# Patient Record
Sex: FEMALE | Race: WHITE | NOT HISPANIC OR LATINO | Employment: UNEMPLOYED | URBAN - METROPOLITAN AREA
[De-identification: names, ages, dates, MRNs, and addresses within clinical notes are randomized per-mention and may not be internally consistent; named-entity substitution may affect disease eponyms.]

---

## 2019-10-07 ENCOUNTER — HOSPITAL ENCOUNTER (EMERGENCY)
Facility: MEDICAL CENTER | Age: 34
End: 2019-10-07
Attending: EMERGENCY MEDICINE
Payer: COMMERCIAL

## 2019-10-07 VITALS
RESPIRATION RATE: 16 BRPM | HEART RATE: 75 BPM | SYSTOLIC BLOOD PRESSURE: 118 MMHG | TEMPERATURE: 96.8 F | WEIGHT: 219.14 LBS | BODY MASS INDEX: 35.22 KG/M2 | HEIGHT: 66 IN | OXYGEN SATURATION: 98 % | DIASTOLIC BLOOD PRESSURE: 72 MMHG
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
## 2019-10-07 LAB
HBV CORE AB SERPL QL IA: NEGATIVE
HBV SURFACE AB SERPL IA-ACNC: 118.52 MIU/ML (ref 0–10)
HBV SURFACE AG SER QL: NEGATIVE
HCV AB SER QL: NEGATIVE
HIV 1+2 AB+HIV1 P24 AG SERPL QL IA: NON REACTIVE

## 2019-10-07 PROCEDURE — 87340 HEPATITIS B SURFACE AG IA: CPT

## 2019-10-07 PROCEDURE — 86803 HEPATITIS C AB TEST: CPT

## 2019-10-07 PROCEDURE — 87389 HIV-1 AG W/HIV-1&-2 AB AG IA: CPT

## 2019-10-07 PROCEDURE — 36415 COLL VENOUS BLD VENIPUNCTURE: CPT

## 2019-10-07 PROCEDURE — 86704 HEP B CORE ANTIBODY TOTAL: CPT

## 2019-10-07 PROCEDURE — 99283 EMERGENCY DEPT VISIT LOW MDM: CPT

## 2019-10-07 PROCEDURE — 86706 HEP B SURFACE ANTIBODY: CPT

## 2019-10-07 NOTE — DISCHARGE INSTRUCTIONS
Please go to [facility] health tomorrow to review all of your lab results.  I recommend that the source patient at [facility] dialysis is tested today for transmissible infectious disease including hepatitis B and C, as well as HIV.  Return to the emergency department if you develop any new or worsening symptoms including worsening pain or swelling in the thumb, redness, fevers, generalized body aches, or if you have any further concerns.

## 2019-10-07 NOTE — LETTER
"  FORM C-4:  EMPLOYEE’S CLAIM FOR COMPENSATION/ REPORT OF INITIAL TREATMENT  EMPLOYEE’S CLAIM - PROVIDE ALL INFORMATION REQUESTED   First Name  Fela Last Name  Te Birthdate             Age  1985 34 y.o. Sex  female Claim Number   Home Employee Address  [address]   [facility]                                     Zip  [address] Height  1.676 m (5' 6\") Weight  99.4 kg (219 lb 2.2 oz) N  [id number]   Mailing Employee Address                           PO    [facility]               Zip  [address] Telephone  [phone] (home)  Primary Language Spoken  ENGLISH   Insurer   Third Party   ROYAL CLAIMS [id number] Employee's Occupation (Job Title) When Injury or Occupational Disease Occurred     Employer's Name  [employer] Tele[employer]      Employer Address  [address]] Zip  [address]   Date of Injury  10/7/2019       Hour of Injury  8:15 AM Date Employer Notified  10/7/2019 Last Day of Work after Injury or Occupational Disease  10/7/2019 Supervisor to Whom Injury Reported  AZAR    Address or Location of Accident (if applicable)  [[address] ]   What were you doing at the time of accident? (if applicable)  NEEDLE INSERTION     How did this injury or occupational disease occur? Be specific and answer in detail. Use additional sheet if necessary)  INSERTED ARTERIAL NEEDLE, INSERTED VENOUS NEEDLE AND ARTERIAL NEEDLE PROTRUDED THROUGH SKIN PUNCTURING MY LEFT THUMB    If you believe that you have an occupational disease, when did you first have knowledge of the disability and it relationship to your employment?  N/A Witnesses to the Accident  N/A     Nature of Injury or Occupational Disease  Workers' Compensation  Part(s) of Body Injured or Affected  Thumb (L), N/A, N/A    I certify that the above is true and correct to the best of my knowledge and that I have provided this information in order to obtain the " benefits of [place]).  I hereby authorize any physician, chiropractor, surgeon, practitioner, or other person, any hospital, including [facility] or [facility] hospital, any medical service organization, any insurance company, or other institution or organization to release to each other, any medical or other information, including benefits paid or payable, pertinent to this injury or disease, except information relative to diagnosis, treatment and/or counseling for AIDS, psychological conditions, alcohol or controlled substances, for which I must give specific authorization.  A Photostat of this authorization shall be as valid as the original.   Date  10/07/2019 Place  [facility] Employee’s Signature   THIS REPORT MUST BE COMPLETED AND MAILED WITHIN 3 WORKING DAYS OF TREATMENT   Place  [facility], EMERGENCY DEPT  Name of Facility   [facility]   Date  10/7/2019 Diagnosis  (W46.1XXA) Needlestick injury accident with exposure to body fluid Is there evidence the injured employee was under the influence of alcohol and/or another controlled substance at the time of accident?   Hour  10:55 AM Description of Injury or Disease  Needlestick injury accident with exposure to body fluid No   Treatment  pending  Have you advised the patient to remain off work five days or more?         No   X-Ray Findings      If Yes   From Date    To Date      From information given by the employee, together with medical evidence, can you directly connect this injury or occupational disease as job incurred?  Yes If No, is the employee capable of: Full Duty  Yes Modified Duty      Is additional medical care by a physician indicated?  Yes If Modified Duty, Specify any Limitations / Restrictions        Do you know of any previous injury or disease contributing to this condition or occupational  "disease?  No   Date  10/7/2019 Print Doctor’s Name  Gunner Rivas certify the employer’s copy of this form was mailed on:   Address  [address] [address] [phone] Insurer’s Use Only   [facility]  [address]    Provider’s Tax ID Number  [id number] Telephone  Dept: [phone]    Doctor’s Signature  e-GUNNER Boone M.D. Degree   MD    Original - TREATING PHYSICIAN OR CHIROPRACTOR   Pg 2-Insurer/TPA   Pg 3-Employer   Pg 4-Employee                                                                                                  Form C-4 (rev01/03)     BRIEF DESCRIPTION OF RIGHTS AND BENEFITS  (Pursuant to [place] 616C.050)    Notice of Injury or Occupational Disease (Incident Report Form C-1): If an injury or occupational disease (OD) arises out of and in the course of employment, you must provide written notice to your employer as soon as practicable, but no later than 7 days after the accident or OD. Your employer shall maintain a sufficient supply of the required forms.  Claim for Compensation (Form C-4): If medical treatment is sought, the form C-4 is available at the place of initial treatment. A completed \"Claim for Compensation\" (Form C-4) must be filed within 90 days after an accident or OD. The treating physician or chiropractor must, within 3 working days after treatment, complete and mail to the employer, the employer's insurer and third-party , the Claim for Compensation.  Medical Treatment: If you require medical treatment for your on-the-job injury or OD, you may be required to select a physician or chiropractor from a list provided by your workers’ compensation insurer, if it has contracted with an Organization for Managed Care (MCO) or Preferred Provider Organization (PPO) or providers of health care. If your employer has not entered into a contract with an MCO or PPO, you may select a physician or chiropractor from the Panel of Physicians and " Chiropractors. Any medical costs related to your industrial injury or OD will be paid by your insurer.  Temporary Total Disability (TTD): If your doctor has certified that you are unable to work for a period of at least 5 consecutive days, or 5 cumulative days in a 20-day period, or places restrictions on you that your employer does not accommodate, you may be entitled to TTD compensation.  Temporary Partial Disability (TPD): If the wage you receive upon reemployment is less than the compensation for TTD to which you are entitled, the insurer may be required to pay you TPD compensation to make up the difference. TPD can only be paid for a maximum of 24 months.  Permanent Partial Disability (PPD): When your medical condition is stable and there is an indication of a PPD as a result of your injury or OD, within 30 days, your insurer must arrange for an evaluation by a rating physician or chiropractor to determine the degree of your PPD. The amount of your PPD award depends on the date of injury, the results of the PPD evaluation and your age and wage.  Permanent Total Disability (PTD): If you are medically certified by a treating physician or chiropractor as permanently and totally disabled and have been granted a PTD status by your insurer, you are entitled to receive monthly benefits not to exceed 66 2/3% of your average monthly wage. The amount of your PTD payments is subject to reduction if you previously received a PPD award.  Vocational Rehabilitation Services: You may be eligible for vocational rehabilitation services if you are unable to return to the job due to a permanent physical impairment or permanent restrictions as a result of your injury or occupational disease.  Transportation and Per Leonor Reimbursement: You may be eligible for travel expenses and per leonor associated with medical treatment.  Reopening: You may be able to reopen your claim if your condition worsens after claim closure.  Appeal Process:  If you disagree with a written determination issued by the insurer or the insurer does not respond to your request, you may appeal to the Department of Administration, , by following the instructions contained in your determination letter. You must appeal the determination within 70 days from the date of the determination letter at [address], [address], [place], [place] [address], or [address], [address], [place], [place] [address]. If you disagree with the  decision, you may appeal to the Department of Administration, . You must file your appeal within 30 days from the date of the  decision letter at [address], [address], [place], [place] [address], or [address], [address], [place], [place] [address]. If you disagree with a decision of an , you may file a petition for judicial review with the District Court. You must do so within 30 days of the Appeal Officer’s decision. You may be represented by an  at your own expense or you may contact the [facility] for possible representation.  [place]  for Injured Workers (NAIW): If you disagree with a  decision, you may request that NAIW represent you without charge at an  Hearing. For information regarding denial of benefits, you may contact the [facility] at: [address], [address], [place] [address], [phone], or [address], [address], [place] [address], [phone]  To File a Complaint with the Division: If you wish to file a complaint with the  of the Division of Industrial Relations (DIR), please contact the Workers’ Compensation Section, [address], [address], [place], [place] [address], telephone (874) [phone], or [address], [place] [address], telephone [phone].  For assistance with Workers’ Compensation Issues: you  may contact the Office of the Governor Consumer Health Assistance, [address] [place] [address], [address], [place] [address], Toll Free 1-308.113.6364, Web site: http://govcha.[facility].nv.us, E-mail [email or web address].                                                                                                                                                                               __________________________________________________________________                                    _________________            Employee Name / Signature                                                                                                                            Date                                       D-2 (rev. 10/07)

## 2019-10-07 NOTE — ED PROVIDER NOTES
ED Provider Note    Chief Complaint:   Needlestick injury    HPI:  Fela Tiwari is a 34 y.o. female who presents with a needlestick injury to the left thumb.  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  She reports that the source patient is known to be hepatitis B positive, she reports that she has been fully vaccinated against hepatitis B.  Additionally her titers have been checked and were found to be adequate.  She thoroughly cleaned the wound, and presented to the emergency department for further evaluation.    She has no history of infectious disease, no history of poor wound healing, no history of impaired immunity.    Source patient is a [facility] dialysis patient.    Review of Systems:  See HPI for pertinent positives and negatives.    Past Medical History:   has a past medical history of Breath shortness, Bronchitis (2014), Cholesterol blood decreased, Heart burn, Personal history of venous thrombosis and embolism (&), and Unspecified hemorrhagic conditions.    Social History:  Social History     Tobacco Use   • Smoking status: Former Smoker     Packs/day: 0.50     Years: 6.00     Pack years: 3.00     Types: Cigarettes     Last attempt to quit: 3/1/2005     Years since quittin.6   Substance and Sexual Activity   • Alcohol use: No   • Drug use: No   • Sexual activity: Not on file       Surgical History:   has a past surgical history that includes tonsillectomy (); liposuction (2009); liposuction (3/4/2010); other; hysterectomy robotic (2013); gastric sleeve laparoscopy (2014); liver biopsy laparoscopic (2014); and jordon by laparoscopy (2015).    Current Medications:  Home Medications    **Home medications have not yet been reviewed for this encounter**         Allergies:  Allergies   Allergen Reactions   • Morphine Sulfate Itching       Physical Exam:  Vital Signs: /72   Pulse 75   Temp 36 °C (96.8 °F)  "(Temporal)   Resp 16   Ht 1.676 m (5' 6\")   Wt 99.4 kg (219 lb 2.2 oz)   LMP 02/10/2010   SpO2 98%   BMI 35.37 kg/m²   Constitutional: Alert, no acute distress, well appearing  HENT: Normocephalic  Eyes: Normal conjunctiva, no ocular irritation  Cardiovascular: Extremities are warm and well perfused  Pulmonary: No respiratory distress, normal work of breathing  Skin: Needle secondary to the left thumb, left thumb is clean, skin is dry and intact, no visible wound  Neurologic: Normal speech, normal motor function  Psychiatric: Normal and appropriate mood and affect    Labs:  Labs Reviewed   EXPOSED PERSON-SOURCE PT NEGATIVE (BLOOD & BODY FLUID EXPOSURE) - Abnormal; Notable for the following components:       Result Value    Hep B Surface Antibody Quant 118.52 (*)     All other components within normal limits       MDM:  Patient presents for evaluation after an occupational exposure. Affected area was copiously irrigated and cleansed prior to arrival. Post exposure labs were drawn from exposed patient. At this time, this appears to be a low risk exposure, emergent HIV post exposure prophylaxis was discussed and is not indicated on my initial assessment.  Patient does not have a known history of HIV.  CDC guidelines reviewed (MMWR December 2013), recommendations for vaccinated healthcare personnel with anti-HBs > 10mIU/mL no postexposure management for HPV is necessary, regardless of source patient's HBsAg status.  Hepatitis B surface antibody result is 118, well over 10mIU/mL. The patient will follow up with [facility] within 12-24 hours for complete recheck, and to receive results of post-exposure labs.     Return precautions discussed including redness or irritation at the site of exposure, fevers, nausea or vomiting, or any new or worsening symptoms.    Disposition:  Discharge home in stable condition.    Final Impression:  1. Needlestick injury accident with exposure to body fluid  "       Electronically signed by: Esme Rivas, 10/7/2019 11:41 AM

## 2019-10-07 NOTE — ED NOTES
Pt had a needle stick at [facility].  Per pt the needle stick was from a Hep B+ pt.  erp at bedside

## 2019-10-10 ENCOUNTER — OCCUPATIONAL MEDICINE (OUTPATIENT)
Dept: OCCUPATIONAL MEDICINE | Facility: CLINIC | Age: 34
End: 2019-10-10
Payer: COMMERCIAL

## 2019-10-10 DIAGNOSIS — Z77.21 EXPOSURE TO BLOOD OR BODY FLUID: ICD-10-CM

## 2019-10-10 PROCEDURE — 99203 OFFICE O/P NEW LOW 30 MIN: CPT | Mod: 29 | Performed by: NURSE PRACTITIONER

## 2019-10-10 ASSESSMENT — ENCOUNTER SYMPTOMS
PSYCHIATRIC NEGATIVE: 1
RESPIRATORY NEGATIVE: 1
NEUROLOGICAL NEGATIVE: 1
CONSTITUTIONAL NEGATIVE: 1
MUSCULOSKELETAL NEGATIVE: 1
CARDIOVASCULAR NEGATIVE: 1

## 2019-10-10 NOTE — PROGRESS NOTES
Subjective:      Fela Tiwari is a 34 y.o. female who presents with Other (NEW )      DOI 10/7/19: Patient was doing needle insertion.  Inserted arterial needle, inserted venous needle and arterial needle protruded through skin puncturing my left thumb.needlestick injury to the left thumb.  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  She reports that the source patient is known to be hepatitis B positive, she reports that she has been fully vaccinated against hepatitis B.  Additionally her titers have been checked and were found to be adequate.  Today patient states overall improvement.  Denies any redness, edema, warmth, or discharge.  Baseline labs reviewed with patient.  Patient has immunity against hepatitis B, hep C neg and HIV non-reactive.  Patient is asymptomatic.  Patient states that source labs for hep C and HIV were drawn yesterday, results pending and unsure where labs were sent.  Plan of care discussed with patient     HPI    Review of Systems   Constitutional: Negative.    Respiratory: Negative.    Cardiovascular: Negative.    Musculoskeletal: Negative.    Skin: Negative.    Neurological: Negative.    Psychiatric/Behavioral: Negative.         ROS: All systems were reviewed on intake form, form was reviewed and signed. See scanned documents in media. Pertinent positives and negatives included in HPI.    PMH: No pertinent past medical history to this problem  MEDS: Medications were reviewed in Epic  ALLERGIES:   Allergies   Allergen Reactions   • Morphine Sulfate Itching     SOCHX: Works as CCHT at [employer]  FH: No pertinent family history to this problem       Objective:     LMP 02/10/2010      Physical Exam   Constitutional: She is oriented to person, place, and time. She appears well-developed and well-nourished.   Cardiovascular: Normal rate and regular rhythm.   Pulmonary/Chest: Effort normal.   Musculoskeletal: Normal  range of motion. She exhibits no edema, tenderness or deformity.   Neurological: She is alert and oriented to person, place, and time.   Skin: Skin is warm and dry. Capillary refill takes less than 2 seconds. No erythema.   Psychiatric: She has a normal mood and affect. Her behavior is normal.       Left thumb:  Skin is clean, dry, and intact.  Negative erythema, edema, warmth, or discharge  Sensation intact  Full range of motion  Brisk cap refill       Assessment/Plan:     1. Exposure to blood or body fluid    Follow-up next Tuesday, to review source labs  Released to full duty  Source known positive for hep B   Discussed risks and benefits of HIV prophylaxis, due to the low risk nature of this incident no HIV prophylaxis was recommended.  Patient amenable to this.

## 2019-10-10 NOTE — LETTER
[address],   [address] [place] [address]  Phone:  [phone] - Fax:  [phone]   [facility] Progress Report and Disability Certification  Date of Service: 10/10/2019   No Show:  No  Date / Time of Next Visit: 10/15/2019 @ 3:45 PM   Claim Information   Patient Name: Fela Tiwari  Claim Number:     Employer: [employer]  Date of Injury: 10/7/2019     Insurer / TPA: [employer]  ID / SSN:     Occupation: [employer]  Diagnosis: The encounter diagnosis was Exposure to blood or body fluid.    Medical Information   Related to Industrial Injury? Yes    Subjective Complaints:  DOI 10/7/19: Patient was doing needle insertion.  Inserted arterial needle, inserted venous needle and arterial needle protruded through skin puncturing my left thumb.needlestick injury to the left thumb.  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  She reports that the source patient is known to be hepatitis B positive, she reports that she has been fully vaccinated against hepatitis B.  Additionally her titers have been checked and were found to be adequate.  Today patient states overall improvement.  Denies any redness, edema, warmth, or discharge.  Baseline labs reviewed with patient.  Patient has immunity against hepatitis B, hep C neg and HIV non-reactive.  Patient is asymptomatic.  Patient states that source labs for hep C and HIV were drawn yesterday, results pending and unsure where labs were sent.  Plan of care discussed with patient   Objective Findings: Left thumb:  Skin is clean, dry, and intact.  Negative erythema, edema, warmth, or discharge  Sensation intact  Full range of motion  Brisk cap refill   Pre-Existing Condition(s):     Assessment:   Condition Improved    Status: Additional Care Required  Permanent Disability:No    Plan:      Diagnostics:      Comments:  Follow-up next  Tuesday, to review source labs  Released to full duty  Source known positive for hep B   Discussed risks and benefits of HIV prophylaxis, due to the low risk nature of this incident no HIV prophylaxis was recommended.  Patient amenable   to this.      Disability Information   Status: Released to Full Duty    From:  10/10/2019  Through: 10/15/2019 Restrictions are:     Physical Restrictions   Sitting:    Standing:    Stooping:    Bending:      Squatting:    Walking:    Climbing:    Pushing:      Pulling:    Other:    Reaching Above Shoulder (L):   Reaching Above Shoulder (R):       Reaching Below Shoulder (L):    Reaching Below Shoulder (R):      Not to exceed Weight Limits   Carrying(hrs):   Weight Limit(lb):   Lifting(hrs):   Weight  Limit(lb):     Comments:      Repetitive Actions   Hands: i.e. Fine Manipulations from Grasping:     Feet: i.e. Operating Foot Controls:     Driving / Operate Machinery:     Physician Name: JAUN Hayden Physician Signature:   e-Signature: Dr. Wilder Ocasio, Medical Director   Clinic Name / Location: [address] [address] Clinic Phone Number: Dept: [phone]   Appointment Time: 11:30 Am Visit Start Time: 11:38 AM   Check-In Time:  11:30 Am Visit Discharge Time:  12:13 PM   Original-Treating Physician or Chiropractor    Page 2-Insurer/TPA    Page 3-Employer    Page 4-Employee

## 2020-05-31 ENCOUNTER — HOSPITAL ENCOUNTER (EMERGENCY)
Dept: HOSPITAL 8 - ED | Age: 35
Discharge: HOME | End: 2020-05-31
Payer: COMMERCIAL

## 2020-05-31 VITALS — WEIGHT: 224.43 LBS | HEIGHT: 66 IN | BODY MASS INDEX: 36.07 KG/M2

## 2020-05-31 VITALS — SYSTOLIC BLOOD PRESSURE: 110 MMHG | DIASTOLIC BLOOD PRESSURE: 78 MMHG

## 2020-05-31 DIAGNOSIS — M62.830: ICD-10-CM

## 2020-05-31 DIAGNOSIS — R00.0: ICD-10-CM

## 2020-05-31 DIAGNOSIS — Z86.711: ICD-10-CM

## 2020-05-31 DIAGNOSIS — Z90.710: ICD-10-CM

## 2020-05-31 DIAGNOSIS — R06.02: ICD-10-CM

## 2020-05-31 DIAGNOSIS — R06.00: Primary | ICD-10-CM

## 2020-05-31 LAB
ALBUMIN SERPL-MCNC: 3.8 G/DL (ref 3.4–5)
ALP SERPL-CCNC: 55 U/L (ref 45–117)
ALT SERPL-CCNC: 29 U/L (ref 12–78)
ANION GAP SERPL CALC-SCNC: 6 MMOL/L (ref 5–15)
BASOPHILS # BLD AUTO: 0.06 X10^3/UL (ref 0–0.1)
BASOPHILS NFR BLD AUTO: 1 % (ref 0–1)
BILIRUB SERPL-MCNC: 1.1 MG/DL (ref 0.2–1)
CALCIUM SERPL-MCNC: 8.9 MG/DL (ref 8.5–10.1)
CHLORIDE SERPL-SCNC: 110 MMOL/L (ref 98–107)
CREAT SERPL-MCNC: 1.22 MG/DL (ref 0.55–1.02)
EOSINOPHIL # BLD AUTO: 0.06 X10^3/UL (ref 0–0.4)
EOSINOPHIL NFR BLD AUTO: 1 % (ref 1–7)
ERYTHROCYTE [DISTWIDTH] IN BLOOD BY AUTOMATED COUNT: 12.8 % (ref 9.6–15.2)
LYMPHOCYTES # BLD AUTO: 2.24 X10^3/UL (ref 1–3.4)
LYMPHOCYTES NFR BLD AUTO: 26 % (ref 22–44)
MCH RBC QN AUTO: 31.7 PG (ref 27–34.8)
MCHC RBC AUTO-ENTMCNC: 33.5 G/DL (ref 32.4–35.8)
MCV RBC AUTO: 94.8 FL (ref 80–100)
MD: NO
MONOCYTES # BLD AUTO: 0.49 X10^3/UL (ref 0.2–0.8)
MONOCYTES NFR BLD AUTO: 6 % (ref 2–9)
NEUTROPHILS # BLD AUTO: 5.7 X10^3/UL (ref 1.8–6.8)
NEUTROPHILS NFR BLD AUTO: 67 % (ref 42–75)
PLATELET # BLD AUTO: 189 X10^3/UL (ref 130–400)
PMV BLD AUTO: 9.9 FL (ref 7.4–10.4)
PROT SERPL-MCNC: 7 G/DL (ref 6.4–8.2)
RBC # BLD AUTO: 4.78 X10^6/UL (ref 3.82–5.3)

## 2020-05-31 PROCEDURE — 36415 COLL VENOUS BLD VENIPUNCTURE: CPT

## 2020-05-31 PROCEDURE — 85025 COMPLETE CBC W/AUTO DIFF WBC: CPT

## 2020-05-31 PROCEDURE — 93005 ELECTROCARDIOGRAM TRACING: CPT

## 2020-05-31 PROCEDURE — 80053 COMPREHEN METABOLIC PANEL: CPT

## 2020-05-31 PROCEDURE — 99285 EMERGENCY DEPT VISIT HI MDM: CPT

## 2020-05-31 PROCEDURE — 96374 THER/PROPH/DIAG INJ IV PUSH: CPT

## 2020-05-31 PROCEDURE — 71275 CT ANGIOGRAPHY CHEST: CPT

## 2020-05-31 NOTE — NUR
PT MEDICATED FOR PAIN PER ORDERS. PT AWARE OF POC, PT TO BE D/C AFTER MEDS WHEN 
SAFE TO GO HOME. ER OPA-C AT BEDSIDE TO DISCUSS POC WITH PT. PT REMAINS ON 
MONITORS, VSS. CONT TO MONITOR,.

## 2020-05-31 NOTE — NUR
PT STATES SOB AND RT UPPER BACK PAIN, WORSE WITH DEEP BREATHING. PT STATES HX 
OF PE. PT IS PROTECTING OWN AIRWAY WELL, NO RESPIR DISTRESS NOTED. PT PLACED ON 
ALL MONITORS, VSS. IV STARTED, LABS DRAWN AND SENT TO LAB. AWAITING CT. CONT TO 
MONITOR.

## 2020-09-23 ENCOUNTER — OFFICE VISIT (OUTPATIENT)
Dept: URGENT CARE | Facility: CLINIC | Age: 35
End: 2020-09-23
Payer: MEDICAID

## 2020-09-23 VITALS
DIASTOLIC BLOOD PRESSURE: 64 MMHG | HEART RATE: 85 BPM | WEIGHT: 205 LBS | HEIGHT: 66 IN | BODY MASS INDEX: 32.95 KG/M2 | SYSTOLIC BLOOD PRESSURE: 112 MMHG | TEMPERATURE: 97.5 F | OXYGEN SATURATION: 96 % | RESPIRATION RATE: 16 BRPM

## 2020-09-23 DIAGNOSIS — L29.9 PRURITUS: ICD-10-CM

## 2020-09-23 DIAGNOSIS — R21 RASH AND NONSPECIFIC SKIN ERUPTION: ICD-10-CM

## 2020-09-23 PROCEDURE — 99214 OFFICE O/P EST MOD 30 MIN: CPT | Performed by: NURSE PRACTITIONER

## 2020-09-23 RX ORDER — HYDROXYZINE HYDROCHLORIDE 25 MG/1
25 TABLET, FILM COATED ORAL EVERY 6 HOURS PRN
Qty: 30 TAB | Refills: 0 | Status: SHIPPED | OUTPATIENT
Start: 2020-09-23 | End: 2022-05-28

## 2020-09-23 RX ORDER — PREDNISONE 20 MG/1
TABLET ORAL
Qty: 15 TAB | Refills: 0 | Status: SHIPPED | OUTPATIENT
Start: 2020-09-23 | End: 2022-05-28

## 2020-09-23 RX ORDER — DIPHENHYDRAMINE HCL 25 MG
25 TABLET ORAL EVERY 6 HOURS PRN
COMMUNITY
End: 2022-05-28

## 2020-09-23 ASSESSMENT — ENCOUNTER SYMPTOMS
CHILLS: 0
CONSTITUTIONAL NEGATIVE: 1
RESPIRATORY NEGATIVE: 1
FEVER: 0

## 2020-09-23 ASSESSMENT — VISUAL ACUITY: OU: 1

## 2020-09-23 NOTE — PROGRESS NOTES
Subjective:     Fela Tiwari is a 35 y.o. female who presents for Rash (x 5 weeks all over the body)       Rash  This is a new problem. Episode onset: 5 weeks ago. The problem has been gradually worsening since onset. Pertinent negatives include no fever.     Patient reports 5 weeks ago, she started to experience generalized itchiness and bumps on her skin all over her body.  Symptoms come and go.  Has tried using Benadryl and topical creams with minimal improvement in the symptoms.  Denies known allergens except for morphine.  Denies changes in soaps or laundry detergents.  Patient reports that 5 weeks ago she went camping with her friend.  Reports that her friend was exposed to poison oak and had symptoms consisting of blistering and itching of the skin.    Patient was screened prior to rooming and denied COVID-19 diagnosis or contact with a person who has been diagnosed or is suspected to have COVID-19. During this visit, appropriate PPE was worn, hand hygiene was performed, and the patient and any visitors were masked.     PMH:  has a past medical history of Breath shortness, Bronchitis (6/2014), Cholesterol blood decreased, Heart burn, Personal history of venous thrombosis and embolism (2005&2012), and Unspecified hemorrhagic conditions.    MEDS:   Current Outpatient Medications:   •  diphenhydrAMINE (BENADRYL) 25 MG Tab, Take 25 mg by mouth every 6 hours as needed for Sleep., Disp: , Rfl:   •  predniSONE (DELTASONE) 20 MG Tab, Take 3 tabs daily x 3 days, then 2 tabs daily x 2 days, then 1 tab x 2 days., Disp: 15 Tab, Rfl: 0  •  hydrOXYzine HCl (ATARAX) 25 MG Tab, Take 1 Tab by mouth every 6 hours as needed for Itching., Disp: 30 Tab, Rfl: 0  •  hydrocodone-acetaminophen 2.5-108 mg/5mL (HYCET) 7.5-325 MG/15ML solution, Take 18 mL by mouth 4 times a day as needed for Moderate Pain., Disp: 240 mL, Rfl: 0  •  oxycodone-acetaminophen (ROXICET) 5-325 MG/5ML SOLN, Take 5 mL by mouth every four hours as  "needed., Disp: 240 mL, Rfl: 0  •  enoxaparin (LOVENOX) 80 MG/0.8ML SOLN inj, Inject 1 Syringe as instructed every 12 hours., Disp: , Rfl:   •  lansoprazole (PREVACID) 15 MG CPDR, Take 15 mg by mouth every day., Disp: , Rfl:   •  rivaroxaban (XARELTO) 20 MG TABS tablet, Take 1 Tab by mouth every morning., Disp: 30 Tab, Rfl:     ALLERGIES:   Allergies   Allergen Reactions   • Morphine Sulfate Itching     SURGHX:   Past Surgical History:   Procedure Laterality Date   • DIEGO BY LAPAROSCOPY  2015    Performed by Dre Malone M.D. [place] [facility] [place]   • GASTRIC SLEEVE LAPAROSCOPY  2014    Performed by Dre Malone M.D. at [place] [facility] [place]   • LIVER BIOPSY LAPAROSCOPIC  2014    Performed by Dre Malone M.D. at [facility] [facility] [place]   • HYSTERECTOMY ROBOTIC  2013   • LIPOSUCTION  3/4/2010    Performed by RAMON NUNN at SURGERY [facility]   • LIPOSUCTION  2009    Performed by RAMON NUNN at [facility]   • TONSILLECTOMY     • OTHER       x2     SOCHX:  reports that she quit smoking about 15 years ago. Her smoking use included cigarettes. She has a 3.00 pack-year smoking history. She does not have any smokeless tobacco history on file. She reports that she does not drink alcohol or use drugs.     FH: Reviewed with patient, not pertinent to this visit.    Review of Systems   Constitutional: Negative.  Negative for chills, fever and malaise/fatigue.   Respiratory: Negative.    Skin: Positive for itching and rash.   All other systems reviewed and are negative.    Additional details per HPI.      Objective:     /64 (BP Location: Left arm, Patient Position: Sitting, BP Cuff Size: Adult long)   Pulse 85   Temp 36.4 °C (97.5 °F) (Temporal)   Resp 16   Ht 1.676 m (5' 6\")   Wt 93 kg (205 lb)   LMP 02/10/2010   SpO2 96%   BMI 33.09 kg/m²     Physical Exam  Vitals signs reviewed.   Constitutional:       General: She is not in acute " distress.     Appearance: She is well-developed. She is not ill-appearing or toxic-appearing.   HENT:      Head: Normocephalic.      Right Ear: External ear normal.      Left Ear: External ear normal.      Nose: Nose normal.   Eyes:      General: Vision grossly intact.      Extraocular Movements: Extraocular movements intact.      Conjunctiva/sclera: Conjunctivae normal.   Neck:      Musculoskeletal: Normal range of motion.   Cardiovascular:      Rate and Rhythm: Normal rate.   Pulmonary:      Effort: Pulmonary effort is normal. No respiratory distress.   Musculoskeletal: Normal range of motion.         General: No deformity.   Skin:     General: Skin is warm and dry.      Coloration: Skin is not pale.      Findings: Rash present. Rash is papular (Diffuse, small skin-colored papules on arms, back, and chest).   Neurological:      Mental Status: She is alert and oriented to person, place, and time.      Sensory: No sensory deficit.      Motor: No weakness.      Coordination: Coordination normal.   Psychiatric:         Behavior: Behavior normal. Behavior is cooperative.       Assessment/Plan:     1. Rash and nonspecific skin eruption  - predniSONE (DELTASONE) 20 MG Tab; Take 3 tabs daily x 3 days, then 2 tabs daily x 2 days, then 1 tab x 2 days.  Dispense: 15 Tab; Refill: 0    2. Pruritus  - hydrOXYzine HCl (ATARAX) 25 MG Tab; Take 1 Tab by mouth every 6 hours as needed for Itching.  Dispense: 30 Tab; Refill: 0    Rx as above sent electronically. Advised of potential sedating effects of Atarax. Not for use while at work or while driving.    Likely allergic rash.  No signs/symptoms of infection.  Avoid allergens.  Wash/clean personal belongings.    Differential diagnosis, natural history, supportive care, over-the-counter symptom management per 's instructions, close monitoring, and indications for immediate follow-up discussed.     Patient advised to: Return for 1) Symptoms that worsen/don't improve, or go  to ER, 2) Follow up with primary care in 7-10 days.    All questions answered. Patient agrees with the plan of care.

## 2021-03-17 ENCOUNTER — HOSPITAL ENCOUNTER (OUTPATIENT)
Facility: MEDICAL CENTER | Age: 36
End: 2021-03-17
Attending: PHYSICIAN ASSISTANT
Payer: MEDICAID

## 2021-03-17 ENCOUNTER — OFFICE VISIT (OUTPATIENT)
Dept: URGENT CARE | Facility: CLINIC | Age: 36
End: 2021-03-17
Payer: MEDICAID

## 2021-03-17 VITALS
OXYGEN SATURATION: 97 % | SYSTOLIC BLOOD PRESSURE: 110 MMHG | HEIGHT: 66 IN | RESPIRATION RATE: 14 BRPM | TEMPERATURE: 98.5 F | BODY MASS INDEX: 33.11 KG/M2 | HEART RATE: 75 BPM | DIASTOLIC BLOOD PRESSURE: 64 MMHG | WEIGHT: 206 LBS

## 2021-03-17 DIAGNOSIS — M54.50 CHRONIC BILATERAL LOW BACK PAIN WITHOUT SCIATICA: ICD-10-CM

## 2021-03-17 DIAGNOSIS — G89.29 CHRONIC BILATERAL LOW BACK PAIN WITHOUT SCIATICA: ICD-10-CM

## 2021-03-17 DIAGNOSIS — R52 BODY ACHES: ICD-10-CM

## 2021-03-17 LAB
APPEARANCE UR: CLEAR
BILIRUB UR STRIP-MCNC: NEGATIVE MG/DL
COLOR UR AUTO: YELLOW
COVID ORDER STATUS COVID19: NORMAL
FLUAV+FLUBV AG SPEC QL IA: NEGATIVE
GLUCOSE UR STRIP.AUTO-MCNC: NEGATIVE MG/DL
INT CON NEG: NEGATIVE
INT CON POS: POSITIVE
KETONES UR STRIP.AUTO-MCNC: NEGATIVE MG/DL
LEUKOCYTE ESTERASE UR QL STRIP.AUTO: NEGATIVE
NITRITE UR QL STRIP.AUTO: NEGATIVE
PH UR STRIP.AUTO: 5.5 [PH] (ref 5–8)
PROT UR QL STRIP: NEGATIVE MG/DL
RBC UR QL AUTO: NEGATIVE
SP GR UR STRIP.AUTO: 1.03
UROBILINOGEN UR STRIP-MCNC: 0.2 MG/DL

## 2021-03-17 PROCEDURE — U0003 INFECTIOUS AGENT DETECTION BY NUCLEIC ACID (DNA OR RNA); SEVERE ACUTE RESPIRATORY SYNDROME CORONAVIRUS 2 (SARS-COV-2) (CORONAVIRUS DISEASE [COVID-19]), AMPLIFIED PROBE TECHNIQUE, MAKING USE OF HIGH THROUGHPUT TECHNOLOGIES AS DESCRIBED BY CMS-2020-01-R: HCPCS

## 2021-03-17 PROCEDURE — U0005 INFEC AGEN DETEC AMPLI PROBE: HCPCS

## 2021-03-17 PROCEDURE — 99214 OFFICE O/P EST MOD 30 MIN: CPT | Performed by: PHYSICIAN ASSISTANT

## 2021-03-17 PROCEDURE — 81002 URINALYSIS NONAUTO W/O SCOPE: CPT | Performed by: PHYSICIAN ASSISTANT

## 2021-03-17 PROCEDURE — 87804 INFLUENZA ASSAY W/OPTIC: CPT | Performed by: PHYSICIAN ASSISTANT

## 2021-03-17 ASSESSMENT — ENCOUNTER SYMPTOMS
DIARRHEA: 1
MYALGIAS: 1
COUGH: 0
HEADACHES: 0
SHORTNESS OF BREATH: 0
ABDOMINAL PAIN: 0
CONSTIPATION: 0
CHILLS: 1
EYE PAIN: 0
SORE THROAT: 0
VOMITING: 1
NAUSEA: 1
FEVER: 1

## 2021-03-17 NOTE — PROGRESS NOTES
Subjective:   Fela Tiwari is a 36 y.o. female who presents for Nausea/Vomiting/Diarrhea ( x 2 days with bodyaches. Denies fever or chills. ) and Back Pain ( x 3 years in mid back. Denies any urinary problems.  Hx of compressed disc in back.  Referral to Spine specialist. )      HPI:  35 yo female presents c/o n/v/d with myalgias xx 2 days. F/c has resolved. No known sick contacts, no recent travel. No natural immunity to covid.  Also requesting referral to spine surgery, no new back pain/paresthesias/weakness.     Review of Systems   Constitutional: Positive for chills, fever and malaise/fatigue.   HENT: Negative for congestion, ear pain and sore throat.    Eyes: Negative for pain.   Respiratory: Negative for cough and shortness of breath.    Cardiovascular: Negative for chest pain.   Gastrointestinal: Positive for diarrhea, nausea and vomiting. Negative for abdominal pain and constipation.   Genitourinary: Negative for dysuria.   Musculoskeletal: Positive for myalgias.   Skin: Negative for rash.   Neurological: Negative for headaches.       Medications:    • diphenhydrAMINE Tabs  • enoxaparin Soln  • HYDROcodone-acetaminophen 2.5-108 mg/5mL  • hydrOXYzine HCl Tabs  • lansoprazole Cpdr  • oxyCODONE-acetaminophen Soln  • predniSONE Tabs  • rivaroxaban Tabs    Allergies: Morphine sulfate    Problem List: Fela Tiwari has Morbid obesity (HCC) and Other specified disorder of gallbladder on their problem list.    Surgical History:  Past Surgical History:   Procedure Laterality Date   • DIEGO BY LAPAROSCOPY  4/29/2015    Performed by Dre Malone M.D. at SURGERY [facility]   • GASTRIC SLEEVE LAPAROSCOPY  12/31/2014    Performed by Dre Malone M.D. at [facility]   • LIVER BIOPSY LAPAROSCOPIC  12/31/2014    Performed by Dre Malone M.D. at SURGERY [facility]   • HYSTERECTOMY ROBOTIC  9/2013   • LIPOSUCTION  3/4/2010    Performed by RAMON NUNN at [facility]  "  • LIPOSUCTION  2009    Performed by RAMON NUNN at SURGERY [facility]   • TONSILLECTOMY     • OTHER       x2       Past Social Hx: Fela Tiwari  reports that she quit smoking about 3 years ago. Her smoking use included cigarettes. She has a 3.00 pack-year smoking history. She has never used smokeless tobacco. She reports that she does not drink alcohol and does not use drugs.     Past Family Hx:  Fela Tiwari family history includes Cancer in an other family member; Diabetes in an other family member; Hypertension in an other family member.     Problem list, medications, and allergies reviewed by myself today in Epic.     Objective:     /64   Pulse 75   Temp 36.9 °C (98.5 °F) (Temporal)   Resp 14   Ht 1.676 m (5' 6\")   Wt 93.4 kg (206 lb)   LMP 02/10/2010   SpO2 97%   BMI 33.25 kg/m²     Physical Exam  Vitals reviewed.   Constitutional:       Appearance: Normal appearance.   HENT:      Head: Normocephalic and atraumatic.      Right Ear: External ear normal.      Left Ear: External ear normal.      Nose: Nose normal.      Mouth/Throat:      Mouth: Mucous membranes are moist.   Eyes:      Conjunctiva/sclera: Conjunctivae normal.   Cardiovascular:      Rate and Rhythm: Normal rate.   Pulmonary:      Effort: Pulmonary effort is normal.   Abdominal:      Tenderness: There is no abdominal tenderness.   Musculoskeletal:         General: No tenderness.   Skin:     General: Skin is warm and dry.      Capillary Refill: Capillary refill takes less than 2 seconds.   Neurological:      Mental Status: She is alert and oriented to person, place, and time.      Coordination: Coordination normal.      Gait: Gait normal.         Lab Results/POC Test Results   Results for orders placed or performed in visit on 21   POCT Influenza A/B   Result Value Ref Range    Rapid Influenza A-B Negative     Internal Control Positive Positive     Internal Control Negative " Negative            Assessment/Plan:     Diagnosis and associated orders:     1. Body aches  COVID/SARS CoV-2 PCR    POCT Influenza A/B    POCT Urinalysis    CANCELED: POCT PREGNANCY   2. Chronic bilateral low back pain without sciatica  REFERRAL TO SPINE SURGERY      Comments/MDM:     • UA unremarkable - unlikely to represent pylo/upper URINARY TRACT INFECTION  • Influenza negative  • covid test pending - no exposures. D/w patient supportive care, return precautions, isolation, etc.  Work note provided. Results available via [employer]  • Referral to spine as requested for patient  • Likely nonspecific AGE         Differential diagnosis, natural history, supportive care, and indications for immediate follow-up discussed.    Advised the patient to follow-up with the primary care physician for recheck, reevaluation, and consideration of further management.    Please note that this dictation was created using voice recognition software. I have made a reasonable attempt to correct obvious errors, but I expect that there are errors of grammar and possibly content that I did not discover before finalizing the note.    This note was electronically signed by Quentin Jaimes PA-C

## 2021-03-18 LAB
SARS-COV-2 RNA RESP QL NAA+PROBE: NOTDETECTED
SPECIMEN SOURCE: NORMAL

## 2021-04-09 ENCOUNTER — HOSPITAL ENCOUNTER (EMERGENCY)
Facility: MEDICAL CENTER | Age: 36
End: 2021-04-09
Attending: EMERGENCY MEDICINE
Payer: COMMERCIAL

## 2021-04-09 VITALS
DIASTOLIC BLOOD PRESSURE: 52 MMHG | BODY MASS INDEX: 32.56 KG/M2 | SYSTOLIC BLOOD PRESSURE: 90 MMHG | HEART RATE: 57 BPM | HEIGHT: 66 IN | RESPIRATION RATE: 14 BRPM | WEIGHT: 202.6 LBS | OXYGEN SATURATION: 97 % | TEMPERATURE: 98 F

## 2021-04-09 DIAGNOSIS — R51.9 ACUTE NONINTRACTABLE HEADACHE, UNSPECIFIED HEADACHE TYPE: ICD-10-CM

## 2021-04-09 LAB — D DIMER PPP IA.FEU-MCNC: 0.38 UG/ML (FEU) (ref 0–0.5)

## 2021-04-09 PROCEDURE — 36415 COLL VENOUS BLD VENIPUNCTURE: CPT

## 2021-04-09 PROCEDURE — 96374 THER/PROPH/DIAG INJ IV PUSH: CPT

## 2021-04-09 PROCEDURE — 96375 TX/PRO/DX INJ NEW DRUG ADDON: CPT

## 2021-04-09 PROCEDURE — 700111 HCHG RX REV CODE 636 W/ 250 OVERRIDE (IP): Performed by: STUDENT IN AN ORGANIZED HEALTH CARE EDUCATION/TRAINING PROGRAM

## 2021-04-09 PROCEDURE — 99284 EMERGENCY DEPT VISIT MOD MDM: CPT

## 2021-04-09 PROCEDURE — 85379 FIBRIN DEGRADATION QUANT: CPT

## 2021-04-09 RX ORDER — ACETAMINOPHEN 500 MG
1000 TABLET ORAL ONCE
Status: DISCONTINUED | OUTPATIENT
Start: 2021-04-09 | End: 2021-04-09 | Stop reason: HOSPADM

## 2021-04-09 RX ORDER — KETOROLAC TROMETHAMINE 30 MG/ML
30 INJECTION, SOLUTION INTRAMUSCULAR; INTRAVENOUS ONCE
Status: COMPLETED | OUTPATIENT
Start: 2021-04-09 | End: 2021-04-09

## 2021-04-09 RX ORDER — IBUPROFEN 200 MG
400 TABLET ORAL ONCE
Status: DISCONTINUED | OUTPATIENT
Start: 2021-04-09 | End: 2021-04-09 | Stop reason: HOSPADM

## 2021-04-09 RX ORDER — DIPHENHYDRAMINE HYDROCHLORIDE 50 MG/ML
25 INJECTION INTRAMUSCULAR; INTRAVENOUS ONCE
Status: COMPLETED | OUTPATIENT
Start: 2021-04-09 | End: 2021-04-09

## 2021-04-09 RX ADMIN — KETOROLAC TROMETHAMINE 30 MG: 30 INJECTION, SOLUTION INTRAMUSCULAR; INTRAVENOUS at 09:19

## 2021-04-09 RX ADMIN — DIPHENHYDRAMINE HYDROCHLORIDE 25 MG: 50 INJECTION INTRAMUSCULAR; INTRAVENOUS at 09:19

## 2021-04-09 NOTE — ED PROVIDER NOTES
ED Provider Note    CHIEF COMPLAINT  Chief Complaint   Patient presents with   • Migraine     x3 days, reports that last time she had a migraine like this she had PE's, pt reports pain with deep breath and back pain   • Back Pain   • Ear Pain     right ear       HPI  Fela Tiwari is a 36 y.o. female who presents to the ED for migraine headaches for about 3 days, she also endorses mild SOB when she takes deep breaths. Patient denied fever, chills, palpitations, lower extremity edema, erythema or leg pain.  Patient is very concerned about having a PE since she has had 2 PE in the past, in 2005 while on Oral contraceptives and smoking heavily, then again in 2010 unprovoked. She was on anticoagulation both times for about a year.  Patient currently not on OCP's, she also quit smoking.  She is very worried about having another PE since last time she was diagnosed with PE had a similar presentation, but apparently she also had low O2 sats then, not this time.  Upon arrival patient's vital stable, Sat 99% on RA, HR was 101 at triage but in the 80's in her room, no acute distress.  PERC criteria given Hx and HR PE can not be rule out.  Patient also complaint of vague back pain, which is not acute, located at lumbar region.    REVIEW OF SYSTEMS  See HPI for further details. All other systems are negative.     PAST MEDICAL HISTORY  Past Medical History:   Diagnosis Date   • Bronchitis 6/2014   • Breath shortness     Only with pulmonary embolism   • Cholesterol blood decreased    • Heart burn    • Personal history of venous thrombosis and embolism 2005&2012    pulmonary   • Unspecified hemorrhagic conditions     xarelto       F[facility] HISTORY  [facility]    SOCIAL HISTORY  Social History     Socioeconomic History   • Marital status:      Spouse name: Not on file   • Number of children: Not on file   • Years of education: Not on file   • Highest education level: Not on file   Occupational History   • Not on file    Tobacco Use   • Smoking status: Former Smoker     Packs/day: 0.50     Years: 6.00     Pack years: 3.00     Types: Cigarettes     Quit date: 6/1/2017     Years since quitting: 3.8   • Smokeless tobacco: Never Used   Substance and Sexual Activity   • Alcohol use: No   • Drug use: No   • Sexual activity: Yes     Partners: Male     Birth control/protection: Female Sterilization   Other Topics Concern   • Not on file   Social History Narrative   • Not on file     Social Determinants of Health     Financial Resource Strain:    • Difficulty of Paying Living Expenses:    Food Insecurity:    • Worried About Running Out of Food in the Last Year:    • Ran Out of Food in the Last Year:    Transportation Needs:    • Lack of Transportation (Medical):    • Lack of Transportation (Non-Medical):    Physical Activity:    • Days of Exercise per Week:    • Minutes of Exercise per Session:    Stress:    • Feeling of Stress :    Social Connections:    • Frequency of Communication with Friends and Family:    • Frequency of Social Gatherings with Friends and Family:    • Attends Rastafari Services:    • Active Member of Clubs or Organizations:    • Attends Club or Organization Meetings:    • Marital Status:    Intimate Partner Violence:    • Fear of Current or Ex-Partner:    • Emotionally Abused:    • Physically Abused:    • Sexually Abused:        SURGICAL HISTORY  Past Surgical History:   Procedure Laterality Date   • DIEGO BY LAPAROSCOPY  4/29/2015    Performed by Dre Malone M.D. at SURGERY [facility]   • GASTRIC SLEEVE LAPAROSCOPY  12/31/2014    Performed by Dre Malone M.D. at [facility]   • LIVER BIOPSY LAPAROSCOPIC  12/31/2014    Performed by Dre Malone M.D. at SURGERY [facility]   • HYSTERECTOMY ROBOTIC  9/2013   • LIPOSUCTION  3/4/2010    Performed by RAMON NUNN at [facility]   • LIPOSUCTION  12/21/2009    Performed by RAMON NUNN at [facility]   •  "TONSILLECTOMY     • OTHER       x2       CURRENT MEDICATIONS  Home Medications     Reviewed by Opal Montoya R.N. (Registered Nurse) on 21 at 0757  Med List Status: Complete   Medication Last Dose Status   diphenhydrAMINE (BENADRYL) 25 MG Tab prn Active   hydrocodone-acetaminophen 2.5-108 mg/5mL (HYCET) 7.5-325 MG/15ML solution  Active   hydrOXYzine HCl (ATARAX) 25 MG Tab  Active   lansoprazole (PREVACID) 15 MG CPDR  Active   oxycodone-acetaminophen (ROXICET) 5-325 MG/5ML SOLN  Active   predniSONE (DELTASONE) 20 MG Tab  Active   rivaroxaban (XARELTO) 20 MG TABS tablet  Active                ALLERGIES  Allergies   Allergen Reactions   • Morphine Sulfate Itching       PHYSICAL EXAM  VITAL SIGNS: /61   Pulse (!) 101   Temp 36.1 °C (97 °F) (Temporal)   Resp 16   Ht 1.676 m (5' 6\")   Wt 91.9 kg (202 lb 9.6 oz)   LMP 02/10/2010   SpO2 99%   BMI 32.70 kg/m²       Constitutional: Well developed, Well nourished, No acute distress, Non-toxic appearance.   HENT: Normocephalic, Atraumatic, Bilateral external ears normal, Oropharynx moist, No oral exudates, Nose normal.   Eyes: PERRLA, EOMI, Conjunctiva normal, No discharge.   Neck: Normal range of motion, No tenderness, Supple, No stridor.   Lymphatic: No lymphadenopathy noted.   Cardiovascular: Normal heart rate, Normal rhythm, No murmurs, No rubs, No gallops.   Thorax & Lungs: Normal breath sounds, No respiratory distress, No wheezing, No chest tenderness.   Abdomen: Bowel sounds normal, Soft, No tenderness, No masses, No pulsatile masses.   Skin: Warm, Dry, No erythema, No rash.   Back: No tenderness, No CVA tenderness.   Genitalia: Not performed  Rectal: Not performed  Extremities: Symmetrical, Intact distal pulses, No edema, No tenderness,No  erythema, No cyanosis, No clubbing.   Musculoskeletal: Patient endorsed mild back pain lumbar region, chronic type, not acute, negative for numbness or tingling lower extremities, pain reproducible " lumbar muscles, but not tenderness in the spine.  Neurologic: Alert & oriented x 3, Normal motor function, Normal sensory function, No focal deficits noted.   Psychiatric: Affect normal, Judgment normal, Mood normal.     EKG  Not performed    RADIOLOGY/PROCEDURES  None    COURSE & MEDICAL DECISION MAKING  Pertinent Labs & Imaging studies reviewed. (See chart for details)  Upon arrival PERC criteria PE can not be rule out.  D Dimmer resulted 0.38, PE very unlikely, no need for Imaging.  Patient also received pain medications with totally resolution of headache.  Patient continued normotensive, HR 60-70, O2 sat 98%on RA, SOB with deep breathing also resolved, after medicated patient fell asleep.   Patient will be discharged home in stable condition with close PCP follow up.    FINAL IMPRESSION  1. Headache  2. Back pain   3. Ear pain unclear source          Electronically signed by: Aayush Jaimes M.D., 4/9/2021 8:34 AM      I agree with the above note created by the resident.  I performed a history and physical.  The patient presents with multiple complaints.  As for her headache it does have a bitemporal distribution and due to the duration of 3 days I suspect this is more from a tension headache.  The patient does not have any meningeal findings.  She does not appear toxic.  She was treated effectively with intravenous Benadryl and Toradol.  She is also been under a lot of stress recently and this could cause a tension headache.  The patient's also had some back pain.  I suspect this could be muscular due to retention.  Clinically do not appreciate any evidence of an infectious source.  She does have a history of a pulmonary emboli and does have some pleuritic component therefore D-dimer was ordered and is negative therefore a pulmonary embolus to be very unlikely.  The patient also has ear pain no have a clear source.  There is no evidence of infection.  The patient has remained hemodynamically stable  throughout her stay.  She will be discharged with instructions to continue her current medication and follow-up with her primary care provider for further outpatient management.  She will return if she is acutely worse.

## 2021-04-09 NOTE — ED TRIAGE NOTES
Pt ambulated to triage with c/o   Chief Complaint   Patient presents with   • Migraine     x3 days, reports that last time she had a migraine like this she had PE's, pt reports pain with deep breath and back pain   • Back Pain   • Ear Pain     right ear     Pt report sensitivity to light and sound, pt reports pain on left side when taking a deep breath.  Pt reports pain 8/10.      Pt Informed regarding triage process and verbalized understanding to inform triage tech or RN for any changes in condition. Placed in lobby.

## 2021-04-09 NOTE — ED NOTES
Pt states pain is better now. 2/10. Pt states understanding of dc instructions and fu care. Pt amb out w/ steady gait.

## 2021-04-09 NOTE — ED NOTES
IV start successful. Blood to lab. Pt declined Tylenol and motrin. States she took  mg po this am, states tylenol does not help.

## 2021-08-11 ENCOUNTER — HOSPITAL ENCOUNTER (EMERGENCY)
Dept: HOSPITAL 8 - ED | Age: 36
Discharge: HOME | End: 2021-08-11
Payer: COMMERCIAL

## 2021-08-11 VITALS — WEIGHT: 211.64 LBS | BODY MASS INDEX: 34.01 KG/M2 | HEIGHT: 66 IN

## 2021-08-11 VITALS — DIASTOLIC BLOOD PRESSURE: 75 MMHG | SYSTOLIC BLOOD PRESSURE: 122 MMHG

## 2021-08-11 DIAGNOSIS — Z87.891: ICD-10-CM

## 2021-08-11 DIAGNOSIS — A59.01: Primary | ICD-10-CM

## 2021-08-11 DIAGNOSIS — Z86.711: ICD-10-CM

## 2021-08-11 DIAGNOSIS — N30.00: ICD-10-CM

## 2021-08-11 LAB
CLUE CELLS: (no result)
HCG UR SG: 1.02 (ref 1–1.03)
MICROSCOPIC: (no result)
T VAGINALIS RRNA GENITAL QL PROBE: PRESENT
WET PREP WBCS: (no result)

## 2021-08-11 PROCEDURE — 87210 SMEAR WET MOUNT SALINE/INK: CPT

## 2021-08-11 PROCEDURE — 87491 CHLMYD TRACH DNA AMP PROBE: CPT

## 2021-08-11 PROCEDURE — 81001 URINALYSIS AUTO W/SCOPE: CPT

## 2021-08-11 PROCEDURE — 96372 THER/PROPH/DIAG INJ SC/IM: CPT

## 2021-08-11 PROCEDURE — 87147 CULTURE TYPE IMMUNOLOGIC: CPT

## 2021-08-11 PROCEDURE — 87591 N.GONORRHOEAE DNA AMP PROB: CPT

## 2021-08-11 PROCEDURE — 81025 URINE PREGNANCY TEST: CPT

## 2021-08-11 PROCEDURE — 99284 EMERGENCY DEPT VISIT MOD MDM: CPT

## 2021-08-11 PROCEDURE — 87086 URINE CULTURE/COLONY COUNT: CPT

## 2021-08-11 PROCEDURE — 87808 TRICHOMONAS ASSAY W/OPTIC: CPT

## 2021-08-11 NOTE — NUR
Pelivc bed brought into room and pt moved onto it with instructions to take 
pants and underwear off for exam. [place] provided to cover pt up while 
waiting. MD notified.

## 2021-08-11 NOTE — NUR
Pelvic exam chaperoned by this RN, discharge present and swabs walked to lab 
once pt repositioned on gurney after exam.

## 2021-08-11 NOTE — NUR
assumed care of pt.  pt here for radhatodd c/o.  pt states that she is having 
back pain and that she thinks that she has a UTI as well.  pt states that she 
"Wants to be checked for all STD's"  Dr. Rey at bedside for marry

## 2022-04-08 ENCOUNTER — APPOINTMENT (OUTPATIENT)
Dept: RADIOLOGY | Facility: MEDICAL CENTER | Age: 37
End: 2022-04-08
Attending: EMERGENCY MEDICINE
Payer: COMMERCIAL

## 2022-04-08 ENCOUNTER — HOSPITAL ENCOUNTER (EMERGENCY)
Facility: MEDICAL CENTER | Age: 37
End: 2022-04-08
Attending: EMERGENCY MEDICINE
Payer: COMMERCIAL

## 2022-04-08 VITALS
WEIGHT: 218 LBS | RESPIRATION RATE: 16 BRPM | DIASTOLIC BLOOD PRESSURE: 64 MMHG | OXYGEN SATURATION: 97 % | BODY MASS INDEX: 35.03 KG/M2 | HEART RATE: 59 BPM | SYSTOLIC BLOOD PRESSURE: 102 MMHG | HEIGHT: 66 IN | TEMPERATURE: 97.2 F

## 2022-04-08 DIAGNOSIS — S16.1XXA STRAIN OF NECK MUSCLE, INITIAL ENCOUNTER: ICD-10-CM

## 2022-04-08 DIAGNOSIS — S39.012A BACK STRAIN, INITIAL ENCOUNTER: ICD-10-CM

## 2022-04-08 DIAGNOSIS — S20.212A CONTUSION OF LEFT CHEST WALL, INITIAL ENCOUNTER: ICD-10-CM
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
## 2022-04-08 PROCEDURE — 72100 X-RAY EXAM L-S SPINE 2/3 VWS: CPT

## 2022-04-08 PROCEDURE — 700102 HCHG RX REV CODE 250 W/ 637 OVERRIDE(OP): Performed by: EMERGENCY MEDICINE

## 2022-04-08 PROCEDURE — 700111 HCHG RX REV CODE 636 W/ 250 OVERRIDE (IP): Performed by: EMERGENCY MEDICINE

## 2022-04-08 PROCEDURE — A9270 NON-COVERED ITEM OR SERVICE: HCPCS | Performed by: EMERGENCY MEDICINE

## 2022-04-08 PROCEDURE — 96372 THER/PROPH/DIAG INJ SC/IM: CPT

## 2022-04-08 PROCEDURE — 70450 CT HEAD/BRAIN W/O DYE: CPT

## 2022-04-08 PROCEDURE — 99284 EMERGENCY DEPT VISIT MOD MDM: CPT

## 2022-04-08 PROCEDURE — 72072 X-RAY EXAM THORAC SPINE 3VWS: CPT

## 2022-04-08 PROCEDURE — 71045 X-RAY EXAM CHEST 1 VIEW: CPT

## 2022-04-08 PROCEDURE — 72125 CT NECK SPINE W/O DYE: CPT

## 2022-04-08 RX ORDER — KETOROLAC TROMETHAMINE 30 MG/ML
15 INJECTION, SOLUTION INTRAMUSCULAR; INTRAVENOUS ONCE
Status: COMPLETED | OUTPATIENT
Start: 2022-04-08 | End: 2022-04-08

## 2022-04-08 RX ORDER — CYCLOBENZAPRINE HCL 10 MG
10 TABLET ORAL ONCE
Status: COMPLETED | OUTPATIENT
Start: 2022-04-08 | End: 2022-04-08

## 2022-04-08 RX ORDER — OXYCODONE HYDROCHLORIDE AND ACETAMINOPHEN 5; 325 MG/1; MG/1
1 TABLET ORAL EVERY 6 HOURS PRN
Qty: 6 TABLET | Refills: 0 | Status: SHIPPED | OUTPATIENT
Start: 2022-04-08 | End: 2022-04-10

## 2022-04-08 RX ORDER — OXYCODONE HYDROCHLORIDE AND ACETAMINOPHEN 5; 325 MG/1; MG/1
1 TABLET ORAL ONCE
Status: COMPLETED | OUTPATIENT
Start: 2022-04-08 | End: 2022-04-08

## 2022-04-08 RX ADMIN — KETOROLAC TROMETHAMINE 15 MG: 30 INJECTION, SOLUTION INTRAMUSCULAR at 09:35

## 2022-04-08 RX ADMIN — CYCLOBENZAPRINE 10 MG: 10 TABLET, FILM COATED ORAL at 09:35

## 2022-04-08 RX ADMIN — OXYCODONE HYDROCHLORIDE AND ACETAMINOPHEN 1 TABLET: 5; 325 TABLET ORAL at 11:47

## 2022-04-08 NOTE — ED NOTES
Pt and RN discussed DC instructions and prescriptions all questions answered. Pt and RN discussed follow-up and how to manage pain at home. All questions answered. Pt ambulated out of ED with all belongings to ride with friend

## 2022-04-08 NOTE — Clinical Note
Fela Tiwari was seen and treated in our emergency department on 4/8/2022.  She may return to work on 04/11/2022.       If you have any questions or concerns, please don't hesitate to call.      To Wang M.D.
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
## 2022-05-28 ENCOUNTER — OFFICE VISIT (OUTPATIENT)
Dept: URGENT CARE | Facility: CLINIC | Age: 37
End: 2022-05-28
Payer: COMMERCIAL

## 2022-05-28 VITALS
SYSTOLIC BLOOD PRESSURE: 100 MMHG | HEART RATE: 77 BPM | OXYGEN SATURATION: 97 % | DIASTOLIC BLOOD PRESSURE: 74 MMHG | HEIGHT: 66 IN | TEMPERATURE: 97.5 F | BODY MASS INDEX: 34.07 KG/M2 | RESPIRATION RATE: 16 BRPM | WEIGHT: 212 LBS

## 2022-05-28 DIAGNOSIS — R05.9 COUGH: ICD-10-CM

## 2022-05-28 PROCEDURE — 99213 OFFICE O/P EST LOW 20 MIN: CPT | Performed by: FAMILY MEDICINE

## 2022-05-28 RX ORDER — ALBUTEROL SULFATE 0.63 MG/3ML
0.63 SOLUTION RESPIRATORY (INHALATION) EVERY 4 HOURS PRN
Qty: 150 ML | Refills: 0 | Status: SHIPPED | OUTPATIENT
Start: 2022-05-28 | End: 2024-01-26

## 2022-05-28 RX ORDER — ALBUTEROL SULFATE 90 UG/1
2 AEROSOL, METERED RESPIRATORY (INHALATION) EVERY 6 HOURS PRN
Qty: 8.5 G | Refills: 0 | Status: SHIPPED | OUTPATIENT
Start: 2022-05-28 | End: 2023-05-31 | Stop reason: SDUPTHER

## 2022-05-29 NOTE — PROGRESS NOTES
Chief Complaint   Patient presents with   • Cough     Headache, ear pain  x 2 days     CC:  cough        Cough  This is a new problem. The current episode started 2 days ago. The problem has been unchanged. The problem occurs constantly. The cough is dry. Associated symptoms include : wheezing, ear pain,  fatigue, but she denies:  muscle aches, fever. Pertinent negatives include no  Sore throat  headaches, nausea, vomiting, diarrhea, sweats, weight loss or wheezing. Nothing aggravates the symptoms.  Patient has tried nothing for the symptoms. There is a history of exercise-induced asthma.        Past Medical History:   Diagnosis Date   • Bronchitis 2014   • Breath shortness     Only with pulmonary embolism   • Cholesterol blood decreased    • Heart burn    • Personal history of venous thrombosis and embolism &    pulmonary   • Unspecified hemorrhagic conditions     xarelto         Social History     Tobacco Use   • Smoking status: Former Smoker     Packs/day: 0.50     Years: 6.00     Pack years: 3.00     Types: Cigarettes     Quit date: 2017     Years since quittin.9   • Smokeless tobacco: Never Used   Vaping Use   • Vaping Use: Never used   Substance Use Topics   • Alcohol use: No   • Drug use: No         Current Outpatient Medications on File Prior to Visit   Medication Sig Dispense Refill   • meloxicam (MOBIC) 15 MG tablet Take 1 Tablet by mouth every day. (Patient not taking: Reported on 2022) 30 Tablet 0   • diphenhydrAMINE (BENADRYL) 25 MG Tab Take 25 mg by mouth every 6 hours as needed for Sleep.     • predniSONE (DELTASONE) 20 MG Tab Take 3 tabs daily x 3 days, then 2 tabs daily x 2 days, then 1 tab x 2 days. (Patient not taking: Reported on 3/17/2021) 15 Tab 0   • hydrOXYzine HCl (ATARAX) 25 MG Tab Take 1 Tab by mouth every 6 hours as needed for Itching. (Patient not taking: Reported on 3/17/2021) 30 Tab 0   • hydrocodone-acetaminophen 2.5-108 mg/5mL (HYCET) 7.5-325 MG/15ML solution  "Take 18 mL by mouth 4 times a day as needed for Moderate Pain. (Patient not taking: Reported on 3/17/2021) 240 mL 0   • oxycodone-acetaminophen (ROXICET) 5-325 MG/5ML SOLN Take 5 mL by mouth every four hours as needed. (Patient not taking: Reported on 3/17/2021) 240 mL 0   • lansoprazole (PREVACID) 15 MG CPDR Take 15 mg by mouth every day.     • rivaroxaban (XARELTO) 20 MG TABS tablet Take 1 Tab by mouth every morning. (Patient not taking: Reported on 3/17/2021) 30 Tab      No current facility-administered medications on file prior to visit.                    Review of Systems   Constitutional: Negative for fever and weight loss.   HENT: + for otalgia  Cardiovascular - denies chest pain or dyspnea  Respiratory: Positive for cough.  .  + for wheezing.    Neurological: Negative for headaches.   GI - denies nausea, vomiting or diarrhea  Neuro - denies numbness or tingling.            Objective:     /74   Pulse 77   Temp 36.4 °C (97.5 °F) (Temporal)   Resp 16   Ht 1.676 m (5' 6\")   Wt 96.2 kg (212 lb)   SpO2 97%     Physical Exam   Constitutional: patient is oriented to person, place, and time. Patient appears well-developed and well-nourished. No distress.   HENT:   Head: Normocephalic and atraumatic.   Right Ear: External ear normal.   Left Ear: External ear normal.   Nose: Mucosal edema  present. Right sinus exhibits no maxillary sinus tenderness. Left sinus exhibits no maxillary sinus tenderness.   Mouth/Throat: Mucous membranes are normal. No oral lesions.  No posterior pharyngeal erythema.  No oropharyngeal exudate or posterior oropharyngeal edema.   Eyes: Conjunctivae and EOM are normal. Pupils are equal, round, and reactive to light. Right eye exhibits no discharge. Left eye exhibits no discharge. No scleral icterus.   Neck: Normal range of motion. Neck supple. No tracheal deviation present.   Cardiovascular: Normal rate, regular rhythm and normal heart sounds.  Exam reveals no friction rub.  "   Pulmonary/Chest: Effort normal. No respiratory distress. Patient has no wheezes or rhonchi. Patient has no rales.    + scattered, minor wheezing noted.  Musculoskeletal:  exhibits no edema.   Lymphadenopathy:     Patient has no cervical adenopathy.      Neurological: patient is alert and oriented to person, place, and time.   Skin: Skin is warm and dry. No rash noted. No erythema.   Psychiatric: patient  has a normal mood and affect.  behavior is normal.   Nursing note and vitals reviewed.              Assessment/Plan:        1. Cough, with wheezing  2. Exertional asthma     likely viral     Pt refused covid screening        - albuterol 108 (90 Base) MCG/ACT Aero Soln inhalation aerosol; Inhale 2 Puffs every 6 hours as needed for Shortness of Breath.  Dispense: 8.5 g; Refill: 0  - albuterol (ACCUNEB) 0.63 MG/3ML nebulizer solution; Take 3 mL by nebulization every four hours as needed for Shortness of Breath.  Dispense: 150 mL; Refill: 0       Follow up in one week if no improvement

## 2022-05-30 DIAGNOSIS — R05.9 COUGH: ICD-10-CM

## 2022-05-30 RX ORDER — PREDNISONE 10 MG/1
50 TABLET ORAL DAILY
Qty: 25 TABLET | Refills: 0 | Status: SHIPPED | OUTPATIENT
Start: 2022-05-30 | End: 2022-06-04

## 2022-11-04 ENCOUNTER — APPOINTMENT (OUTPATIENT)
Dept: RADIOLOGY | Facility: MEDICAL CENTER | Age: 37
End: 2022-11-04
Attending: EMERGENCY MEDICINE
Payer: COMMERCIAL

## 2022-11-04 ENCOUNTER — HOSPITAL ENCOUNTER (EMERGENCY)
Facility: MEDICAL CENTER | Age: 37
End: 2022-11-04
Attending: EMERGENCY MEDICINE
Payer: COMMERCIAL

## 2022-11-04 ENCOUNTER — TELEPHONE (OUTPATIENT)
Dept: OBGYN | Facility: CLINIC | Age: 37
End: 2022-11-04
Payer: COMMERCIAL

## 2022-11-04 VITALS
SYSTOLIC BLOOD PRESSURE: 115 MMHG | TEMPERATURE: 97.8 F | OXYGEN SATURATION: 94 % | WEIGHT: 212.96 LBS | BODY MASS INDEX: 34.23 KG/M2 | DIASTOLIC BLOOD PRESSURE: 60 MMHG | HEART RATE: 74 BPM | HEIGHT: 66 IN | RESPIRATION RATE: 17 BRPM

## 2022-11-04 DIAGNOSIS — N83.201 RIGHT OVARIAN CYST: ICD-10-CM

## 2022-11-04 DIAGNOSIS — R10.31 RLQ ABDOMINAL PAIN: ICD-10-CM

## 2022-11-04 LAB
ALBUMIN SERPL BCP-MCNC: 4 G/DL (ref 3.2–4.9)
ALBUMIN/GLOB SERPL: 1.4 G/DL
ALP SERPL-CCNC: 61 U/L (ref 30–99)
ALT SERPL-CCNC: 18 U/L (ref 2–50)
ANION GAP SERPL CALC-SCNC: 9 MMOL/L (ref 7–16)
APPEARANCE UR: CLEAR
AST SERPL-CCNC: 11 U/L (ref 12–45)
BASOPHILS # BLD AUTO: 0.4 % (ref 0–1.8)
BASOPHILS # BLD: 0.05 K/UL (ref 0–0.12)
BILIRUB SERPL-MCNC: 1.1 MG/DL (ref 0.1–1.5)
BILIRUB UR QL STRIP.AUTO: NEGATIVE
BUN SERPL-MCNC: 12 MG/DL (ref 8–22)
CALCIUM SERPL-MCNC: 9.1 MG/DL (ref 8.5–10.5)
CHLORIDE SERPL-SCNC: 106 MMOL/L (ref 96–112)
CO2 SERPL-SCNC: 26 MMOL/L (ref 20–33)
COLOR UR: YELLOW
CREAT SERPL-MCNC: 0.88 MG/DL (ref 0.5–1.4)
EOSINOPHIL # BLD AUTO: 0.14 K/UL (ref 0–0.51)
EOSINOPHIL NFR BLD: 1.1 % (ref 0–6.9)
ERYTHROCYTE [DISTWIDTH] IN BLOOD BY AUTOMATED COUNT: 43.8 FL (ref 35.9–50)
GFR SERPLBLD CREATININE-BSD FMLA CKD-EPI: 85 ML/MIN/1.73 M 2
GLOBULIN SER CALC-MCNC: 2.9 G/DL (ref 1.9–3.5)
GLUCOSE SERPL-MCNC: 115 MG/DL (ref 65–99)
GLUCOSE UR STRIP.AUTO-MCNC: NEGATIVE MG/DL
HCT VFR BLD AUTO: 47.9 % (ref 37–47)
HGB BLD-MCNC: 16.2 G/DL (ref 12–16)
IMM GRANULOCYTES # BLD AUTO: 0.08 K/UL (ref 0–0.11)
IMM GRANULOCYTES NFR BLD AUTO: 0.6 % (ref 0–0.9)
KETONES UR STRIP.AUTO-MCNC: NEGATIVE MG/DL
LEUKOCYTE ESTERASE UR QL STRIP.AUTO: NEGATIVE
LIPASE SERPL-CCNC: 21 U/L (ref 11–82)
LYMPHOCYTES # BLD AUTO: 2.22 K/UL (ref 1–4.8)
LYMPHOCYTES NFR BLD: 17 % (ref 22–41)
MCH RBC QN AUTO: 32.5 PG (ref 27–33)
MCHC RBC AUTO-ENTMCNC: 33.8 G/DL (ref 33.6–35)
MCV RBC AUTO: 96 FL (ref 81.4–97.8)
MICRO URNS: ABNORMAL
MONOCYTES # BLD AUTO: 0.97 K/UL (ref 0–0.85)
MONOCYTES NFR BLD AUTO: 7.4 % (ref 0–13.4)
NEUTROPHILS # BLD AUTO: 9.6 K/UL (ref 2–7.15)
NEUTROPHILS NFR BLD: 73.5 % (ref 44–72)
NITRITE UR QL STRIP.AUTO: NEGATIVE
NRBC # BLD AUTO: 0 K/UL
NRBC BLD-RTO: 0 /100 WBC
PH UR STRIP.AUTO: 5.5 [PH] (ref 5–8)
PLATELET # BLD AUTO: 175 K/UL (ref 164–446)
PMV BLD AUTO: 11.5 FL (ref 9–12.9)
POTASSIUM SERPL-SCNC: 4.4 MMOL/L (ref 3.6–5.5)
PROT SERPL-MCNC: 6.9 G/DL (ref 6–8.2)
PROT UR QL STRIP: NEGATIVE MG/DL
RBC # BLD AUTO: 4.99 M/UL (ref 4.2–5.4)
RBC UR QL AUTO: NEGATIVE
SODIUM SERPL-SCNC: 141 MMOL/L (ref 135–145)
SP GR UR STRIP.AUTO: >=1.045
UROBILINOGEN UR STRIP.AUTO-MCNC: 0.2 MG/DL
WBC # BLD AUTO: 13.1 K/UL (ref 4.8–10.8)

## 2022-11-04 PROCEDURE — 74177 CT ABD & PELVIS W/CONTRAST: CPT

## 2022-11-04 PROCEDURE — 36415 COLL VENOUS BLD VENIPUNCTURE: CPT

## 2022-11-04 PROCEDURE — 81003 URINALYSIS AUTO W/O SCOPE: CPT

## 2022-11-04 PROCEDURE — 96376 TX/PRO/DX INJ SAME DRUG ADON: CPT

## 2022-11-04 PROCEDURE — 80053 COMPREHEN METABOLIC PANEL: CPT

## 2022-11-04 PROCEDURE — 96374 THER/PROPH/DIAG INJ IV PUSH: CPT | Mod: XU

## 2022-11-04 PROCEDURE — 99284 EMERGENCY DEPT VISIT MOD MDM: CPT

## 2022-11-04 PROCEDURE — 76856 US EXAM PELVIC COMPLETE: CPT

## 2022-11-04 PROCEDURE — 700111 HCHG RX REV CODE 636 W/ 250 OVERRIDE (IP): Performed by: EMERGENCY MEDICINE

## 2022-11-04 PROCEDURE — 83690 ASSAY OF LIPASE: CPT

## 2022-11-04 PROCEDURE — 700117 HCHG RX CONTRAST REV CODE 255: Performed by: EMERGENCY MEDICINE

## 2022-11-04 PROCEDURE — 96375 TX/PRO/DX INJ NEW DRUG ADDON: CPT

## 2022-11-04 PROCEDURE — 85025 COMPLETE CBC W/AUTO DIFF WBC: CPT

## 2022-11-04 RX ORDER — HYDROMORPHONE HYDROCHLORIDE 1 MG/ML
0.5 INJECTION, SOLUTION INTRAMUSCULAR; INTRAVENOUS; SUBCUTANEOUS ONCE
Status: COMPLETED | OUTPATIENT
Start: 2022-11-04 | End: 2022-11-04

## 2022-11-04 RX ORDER — OXYCODONE HYDROCHLORIDE AND ACETAMINOPHEN 5; 325 MG/1; MG/1
1 TABLET ORAL EVERY 4 HOURS PRN
Qty: 15 TABLET | Refills: 0 | Status: SHIPPED | OUTPATIENT
Start: 2022-11-04 | End: 2022-11-09

## 2022-11-04 RX ORDER — HYDROMORPHONE HYDROCHLORIDE 1 MG/ML
1 INJECTION, SOLUTION INTRAMUSCULAR; INTRAVENOUS; SUBCUTANEOUS ONCE
Status: COMPLETED | OUTPATIENT
Start: 2022-11-04 | End: 2022-11-04

## 2022-11-04 RX ORDER — ONDANSETRON 2 MG/ML
4 INJECTION INTRAMUSCULAR; INTRAVENOUS ONCE
Status: COMPLETED | OUTPATIENT
Start: 2022-11-04 | End: 2022-11-04

## 2022-11-04 RX ADMIN — IOHEXOL 100 ML: 350 INJECTION, SOLUTION INTRAVENOUS at 08:45

## 2022-11-04 RX ADMIN — HYDROMORPHONE HYDROCHLORIDE 1 MG: 1 INJECTION, SOLUTION INTRAMUSCULAR; INTRAVENOUS; SUBCUTANEOUS at 07:31

## 2022-11-04 RX ADMIN — ONDANSETRON HYDROCHLORIDE 4 MG: 2 SOLUTION INTRAMUSCULAR; INTRAVENOUS at 08:51

## 2022-11-04 RX ADMIN — HYDROMORPHONE HYDROCHLORIDE 0.5 MG: 1 INJECTION, SOLUTION INTRAMUSCULAR; INTRAVENOUS; SUBCUTANEOUS at 08:51

## 2022-11-04 RX ADMIN — ONDANSETRON HYDROCHLORIDE 4 MG: 2 SOLUTION INTRAMUSCULAR; INTRAVENOUS at 07:31

## 2022-11-04 NOTE — ED PROVIDER NOTES
ED Provider Note    Scribed for Mert Rosas M.D. by Pattie Santoro. 11/4/2022  7:24 AM    Primary care provider: Jaron Morocho M.D.  Means of arrival: Walk-in  History obtained from: Patient  History limited by: None    CHIEF COMPLAINT  Chief Complaint   Patient presents with    RLQ Pain       HPI  Fela Tiwari is a 37 y.o. female who presents to the Emergency Department for evaluation of acute abdominal pain onset 3 days ago.  The patient states that the pain is localized to the right-side of her abdomen and travels down to her right lower quadrant.  The patient states that the pain has been getting progressively worse over the last few days, prompting her to come to the ED.  She describes the pain as sharp.  She reports associated vomiting.  The patient denies any fever, diarrhea, dysuria, or hematuria.  She reports a prior medical history of a hysterectomy, 2 C-sections, and a cholecystectomy.  No alleviating or exacerbating factors noted.  Patient is allergic to Morphine Sulfate.    REVIEW OF SYSTEMS  Pertinent positives include right lower quadrant abdominal pain and vomiting.   Pertinent negatives include no fever, diarrhea, dysuria, or hematuria.    All other systems reviewed and negative. See HPI for further details.     PAST MEDICAL HISTORY   has a past medical history of Breath shortness, Bronchitis (6/2014), Cholesterol blood decreased, Heart burn, Personal history of venous thrombosis and embolism (2005&2012), and Unspecified hemorrhagic conditions.    SURGICAL HISTORY   has a past surgical history that includes tonsillectomy (1997); liposuction (12/21/2009); liposuction (3/4/2010); other; hysterectomy robotic (9/2013); gastric sleeve laparoscopy (12/31/2014); liver biopsy laparoscopic (12/31/2014); and jordon by laparoscopy (4/29/2015).    SOCIAL HISTORY  Social History     Tobacco Use    Smoking status: Former     Packs/day: 0.50     Years: 6.00     Pack years: 3.00     Types:  "Cigarettes     Quit date: 2017     Years since quittin.4    Smokeless tobacco: Never   Vaping Use    Vaping Use: Never used   Substance Use Topics    Alcohol use: No    Drug use: Yes     Types: Inhaled     Comment: thc      Social History     Substance and Sexual Activity   Drug Use Yes    Types: Inhaled    Comment: thc       FAMILY HISTORY  Family History   Problem Relation Age of Onset    Diabetes Other     Hypertension Other     Cancer Other        CURRENT MEDICATIONS  Home Medications       Reviewed by Yamilet Godinez R.N. (Registered Nurse) on 22 at 0708  Med List Status: Not Addressed     Medication Last Dose Status   albuterol (ACCUNEB) 0.63 MG/3ML nebulizer solution  Active   albuterol 108 (90 Base) MCG/ACT Aero Soln inhalation aerosol  Active   meloxicam (MOBIC) 15 MG tablet  Active                    ALLERGIES  Allergies   Allergen Reactions    Morphine Sulfate Itching       PHYSICAL EXAM  VITAL SIGNS: /82   Pulse 89   Temp 36.2 °C (97.1 °F) (Temporal)   Resp 18   Ht 1.676 m (5' 6\")   Wt 96.6 kg (212 lb 15.4 oz)   LMP 02/10/2010   SpO2 98% Comment: Room air  BMI 34.37 kg/m²     Nursing note and vitals reviewed.  Constitutional: Well-developed and well-nourished.  Crying, moderate distress secondary to pain.  HENT: Head is normocephalic and atraumatic. Oropharynx is clear and moist without exudate or erythema.   Eyes: Pupils are equal, round, and reactive to light. Conjunctiva are normal.   Cardiovascular: Normal rate and regular rhythm. No murmur heard. Normal radial pulses.  Pulmonary/Chest: Breath sounds normal. No wheezes or rales.   Abdominal: Soft and non-tender. No distention    Musculoskeletal: Extremities exhibit normal range of motion without edema or tenderness.   Neurological: Awake, alert and oriented to person, place, and time. No focal deficits noted.  Skin: Skin is warm and dry. No rash.   Psychiatric: Normal mood and affect. Appropriate for clinical " situation.    DIAGNOSTIC STUDIES / PROCEDURES    LABS  Results for orders placed or performed during the hospital encounter of 11/04/22   CBC with Differential   Result Value Ref Range    WBC 13.1 (H) 4.8 - 10.8 K/uL    RBC 4.99 4.20 - 5.40 M/uL    Hemoglobin 16.2 (H) 12.0 - 16.0 g/dL    Hematocrit 47.9 (H) 37.0 - 47.0 %    MCV 96.0 81.4 - 97.8 fL    MCH 32.5 27.0 - 33.0 pg    MCHC 33.8 33.6 - 35.0 g/dL    RDW 43.8 35.9 - 50.0 fL    Platelet Count 175 164 - 446 K/uL    MPV 11.5 9.0 - 12.9 fL    Neutrophils-Polys 73.50 (H) 44.00 - 72.00 %    Lymphocytes 17.00 (L) 22.00 - 41.00 %    Monocytes 7.40 0.00 - 13.40 %    Eosinophils 1.10 0.00 - 6.90 %    Basophils 0.40 0.00 - 1.80 %    Immature Granulocytes 0.60 0.00 - 0.90 %    Nucleated RBC 0.00 /100 WBC    Neutrophils (Absolute) 9.60 (H) 2.00 - 7.15 K/uL    Lymphs (Absolute) 2.22 1.00 - 4.80 K/uL    Monos (Absolute) 0.97 (H) 0.00 - 0.85 K/uL    Eos (Absolute) 0.14 0.00 - 0.51 K/uL    Baso (Absolute) 0.05 0.00 - 0.12 K/uL    Immature Granulocytes (abs) 0.08 0.00 - 0.11 K/uL    NRBC (Absolute) 0.00 K/uL   Complete Metabolic Panel   Result Value Ref Range    Sodium 141 135 - 145 mmol/L    Potassium 4.4 3.6 - 5.5 mmol/L    Chloride 106 96 - 112 mmol/L    Co2 26 20 - 33 mmol/L    Anion Gap 9.0 7.0 - 16.0    Glucose 115 (H) 65 - 99 mg/dL    Bun 12 8 - 22 mg/dL    Creatinine 0.88 0.50 - 1.40 mg/dL    Calcium 9.1 8.5 - 10.5 mg/dL    AST(SGOT) 11 (L) 12 - 45 U/L    ALT(SGPT) 18 2 - 50 U/L    Alkaline Phosphatase 61 30 - 99 U/L    Total Bilirubin 1.1 0.1 - 1.5 mg/dL    Albumin 4.0 3.2 - 4.9 g/dL    Total Protein 6.9 6.0 - 8.2 g/dL    Globulin 2.9 1.9 - 3.5 g/dL    A-G Ratio 1.4 g/dL   Lipase   Result Value Ref Range    Lipase 21 11 - 82 U/L   Urinalysis    Specimen: Urine   Result Value Ref Range    Color Yellow     Character Clear     Specific Gravity >=1.045 (A) <1.035    Ph 5.5 5.0 - 8.0    Glucose Negative Negative mg/dL    Ketones Negative Negative mg/dL    Protein Negative  Negative mg/dL    Bilirubin Negative Negative    Urobilinogen, Urine 0.2 Negative    Nitrite Negative Negative    Leukocyte Esterase Negative Negative    Occult Blood Negative Negative    Micro Urine Req see below    ESTIMATED GFR   Result Value Ref Range    GFR (CKD-EPI) 85 >60 mL/min/1.73 m 2       All labs reviewed by me.    RADIOLOGY  No orders to display     The radiologist's interpretation of all radiological studies have been reviewed by me.    COURSE & MEDICAL DECISION MAKING  Nursing notes, VS, PMSFHx reviewed in chart.         7:24 AM - Patient seen and examined at bedside. Patient will be treated with Dialudid 1 mg injection and Zofran 4 mg injection.  Ordered CT-Abdomen-Pelvis, CBC w/ diff, CMP, Lipase, and UA to evaluate her symptoms. The differential diagnoses include but are not limited to: Urinary tract infection, ovarian cyst, ureterolithiasis, ovarian torsion, appendicitis.    Laboratory studies remarkable for mild leukocytosis.  No evidence of urinary tract infection.  Metabolic panel is unremarkable.    CT scan shows a right ovarian cyst which is likely the etiology of patient's pain.  Ultrasound will be obtained for further evaluation.    9:56 AM ultrasound is pending at this time.  My partner Dr. Jaimes will assume care at this time follow-up on the results of the ultrasound.  If this simply reveals a ovarian cyst I anticipate the patient will be discharged home with pain medication [facility]  Otherwise an addendum will be dictated.        FINAL IMPRESSION  1. Right ovarian cyst    2. RLQ abdominal pain          Pattie GU (Bishop), am scribing for, and in the presence of, Mert Rosas M.D..    Electronically signed by: Pattie Small), 11/4/2022    Mert GU M.D. personally performed the services described in this documentation, as scribed by Pattie Santoro in my presence, and it is both accurate and complete.    The note  accurately reflects work and decisions made by me.  Mert Rosas M.D.  11/4/2022  9:56 AM

## 2022-11-04 NOTE — ED NOTES
Pt medicated per MAR by RN for pain. Tolerated well. Plan of care discussed and pt verbalized understanding.

## 2022-11-04 NOTE — ED NOTES
Pt discharged to home. IV removed. Pt was given follow up instructions and prescriptions for percocet. Narcotic agreement signed. Pt verbalized understanding of all instructions for discharge and is ambulatory out of ED with steady gait. AOx4. GCS 15. Vitals stable

## 2022-11-04 NOTE — ED TRIAGE NOTES
Fela Tiwari  37 y.o.  Chief Complaint   Patient presents with    RLQ Pain     [facility] pt reporting RLQ pain classified as 9.5/10 sharp stabbing pain that comes and goes. Pt still has appendix, + rebound tenderness. Denies fevers. A&Ox4, speaking in full sentences, tearful in triage. Reports pain has been present for 3 days.

## 2022-11-04 NOTE — ED PROVIDER NOTES
ED Provider Note    Medical decision making.  The patient was signed out to my care pending an ultrasound as CT scan showed evidence of a likely right ovarian cyst.  There is no evidence of torsion.  There is evidence of a suspected hemorrhagic cyst and I suspect this is causing the discomfort that the patient presented to the emergency department with.  Please see Dr. Rosas's note.  The patient will be discharged on pain medication and she will follow-up with [facility].  The patient is aware needs have a repeat ultrasound in 6 to 8 weeks to make sure this hemorrhagic cyst resolves.

## 2022-11-04 NOTE — TELEPHONE ENCOUNTER
----- Message from Lisa Elizabeth sent at 11/4/2022 10:59 AM PDT -----  Regarding: ER Follow up for 901  Hello,    Pt admitted today for abdominal pain, she has ovarian cyst, to be scheduled at 901.    Thank you,  Vale      Consulted with Dr. Vera and agreed for pt to be seen in the next 1 or 2.     Called pt and schedule her for ER f/u on 11/8/22 at 0800. Pt agreed and verbalized understanding.

## 2022-11-08 ENCOUNTER — GYNECOLOGY VISIT (OUTPATIENT)
Dept: OBGYN | Facility: CLINIC | Age: 37
End: 2022-11-08
Payer: COMMERCIAL

## 2022-11-08 ENCOUNTER — TELEPHONE (OUTPATIENT)
Dept: OBGYN | Facility: CLINIC | Age: 37
End: 2022-11-08

## 2022-11-08 VITALS — WEIGHT: 211 LBS | DIASTOLIC BLOOD PRESSURE: 70 MMHG | BODY MASS INDEX: 34.06 KG/M2 | SYSTOLIC BLOOD PRESSURE: 110 MMHG

## 2022-11-08 DIAGNOSIS — N83.201 CYST OF RIGHT OVARY: ICD-10-CM

## 2022-11-08 PROCEDURE — 99203 OFFICE O/P NEW LOW 30 MIN: CPT | Performed by: OBSTETRICS & GYNECOLOGY

## 2022-11-08 ASSESSMENT — FIBROSIS 4 INDEX: FIB4 SCORE: 0.58

## 2022-11-08 NOTE — PROGRESS NOTES
Chief complaint; new patient    Fela Tiwari is a 37 y.o. No obstetric history on file. who presents complaining of several day history of right lower quadrant pain she was seen in the emergency department on 2022 complaining of right lower quadrant pain and slight nausea transvaginal ultrasound there showed a 2.6 cm x 2.2 cm x 2.2 cm right ovarian cyst consistent with a hemorrhagic corpus luteum cyst.  Patient states that her right lower quadrant pain is moderately better but she is now having some left lower quadrant discomfort.    Past GYN history significant for robotic hysterectomy  patient states that it was performed because she was told she should not have children due to history of bilateral pulmonary embolus during her pregnancies.  Patient has a history of previous  section x2 1 performed by     Past surgical history- section x2 laparoscopic cholecystectomy gastric sleeve surgery and liposuction.    Patient was previously on Lovenox but has not been on it for several years.  .    Review of systems; denies fever chills abdominal pain, denies chest pain shortness of breath or urinary symptoms  Past medical history-  Past Medical History:   Diagnosis Date    Breath shortness     Only with pulmonary embolism    Bronchitis 2014    Cholesterol blood decreased     Heart burn     Personal history of venous thrombosis and embolism &    pulmonary    Unspecified hemorrhagic conditions     xarelto     Past surgical history-  Past Surgical History:   Procedure Laterality Date    DIEGO BY LAPAROSCOPY  2015    Performed by Dre Malone M.D. at SUR[facility]ERY [facility]    GASTRIC SLEEVE LAPAROSCOPY  2014    Performed by Dre Malone M.D. at SURGERY [facility]    LIVER BIOPSY LAPAROSCOPIC  2014    Performed by Dre Malone M.D. at SURGERY [facility]    HYSTERECTOMY ROBOTIC  2013    LIPOSUCTION  3/4/2010    Performed by RAMON NUNN at  SUR[facility]ERY [facility]    LIPOSUCTION  2009    Performed by RAMON NUNN at SURGERY [facility]    TONSILLECTOMY  1997    OTHER       x2     Allergies-Morphine sulfate  Medications-  Current Outpatient Medications on File Prior to Visit   Medication Sig Dispense Refill    oxyCODONE-acetaminophen (PERCOCET) 5-325 MG Tab Take 1 Tablet by mouth every four hours as needed for Moderate Pain for up to 5 days. 15 Tablet 0    albuterol 108 (90 Base) MCG/ACT Aero Soln inhalation aerosol Inhale 2 Puffs every 6 hours as needed for Shortness of Breath. 8.5 g 0    albuterol (ACCUNEB) 0.63 MG/3ML nebulizer solution Take 3 mL by nebulization every four hours as needed for Shortness of Breath. 150 mL 0    meloxicam (MOBIC) 15 MG tablet Take 1 Tablet by mouth every day. (Patient not taking: Reported on 2022) 30 Tablet 0     No current facility-administered medications on file prior to visit.     Social history-  Social History     Socioeconomic History    Marital status:      Spouse name: Not on file    Number of children: Not on file    Years of education: Not on file    Highest education level: Not on file   Occupational History    Not on file   Tobacco Use    Smoking status: Former     Packs/day: 0.50     Years: 6.00     Pack years: 3.00     Types: Cigarettes     Quit date: 2017     Years since quittin.4    Smokeless tobacco: Never   Vaping Use    Vaping Use: Never used   Substance and Sexual Activity    Alcohol use: No    Drug use: Yes     Types: Inhaled     Comment: thc    Sexual activity: Yes     Partners: Male     Birth control/protection: Female Sterilization   Other Topics Concern    Not on file   Social History Narrative    Not on file     Social Determinants of Health     Financial Resource Strain: Not on file   Food Insecurity: Not on file   Transportation Needs: Not on file   Physical Activity: Not on file   Stress: Not on file   Social Connections: Not on file   Intimate  Partner Violence: Not on file   Housing Stability: Not on file     Past Family History-no history of breast or ovarian cancer    Physical examination;  Alert and oriented x3  General a thin well-developed well-nourished female in no apparent distress  Vitals:    11/08/22 0758   BP: 110/70   BP Location: Right arm   Patient Position: Sitting   BP Cuff Size: Large adult   Weight: 211 lb     Skin is warm and dry  Neck-supple  HEENT-head-atraumatic, normocephalic, EOMI, PERRLA  Cardiovascular-regular rate and rhythm, normal S1-S2, no murmurs or gallops  Lungs-clear to auscultation bilaterally  Back-negative CVA tenderness  Abdomen-nondistended positive bowel sounds soft nontender no masses or hepatosplenomegaly  Pelvic examination;  External genitalia-no visible lesions   Vagina-no blood or discharge  Cervix- surgically absent  Uterus-surgically absent  Adnexa-no masses palpated, bilateral discomfort  Extremities without cyanosis clubbing or edema  Neurologic exam grossly intact    Impression;  Right ovarian cyst-small likely hemorrhagic    Plan;  Recommend conservative measures  Patient is unable to take combination OCPs due to history of pulmonary embolus  We will repeat transvaginal ultrasound in 6 to 8 weeks.  Patient has requested paperwork for [employer] disabilities act due to her cyst for work as a .  I discussed with her that her right ovary contains a small cyst which is  typically a temporary process and usually heals over a short period of time.    30  Minutes spent with the patient in face-to-face contact, greater than 50% of the time spent on counseling and coordination of care. All questions answered in detail.    Female chaperone present for entire examination and history

## 2022-11-08 NOTE — LETTER
November 8, 2022         Patient: Fela Tiwari   YOB: 1985   Date of Visit: 11/8/2022           To Whom it May Concern:    Fela Tiwari was seen in my clinic on 11/8/2022. Please excuse her from work 11/7/22 and 11/8/22 due to medical reasons. If you have any questions or concerns, please don't hesitate to call.        Sincerely,           Darrel Bruce M.D.  Electronically Signed

## 2023-01-05 ENCOUNTER — HOSPITAL ENCOUNTER (EMERGENCY)
Facility: MEDICAL CENTER | Age: 38
End: 2023-01-05
Attending: EMERGENCY MEDICINE
Payer: MEDICAID

## 2023-01-05 VITALS
HEIGHT: 66 IN | SYSTOLIC BLOOD PRESSURE: 129 MMHG | RESPIRATION RATE: 16 BRPM | DIASTOLIC BLOOD PRESSURE: 75 MMHG | BODY MASS INDEX: 34.86 KG/M2 | WEIGHT: 216.93 LBS | HEART RATE: 80 BPM | OXYGEN SATURATION: 100 % | TEMPERATURE: 97.1 F

## 2023-01-05 DIAGNOSIS — H10.33 ACUTE CONJUNCTIVITIS OF BOTH EYES, UNSPECIFIED ACUTE CONJUNCTIVITIS TYPE: ICD-10-CM

## 2023-01-05 PROCEDURE — 99283 EMERGENCY DEPT VISIT LOW MDM: CPT

## 2023-01-05 PROCEDURE — 700101 HCHG RX REV CODE 250: Performed by: EMERGENCY MEDICINE

## 2023-01-05 RX ORDER — ERYTHROMYCIN 5 MG/G
1 OINTMENT OPHTHALMIC 3 TIMES DAILY
Qty: 1 G | Refills: 0 | Status: SHIPPED | OUTPATIENT
Start: 2023-01-05 | End: 2023-01-08

## 2023-01-05 RX ORDER — PROPARACAINE HYDROCHLORIDE 5 MG/ML
1 SOLUTION/ DROPS OPHTHALMIC ONCE
Status: COMPLETED | OUTPATIENT
Start: 2023-01-05 | End: 2023-01-05

## 2023-01-05 RX ORDER — ERYTHROMYCIN 5 MG/G
1 OINTMENT OPHTHALMIC ONCE
Status: COMPLETED | OUTPATIENT
Start: 2023-01-05 | End: 2023-01-05

## 2023-01-05 RX ADMIN — FLUORESCEIN SODIUM 1 MG: 1 STRIP OPHTHALMIC at 22:15

## 2023-01-05 RX ADMIN — ERYTHROMYCIN 1 APPLICATION: 5 OINTMENT OPHTHALMIC at 23:00

## 2023-01-05 RX ADMIN — PROPARACAINE HYDROCHLORIDE 1 DROP: 5 SOLUTION/ DROPS OPHTHALMIC at 22:15

## 2023-01-05 ASSESSMENT — LIFESTYLE VARIABLES: DO YOU DRINK ALCOHOL: NO

## 2023-01-05 ASSESSMENT — FIBROSIS 4 INDEX: FIB4 SCORE: 0.58

## 2023-01-06 NOTE — ED PROVIDER NOTES
Emergency Physician Note    Chief Concern:  Eye pain, eye drainage    Historian:   Patient    HPI:  Ms. Tiwari presents to the emergency department today for evaluation of bilateral eye pain and eye drainage.  Her symptoms began around 4:00 this afternoon, 4 to 5 hours prior to arrival.  Pain initially began in the right eye while she was driving, she then developed itching and irritation of the left eye as well.  She has had ocular discharge bilaterally since that time.  She does not know of any foreign bodies or could have gotten into the eye, no recent welding, no recent UV exposure.  She has not used any new lotions, or soaps, she has no other symptoms, no nasal congestion, no upper respiratory symptoms, no cough, no shortness of breath, and no fever.  She does have some irritation around the eyes as well, with very minimal periorbital edema bilaterally.  No reliably exacerbating or alleviating factors are identified.    Medical Records Reviewed for Continuity of Care:  No recent visits for similar symptoms.    Review of Systems:  Review of systems as documented in HPI.     Past Medical History:   has a past medical history of Breath shortness, Bronchitis (6/2014), Cholesterol blood decreased, Heart burn, Personal history of venous thrombosis and embolism (2005&2012), and Unspecified hemorrhagic conditions.    Past Surgical History:   has a past surgical history that includes tonsillectomy (1997); liposuction (12/21/2009); liposuction (3/4/2010); other; hysterectomy robotic (9/2013); gastric sleeve laparoscopy (12/31/2014); liver biopsy laparoscopic (12/31/2014); and jordon by laparoscopy (4/29/2015).    Family History:  Family History   Problem Relation Age of Onset    Diabetes Other     Hypertension Other     Cancer Other        Social History:  Social History     Tobacco Use    Smoking status: Former     Packs/day: 0.50     Years: 6.00     Pack years: 3.00     Types: Cigarettes     Quit date: 6/1/2017     " Years since quittin.6    Smokeless tobacco: Never   Vaping Use    Vaping Use: Never used   Substance and Sexual Activity    Alcohol use: No    Drug use: Yes     Types: Inhaled     Comment: thc    Sexual activity: Yes     Partners: Male     Birth control/protection: Female Sterilization     Comment: Hysterectomy       Current Medications:  Home Medications    **Home medications have not yet been reviewed for this encounter**         Allergies:  Allergies   Allergen Reactions    Morphine Sulfate Itching       Physical Exam:  Vital Signs: /75   Pulse 80   Temp 36.2 °C (97.1 °F) (Temporal)   Resp 16   Ht 1.676 m (5' 6\")   Wt 98.4 kg (216 lb 14.9 oz)   LMP 02/10/2010   SpO2 100%   BMI 35.01 kg/m²   Constitutional: Alert, no acute distress  HENT: Normocephalic, posterior pharynx is normal-appearing, uvula midline, no patchy exudates  Eyes: Injected conjunctiva, small amount of yellow ocular discharge from the right eye, very minimal bilateral periorbital edema, extraocular movements are painless.  Fluorescein stain demonstrates no uptake bilaterally.  Neck: Supple, normal range of motion  Cardiovascular: No tachycardia  Pulmonary: No respiratory distress, normal work of breathing,  Psychiatric: Normal and appropriate mood and affect    Medical Decision Making:  Ms. Tiwari presents to the emergency department today for evaluation of bilateral conjunctivitis that began 4 to 5 hours prior to arrival.  No history of foreign bodies appreciated.  On exam, she has painless extraocular movements, less concerning for orbital cellulitis.  She does have some mild irritation of the periorbital region, no evidence of periorbital cellulitis.  She also has some mild ocular discharge.  Symptoms are consistent with conjunctivitis, differential diagnosis includes bacterial conjunctivitis or viral conjunctivitis.  Shortly prior to discharge she began to develop some discomfort in the ear, as well as a sore throat.  " Suspect this is likely due to viral upper respiratory infection.  She was discharged with a prescription for erythromycin ointment, I also provided follow-up information for the ophthalmologist on-call.  She will contact her clinic in the morning if her symptoms or not significantly improved.Return precautions were discussed with the patient, and provided in written form with the patient's discharge instructions.     Disposition:  Discharge home in stable condition    Final Impression:  1. Acute conjunctivitis of both eyes, unspecified acute conjunctivitis type        Electronically signed by Esme Rivas MD

## 2023-01-06 NOTE — ED TRIAGE NOTES
"Chief Complaint   Patient presents with    Eye Pain     Pt reports that at apx 1600 she began having redness and pain in her right eye, she administered cooling eye drops to both eyes and an hour later she began having the same sx in the left eye. Pt reports that she has tried warm compress and NS rinse with no relief. She does report slight blurred vision in both eyes     Pt ambulatory to triage for above complaint. Pt reports burning and itching in bilat eyes, denies any debris exposure. States that she has had pink eye in the past but \"this feels different.\" Denies any recent exposure to pink eye. Denies any headache or other neuro sx, denies use of contacts. Pt does have greenish drainage coming from bilat eyes.     /69   Pulse 72   Temp 36.4 °C (97.5 °F) (Temporal)   Resp 18   Ht 1.676 m (5' 6\")   Wt 98.4 kg (216 lb 14.9 oz)   LMP 02/10/2010   SpO2 99% Comment: Room air  BMI 35.01 kg/m²      "

## 2023-01-06 NOTE — ED NOTES
Pt discharged independent and [facility]ulatory to [facility]. Discharge instructions given and pt has no follow up questions. Vitals and condition stable for discharge

## 2023-01-06 NOTE — DISCHARGE INSTRUCTIONS
If your eyes have not significantly improved by tomorrow morning, please call the ophthalmologist listed above.  Return to the emergency department if you develop any new or worsening symptoms including worsening pain, vision changes, drainage from the eyes, fevers, pain with moving the eyes, or any further concerns.

## 2023-01-06 NOTE — ED NOTES
Pt complaining of new ear and throat pain, pt requesting ERP to check her out for that. ERP notified of pt's request

## 2023-05-31 ENCOUNTER — OFFICE VISIT (OUTPATIENT)
Dept: MEDICAL GROUP | Facility: CLINIC | Age: 38
End: 2023-05-31
Payer: MEDICAID

## 2023-05-31 VITALS
TEMPERATURE: 97.1 F | OXYGEN SATURATION: 96 % | SYSTOLIC BLOOD PRESSURE: 107 MMHG | DIASTOLIC BLOOD PRESSURE: 75 MMHG | HEIGHT: 66 IN | BODY MASS INDEX: 36.1 KG/M2 | WEIGHT: 224.6 LBS | HEART RATE: 79 BPM

## 2023-05-31 DIAGNOSIS — Z87.09 HISTORY OF EXTRINSIC ASTHMA: ICD-10-CM

## 2023-05-31 DIAGNOSIS — Z00.00 ENCOUNTER FOR ANNUAL PHYSICAL EXAM: ICD-10-CM

## 2023-05-31 DIAGNOSIS — Z87.892 HISTORY OF ANAPHYLAXIS: ICD-10-CM

## 2023-05-31 DIAGNOSIS — E66.01 MORBID OBESITY (HCC): ICD-10-CM

## 2023-05-31 PROCEDURE — 3078F DIAST BP <80 MM HG: CPT

## 2023-05-31 PROCEDURE — 99386 PREV VISIT NEW AGE 40-64: CPT | Mod: GE,EP

## 2023-05-31 PROCEDURE — 3074F SYST BP LT 130 MM HG: CPT

## 2023-05-31 RX ORDER — EPINEPHRINE 0.3 MG/.3ML
INJECTION SUBCUTANEOUS
Qty: 1 EACH | Refills: 0 | Status: SHIPPED | OUTPATIENT
Start: 2023-05-31

## 2023-05-31 RX ORDER — ALBUTEROL SULFATE 90 UG/1
2 AEROSOL, METERED RESPIRATORY (INHALATION) EVERY 6 HOURS PRN
Qty: 8.5 G | Refills: 0 | Status: SHIPPED | OUTPATIENT
Start: 2023-05-31 | End: 2024-01-26

## 2023-05-31 RX ORDER — EPINEPHRINE 0.3 MG/.3ML
INJECTION SUBCUTANEOUS
Qty: 1 EACH | Refills: 0 | Status: CANCELLED | OUTPATIENT
Start: 2023-05-31

## 2023-05-31 SDOH — ECONOMIC STABILITY: TRANSPORTATION INSECURITY
IN THE PAST 12 MONTHS, HAS THE LACK OF TRANSPORTATION KEPT YOU FROM MEDICAL APPOINTMENTS OR FROM GETTING MEDICATIONS?: NO

## 2023-05-31 SDOH — ECONOMIC STABILITY: FOOD INSECURITY: WITHIN THE PAST 12 MONTHS, THE FOOD YOU BOUGHT JUST DIDN'T LAST AND YOU DIDN'T HAVE MONEY TO GET MORE.: NEVER TRUE

## 2023-05-31 SDOH — ECONOMIC STABILITY: INCOME INSECURITY: HOW HARD IS IT FOR YOU TO PAY FOR THE VERY BASICS LIKE FOOD, HOUSING, MEDICAL CARE, AND HEATING?: NOT VERY HARD

## 2023-05-31 SDOH — HEALTH STABILITY: MENTAL HEALTH
STRESS IS WHEN SOMEONE FEELS TENSE, NERVOUS, ANXIOUS, OR CAN'T SLEEP AT NIGHT BECAUSE THEIR MIND IS TROUBLED. HOW STRESSED ARE YOU?: TO SOME EXTENT

## 2023-05-31 SDOH — ECONOMIC STABILITY: FOOD INSECURITY: WITHIN THE PAST 12 MONTHS, YOU WORRIED THAT YOUR FOOD WOULD RUN OUT BEFORE YOU GOT MONEY TO BUY MORE.: NEVER TRUE

## 2023-05-31 SDOH — ECONOMIC STABILITY: INCOME INSECURITY: IN THE LAST 12 MONTHS, WAS THERE A TIME WHEN YOU WERE NOT ABLE TO PAY THE MORTGAGE OR RENT ON TIME?: NO

## 2023-05-31 SDOH — HEALTH STABILITY: PHYSICAL HEALTH: ON AVERAGE, HOW MANY MINUTES DO YOU ENGAGE IN EXERCISE AT THIS LEVEL?: 30 MIN

## 2023-05-31 SDOH — ECONOMIC STABILITY: HOUSING INSECURITY
IN THE LAST 12 MONTHS, WAS THERE A TIME WHEN YOU DID NOT HAVE A STEADY PLACE TO SLEEP OR SLEPT IN A SHELTER (INCLUDING NOW)?: NO

## 2023-05-31 SDOH — HEALTH STABILITY: PHYSICAL HEALTH: ON AVERAGE, HOW MANY DAYS PER WEEK DO YOU ENGAGE IN MODERATE TO STRENUOUS EXERCISE (LIKE A BRISK WALK)?: 3 DAYS

## 2023-05-31 SDOH — ECONOMIC STABILITY: TRANSPORTATION INSECURITY
IN THE PAST 12 MONTHS, HAS LACK OF RELIABLE TRANSPORTATION KEPT YOU FROM MEDICAL APPOINTMENTS, MEETINGS, WORK OR FROM GETTING THINGS NEEDED FOR DAILY LIVING?: NO

## 2023-05-31 SDOH — ECONOMIC STABILITY: TRANSPORTATION INSECURITY
IN THE PAST 12 MONTHS, HAS LACK OF TRANSPORTATION KEPT YOU FROM MEETINGS, WORK, OR FROM GETTING THINGS NEEDED FOR DAILY LIVING?: NO

## 2023-05-31 SDOH — ECONOMIC STABILITY: HOUSING INSECURITY

## 2023-05-31 ASSESSMENT — SOCIAL DETERMINANTS OF HEALTH (SDOH)
HOW MANY DRINKS CONTAINING ALCOHOL DO YOU HAVE ON A TYPICAL DAY WHEN YOU ARE DRINKING: 1 OR 2
WITHIN THE PAST 12 MONTHS, YOU WORRIED THAT YOUR FOOD WOULD RUN OUT BEFORE YOU GOT THE MONEY TO BUY MORE: NEVER TRUE
IN A TYPICAL WEEK, HOW MANY TIMES DO YOU TALK ON THE PHONE WITH FAMILY, FRIENDS, OR NEIGHBORS?: MORE THAN THREE TIMES A WEEK
HOW OFTEN DO YOU GET TOGETHER WITH FRIENDS OR RELATIVES?: MORE THAN THREE TIMES A WEEK
IN A TYPICAL WEEK, HOW MANY TIMES DO YOU TALK ON THE PHONE WITH FAMILY, FRIENDS, OR NEIGHBORS?: MORE THAN THREE TIMES A WEEK
HOW OFTEN DO YOU HAVE SIX OR MORE DRINKS ON ONE OCCASION: LESS THAN MONTHLY
HOW OFTEN DO YOU HAVE A DRINK CONTAINING ALCOHOL: 2-3 TIMES A WEEK
HOW OFTEN DO YOU ATTEND CHURCH OR RELIGIOUS SERVICES?: NEVER
HOW HARD IS IT FOR YOU TO PAY FOR THE VERY BASICS LIKE FOOD, HOUSING, MEDICAL CARE, AND HEATING?: NOT VERY HARD
HOW OFTEN DO YOU GET TOGETHER WITH FRIENDS OR RELATIVES?: MORE THAN THREE TIMES A WEEK
HOW OFTEN DO YOU ATTEND CHURCH OR RELIGIOUS SERVICES?: NEVER
HOW OFTEN DO YOU ATTENT MEETINGS OF THE CLUB OR ORGANIZATION YOU BELONG TO?: NEVER
HOW OFTEN DO YOU ATTENT MEETINGS OF THE CLUB OR ORGANIZATION YOU BELONG TO?: NEVER
DO YOU BELONG TO ANY CLUBS OR ORGANIZATIONS SUCH AS CHURCH GROUPS UNIONS, FRATERNAL OR ATHLETIC GROUPS, OR SCHOOL GROUPS?: NO
DO YOU BELONG TO ANY CLUBS OR ORGANIZATIONS SUCH AS CHURCH GROUPS UNIONS, FRATERNAL OR ATHLETIC GROUPS, OR SCHOOL GROUPS?: NO

## 2023-05-31 ASSESSMENT — FIBROSIS 4 INDEX: FIB4 SCORE: 0.59

## 2023-05-31 ASSESSMENT — LIFESTYLE VARIABLES
AUDIT-C TOTAL SCORE: 4
HOW OFTEN DO YOU HAVE SIX OR MORE DRINKS ON ONE OCCASION: LESS THAN MONTHLY
HOW MANY STANDARD DRINKS CONTAINING ALCOHOL DO YOU HAVE ON A TYPICAL DAY: 1 OR 2
HOW OFTEN DO YOU HAVE A DRINK CONTAINING ALCOHOL: 2-3 TIMES A WEEK
SKIP TO QUESTIONS 9-10: 0

## 2023-05-31 ASSESSMENT — PATIENT HEALTH QUESTIONNAIRE - PHQ9: CLINICAL INTERPRETATION OF PHQ2 SCORE: 0

## 2023-05-31 NOTE — PROGRESS NOTES
"    SUBJECTIVE:     CC:  for annual check    HISTORY OF THE PRESENT ILLNESS:   Patient is a 38 y.o.  female, here today to establish care, get her annual lab work and get medications refills. She states her last annual check up was 5-6 years ago while in [place]. Her last pap smear was in , and partial hysterectomy in . Patient is sexually active with her male partner of 4 months. She declined STI screening as she states she had an STI testing done 2 weeks ago at the [facility], which she states was negative.    Patient reports that she had high cholesterol prior to her weight loss surgery (gastric sleeve in ). She desires Ozempic for weight loss. She states she has a history of allergic asthma especially in the spring and fall seasons. She states she uses her albuterol inhaler as a rescue. Last use was 2 weeks ago. Patient states she also takes zyrtec as needed for allergies when it gets bad.    Patient states she has a history of two episodes of pulmonary embolism. She reports that the first time was when she was a smoker and taking oral contraceptive pills. The second event she states she was told was \"unprovoked. She states she was initially on warfarin, but switched to aspirin and enoxaparin for 3 years. She has not had any episodes in the past three years.     Past Medical History:   Diagnosis Date    Breath shortness     Only with pulmonary embolism    Bronchitis 2014    Cholesterol blood decreased     Heart burn     Personal history of venous thrombosis and embolism &    pulmonary    Unspecified hemorrhagic conditions     xarelto       Past Surgical History:   Procedure Laterality Date    DIEGO BY LAPAROSCOPY  2015    Performed by Dre Malone M.D. at SUR[facility]ERY [facility]    GASTRIC SLEEVE LAPAROSCOPY  2014    Performed by Dre Malone M.D. at SURGERY [facility]    LIVER BIOPSY LAPAROSCOPIC  2014    Performed by Dre Malone M.D. at SURGERY " [facility]    HYSTERECTOMY ROBOTIC  2013    LIPOSUCTION  3/4/2010    Performed by RAMON NUNN at SURGERY [facility]    LIPOSUCTION  2009    Performed by RAMON NUNN at SURGERY [facility]    TONSILLECTOMY      OTHER       x2       Medications:  Current Outpatient Medications on File Prior to Visit   Medication Sig Dispense Refill    albuterol (ACCUNEB) 0.63 MG/3ML nebulizer solution Take 3 mL by nebulization every four hours as needed for Shortness of Breath. 150 mL 0    meloxicam (MOBIC) 15 MG tablet Take 1 Tablet by mouth every day. (Patient not taking: Reported on 2022) 30 Tablet 0     No current facility-administered medications on file prior to visit.     Allergies   Allergen Reactions    Morphine Sulfate Itching    Peg-8 Beeswax [Polyethylene Glycol] Anaphylaxis    Pineapple Hives       Family History   Problem Relation Age of Onset    Diabetes Other     Hypertension Other     Cancer Other        Cirrhosis (Father from hepatitis B/C)      Lung cancer (Paternal grandparents (from smoking)      TIA (Maternal grandmother      Fatty Liver; sleep apnea due to nasal polyp (Brother)      Social History:  Patient lives at home with her 2 daughters, ages 10 and 19yo. 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   Tobacco use: none  Vaping use: daily  Alcohol use: occasional   Substance use: Mariajuana; daily for back pain (from fall in )  She hikes; recently started going to the gym 2 weeks ago. Attends the gym 3/7 days a week.        ROS:   Gen: no fevers/chills, no changes in weight  Eyes: no changes in vision  ENT: no changes in hearing  Pulm: no sob, no cough  CV: no chest pain, no palpitations  GI: no nausea/vomiting, no diarrhea  MSk: no myalgias  Skin: no rash  Neuro: no headaches, no numbness/tingling        OBJECTIVE:     Exam: /75 (BP Location: Left arm, Patient Position: Sitting, BP Cuff Size: Adult)   Pulse 79   Temp 36.2 °C (97.1 °F) (Temporal)   Ht  "1.676 m (5' 6\")   Wt 102 kg (224 lb 9.6 oz)   SpO2 96%  Body mass index is 36.25 kg/m².    General: Normal appearing. No distress.  HEENT: Normocephalic. Atraumatic.  Neck: Supple without JVD or bruit. Thyroid is not enlarged.  Pulmonary: Clear to ausculation.  Normal effort. No rales, ronchi, or wheezing.  Cardiovascular: Regular rate and rhythm without murmur. Carotid and radial pulses are intact and equal bilaterally.  Abdomen: Soft, nontender, nondistended. Normal bowel sounds. Liver and spleen are not palpable  Neurologic: Grossly nonfocal  Lymph: No cervical or supraclavicular lymph nodes are palpable  Skin: Warm and dry.  No obvious lesions.  Musculoskeletal: Normal gait. No extremity cyanosis, clubbing, or edema.  Psych: Normal mood and affect. Alert and oriented x3. Judgment and insight is normal.      ASSESSMENT & PLAN:   38 y.o. female with the following -    #Encounter for Annual Checkup   Patient reports her last annual check up was 5-6 years ago in [place]. Her CBC and CMP from 11/2022 were unremarkable. Her her last pap smear was in 2013, and partial hysterectomy in 2013. Patient is sexually active with her male partner of 4 months. She declined STI screening as she states she had an STI testing done 2 weeks ago at the [facility], which was negative.  - Mammogram (MA-DIAGNOSTIC MAMMO BILAT W/TOMOSYNTHESIS W/CAD)    #Obesity (BMI >35)  #History of High Cholesterol  Patient reports that she had high cholesterol prior to her weight loss surgery (gastric sleeve in 2015). She desires Ozempic for weight loss.  - Relevant Orders:  - Hemoglobin A1c  - Lipid panel   - Lifestyle and dietary modifications were emphasized including exercising and walking.   - Adequate hydration with water  - Eating healthy meals, decrease soda intake and sweets.  - Limiting fatty and greasy foods.  - Encourage to bake, broil, boil, grill, roast or microwave foods.  - Semaglutide (Ozempic) 0.25mg once weekly for the " "first 4 weeks. Follow up in a month.    #History of Allergic Asthma  Patient reports of history of allergic asthma especially in the spring and fall seasons. She states she uses her albuterol inhaler as a rescue. Last use was 2 weeks ago. Patient states she also takes zyrtec as needed for allergies when it gets bad.  - Albuterol Aero Soln inhalation aerosol refilled.  - Consider Montelukast. To be discussed with patient.    #History of Pulmonary Embolism  Patient states she has a history of two episodes of pulmonary embolism. She reports that the first time was when she was a smoker and taking oral contraceptive pills. The second event she states she was told was \"unprovoked. She states she was initially on warfarin, but switched to aspirin and enoxaparin for 3 years. She has not had any episodes in the past three years. Patient admits she does not smoke, nor she takes OCPs  (hysterectomy in 2013).    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   - Relevant Medication:              - EPINEPHrine (EPIPEN) 0.3 MG/0.3ML Solution Auto-injector solution for injection.  - Patient was counseled on signs and symptoms to watch out for with other organs beside airway closure in the event she encounters a bee sting.   - Patient advised to present to the ED even after epinephrine administration for further observation and management.       James Maier, PGY-1  [facility]  "

## 2023-11-07 ENCOUNTER — NON-PROVIDER VISIT (OUTPATIENT)
Dept: OCCUPATIONAL MEDICINE | Facility: CLINIC | Age: 38
End: 2023-11-07

## 2023-11-07 DIAGNOSIS — Z02.1 PRE-EMPLOYMENT DRUG SCREENING: ICD-10-CM

## 2023-11-07 DIAGNOSIS — Z02.83 ENCOUNTER FOR DRUG SCREENING: ICD-10-CM

## 2023-11-07 LAB
AMP AMPHETAMINE: NEGATIVE
COC COCAINE: NEGATIVE
INT CON NEG: NORMAL
INT CON POS: NORMAL
MET METHAMPHETAMINES: NEGATIVE
OPI OPIATES: NEGATIVE
PCP PHENCYCLIDINE: NEGATIVE
POC DRUG COMMENT 753798-OCCUPATIONAL HEALTH: NEGATIVE
THC: NEGATIVE

## 2023-11-07 PROCEDURE — 80305 DRUG TEST PRSMV DIR OPT OBS: CPT | Performed by: NURSE PRACTITIONER

## 2023-11-28 ENCOUNTER — TELEPHONE (OUTPATIENT)
Dept: MEDICAL GROUP | Facility: OTHER | Age: 38
End: 2023-11-28

## 2023-11-28 NOTE — TELEPHONE ENCOUNTER
Patient called requesting order for mammogram, she found a lump on her right breast and she needs a specific order for it, please advise thank you

## 2023-11-30 ENCOUNTER — TELEPHONE (OUTPATIENT)
Dept: MEDICAL GROUP | Facility: CLINIC | Age: 38
End: 2023-11-30

## 2023-11-30 DIAGNOSIS — N63.10 MASS OF RIGHT BREAST, UNSPECIFIED QUADRANT: ICD-10-CM

## 2023-11-30 NOTE — TELEPHONE ENCOUNTER
Patient  on 11/30 at 8:36am regarding a referral that needs to be sent over. However patient has not been seen since 5/31/2023.     I called patient back on 11/30 @ 12:35pm and Left VM to call back. I am unsure what is being requested.     Brisa Damon

## 2023-12-05 ENCOUNTER — HOSPITAL ENCOUNTER (OUTPATIENT)
Dept: RADIOLOGY | Facility: MEDICAL CENTER | Age: 38
End: 2023-12-05
Attending: FAMILY MEDICINE
Payer: MEDICAID

## 2023-12-05 DIAGNOSIS — N63.10 MASS OF RIGHT BREAST, UNSPECIFIED QUADRANT: ICD-10-CM

## 2023-12-05 PROCEDURE — G0279 TOMOSYNTHESIS, MAMMO: HCPCS

## 2023-12-05 PROCEDURE — 76642 ULTRASOUND BREAST LIMITED: CPT | Mod: RT

## 2023-12-07 ENCOUNTER — HOSPITAL ENCOUNTER (OUTPATIENT)
Dept: RADIOLOGY | Facility: MEDICAL CENTER | Age: 38
End: 2023-12-07
Payer: MEDICAID

## 2023-12-07 DIAGNOSIS — R92.8 ABNORMAL FINDING ON BREAST IMAGING: ICD-10-CM

## 2023-12-07 LAB — PATHOLOGY CONSULT NOTE: NORMAL

## 2023-12-07 PROCEDURE — A4648 IMPLANTABLE TISSUE MARKER: HCPCS

## 2023-12-07 PROCEDURE — 88305 TISSUE EXAM BY PATHOLOGIST: CPT

## 2023-12-07 PROCEDURE — 19285 PERQ DEV BREAST 1ST US IMAG: CPT | Mod: RT

## 2023-12-07 PROCEDURE — 88360 TUMOR IMMUNOHISTOCHEM/MANUAL: CPT

## 2023-12-07 PROCEDURE — 88361 TUMOR IMMUNOHISTOCHEM/COMPUT: CPT

## 2023-12-08 ENCOUNTER — TELEPHONE (OUTPATIENT)
Dept: RADIOLOGY | Facility: MEDICAL CENTER | Age: 38
End: 2023-12-08

## 2023-12-11 ENCOUNTER — TELEPHONE (OUTPATIENT)
Dept: MEDICAL GROUP | Facility: CLINIC | Age: 38
End: 2023-12-11

## 2023-12-11 DIAGNOSIS — N63.10 MASS OF RIGHT BREAST, UNSPECIFIED QUADRANT: ICD-10-CM

## 2023-12-12 NOTE — TELEPHONE ENCOUNTER
Pt called today requesting a referral to Breasts Surgeon as she was diagnosed with cancer. Please advise

## 2023-12-14 ENCOUNTER — OFFICE VISIT (OUTPATIENT)
Dept: SURGERY | Facility: MEDICAL CENTER | Age: 38
End: 2023-12-14
Payer: MEDICAID

## 2023-12-14 ENCOUNTER — PATIENT OUTREACH (OUTPATIENT)
Dept: ONCOLOGY | Facility: MEDICAL CENTER | Age: 38
End: 2023-12-14

## 2023-12-14 VITALS
DIASTOLIC BLOOD PRESSURE: 82 MMHG | HEART RATE: 61 BPM | OXYGEN SATURATION: 61 % | WEIGHT: 217 LBS | SYSTOLIC BLOOD PRESSURE: 110 MMHG | TEMPERATURE: 98.3 F | HEIGHT: 66 IN | BODY MASS INDEX: 34.87 KG/M2

## 2023-12-14 DIAGNOSIS — C50.411 MALIGNANT NEOPLASM OF UPPER-OUTER QUADRANT OF RIGHT BREAST IN FEMALE, ESTROGEN RECEPTOR NEGATIVE (HCC): ICD-10-CM

## 2023-12-14 DIAGNOSIS — Z17.1 MALIGNANT NEOPLASM OF UPPER-OUTER QUADRANT OF RIGHT BREAST IN FEMALE, ESTROGEN RECEPTOR NEGATIVE (HCC): ICD-10-CM

## 2023-12-14 PROCEDURE — 99205 OFFICE O/P NEW HI 60 MIN: CPT | Performed by: SURGERY

## 2023-12-14 PROCEDURE — 3079F DIAST BP 80-89 MM HG: CPT | Performed by: SURGERY

## 2023-12-14 PROCEDURE — 99417 PROLNG OP E/M EACH 15 MIN: CPT | Performed by: SURGERY

## 2023-12-14 PROCEDURE — 3074F SYST BP LT 130 MM HG: CPT | Performed by: SURGERY

## 2023-12-14 ASSESSMENT — ENCOUNTER SYMPTOMS
DEPRESSION: 1
NECK PAIN: 1
BACK PAIN: 1
ARTHRALGIAS: 1
FATIGUE: 1
NAUSEA: 1
SHORTNESS OF BREATH: 1
NERVOUS/ANXIOUS: 1

## 2023-12-14 ASSESSMENT — FIBROSIS 4 INDEX: FIB4 SCORE: 0.59

## 2023-12-14 NOTE — PROGRESS NOTES
Subjective     Fela Tiwari is a 38 y.o. female who presents for evaluation of a new diagnosis of right breast cancer.  She reports that she noticed a mass in the lateral right breast about 3 weeks ago.  She feels like it has increased in size since she first noticed it.  She can see bulging at the site of the mass when she raises her arms.    Routine self breast exams: No   Breast pain: No   Nipple discharge: No   Skin changes: No   Masses: Yes  Contour/nipple changes: Yes  Previous breast biopsy or surgery: No     Age at menarche: 10  Age at menopause: premenopausal (hysterectomy , ovaries in place)  Age at first birth: 17    Hormone replacement therapy: No     Family history of cancer: PGM/PGF tobacco-associated lung cancer. No breast/ovarian cancers. No Ashkenazi Taoism heritage.    Lifetime (5-yr) breast cancer risk assessment calculation  Tyrer-Cuzick v7: 10.9 % (0.5 %)  Tyrer-Cuzick v8: 12.7 % (0.6 %)  Gayathri model: 7.3 % (0.4 %)    Imaging  Diagnostic mammogram ([facility]) 2023:  Right breast mass at palpable area in upper outer quadrant, left breast negative. BIRADS 5, density C.  Diagnostic ultrasound ([facility]) 2023:  Right 10:00 4cmFN 2.6cm irregular hypoechoic spiculated mass. Axilla unremarkable. Left negative. BIRADS 5.     Pathology  Right breast US-guided CNBx 2023: Invasive ductal carcinoma, grade 3, ER-IA-HER2+, Ki67 20-30%.    Past Medical History   Past Medical History:   Diagnosis Date    Breath shortness     Only with pulmonary embolism    Bronchitis 2014    Cholesterol blood decreased     Heart burn     Personal history of venous thrombosis and embolism &    pulmonary    Unspecified hemorrhagic conditions     xarelto       Surgical History  Past Surgical History:   Procedure Laterality Date    DIEGO BY LAPAROSCOPY  2015    Performed by Dre Malone M.D. at SUR[facility]ERY [facility]    GASTRIC SLEEVE LAPAROSCOPY  2014    Performed by Dre ED SANTIAGO  ZAYNAB Malone at [facility]ERY [facility]    LIVER BIOPSY LAPAROSCOPIC  2014    Performed by Dre Malone M.D. at SUR[facility]ERY [facility]    HYSTERECTOMY ROBOTIC  2013    LIPOSUCTION  3/4/2010    Performed by RAMON NUNN at SURGERY [facility]    LIPOSUCTION  2009    Performed by RAMON NUNN at SURGERY [facility]    TONSILLECTOMY  1997    OTHER       x2       Family History  Family History   Problem Relation Age of Onset    Diabetes Other     Hypertension Other     Cancer Other        Social History  Social History     Socioeconomic History    Marital status:      Spouse name: Not on file    Number of children: Not on file    Years of education: Not on file    Highest education level: Associate degree: academic program   Occupational History    Not on file   Tobacco Use    Smoking status: Former     Current packs/day: 0.00     Average packs/day: 0.5 packs/day for 6.0 years (3.0 ttl pk-yrs)     Types: Cigarettes     Start date: 2011     Quit date: 2017     Years since quittin.5    Smokeless tobacco: Never   Vaping Use    Vaping Use: Never used   Substance and Sexual Activity    Alcohol use: No    Drug use: Yes     Types: Inhaled     Comment: thc    Sexual activity: Yes     Partners: Male     Birth control/protection: Female Sterilization     Comment: Hysterectomy   Other Topics Concern    Not on file   Social History Narrative    Not on file     Social Determinants of Health     Financial Resource Strain: Low Risk  (2023)    Overall Financial Resource Strain (CARDIA)     Difficulty of Paying Living Expenses: Not very hard   Food Insecurity: No Food Insecurity (2023)    Hunger Vital Sign     Worried About Running Out of Food in the Last Year: Never true     Ran Out of Food in the Last Year: Never true   Transportation Needs: No Transportation Needs (2023)    PRAPARE - Transportation     Lack of Transportation (Medical): No     Lack of  Transportation (Non-Medical): No   Physical Activity: Insufficiently Active (5/31/2023)    Exercise Vital Sign     Days of Exercise per Week: 3 days     Minutes of Exercise per Session: 30 min   Stress: Stress Concern Present (5/31/2023)    [employer] [place] [employer] - Occupational Stress Questionnaire     Feeling of Stress : To some extent   Social Connections: Socially Isolated (5/31/2023)    Social Connection and Isolation Panel [NHANES]     Frequency of Communication with Friends and Family: More than three times a week     Frequency of Social Gatherings with Friends and Family: More than three times a week     Attends Episcopal Services: Never     Active Member of Clubs or Organizations: No     Attends Club or Organization Meetings: Never     Marital Status:    Intimate Partner Violence: Not on file   Housing Stability: Unknown (5/31/2023)    Housing Stability Vital Sign     Unable to Pay for Housing in the Last Year: No     Number of Places Lived in the Last Year: Not on file     Unstable Housing in the Last Year: No        Review of Systems  Review of Systems   Constitutional:  Positive for fatigue.   Respiratory:  Positive for shortness of breath.    Gastrointestinal:  Positive for nausea.   Musculoskeletal:  Positive for arthralgias, back pain and neck pain.   Psychiatric/Behavioral:  Positive for depression. The patient is nervous/anxious.    All other systems reviewed and are negative.       Objective   LMP 02/10/2010    Physical Exam  Vitals and nursing note reviewed.   Constitutional:       General: She is not in acute distress.     Appearance: Normal appearance.   HENT:      Head: Normocephalic and atraumatic.      Right Ear: External ear normal.      Left Ear: External ear normal.      Nose: Nose normal.      Mouth/Throat:      Pharynx: Oropharynx is clear.   Eyes:      General: No scleral icterus.     Conjunctiva/sclera: Conjunctivae normal.   Cardiovascular:      Rate and  Rhythm: Normal rate and regular rhythm.      Heart sounds: Normal heart sounds. No murmur heard.     No friction rub. No gallop.   Pulmonary:      Effort: Pulmonary effort is normal. No respiratory distress.      Breath sounds: Normal breath sounds. No wheezing, rhonchi or rales.   Chest:   Breasts:     Piotr Score is 5.      Breasts are symmetrical.      Right: Mass present. No swelling, bleeding, inverted nipple, nipple discharge or skin change.      Left: Normal. No swelling, bleeding, inverted nipple, mass, nipple discharge or skin change.          Comments: Bilateral breasts examined in the upright and supine positions.  No suspicious skin changes (erythema, peau d'orange).  Asymmetric fullness in the right lateral/upper outer quadrant with arms raised without discrete tenting.  Bilateral breast tissue heterogeneously dense with dominant firm mass in the right lateral breast spanning ~3cm, not fixed to skin or chest wall.  Bilateral nipples everted without expressible discharge.  No palpable cervical, supraclavicular, or axillary adenopathy bilaterally.  Abdominal:      General: Abdomen is flat. There is no distension.      Palpations: Abdomen is soft. There is no mass.   Musculoskeletal:         General: No swelling or deformity. Normal range of motion.      Cervical back: Neck supple.   Lymphadenopathy:      Cervical: No cervical adenopathy.      Upper Body:      Right upper body: No supraclavicular or axillary adenopathy.      Left upper body: No supraclavicular or axillary adenopathy.   Skin:     General: Skin is warm and dry.      Capillary Refill: Capillary refill takes less than 2 seconds.   Neurological:      General: No focal deficit present.      Mental Status: She is alert and oriented to person, place, and time.   Psychiatric:         Mood and Affect: Mood normal.         Behavior: Behavior normal.         Thought Content: Thought content normal.         Judgment: Judgment normal.          Assessment & Plan   The patient is a delightful 38 y.o. female with a new diagnosis of right breast upper outer quadrant invasive ductal carcinoma.  This is clinical stage cT2 N0 M0 (anatomic and prognostic stage IIA), spanning 2.6 cm by ultrasound, with clinically negative axillary lymph nodes.  The tumor is grade 3, estrogen receptor negative (0 %), progesterone receptor negative (0 %), HER2 positive (3+ by IHC), with a Ki67 of 20-30 %.     We discussed the development, progression, and natural history of untreated breast cancer, including the potential for progression to metastatic disease and death.  Given her current stage of IIA I think that this tumor is very treatable and her prognosis is overall good.  We discussed medical oncology and radiation oncology treatments briefly, and how I expect them to be applied in her particular case.     We discussed surgical options, including mastectomy with or without reconstruction and segmental mastectomy (lumpectomy).  We discussed that there is no difference in survival between mastectomy and lumpectomy, but that there is an increased risk of local recurrence with lumpectomy for which we give radiation after surgery.  We discussed that radiation may confer a small survival benefit according to new data.  We discussed lymphatic sampling and the indications for sentinel lymph node biopsy, axillary dissection, and the scenarios in which lymphatic sampling may be omitted.  In her particular case, I recommend proceeding with neoadjuvant chemotherapy for the cT2 HER2+ cancer.   All questions answered in detail.  A thorough discussion was held with the patient of the indications, alternatives, risks (including lymphedema, positive margins, bleeding, infection, anesthetic complications, and the potential need for more than one operation), as well as the expected outcomes.  She would like to proceed with medical oncology consultation; STAT referral placed.  Right now  "she is leaning toward wanting bilateral mastectomy with reconstruction; we discussed that we will get the genetic testing results back and then discuss the risks and benefits of her surgical options at that point.    Before proceeding with treatment, she will need bilateral breast MRI.  This has been ordered STAT.    We discussed the option of genetic counseling and genetic testing.  She meets NCCN criteria for genetic testing due to her age.  At this point the patient is interested in genetic counseling and testing; referral placed.     A total of 90 minutes were spent on and with this patient today, including review of records, independent review of imaging, history and physical exam, counseling, documentation of exam, and coordination of care.  The patient was given a copy of her percutaneous biopsy pathology report, a filled-out \"Your Guide to Understanding the Diagnosis of Breast Cancer\" booklet adapted from the [email or web address].org version, and a personalized flight plan including her diagnosis, imaging, and detailed plan.  Referrals were placed to cancer navigation, social work/distress screening, and financial counseling.  I will see her back toward the end of neoadjuvant chemotherapy for final surgical planning.    "

## 2023-12-21 ENCOUNTER — HOSPITAL ENCOUNTER (OUTPATIENT)
Dept: RADIOLOGY | Facility: MEDICAL CENTER | Age: 38
End: 2023-12-21
Attending: SURGERY
Payer: MEDICAID

## 2023-12-21 DIAGNOSIS — Z17.1 MALIGNANT NEOPLASM OF UPPER-OUTER QUADRANT OF RIGHT BREAST IN FEMALE, ESTROGEN RECEPTOR NEGATIVE (HCC): ICD-10-CM

## 2023-12-21 DIAGNOSIS — C50.411 MALIGNANT NEOPLASM OF UPPER-OUTER QUADRANT OF RIGHT BREAST IN FEMALE, ESTROGEN RECEPTOR NEGATIVE (HCC): ICD-10-CM

## 2023-12-21 PROCEDURE — A9579 GAD-BASE MR CONTRAST NOS,1ML: HCPCS | Mod: UD | Performed by: SURGERY

## 2023-12-21 PROCEDURE — 700117 HCHG RX CONTRAST REV CODE 255: Mod: UD | Performed by: SURGERY

## 2023-12-21 PROCEDURE — C8908 MRI W/O FOL W/CONT, BREAST,: HCPCS

## 2023-12-21 RX ADMIN — GADOTERIDOL 20 ML: 279.3 INJECTION, SOLUTION INTRAVENOUS at 16:56

## 2023-12-22 ENCOUNTER — HOSPITAL ENCOUNTER (OUTPATIENT)
Dept: HEMATOLOGY ONCOLOGY | Facility: MEDICAL CENTER | Age: 38
End: 2023-12-22
Attending: INTERNAL MEDICINE
Payer: MEDICAID

## 2023-12-22 VITALS
WEIGHT: 217.37 LBS | TEMPERATURE: 96.8 F | RESPIRATION RATE: 15 BRPM | BODY MASS INDEX: 34.93 KG/M2 | DIASTOLIC BLOOD PRESSURE: 68 MMHG | HEIGHT: 66 IN | HEART RATE: 70 BPM | SYSTOLIC BLOOD PRESSURE: 108 MMHG | OXYGEN SATURATION: 97 %

## 2023-12-22 DIAGNOSIS — C50.411 MALIGNANT NEOPLASM OF UPPER-OUTER QUADRANT OF RIGHT BREAST IN FEMALE, ESTROGEN RECEPTOR NEGATIVE (HCC): ICD-10-CM

## 2023-12-22 DIAGNOSIS — Z17.1 MALIGNANT NEOPLASM OF UPPER-OUTER QUADRANT OF RIGHT BREAST IN FEMALE, ESTROGEN RECEPTOR NEGATIVE (HCC): ICD-10-CM

## 2023-12-22 DIAGNOSIS — C90.00 MULTIPLE MYELOMA NOT HAVING ACHIEVED REMISSION (HCC): ICD-10-CM

## 2023-12-22 PROCEDURE — 99212 OFFICE O/P EST SF 10 MIN: CPT | Performed by: INTERNAL MEDICINE

## 2023-12-22 PROCEDURE — 99215 OFFICE O/P EST HI 40 MIN: CPT | Performed by: INTERNAL MEDICINE

## 2023-12-22 ASSESSMENT — ENCOUNTER SYMPTOMS
CARDIOVASCULAR NEGATIVE: 1
EYES NEGATIVE: 1
NAUSEA: 1
PSYCHIATRIC NEGATIVE: 1
MUSCULOSKELETAL NEGATIVE: 1
CONSTITUTIONAL NEGATIVE: 1
NEUROLOGICAL NEGATIVE: 1
RESPIRATORY NEGATIVE: 1

## 2023-12-22 ASSESSMENT — FIBROSIS 4 INDEX: FIB4 SCORE: 0.59

## 2023-12-22 ASSESSMENT — PAIN SCALES - GENERAL: PAINLEVEL: 6=MODERATE PAIN

## 2023-12-22 NOTE — PROGRESS NOTES
Consult:  Hematology/Oncology      Referring Physician: Rochelle Gaviria MD  Primary Care:  Francisco Maier M.D.    Diagnosis: Newly diagnosed ER negative HER2 positive right breast cancer    Chief Complaint: Newly Diagnosed ER Negative HER2 Positive Right Breast Cancer    History of Presenting Illness:  Fela Tiwari is a 38 y.o. likely premenopausal white female who self detected a mass in her lateral right breast in 2023.  She had a mammogram on 2023 which showed the palpable mass is likely malignant.  Ultrasound showed it was 2.6 cm irregular hypoechoic spiculated mass.  Left breast was normal.  She underwent a ultrasound-guided biopsy of the right breast on 2023: This revealed an invasive ductal carcinoma, grade 3, ER negative, NE negative, HER2 3+ by IHC, Ki-67 20 to 30%.  An MRI was done yesterday and results are pending.  She had a hysterectomy in  but her ovaries are in place.  She is  3 para 2 and has not taken any hormone replacement therapy.  No family history of breast or ovarian cancer.  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  She was on anticoagulation for a while but has been off for several years.  She has also had gastric sleeve laparoscopic and cholecystectomy and does note chronic nausea.      Past Medical History:   Diagnosis Date    Breath shortness     Only with pulmonary embolism    Bronchitis 2014    Cholesterol blood decreased     Heart burn     Personal history of venous thrombosis and embolism &    pulmonary    Unspecified hemorrhagic conditions     xarelto       Past Surgical History:   Procedure Laterality Date    DIEGO BY LAPAROSCOPY  2015    Performed by Dre Malone M.D. at SURGERY [facility]    GASTRIC SLEEVE LAPAROSCOPY  2014    Performed by Dre Malone M.D. at [facility]ERY [facility]    LIVER BIOPSY LAPAROSCOPIC  2014    Performed by Dre DE SANTIAGO  ZAYNAB Malone at SURGERY [facility]    HYSTERECTOMY ROBOTIC  2013    LIPOSUCTION  3/4/2010    Performed by RAMON NUNN at SURGERY [facility]    LIPOSUCTION  2009    Performed by RAMON NUNN at SURGERY [facility]    TONSILLECTOMY  1997    OTHER       x2       Social History     Tobacco Use    Smoking status: Former     Current packs/day: 0.00     Average packs/day: 0.5 packs/day for 6.0 years (3.0 ttl pk-yrs)     Types: Cigarettes     Start date: 2011     Quit date: 2017     Years since quittin.5    Smokeless tobacco: Never   Vaping Use    Vaping Use: Every day   Substance Use Topics    Alcohol use: Yes     Alcohol/week: 0.6 - 1.2 oz     Types: 1 - 2 Standard drinks or equivalent per week     Comment: Daily 1-2 drinks    Drug use: Yes     Types: Inhaled, Marijuana     Comment: thc, daily        Family History   Problem Relation Age of Onset    Diabetes Other     Hypertension Other     Cancer Other        Allergies as of 2023 - Reviewed 2023   Allergen Reaction Noted    Bee venom Anaphylaxis 2023    Morphine sulfate Anaphylaxis and Itching 2010    Tape Rash 2023    Pineapple Hives 2023         Current Outpatient Medications:     EPINEPHrine (EPIPEN) 0.3 MG/0.3ML Solution Auto-injector solution for injection, Inject 0.3 mL into the thigh one time for 1 dose  Indications: Patient advised to present to the ED even after epinephrine administration for further observation and management., Disp: 1 Each, Rfl: 0    albuterol 108 (90 Base) MCG/ACT Aero Soln inhalation aerosol, Inhale 2 Puffs every 6 hours as needed for Shortness of Breath., Disp: 8.5 g, Rfl: 0    albuterol (ACCUNEB) 0.63 MG/3ML nebulizer solution, Take 3 mL by nebulization every four hours as needed for Shortness of Breath., Disp: 150 mL, Rfl: 0    Review of Systems:  Review of Systems   Constitutional: Negative.    HENT: Negative.     Eyes: Negative.    Respiratory:  Negative.     Cardiovascular: Negative.    Gastrointestinal:  Positive for nausea.   Genitourinary: Negative.    Musculoskeletal: Negative.    Skin: Negative.    Neurological: Negative.    Endo/Heme/Allergies: Negative.    Psychiatric/Behavioral: Negative.            Physical Exam:  There were no vitals filed for this visit.    DESC; KARNOFSKY SCALE WITH ECOG EQUIVALENT: 100, Fully active, able to carry on all pre-disease performed without restriction (ECOG equivalent 0)    DISTRESS LEVEL: no acute distress    Physical Exam  Constitutional:       Appearance: Normal appearance.   HENT:      Head: Normocephalic.      Mouth/Throat:      Mouth: Mucous membranes are moist.      Pharynx: Oropharynx is clear.   Eyes:      Extraocular Movements: Extraocular movements intact.      Conjunctiva/sclera: Conjunctivae normal.      Pupils: Pupils are equal, round, and reactive to light.   Cardiovascular:      Rate and Rhythm: Normal rate and regular rhythm.      Pulses: Normal pulses.   Pulmonary:      Effort: Pulmonary effort is normal.      Breath sounds: Normal breath sounds.   Chest:      Comments: 12/22/2023: Mass at 1030 o'clock 5 cm from the right nipple measuring 3.5 x 3.5 cm.  No right axillary adenopathy is noted.  The left breast is normal.  Abdominal:      General: Abdomen is flat. Bowel sounds are normal.      Palpations: Abdomen is soft. There is no mass.   Musculoskeletal:         General: Normal range of motion.   Skin:     General: Skin is warm.   Neurological:      General: No focal deficit present.      Mental Status: She is alert and oriented to person, place, and time.   Psychiatric:         Mood and Affect: Mood normal.         Behavior: Behavior normal.            Labs:  Hospital Outpatient Visit on 12/07/2023   Component Date Value Ref Range Status    Pathology Request 12/07/2023 Sent to Histo   Final       Imaging:   All listed images below have been independently reviewed by me. I agree with the findings  as summarized below:    US-ULTRASOUND BIOPSY RIGHT INITIAL LESION    Addendum Date: 12/12/2023    Addendum dictated on 12/8/2023 by Dr. Marie as follows: Pathology results revealed invasive mammary carcinoma. These results are concordant. They were discussed with the patient. She denied any problems at her biopsy site. She was instructed to contact Dr. Maier for surgical referral.    Result Date: 12/12/2023 12/7/2023 12:45 PM HISTORY/REASON FOR EXAM:  10:00 4 cm from the nipple right breast mass COMPARISON:  None. TECHNIQUE/EXAM DESCRIPTION AND NUMBER OF VIEWS:  Right breast ultrasound-guided vacuum-assisted core biopsy and STEFANY placement. The procedure was discussed with the patient. Risks, benefits, and alternatives were discussed. Informed written consent was obtained. A timeout was performed. The lateral approach biopsy tract was anesthetized with 5 mL of 1% lidocaine after the site was draped and prepared in the usual sterile fashion. A superficial dermatotomy was made. 4 cores were obtained using a 12g [employer]ge vacuum-assisted biopsy device. Additional 5 mL lidocaine was administered in the middle of the procedure. A Stefany reflector was placed under ultrasound guidance into the mass. Auditory confirmation of clip placement is successful. Two-view right mammogram was obtained following clip placement. Hemostasis was obtained with direct pressure. There were no apparent complications. FINDINGS: The clip was delivered into the mass and shown sonographically in the mass. Postprocedure mammography shows the clip along the superolateral margin of the mass.     Technically successful ultrasound-guided 10:00 right breast mass biopsy and Stefany reflector placement. Note that the reflector mammographically is along the superolateral margin of the mass. At the termination of the procedure, before the post procedure mammogram, the reflector is shown in the mass     US-LOCALIZATION BREAST SINGLE    Addendum Date:  12/12/2023    Addendum dictated on 12/8/2023 by Dr. Marie as follows: Pathology results revealed invasive mammary carcinoma. These results are concordant. They were discussed with the patient. She denied any problems at her biopsy site. She was instructed to contact Dr. Maier for surgical referral.    Result Date: 12/12/2023 12/7/2023 12:45 PM HISTORY/REASON FOR EXAM:  10:00 4 cm from the nipple right breast mass COMPARISON:  None. TECHNIQUE/EXAM DESCRIPTION AND NUMBER OF VIEWS:  Right breast ultrasound-guided vacuum-assisted core biopsy and STEFANY placement. The procedure was discussed with the patient. Risks, benefits, and alternatives were discussed. Informed written consent was obtained. A timeout was performed. The lateral approach biopsy tract was anesthetized with 5 mL of 1% lidocaine after the site was draped and prepared in the usual sterile fashion. A superficial dermatotomy was made. 4 cores were obtained using a 12g [employer] vacuum-assisted biopsy device. Additional 5 mL lidocaine was administered in the middle of the procedure. A Stefany reflector was placed under ultrasound guidance into the mass. Auditory confirmation of clip placement is successful. Two-view right mammogram was obtained following clip placement. Hemostasis was obtained with direct pressure. There were no apparent complications. FINDINGS: The clip was delivered into the mass and shown sonographically in the mass. Postprocedure mammography shows the clip along the superolateral margin of the mass.     Technically successful ultrasound-guided 10:00 right breast mass biopsy and Stefany reflector placement. Note that the reflector mammographically is along the superolateral margin of the mass. At the termination of the procedure, before the post procedure mammogram, the reflector is shown in the mass     MA-MAMMO CONFIRMATION RIGHT    Addendum Date: 12/12/2023    Addendum dictated on 12/8/2023 by Dr. Marie as follows: Pathology results  revealed invasive mammary carcinoma. These results are concordant. They were discussed with the patient. She denied any problems at her biopsy site. She was instructed to contact Dr. Maier for surgical referral.    Result Date: 12/12/2023 12/7/2023 12:45 PM HISTORY/REASON FOR EXAM:  10:00 4 cm from the nipple right breast mass COMPARISON:  None. TECHNIQUE/EXAM DESCRIPTION AND NUMBER OF VIEWS:  Right breast ultrasound-guided vacuum-assisted core biopsy and STEFANY placement. The procedure was discussed with the patient. Risks, benefits, and alternatives were discussed. Informed written consent was obtained. A timeout was performed. The lateral approach biopsy tract was anesthetized with 5 mL of 1% lidocaine after the site was draped and prepared in the usual sterile fashion. A superficial dermatotomy was made. 4 cores were obtained using a 12g [employer] vacuum-assisted biopsy device. Additional 5 mL lidocaine was administered in the middle of the procedure. A Stefany reflector was placed under ultrasound guidance into the mass. Auditory confirmation of clip placement is successful. Two-view right mammogram was obtained following clip placement. Hemostasis was obtained with direct pressure. There were no apparent complications. FINDINGS: The clip was delivered into the mass and shown sonographically in the mass. Postprocedure mammography shows the clip along the superolateral margin of the mass.     Technically successful ultrasound-guided 10:00 right breast mass biopsy and Stefany reflector placement. Note that the reflector mammographically is along the superolateral margin of the mass. At the termination of the procedure, before the post procedure mammogram, the reflector is shown in the mass     MA-DIAGNOSTIC MAMMO BILAT W/TOMOSYNTHESIS W/CAD    Result Date: 12/5/2023 12/5/2023 9:31 AM HISTORY/REASON FOR EXAM:  right breast lump appreciated by patient. Palpable lump right breast 10:00 position left nipple discharge  TECHNIQUE/EXAM DESCRIPTION AND NUMBER OF VIEWS: Bilateral tomosynthesis diagnostic mammography was performed with standard mammographic images generated from the data set. Images were reviewed and interpreted with CAD. Bilateral breast ultrasound limited COMPARISON:   None FINDINGS: The breast parenchyma is heterogeneously dense which may obscure small masses. CAD was performed. Circumscribed mass is identified at site of palpable abnormality upper outer quadrant of the right breast. No other dominant mass or abnormal calcification or architectural distortion is noted bilaterally. Solid mass with irregular and lobulated borders is identified in the right breast at the 10:00 position which does correspond to the palpable abnormality. Dimensions are 2.6 x 2.3 x 1.4 cm. Borders are irregular and appears slightly spiculated. Ultrasound of the right axilla demonstrates no lymph nodes with abnormally thickened cortex. Ultrasound of the left breast reveals no evidence of solid mass at the retroareolar location. No cyst or fluid collection or abnormal shadowing is identified. No dilated ducts are identified.     1.  Solid mass right breast is identified on mammogram and ultrasound corresponding to palpable abnormality as described above. Ultrasound-guided biopsy with Stefany  marker placement is recommended. 2.  No adenopathy identified in the right breast. 3.  No abnormalities noted in the left breast in retroareolar location. Discharge worsens then additional imaging can be performed if clinically indicated. These results were given to the patient at the time of visit. R5- CATEGORY 5:  HIGHLY SUGGESTIVE OF MALIGNANCY - APPROPRIATE ACTION SHOULD BE TAKEN Ten to twenty percent of all cancers can be categorized as hereditary and the clinical and financial value of identifying patients and families at risk is well documented. If you have a personal or family history of breast, ovarian, fallopian tube, peritoneal or other  cancer, please consult your physician regarding genetic counseling and testing.     US-BREAST BILAT-LIMITED    Result Date: 12/5/2023 12/5/2023 9:31 AM HISTORY/REASON FOR EXAM:  right breast lump appreciated by patient. Palpable lump right breast 10:00 position left nipple discharge TECHNIQUE/EXAM DESCRIPTION AND NUMBER OF VIEWS: Bilateral tomosynthesis diagnostic mammography was performed with standard mammographic images generated from the data set. Images were reviewed and interpreted with CAD. Bilateral breast ultrasound limited COMPARISON:   None FINDINGS: The breast parenchyma is heterogeneously dense which may obscure small masses. CAD was performed. Circumscribed mass is identified at site of palpable abnormality upper outer quadrant of the right breast. No other dominant mass or abnormal calcification or architectural distortion is noted bilaterally. Solid mass with irregular and lobulated borders is identified in the right breast at the 10:00 position which does correspond to the palpable abnormality. Dimensions are 2.6 x 2.3 x 1.4 cm. Borders are irregular and appears slightly spiculated. Ultrasound of the right axilla demonstrates no lymph nodes with abnormally thickened cortex. Ultrasound of the left breast reveals no evidence of solid mass at the retroareolar location. No cyst or fluid collection or abnormal shadowing is identified. No dilated ducts are identified.     1.  Solid mass right breast is identified on mammogram and ultrasound corresponding to palpable abnormality as described above. Ultrasound-guided biopsy with Stefany  marker placement is recommended. 2.  No adenopathy identified in the right breast. 3.  No abnormalities noted in the left breast in retroareolar location. Discharge worsens then additional imaging can be performed if clinically indicated. These results were given to the patient at the time of visit. R5- CATEGORY 5:  HIGHLY SUGGESTIVE OF MALIGNANCY - APPROPRIATE ACTION  SHOULD BE TAKEN Ten to twenty percent of all cancers can be categorized as hereditary and the clinical and financial value of identifying patients and families at risk is well documented. If you have a personal or family history of breast, ovarian, fallopian tube, peritoneal or other cancer, please consult your physician regarding genetic counseling and testing.        Pathology:      Assessment & Plan:  1.  Invasive ductal carcinoma of the right breast, grade 3, clinically stage IIa (cT2, cN0) ER negative DC negative, HER2 3+ positive, Ki-67 20 to 30%.  She is a good candidate for neoadjuvant chemotherapy with TCHP x 6.  I have discussed this with her in full and she will initiate in approximately 2 weeks.  2.  Chronic nausea after gastric sleeve and cholecystectomy mild.  3.  History of DVT/PE off anticoagulation for several years    Plan: We are going to get an echocardiogram and a Port-A-Cath placed she will see Luz for chemo education.  Will plan to initiate chemotherapy in approximately 2 weeks.    Any questions and concerns raised by the patient were answered to the best of my ability. Thank you for allowing me to participate in the care for this patient. Please feel free to contact me for any questions or concerns.     Lizandro Thomas M.D.

## 2023-12-26 ENCOUNTER — APPOINTMENT (OUTPATIENT)
Dept: ADMISSIONS | Facility: MEDICAL CENTER | Age: 38
End: 2023-12-26
Attending: INTERNAL MEDICINE
Payer: MEDICAID

## 2023-12-26 RX ORDER — EPINEPHRINE 1 MG/ML(1)
0.5 AMPUL (ML) INJECTION PRN
Status: CANCELLED | OUTPATIENT
Start: 2024-01-26

## 2023-12-26 RX ORDER — 0.9 % SODIUM CHLORIDE 0.9 %
10 VIAL (ML) INJECTION PRN
Status: CANCELLED | OUTPATIENT
Start: 2024-01-26

## 2023-12-26 RX ORDER — 0.9 % SODIUM CHLORIDE 0.9 %
VIAL (ML) INJECTION PRN
Status: CANCELLED | OUTPATIENT
Start: 2024-01-26

## 2023-12-26 RX ORDER — 0.9 % SODIUM CHLORIDE 0.9 %
3 VIAL (ML) INJECTION PRN
Status: CANCELLED | OUTPATIENT
Start: 2024-01-26

## 2023-12-26 RX ORDER — DIPHENHYDRAMINE HYDROCHLORIDE 50 MG/ML
50 INJECTION INTRAMUSCULAR; INTRAVENOUS PRN
Status: CANCELLED | OUTPATIENT
Start: 2024-01-26

## 2023-12-26 RX ORDER — METHYLPREDNISOLONE SODIUM SUCCINATE 125 MG/2ML
125 INJECTION, POWDER, LYOPHILIZED, FOR SOLUTION INTRAMUSCULAR; INTRAVENOUS PRN
Status: CANCELLED | OUTPATIENT
Start: 2024-01-26

## 2023-12-26 RX ORDER — 0.9 % SODIUM CHLORIDE 0.9 %
VIAL (ML) INJECTION PRN
Status: CANCELLED | OUTPATIENT
Start: 2024-01-21

## 2023-12-26 RX ORDER — ONDANSETRON 8 MG/1
8 TABLET, ORALLY DISINTEGRATING ORAL PRN
Status: CANCELLED | OUTPATIENT
Start: 2024-01-26

## 2023-12-26 RX ORDER — 0.9 % SODIUM CHLORIDE 0.9 %
10 VIAL (ML) INJECTION PRN
Status: CANCELLED | OUTPATIENT
Start: 2024-01-21

## 2023-12-26 RX ORDER — SODIUM CHLORIDE 9 MG/ML
INJECTION, SOLUTION INTRAVENOUS CONTINUOUS
Status: CANCELLED | OUTPATIENT
Start: 2024-01-26

## 2023-12-26 RX ORDER — 0.9 % SODIUM CHLORIDE 0.9 %
3 VIAL (ML) INJECTION PRN
Status: CANCELLED | OUTPATIENT
Start: 2024-01-21

## 2023-12-26 RX ORDER — PROCHLORPERAZINE MALEATE 10 MG
10 TABLET ORAL EVERY 6 HOURS PRN
Status: CANCELLED | OUTPATIENT
Start: 2024-01-26

## 2023-12-26 RX ORDER — ONDANSETRON 2 MG/ML
4 INJECTION INTRAMUSCULAR; INTRAVENOUS PRN
Status: CANCELLED | OUTPATIENT
Start: 2024-01-26

## 2023-12-27 ENCOUNTER — HOSPITAL ENCOUNTER (OUTPATIENT)
Dept: CARDIOLOGY | Facility: MEDICAL CENTER | Age: 38
End: 2023-12-27
Attending: INTERNAL MEDICINE
Payer: MEDICAID

## 2023-12-27 DIAGNOSIS — C50.411 MALIGNANT NEOPLASM OF UPPER-OUTER QUADRANT OF RIGHT BREAST IN FEMALE, ESTROGEN RECEPTOR NEGATIVE (HCC): ICD-10-CM

## 2023-12-27 DIAGNOSIS — Z17.1 MALIGNANT NEOPLASM OF UPPER-OUTER QUADRANT OF RIGHT BREAST IN FEMALE, ESTROGEN RECEPTOR NEGATIVE (HCC): ICD-10-CM

## 2023-12-27 LAB
LV EJECT FRACT MOD 2C 99903: 60.29
LV EJECT FRACT MOD 4C 99902: 68.04
LV EJECT FRACT MOD BP 99901: 64.12

## 2023-12-27 PROCEDURE — 93306 TTE W/DOPPLER COMPLETE: CPT

## 2023-12-27 PROCEDURE — 93306 TTE W/DOPPLER COMPLETE: CPT | Mod: 26 | Performed by: STUDENT IN AN ORGANIZED HEALTH CARE EDUCATION/TRAINING PROGRAM

## 2023-12-28 ENCOUNTER — APPOINTMENT (OUTPATIENT)
Dept: ADMISSIONS | Facility: MEDICAL CENTER | Age: 38
End: 2023-12-28
Payer: MEDICAID

## 2023-12-28 DIAGNOSIS — C50.411 MALIGNANT NEOPLASM OF UPPER-OUTER QUADRANT OF RIGHT BREAST IN FEMALE, ESTROGEN RECEPTOR NEGATIVE (HCC): ICD-10-CM

## 2023-12-28 DIAGNOSIS — Z17.1 MALIGNANT NEOPLASM OF UPPER-OUTER QUADRANT OF RIGHT BREAST IN FEMALE, ESTROGEN RECEPTOR NEGATIVE (HCC): ICD-10-CM

## 2023-12-28 RX ORDER — ALPRAZOLAM 0.25 MG/1
0.25 TABLET ORAL 2 TIMES DAILY PRN
Qty: 2 TABLET | Refills: 0 | Status: SHIPPED | OUTPATIENT
Start: 2023-12-28 | End: 2023-12-30

## 2023-12-28 NOTE — ADDENDUM NOTE
Encounter addended by: Luz Alvarenga, Med Ass't on: 12/28/2023 10:45 AM   Actions taken: Charge Capture section accepted

## 2023-12-29 ENCOUNTER — APPOINTMENT (OUTPATIENT)
Dept: RADIOLOGY | Facility: MEDICAL CENTER | Age: 38
End: 2023-12-29
Attending: INTERNAL MEDICINE
Payer: MEDICAID

## 2023-12-29 ENCOUNTER — HOSPITAL ENCOUNTER (OUTPATIENT)
Facility: MEDICAL CENTER | Age: 38
End: 2023-12-29
Attending: INTERNAL MEDICINE | Admitting: INTERNAL MEDICINE
Payer: MEDICAID

## 2023-12-29 VITALS
HEART RATE: 60 BPM | HEIGHT: 66 IN | BODY MASS INDEX: 34.62 KG/M2 | SYSTOLIC BLOOD PRESSURE: 101 MMHG | OXYGEN SATURATION: 95 % | WEIGHT: 215.39 LBS | TEMPERATURE: 97 F | RESPIRATION RATE: 18 BRPM | DIASTOLIC BLOOD PRESSURE: 63 MMHG

## 2023-12-29 DIAGNOSIS — C50.411 MALIGNANT NEOPLASM OF UPPER-OUTER QUADRANT OF RIGHT BREAST IN FEMALE, ESTROGEN RECEPTOR NEGATIVE (HCC): ICD-10-CM

## 2023-12-29 DIAGNOSIS — C90.00 MULTIPLE MYELOMA NOT HAVING ACHIEVED REMISSION (HCC): ICD-10-CM

## 2023-12-29 DIAGNOSIS — Z17.1 MALIGNANT NEOPLASM OF UPPER-OUTER QUADRANT OF RIGHT BREAST IN FEMALE, ESTROGEN RECEPTOR NEGATIVE (HCC): ICD-10-CM

## 2023-12-29 PROCEDURE — 99153 MOD SED SAME PHYS/QHP EA: CPT

## 2023-12-29 PROCEDURE — 36561 INSERT TUNNELED CV CATH: CPT

## 2023-12-29 PROCEDURE — 160035 HCHG PACU - 1ST 60 MINS PHASE I

## 2023-12-29 PROCEDURE — 700102 HCHG RX REV CODE 250 W/ 637 OVERRIDE(OP): Mod: UD | Performed by: RADIOLOGY

## 2023-12-29 PROCEDURE — 700111 HCHG RX REV CODE 636 W/ 250 OVERRIDE (IP): Mod: UD

## 2023-12-29 PROCEDURE — 700105 HCHG RX REV CODE 258: Mod: UD | Performed by: RADIOLOGY

## 2023-12-29 PROCEDURE — 160002 HCHG RECOVERY MINUTES (STAT)

## 2023-12-29 PROCEDURE — A9270 NON-COVERED ITEM OR SERVICE: HCPCS | Mod: UD | Performed by: RADIOLOGY

## 2023-12-29 PROCEDURE — 700111 HCHG RX REV CODE 636 W/ 250 OVERRIDE (IP): Mod: JZ,UD | Performed by: RADIOLOGY

## 2023-12-29 PROCEDURE — 160046 HCHG PACU - 1ST 60 MINS PHASE II

## 2023-12-29 RX ORDER — ONDANSETRON 2 MG/ML
4 INJECTION INTRAMUSCULAR; INTRAVENOUS PRN
Status: DISCONTINUED | OUTPATIENT
Start: 2023-12-29 | End: 2023-12-29 | Stop reason: HOSPADM

## 2023-12-29 RX ORDER — HYDROMORPHONE HYDROCHLORIDE 1 MG/ML
0.25 INJECTION, SOLUTION INTRAMUSCULAR; INTRAVENOUS; SUBCUTANEOUS
Status: DISCONTINUED | OUTPATIENT
Start: 2023-12-29 | End: 2023-12-29 | Stop reason: HOSPADM

## 2023-12-29 RX ORDER — LIDOCAINE HYDROCHLORIDE 10 MG/ML
INJECTION, SOLUTION INFILTRATION; PERINEURAL
Status: DISCONTINUED
Start: 2023-12-29 | End: 2023-12-29 | Stop reason: HOSPADM

## 2023-12-29 RX ORDER — MIDAZOLAM HYDROCHLORIDE 1 MG/ML
INJECTION INTRAMUSCULAR; INTRAVENOUS
Status: COMPLETED
Start: 2023-12-29 | End: 2023-12-29

## 2023-12-29 RX ORDER — SODIUM CHLORIDE 9 MG/ML
1000 INJECTION, SOLUTION INTRAVENOUS CONTINUOUS
Status: DISCONTINUED | OUTPATIENT
Start: 2023-12-29 | End: 2023-12-29 | Stop reason: HOSPADM

## 2023-12-29 RX ORDER — CEFAZOLIN SODIUM 1 G/3ML
INJECTION, POWDER, FOR SOLUTION INTRAMUSCULAR; INTRAVENOUS
Status: DISCONTINUED
Start: 2023-12-29 | End: 2023-12-29 | Stop reason: HOSPADM

## 2023-12-29 RX ORDER — MIDAZOLAM HYDROCHLORIDE 1 MG/ML
.5-2 INJECTION INTRAMUSCULAR; INTRAVENOUS PRN
Status: DISCONTINUED | OUTPATIENT
Start: 2023-12-29 | End: 2023-12-29 | Stop reason: HOSPADM

## 2023-12-29 RX ORDER — OXYCODONE HYDROCHLORIDE 5 MG/1
2.5 TABLET ORAL
Status: DISCONTINUED | OUTPATIENT
Start: 2023-12-29 | End: 2023-12-29 | Stop reason: HOSPADM

## 2023-12-29 RX ADMIN — FENTANYL CITRATE 50 MCG: 50 INJECTION, SOLUTION INTRAMUSCULAR; INTRAVENOUS at 08:45

## 2023-12-29 RX ADMIN — ONDANSETRON 4 MG: 2 INJECTION INTRAMUSCULAR; INTRAVENOUS at 09:45

## 2023-12-29 RX ADMIN — MIDAZOLAM HYDROCHLORIDE 0.5 MG: 1 INJECTION, SOLUTION INTRAMUSCULAR; INTRAVENOUS at 08:55

## 2023-12-29 RX ADMIN — FENTANYL CITRATE 50 MCG: 50 INJECTION, SOLUTION INTRAMUSCULAR; INTRAVENOUS at 08:55

## 2023-12-29 RX ADMIN — OXYCODONE HYDROCHLORIDE 2.5 MG: 5 TABLET ORAL at 10:09

## 2023-12-29 RX ADMIN — MIDAZOLAM HYDROCHLORIDE 1.5 MG: 1 INJECTION, SOLUTION INTRAMUSCULAR; INTRAVENOUS at 08:44

## 2023-12-29 RX ADMIN — CEFAZOLIN 1 G: 1 INJECTION, POWDER, FOR SOLUTION INTRAMUSCULAR; INTRAVENOUS at 08:35

## 2023-12-29 RX ADMIN — HYDROMORPHONE HYDROCHLORIDE 0.25 MG: 1 INJECTION, SOLUTION INTRAMUSCULAR; INTRAVENOUS; SUBCUTANEOUS at 09:45

## 2023-12-29 ASSESSMENT — PAIN DESCRIPTION - PAIN TYPE
TYPE: SURGICAL PAIN
TYPE: SURGICAL PAIN

## 2023-12-29 ASSESSMENT — FIBROSIS 4 INDEX: FIB4 SCORE: 0.59

## 2023-12-29 NOTE — OR NURSING
Arrived from PACU AXO, Pt's VSS; denies N/V; states pain is at tolerable level. Dressing CDI to left chest.     D/c orders received. IV dc'd. Pt changed into clothing with assistance. Discharge paperwork reviewed by Dhaval RN, Pt and family verbalized understanding and questions answered. Patient states ready to d/c home. Pt dc'd in w/c with brother.

## 2023-12-29 NOTE — DISCHARGE INSTRUCTIONS
HOME CARE INSTRUCTIONS    ACTIVITY: Rest and take it easy for the first 24 hours.  A responsible adult is recommended to remain with you during that time.  It is normal to feel sleepy.  We encourage you to not do anything that requires balance, judgment or coordination.    FOR 24 HOURS DO NOT:  Drive, operate machinery or run household appliances.  Drink beer or alcoholic beverages.  Make important decisions or sign legal documents.    SPECIAL INSTRUCTIONS:   Implanted Port Insertion, Care After  The following information offers guidance on how to care for yourself after your procedure. Your health care provider may also give you more specific instructions. If you have problems or questions, contact your health care provider.  What can I expect after the procedure?  After the procedure, it is common to have:  Discomfort at the port insertion site.  Bruising on the skin over the port. This should improve over 3-4 days.  Follow these instructions at home:  Port care  After your port is placed, you will get a 's information card. The card has information about your port. Keep this card with you at all times.  Take care of the port as told by your health care provider. Ask your health care provider if you or a family member can get training for taking care of the port at home.  A home health care nurse will be be available to help care for the port.  Make sure to remember what type of port you have.  Incision care  Follow instructions from your health care provider about how to take care of your port insertion site. Make sure you:  Wash your hands with soap and water for at least 20 seconds before and after you change your bandage (dressing). If soap and water are not available, use hand .  Change your dressing as told by your health care provider.  Leave stitches (sutures), skin glue, or adhesive strips in place. These skin closures may need to stay in place for 2 weeks or longer. If adhesive strip  edges start to loosen and curl up, you may trim the loose edges. Do not remove adhesive strips completely unless your health care provider tells you to do that.  Check your port insertion site every day for signs of infection. Check for:  Redness, swelling, or pain.  Fluid or blood.  Warmth.  Pus or a bad smell.  Activity  Return to your normal activities as told by your health care provider. Ask your health care provider what activities are safe for you.  You may have to avoid lifting. Ask your health care provider how much you can safely lift.  General instructions  Take over-the-counter and prescription medicines only as told by your health care provider.  Do not take baths, swim, or use a hot tub until your health care provider approves. Ask your health care provider if you may take showers. You may only be allowed to take sponge baths.  If you were given a sedative during the procedure, it can affect you for several hours. Do not drive or operate machinery until your health care provider says that it is safe.  Wear a medical alert bracelet in case of an emergency. This will tell any health care providers that you have a port.  Keep all follow-up visits. This is important.  Contact a health care provider if:  You cannot flush your port with saline as directed, or you cannot draw blood from the port.  You have a fever or chills.  You have redness, swelling, or pain around your port insertion site.  You have fluid or blood coming from your port insertion site.  Your port insertion site feels warm to the touch.  You have pus or a bad smell coming from the port insertion site.  Get help right away if:  You have chest pain or shortness of breath.  You have bleeding from your port that you cannot control.  These symptoms may be an emergency. Get help right away. Call 911.  Do not wait to see if the symptoms will go away.  Do not drive yourself to the hospital.  Summary  Take care of the port as told by your health  care provider. Keep the 's information card with you at all times.  Change your dressing as told by your health care provider.  Contact a health care provider if you have a fever or chills or if you have redness, swelling, or pain around your port insertion site.  Keep all follow-up visits.    DIET: To avoid nausea, slowly advance diet as tolerated, avoiding spicy or greasy foods for the first day.  Add more substantial food to your diet according to your physician's instructions.  Babies can be fed formula or breast milk as soon as they are hungry.  INCREASE FLUIDS AND FIBER TO AVOID CONSTIPATION.    SURGICAL DRESSING/BATHING: Keep dressing on for 7 days.     MEDICATIONS: Resume taking daily medication.  Take prescribed pain medication with food.  If no medication is prescribed, you may take non-aspirin pain medication if needed.  PAIN MEDICATION CAN BE VERY CONSTIPATING.  Take a stool softener or laxative such as senokot, pericolace, or milk of magnesia if needed.    Last pain medication given at Oxycodone at 10:00 am.    A follow-up appointment should be arranged with your doctor in 1-2 weeks; call to schedule.    You should CALL YOUR PHYSICIAN if you develop:  Fever greater than 101 degrees F.  Pain not relieved by medication, or persistent nausea or vomiting.  Excessive bleeding (blood soaking through dressing) or unexpected drainage from the wound.  Extreme redness or swelling around the incision site, drainage of pus or foul smelling drainage.  Inability to urinate or empty your bladder within 8 hours.  Problems with breathing or chest pain.    You should call 911 if you develop problems with breathing or chest pain.  If you are unable to contact your doctor or surgical center, you should go to the nearest emergency room or urgent care center.  Physician's telephone #: [phone]     MILD FLU-LIKE SYMPTOMS ARE NORMAL.  YOU MAY EXPERIENCE GENERALIZED MUSCLE ACHES, THROAT IRRITATION, HEADACHE  AND/OR SOME NAUSEA.    If any questions arise, call your doctor.  If your doctor is not available, please feel free to call the Surgical Center at [phone].  The Center is open Monday through Friday from 7AM to 7PM.      A registered nurse may call you a few days after your surgery to see how you are doing after your procedure.    You may also receive a survey in the mail within the next two weeks and we ask that you take a few moments to complete the survey and return it to us.  Our goal is to provide you with very good care and we value your comments.     Depression / Suicide Risk    As you are discharged from this [facility] facility, it is important to learn how to keep safe from harming yourself.    Recognize the warning signs:  Abrupt changes in personality, positive or negative- including increase in energy   Giving away possessions  Change in eating patterns- significant weight changes-  positive or negative  Change in sleeping patterns- unable to sleep or sleeping all the time   Unwillingness or inability to communicate  Depression  Unusual sadness, discouragement and loneliness  Talk of wanting to die  Neglect of personal appearance   Rebelliousness- reckless behavior  Withdrawal from people/activities they love  Confusion- inability to concentrate     If you or a loved one observes any of these behaviors or has concerns about self-harm, here's what you can do:  Talk about it- your feelings and reasons for harming yourself  Remove any means that you might use to hurt yourself (examples: pills, rope, extension cords, firearm)  Get professional help from the community (Mental Health, Substance Abuse, psychological counseling)  Do not be alone:Call your Safe Contact- someone whom you trust who will be there for you.  Call your local CRISIS HOTLINE 846-7926 or 535-375-4857  Call your local Children's Mobile Crisis Response Team [place] (729) 681-4271 or www.[employer]  Call the toll free  National Suicide Prevention Hotlines   National Suicide Prevention Lifeline 528-320-EJPX (7678)  [facility] Network 800-SUICIDE (212-8329)    I acknowledge receipt and understanding of these Home Care instructions.

## 2023-12-29 NOTE — OR SURGEON
Immediate Post- Operative Note    PostOp Diagnosis: VENOUS ACCESS REQUIRED FOR CHEMOTHERAPY. BREAST CA      Procedure(s): LEFT INTERNAL JUGULAR POWER PORT VENOUS ACCESS PLACEMENT WITH U/S AND FLUOROSCOPIC GUIDANCE    27 CM LENGTH. TIP AT SVC-RA JXN      Estimated Blood Loss: <5CC      FINDINGS: CATHETER IN GOOD POSITION        Complications: NONE          12/29/2023     9:14 AM     Manny Garcia M.D.

## 2023-12-29 NOTE — OR NURSING
0935: Pt arrived from OR, handoff received from anesthesiologist and RN. Patient awake, oriented x4, moving all extremities. Vitals stable. Left chest port site, soft, dressing C/D/I.     0945: Mediated for pain and nausea per MAR.     1015: Per patient nausea improved, tolerating sips of water. Continuing to medicate for pain.     1020: Brother updated on status.     1030: Report given to phase 2 RNDhaval. dressing remains C/D/I.

## 2023-12-29 NOTE — PROGRESS NOTES
Left message   Pt presents to IR 2. Pt. was consented by MD at bedside, confirmed by this RN and consent at bedside. Pt transferred to procedure table in supine position. Patient underwent a port insertion by Dr. Garcia. Procedure site was marked by MD and verified using imaging guidance. Pt placed on monitor, prepped and draped in a sterile fashion. Vitals were taken every 5 minutes and remained stable during procedure (see doc flow sheet for results). CO2 waveform capnography was monitored and remained WNL throughout procedure. Report called to WILLIAM López. Pt transported by stretcher with RN to PACU 10B.     B[employer] Port 8 Fr x 27 cm  REF: [id number]  LOT: [id number]  EXP: 2025-05-31

## 2024-01-03 ENCOUNTER — APPOINTMENT (OUTPATIENT)
Dept: ADMISSIONS | Facility: MEDICAL CENTER | Age: 39
End: 2024-01-03
Attending: INTERNAL MEDICINE
Payer: MEDICAID

## 2024-01-03 ENCOUNTER — NON-PROVIDER VISIT (OUTPATIENT)
Dept: GENETICS | Facility: MEDICAL CENTER | Age: 39
End: 2024-01-03
Payer: MEDICAID

## 2024-01-03 ENCOUNTER — HOSPITAL ENCOUNTER (OUTPATIENT)
Dept: HEMATOLOGY ONCOLOGY | Facility: MEDICAL CENTER | Age: 39
End: 2024-01-03
Attending: INTERNAL MEDICINE
Payer: MEDICAID

## 2024-01-03 DIAGNOSIS — C50.411 MALIGNANT NEOPLASM OF UPPER-OUTER QUADRANT OF RIGHT BREAST IN FEMALE, ESTROGEN RECEPTOR NEGATIVE (HCC): ICD-10-CM

## 2024-01-03 DIAGNOSIS — Z17.1 MALIGNANT NEOPLASM OF UPPER-OUTER QUADRANT OF RIGHT BREAST IN FEMALE, ESTROGEN RECEPTOR NEGATIVE (HCC): ICD-10-CM

## 2024-01-03 DIAGNOSIS — F41.1 ANXIETY ASSOCIATED WITH CANCER DIAGNOSIS (HCC): ICD-10-CM

## 2024-01-03 DIAGNOSIS — C80.1 ANXIETY ASSOCIATED WITH CANCER DIAGNOSIS (HCC): ICD-10-CM

## 2024-01-03 DIAGNOSIS — Z79.899 HIGH RISK MEDICATION USE: ICD-10-CM

## 2024-01-03 RX ORDER — ALPRAZOLAM 0.5 MG/1
0.5 TABLET ORAL 3 TIMES DAILY PRN
Qty: 30 TABLET | Refills: 2 | Status: SHIPPED | OUTPATIENT
Start: 2024-01-03 | End: 2024-02-02

## 2024-01-03 RX ORDER — PROCHLORPERAZINE MALEATE 10 MG
10 TABLET ORAL EVERY 6 HOURS PRN
Qty: 30 TABLET | Refills: 6 | Status: SHIPPED | OUTPATIENT
Start: 2024-01-03

## 2024-01-03 RX ORDER — ONDANSETRON 4 MG/1
4 TABLET, FILM COATED ORAL EVERY 4 HOURS PRN
Qty: 30 TABLET | Refills: 6 | Status: SHIPPED | OUTPATIENT
Start: 2024-01-03

## 2024-01-03 RX ORDER — DEXAMETHASONE 4 MG/1
8 TABLET ORAL 2 TIMES DAILY
Qty: 12 TABLET | Refills: 6 | Status: SHIPPED | OUTPATIENT
Start: 2024-01-03

## 2024-01-03 RX ORDER — LOPERAMIDE HYDROCHLORIDE 2 MG/1
2 CAPSULE ORAL SEE ADMIN INSTRUCTIONS
Qty: 30 CAPSULE | Refills: 11 | Status: SHIPPED | OUTPATIENT
Start: 2024-01-03 | End: 2024-01-26

## 2024-01-03 RX ORDER — LIDOCAINE AND PRILOCAINE 25; 25 MG/G; MG/G
CREAM TOPICAL
Qty: 30 G | Refills: 3 | Status: SHIPPED | OUTPATIENT
Start: 2024-01-03

## 2024-01-03 NOTE — PROGRESS NOTES
The following appointments, labs, and medications have been provided to the patient:  Line: Port a cath in place  ECHO: Completed 12/27/23  WBC Support (g-csf): Udenyca  Labs: CBC, CMP (Pt has had hysterectomy)  Follow up appts: TBD- awaiting infusion auth  Chemo/immunotherapy class: Completed 1/3/2023  Chemotherapy Regimen: OP Breast Docetaxel + CARBOplatin + Trastuzumab + Pertuzumab followed by Trastuzumab + Pertuzumab   Nausea medication: zofran/compazine   Other treatment specific meds: EMLA cream, dexamethasone (decadron) , loperamide (Imodium)  Oral chemo follow up: NA  Pain medication: Per provider discretion  Nurse deb: Referred on 12/14/2023  Established with WILLIAM Tan  Dietician:  MICHELLE  SW: MICHELLE  FRA: Referred on 12/14/2023    Additional info/teaching for immunotherapy patients:  If hospitalized, inform staff of immunotherapy treatment and have them contact oncologist - immunotherapy alert card provided.    Additional info/teaching for chemotherapy patients:  Neutropenia reminder card provided to patient      Additional teaching:  Link to NCCN patient guidelines provided. Support group calendar provided. Pt given tour of [facility].    Patient was provided with drug specific handouts and [facility] side effects sheet. Patient verbalized that questions and concerns regarding treatment have been addressed. Consent to proceed with treatment was reviewed and signed during this visit.    Pt very anxious and reports she is not sleeping well due to anxiety.  Discussed with Dr. Thomas and rx for xanax sent to [facility] by Dr. JEFF Provided medication education to pt.     Spent 60 minutes of continuous, non-interrupted, face-to-face patient contact in which greater than 50% of the visit was spent counseling and coordinating of care.

## 2024-01-03 NOTE — PROGRESS NOTES
Fela Tiwari is a 38 y.o. female here for a non-provider visit for: Lab Draws  on 1/3/2024 at 8:55 AM    Procedure Performed: Venipuncture     Anatomical site: Left AC    Equipment used: 25 butterfly    Labs drawn: [employer] (two lavender EDTA tubes)    Ordering Provider: [employer]    Andriy By: Luz Alvarenga, Med Ass't

## 2024-01-04 ENCOUNTER — HOSPITAL ENCOUNTER (OUTPATIENT)
Dept: RADIOLOGY | Facility: MEDICAL CENTER | Age: 39
End: 2024-01-04
Attending: SURGERY
Payer: MEDICAID

## 2024-01-04 VITALS — HEIGHT: 66 IN | WEIGHT: 216.38 LBS | BODY MASS INDEX: 34.78 KG/M2

## 2024-01-04 DIAGNOSIS — R92.8 ABNORMAL MRI, BREAST: ICD-10-CM

## 2024-01-04 LAB — PATHOLOGY CONSULT NOTE: NORMAL

## 2024-01-04 PROCEDURE — 700117 HCHG RX CONTRAST REV CODE 255: Mod: UD | Performed by: SURGERY

## 2024-01-04 PROCEDURE — 700101 HCHG RX REV CODE 250: Mod: UD | Performed by: RADIOLOGY

## 2024-01-04 PROCEDURE — 19288 PERQ DEV BREAST ADD MR GUIDE: CPT | Mod: RT

## 2024-01-04 PROCEDURE — A9579 GAD-BASE MR CONTRAST NOS,1ML: HCPCS | Mod: UD | Performed by: SURGERY

## 2024-01-04 PROCEDURE — 88342 IMHCHEM/IMCYTCHM 1ST ANTB: CPT

## 2024-01-04 PROCEDURE — 88341 IMHCHEM/IMCYTCHM EA ADD ANTB: CPT

## 2024-01-04 PROCEDURE — 88305 TISSUE EXAM BY PATHOLOGIST: CPT

## 2024-01-04 PROCEDURE — 700111 HCHG RX REV CODE 636 W/ 250 OVERRIDE (IP): Mod: JZ,UD | Performed by: RADIOLOGY

## 2024-01-04 PROCEDURE — 19085 BX BREAST 1ST LESION MR IMAG: CPT | Mod: RT

## 2024-01-04 PROCEDURE — 77065 DX MAMMO INCL CAD UNI: CPT | Mod: RT

## 2024-01-04 PROCEDURE — 88360 TUMOR IMMUNOHISTOCHEM/MANUAL: CPT

## 2024-01-04 RX ORDER — LIDOCAINE HYDROCHLORIDE AND EPINEPHRINE 10; 10 MG/ML; UG/ML
2-15 INJECTION, SOLUTION INFILTRATION; PERINEURAL
Status: COMPLETED | OUTPATIENT
Start: 2024-01-04 | End: 2024-01-04

## 2024-01-04 RX ORDER — LIDOCAINE HYDROCHLORIDE 10 MG/ML
2-15 INJECTION, SOLUTION EPIDURAL; INFILTRATION; INTRACAUDAL; PERINEURAL
Status: COMPLETED | OUTPATIENT
Start: 2024-01-04 | End: 2024-01-04

## 2024-01-04 RX ADMIN — LIDOCAINE HYDROCHLORIDE 8 ML: 10 INJECTION, SOLUTION EPIDURAL; INFILTRATION; INTRACAUDAL; PERINEURAL at 14:06

## 2024-01-04 RX ADMIN — GADOTERIDOL 20 ML: 279.3 INJECTION, SOLUTION INTRAVENOUS at 15:18

## 2024-01-04 RX ADMIN — LIDOCAINE HYDROCHLORIDE AND EPINEPHRINE 8 ML: 10; 10 INJECTION, SOLUTION INFILTRATION; PERINEURAL at 14:07

## 2024-01-04 ASSESSMENT — FIBROSIS 4 INDEX: FIB4 SCORE: 0.59

## 2024-01-09 DIAGNOSIS — C50.411 MALIGNANT NEOPLASM OF UPPER-OUTER QUADRANT OF RIGHT BREAST IN FEMALE, ESTROGEN RECEPTOR NEGATIVE (HCC): ICD-10-CM

## 2024-01-09 DIAGNOSIS — Z17.1 MALIGNANT NEOPLASM OF UPPER-OUTER QUADRANT OF RIGHT BREAST IN FEMALE, ESTROGEN RECEPTOR NEGATIVE (HCC): ICD-10-CM

## 2024-01-10 ENCOUNTER — HOSPITAL ENCOUNTER (OUTPATIENT)
Dept: RADIOLOGY | Facility: MEDICAL CENTER | Age: 39
End: 2024-01-10
Attending: SURGERY
Payer: MEDICAID

## 2024-01-10 DIAGNOSIS — Z17.1 MALIGNANT NEOPLASM OF UPPER-OUTER QUADRANT OF RIGHT BREAST IN FEMALE, ESTROGEN RECEPTOR NEGATIVE (HCC): ICD-10-CM

## 2024-01-10 DIAGNOSIS — C50.411 MALIGNANT NEOPLASM OF UPPER-OUTER QUADRANT OF RIGHT BREAST IN FEMALE, ESTROGEN RECEPTOR NEGATIVE (HCC): ICD-10-CM

## 2024-01-10 PROCEDURE — 76642 ULTRASOUND BREAST LIMITED: CPT | Mod: RT

## 2024-01-12 ENCOUNTER — HOSPITAL ENCOUNTER (OUTPATIENT)
Dept: RADIOLOGY | Facility: MEDICAL CENTER | Age: 39
End: 2024-01-12
Attending: SURGERY
Payer: MEDICAID

## 2024-01-12 DIAGNOSIS — C50.411 MALIGNANT NEOPLASM OF UPPER-OUTER QUADRANT OF RIGHT BREAST IN FEMALE, ESTROGEN RECEPTOR NEGATIVE (HCC): ICD-10-CM

## 2024-01-12 DIAGNOSIS — Z17.1 MALIGNANT NEOPLASM OF UPPER-OUTER QUADRANT OF RIGHT BREAST IN FEMALE, ESTROGEN RECEPTOR NEGATIVE (HCC): ICD-10-CM

## 2024-01-12 LAB — PATHOLOGY CONSULT NOTE: NORMAL

## 2024-01-12 PROCEDURE — 88305 TISSUE EXAM BY PATHOLOGIST: CPT

## 2024-01-12 PROCEDURE — 77065 DX MAMMO INCL CAD UNI: CPT | Mod: RT

## 2024-01-12 PROCEDURE — 10035 PLMT SFT TISS LOCLZJ DEV 1ST: CPT

## 2024-01-15 ENCOUNTER — PATIENT MESSAGE (OUTPATIENT)
Dept: HEMATOLOGY ONCOLOGY | Facility: MEDICAL CENTER | Age: 39
End: 2024-01-15
Payer: MEDICAID

## 2024-01-15 ENCOUNTER — TELEPHONE (OUTPATIENT)
Dept: RADIOLOGY | Facility: MEDICAL CENTER | Age: 39
End: 2024-01-15
Payer: MEDICAID

## 2024-01-15 NOTE — TELEPHONE ENCOUNTER
Spoke w/ pt regarding breast bx results, pt states Dr. Sullivan messaged her regarding her results and had no further questions or concerns at the time. Given to rad to completed addendum - tjs

## 2024-01-15 NOTE — PATIENT COMMUNICATION
Pt called with questions regarding medication. Explained that dexamethasone is to be taken two times a day the day before chemotherapy and the day after chemotherapy. Pt is picking up other medications today, will call if there are any issues.

## 2024-01-20 ENCOUNTER — OUTPATIENT INFUSION SERVICES (OUTPATIENT)
Dept: ONCOLOGY | Facility: MEDICAL CENTER | Age: 39
End: 2024-01-20
Attending: INTERNAL MEDICINE
Payer: MEDICAID

## 2024-01-20 VITALS
HEART RATE: 66 BPM | DIASTOLIC BLOOD PRESSURE: 62 MMHG | TEMPERATURE: 98.2 F | SYSTOLIC BLOOD PRESSURE: 107 MMHG | OXYGEN SATURATION: 95 % | RESPIRATION RATE: 18 BRPM

## 2024-01-20 DIAGNOSIS — C50.411 MALIGNANT NEOPLASM OF UPPER-OUTER QUADRANT OF RIGHT BREAST IN FEMALE, ESTROGEN RECEPTOR NEGATIVE (HCC): ICD-10-CM

## 2024-01-20 DIAGNOSIS — Z17.1 MALIGNANT NEOPLASM OF UPPER-OUTER QUADRANT OF RIGHT BREAST IN FEMALE, ESTROGEN RECEPTOR NEGATIVE (HCC): ICD-10-CM

## 2024-01-20 LAB
ALBUMIN SERPL BCP-MCNC: 3.8 G/DL (ref 3.2–4.9)
ALBUMIN/GLOB SERPL: 1.6 G/DL
ALP SERPL-CCNC: 48 U/L (ref 30–99)
ALT SERPL-CCNC: 16 U/L (ref 2–50)
ANION GAP SERPL CALC-SCNC: 12 MMOL/L (ref 7–16)
AST SERPL-CCNC: 17 U/L (ref 12–45)
BASOPHILS # BLD AUTO: 0.3 % (ref 0–1.8)
BASOPHILS # BLD: 0.03 K/UL (ref 0–0.12)
BILIRUB SERPL-MCNC: 0.4 MG/DL (ref 0.1–1.5)
BUN SERPL-MCNC: 16 MG/DL (ref 8–22)
CALCIUM ALBUM COR SERPL-MCNC: 8.4 MG/DL (ref 8.5–10.5)
CALCIUM SERPL-MCNC: 8.2 MG/DL (ref 8.5–10.5)
CHLORIDE SERPL-SCNC: 107 MMOL/L (ref 96–112)
CO2 SERPL-SCNC: 21 MMOL/L (ref 20–33)
CREAT SERPL-MCNC: 0.85 MG/DL (ref 0.5–1.4)
EOSINOPHIL # BLD AUTO: 0.13 K/UL (ref 0–0.51)
EOSINOPHIL NFR BLD: 1.2 % (ref 0–6.9)
ERYTHROCYTE [DISTWIDTH] IN BLOOD BY AUTOMATED COUNT: 42.8 FL (ref 35.9–50)
GFR SERPLBLD CREATININE-BSD FMLA CKD-EPI: 88 ML/MIN/1.73 M 2
GLOBULIN SER CALC-MCNC: 2.4 G/DL (ref 1.9–3.5)
GLUCOSE SERPL-MCNC: 90 MG/DL (ref 65–99)
HCT VFR BLD AUTO: 40.2 % (ref 37–47)
HGB BLD-MCNC: 14.1 G/DL (ref 12–16)
IMM GRANULOCYTES # BLD AUTO: 0.02 K/UL (ref 0–0.11)
IMM GRANULOCYTES NFR BLD AUTO: 0.2 % (ref 0–0.9)
LYMPHOCYTES # BLD AUTO: 3.19 K/UL (ref 1–4.8)
LYMPHOCYTES NFR BLD: 29.5 % (ref 22–41)
MCH RBC QN AUTO: 32.3 PG (ref 27–33)
MCHC RBC AUTO-ENTMCNC: 35.1 G/DL (ref 32.2–35.5)
MCV RBC AUTO: 92.2 FL (ref 81.4–97.8)
MONOCYTES # BLD AUTO: 0.67 K/UL (ref 0–0.85)
MONOCYTES NFR BLD AUTO: 6.2 % (ref 0–13.4)
NEUTROPHILS # BLD AUTO: 6.79 K/UL (ref 1.82–7.42)
NEUTROPHILS NFR BLD: 62.6 % (ref 44–72)
NRBC # BLD AUTO: 0 K/UL
NRBC BLD-RTO: 0 /100 WBC (ref 0–0.2)
OUTPT INFUS CBC COMMENT OICOM: NORMAL
PLATELET # BLD AUTO: 167 K/UL (ref 164–446)
PMV BLD AUTO: 11.8 FL (ref 9–12.9)
POTASSIUM SERPL-SCNC: 3.6 MMOL/L (ref 3.6–5.5)
PROT SERPL-MCNC: 6.2 G/DL (ref 6–8.2)
RBC # BLD AUTO: 4.36 M/UL (ref 4.2–5.4)
SODIUM SERPL-SCNC: 140 MMOL/L (ref 135–145)
WBC # BLD AUTO: 10.8 K/UL (ref 4.8–10.8)

## 2024-01-20 PROCEDURE — 36591 DRAW BLOOD OFF VENOUS DEVICE: CPT

## 2024-01-20 PROCEDURE — A4212 NON CORING NEEDLE OR STYLET: HCPCS

## 2024-01-20 PROCEDURE — 85025 COMPLETE CBC W/AUTO DIFF WBC: CPT

## 2024-01-20 PROCEDURE — 80053 COMPREHEN METABOLIC PANEL: CPT

## 2024-01-21 NOTE — PROGRESS NOTES
Fela presents to infusion for pre-chemo labs.     PRINCE port accessed using sterile technique, flushed with NS with positive blood return and labs drawn off of port, tovar needle d/kevin with tip intact and covered with gauze and tape.     Patient left in stable condition, knows when to return for chemo appt on Monday.     Labs reviewed by RN, within parameters for treatment on Monday.

## 2024-01-23 ENCOUNTER — PATIENT OUTREACH (OUTPATIENT)
Dept: ONCOLOGY | Facility: MEDICAL CENTER | Age: 39
End: 2024-01-23

## 2024-01-23 ENCOUNTER — OUTPATIENT INFUSION SERVICES (OUTPATIENT)
Dept: ONCOLOGY | Facility: MEDICAL CENTER | Age: 39
End: 2024-01-23
Attending: INTERNAL MEDICINE
Payer: MEDICAID

## 2024-01-23 ENCOUNTER — DOCUMENTATION (OUTPATIENT)
Dept: ONCOLOGY | Facility: MEDICAL CENTER | Age: 39
End: 2024-01-23

## 2024-01-23 VITALS
SYSTOLIC BLOOD PRESSURE: 119 MMHG | RESPIRATION RATE: 18 BRPM | BODY MASS INDEX: 35.36 KG/M2 | HEIGHT: 66 IN | OXYGEN SATURATION: 97 % | TEMPERATURE: 97.9 F | DIASTOLIC BLOOD PRESSURE: 72 MMHG | HEART RATE: 60 BPM | WEIGHT: 220.02 LBS

## 2024-01-23 DIAGNOSIS — Z17.1 MALIGNANT NEOPLASM OF UPPER-OUTER QUADRANT OF RIGHT BREAST IN FEMALE, ESTROGEN RECEPTOR NEGATIVE (HCC): ICD-10-CM

## 2024-01-23 DIAGNOSIS — C50.411 MALIGNANT NEOPLASM OF UPPER-OUTER QUADRANT OF RIGHT BREAST IN FEMALE, ESTROGEN RECEPTOR NEGATIVE (HCC): ICD-10-CM

## 2024-01-23 PROCEDURE — 304540 HCHG NITRO SET VENT 2ND TUB

## 2024-01-23 PROCEDURE — 96413 CHEMO IV INFUSION 1 HR: CPT

## 2024-01-23 PROCEDURE — 96375 TX/PRO/DX INJ NEW DRUG ADDON: CPT

## 2024-01-23 PROCEDURE — 700105 HCHG RX REV CODE 258: Mod: UD | Performed by: INTERNAL MEDICINE

## 2024-01-23 PROCEDURE — A4212 NON CORING NEEDLE OR STYLET: HCPCS

## 2024-01-23 PROCEDURE — 96415 CHEMO IV INFUSION ADDL HR: CPT

## 2024-01-23 PROCEDURE — 96417 CHEMO IV INFUS EACH ADDL SEQ: CPT

## 2024-01-23 PROCEDURE — 700111 HCHG RX REV CODE 636 W/ 250 OVERRIDE (IP): Mod: JZ,UD | Performed by: INTERNAL MEDICINE

## 2024-01-23 PROCEDURE — 96367 TX/PROPH/DG ADDL SEQ IV INF: CPT

## 2024-01-23 RX ORDER — DIPHENHYDRAMINE HYDROCHLORIDE 50 MG/ML
50 INJECTION INTRAMUSCULAR; INTRAVENOUS PRN
Status: COMPLETED | OUTPATIENT
Start: 2024-01-23 | End: 2024-01-23

## 2024-01-23 RX ORDER — METHYLPREDNISOLONE SODIUM SUCCINATE 125 MG/2ML
125 INJECTION, POWDER, LYOPHILIZED, FOR SOLUTION INTRAMUSCULAR; INTRAVENOUS PRN
Status: COMPLETED | OUTPATIENT
Start: 2024-01-23 | End: 2024-01-23

## 2024-01-23 RX ADMIN — ONDANSETRON 16 MG: 2 INJECTION INTRAMUSCULAR; INTRAVENOUS at 07:33

## 2024-01-23 RX ADMIN — DOCETAXEL 160 MG: 20 INJECTION, SOLUTION, CONCENTRATE INTRAVENOUS at 13:31

## 2024-01-23 RX ADMIN — DIPHENHYDRAMINE HYDROCHLORIDE 50 MG: 50 INJECTION, SOLUTION INTRAMUSCULAR; INTRAVENOUS at 12:41

## 2024-01-23 RX ADMIN — TRASTUZUMAB-ANNS 720 MG: 150 INJECTION, POWDER, LYOPHILIZED, FOR SOLUTION INTRAVENOUS at 10:34

## 2024-01-23 RX ADMIN — HEPARIN 500 UNITS: 100 SYRINGE at 16:37

## 2024-01-23 RX ADMIN — FOSAPREPITANT 150 MG: 150 INJECTION, POWDER, LYOPHILIZED, FOR SOLUTION INTRAVENOUS at 07:49

## 2024-01-23 RX ADMIN — PERTUZUMAB 840 MG: 30 INJECTION, SOLUTION, CONCENTRATE INTRAVENOUS at 08:33

## 2024-01-23 RX ADMIN — CARBOPLATIN 900 MG: 10 INJECTION, SOLUTION INTRAVENOUS at 15:57

## 2024-01-23 RX ADMIN — METHYLPREDNISOLONE SODIUM SUCCINATE 125 MG: 125 INJECTION, POWDER, FOR SOLUTION INTRAMUSCULAR; INTRAVENOUS at 12:06

## 2024-01-23 RX ADMIN — DEXAMETHASONE SODIUM PHOSPHATE 12 MG: 4 INJECTION, SOLUTION INTRA-ARTICULAR; INTRALESIONAL; INTRAMUSCULAR; INTRAVENOUS; SOFT TISSUE at 07:22

## 2024-01-23 ASSESSMENT — FIBROSIS 4 INDEX: FIB4 SCORE: 1.02

## 2024-01-23 NOTE — PROGRESS NOTES
Attempted to visit with patient at [facility] during treatment and patient was sleeping. Provided patient's nurse with [facility] resource folder and informed him that I would call patient tomorrow for follow-up.

## 2024-01-23 NOTE — PROGRESS NOTES
Chemotherapy Verification - SECONDARY RN       Height = 168 cm  Weight = 99.8 kg  BSA = 2.16 m2       Medication: Perjeta  Dose: 840 mg set dose  Calculated Dose: 840 mg                             (In mg/m2, AUC, mg/kg)     Medication: Kanjinti  Dose: 8 mg/kg  Calculated Dose: 798.4 mg                             (In mg/m2, AUC, mg/kg)    Medication: Taxotere  Dose: 75 mg/m2  Calculated Dose: 162 mg                             (In mg/m2, AUC, mg/kg)    Medication: Carboplatin  Dose: AUC 6  Calculated Dose: 900 mg                             (In mg/m2, AUC, mg/kg)    Carboplatin calculation (if applicable):  (6*(125+25)) = 900     I confirm that this process was performed independently.

## 2024-01-23 NOTE — PROGRESS NOTES
"Pharmacy Chemotherapy Calculations    Dx: Breast cancer, ER-, AR-, HER2+  Cycle 1, Day 1  Previous treatment = n/a    Protocol: TCHP  Pertuzumab 840 mg IV over 60 minutes on Day 1 of Cycle 1 followed by  Pertuzumab 420 mg IV over 30 minutes on Day 1 of Cycles 2-6  Trastuzumab 8 mg/kg IV over 90 minutes on Day 1 of Cycle 1 followed by  Trastuzumab 6 mg/kg IV over 30 minutes on Day 1 of Cycles 2-6 followed by  Docetaxel 75 mg/m2 IV over 60 minutes on Day 1 followed by  Carboplatin AUC 6 IV over 30 minutes on Day 1  21-day cycle for 6 cycles  ~Followed by~  Pertuzumab 420 mg IV over 30 minutes on Day 1 beginning with Week 19  Trastuzumab 6 mg/kg IV over 30 minutes on Day 1 beginning with Week 19  21-day cycle to complete 52 weeks total of trastuzumab and pertuzumab therapy  NCCN Guidelines for Breast Cancer V.2.2022.  Josef LEON et al. Gay Oncol. 2013;24(9):2278-84.    Allergies:  Bee venom, Morphine sulfate, Tape, and Pineapple       /66   Pulse 77   Temp 36.7 °C (98 °F) (Temporal)   Resp 18   Ht 1.68 m (5' 6.14\")   Wt 99.8 kg (220 lb 0.3 oz)   LMP 02/10/2012 (Approximate) Comment: Age of first period:10, No HRT  SpO2 95%   BMI 35.36 kg/m²  Body surface area is 2.16 meters squared.    ECHO  12/27/23 LVEF 65%    Labs 1/20/24:  ANC~ 6790 Plt = 167k   Hgb = 14.1     SCr = 0.85 mg/dL CrCl >125 mL/min   AST/ALT/AP = 17/16/48 TBili = 0.4     Pertuzumab 840 mg fixed dose              Fixed dose, OK to treat with final dose = 840 mg     Trastuzumab-pkrb 8 mg/kg x 99.8 kg = 798.4 mg              <10% difference, OK to treat with final dose = 720 mg     Docetaxel 75 mg/m² x 2.16 m² = 162 mg              <10% difference, OK to treat with final dose = 160 mg     Carboplatin AUC 6 x (125mL/min + 25) = 900 mg              <10% difference, OK to treat with final dose = 900 mg    John Hillman, PharmD  "

## 2024-01-23 NOTE — PROGRESS NOTES
"Pharmacy Chemotherapy Calculation:    Dx: Invasive ductal carcinoma of the right breast AL/ER (-) HER2 (+) stage IIa (cT2, cN0)        Protocol: TCHP (DOCEtaxel/CARBOplatin + Pertuzumab + Trastuzumab)    *Dosing Reference*   Pertuzumab 840 mg IV over 60 minutes on Day 1 of Cycle 1   followed by 420 mg IV over 30 minutes on Day 1 of Cycles 2 - 6   Trastuzumab 8 mg/kg IV over 90 minutes on Day 1 of Cycle 1   followed by 6 mg/kg IV over 30 minutes on Day 1 of Cycles 2 - 6 followed by   DOCEtaxel 75 mg/m² IV over 60 minutes on Day 1 followed by   CARBOplatin AUC 6 IV over 30 minutes on Day 1   21-day cycle for 6 cycles     ~Followed by~    Pertuzumab 420 mg IV over 30 minutes on Day 1 beginning with Week 19   Trastuzumab 6 mg/kg IV over 30 minutes on Day 1 beginning with Week 19  21-day cycle to complete 52 weeks total of trastuzumab and pertuzumab therapy    NCCN Guidelines® for Breast Cancer V.4.2023.   Josef LEON, et al. Gay Oncol. 2013;24(9):2278-49.a    Allergies:  Bee venom, Morphine sulfate, Tape, and Pineapple       /66   Pulse 77   Temp 36.7 °C (98 °F) (Temporal)   Resp 18   Ht 1.68 m (5' 6.14\")   Wt 99.8 kg (220 lb 0.3 oz)   LMP 02/10/2012 (Approximate) Comment: Age of first period:10, No HRT  SpO2 95%   BMI 35.36 kg/m²  Body surface area is 2.16 meters squared.    Labs 1/20/24:  ANC~ 6790 Plt = 167k   Hgb = 14.1     SCr = 0.85 mg/dL CrCl >125 mL/min   AST/ALT/AP = 17/16/48 TBili = 0.4      ECHO:   12/27/23: LVEF~65%    Drug Order   (Drug name, dose, route, IV Fluid & volume, frequency, number of doses) Cycle 1  Previous treatment: n/a      Medication = Pertuzumab  Base Dose = 840 mg  Fixed dose; no calc required  Final Dose = 840 mg  Route = IV  Fluid & Volume =  mL  Admin Duration = Over 60 minutes          Fixed dose, OK to treat with final dose   Medication = Trastuzumab  Base Dose = 8 mg/m2  Calc Dose: Base Dose x 99.8 kg = 798.4 mg  Final Dose = 720 mg  Route = IV  Fluid & Volume = "  mL  Admin Duration = Over 90 minutes          <10% difference, OK to treat with final dose   Medication = DOCEtaxel  Base Dose = 75 mg/m2  Calc Dose:Base Dose x 2.16 m2 = 162 mg  Final Dose = 160 mg  Route = IV  Fluid & Volume =  mL  Admin Duration = Over 60 minutes          <10% difference, OK to treat with final dose   Medication = CARBOplatin  Base Dose = AUC 6  Calc Dose: Base Dose x (125 mL/min + 25) = 900 mg  Final Dose = 900 mg  Route = IV  Fluid & Volume =  mL  Admin Duration = Over 30 minutes          <10% difference, OK to treat with final dose     By my signature below, I confirm this process was performed independently with the BSA and all final chemotherapy dosing calculations congruent. I have reviewed the above chemotherapy order and that my calculation of the final dose and BSA (when applicable) corroborate those calculations of the  pharmacist. Discrepancies of 10% or greater in the written dose have been addressed and documented within the EPIC Progress notes.    Kori NunezD

## 2024-01-23 NOTE — PROGRESS NOTES
Nutrition Services: RD Consultation/ New Chemotherapy Start  Fela Tiwari is a 38 y.o. female with diagnosis of malignant neoplasm of upper-outer quadrant of right breast: Stage IIA: cT2, cN0, cM0, G3, ER-, VA-, HER2+    RD met with pt to assess current intake, appetite, and nutritional status.  Pt presents to appointment unaccompanied. Pt states is feeling well at this time. Mentions appetite is very strong and eating well. Denies any current n/v/d/c. States is stooling every day with last BM this morning. Pt did not express any further nutrition-related questions or concerns at this time.     Dietary intake pattern:  Reports allergy to pineapple, denies any other food allergies and intolerances. Mentions hx of gastric sleeve and eats small amounts more frequently. States eating some red meat in small amounts as enjoys steak.     Past Medical History:   Diagnosis Date    Anesthesia 12/28/2023    wakes up disoriented and panics    Asthma 12/28/2023    exercise or illness induced, inhalers prn    Breath shortness     Only with pulmonary embolism    Bronchitis 06/01/2014    Cancer (HCC) 12/28/2023    right outer breast    Cholesterol blood decreased     Heart burn     Indigestion Due to sleeve    Pain 12/28/2023    right breast very tender    Personal history of venous thrombosis and embolism 2005&2012    pulmonary    Psychiatric problem 12/28/2023    anxiety    Unspecified hemorrhagic conditions     xarelto       Past Surgical History:   Procedure Laterality Date    DIEGO BY LAPAROSCOPY  04/29/2015    Performed by Dre Malone M.D. at SURGERY [facility] ORS    GASTRIC SLEEVE LAPAROSCOPY  12/31/2014    Performed by Dre Malone M.D. at SURGERY [facility] ORS    LIVER BIOPSY LAPAROSCOPIC  12/31/2014    Performed by Dre Malone M.D. at SURGERY [facility] ORS    HYSTERECTOMY ROBOTIC  09/01/2013    LIPOSUCTION  03/04/2010    Performed by RAMON NUNN at SURGERY [facility]    LIPOSUCTION   "2009    Performed by RAMON NUNN at SURGERY [facility] ORS    TONSILLECTOMY  1997    OTHER       x2     Biochemical/ Anthropometric Assessment:  Treatment Regimen: OP Breast Docetaxel + CARBOplatin + Trastuzumab + Pertuzumab followed by Trastuzumab + Pertuzumab with Dr. Thomas   Pertinent Labs: reviewed  Pertinent Meds: decadron, compazine, decadron, imodium, xanax  Wt Readings from Last 1 Encounters:   24 99.8 kg (220 lb 0.3 oz)      Ht Readings from Last 1 Encounters:   24 1.68 m (5' 6.14\")      BMI Readings from Last 1 Encounters:   24 35.36 kg/m²   BMI Classification: Obesity Class I  Nutrition-Focused Physical Exam: Appears nourished, no signs of subcutaneous fat loss or muscle wasting observed.     Weight History:  Wt Readings from Last 6 Encounters:   24 99.8 kg (220 lb 0.3 oz)   24 98.1 kg (216 lb 6.1 oz)   23 97.7 kg (215 lb 6.2 oz)   23 98.6 kg (217 lb 6 oz)   23 98.4 kg (217 lb)   23 102 kg (224 lb 9.6 oz)     Weight Change/Malnutrition Risk: wt maintained within past 1 month minimally. No malnutrition at this time.     Interventions:  Introduced self and discussed role of dietitian throughout treatment process   Encouraged balanced diet. Discussed recommendation of red meat at <18 ounces each week. Discussed importance of diet as a whole, including fruits, vegetables, whole grains, and plant based protein.   Encourage physical activity as tolerated with emphasis on reducing long periods of sedentary activity as able.   Continue meal and snack frequency to 4-6 x/day.     Pt reports understanding and was receptive to information provided.   RD provided contact information. Encouraged pt to reach out as questions/concerns arise.   RD available PRN   [phone]   "

## 2024-01-23 NOTE — PROGRESS NOTES
Chemotherapy Verification - PRIMARY RN      Height = 1.68 m  Weight = 99.8 kg  BSA = 2.16 m2       Medication: Pertuzumab  Dose: SET DOSE  Calculated Dose: 840 mg                             (In mg/m2, AUC, mg/kg)     Medication: Trastuzumab  Dose: 8 mg/kg  Calculated Dose: 798.4                             (In mg/m2, AUC, mg/kg)    Medication: Docetaxel  Dose: 75 mg/m2  Calculated Dose: 162 mg                             (In mg/m2, AUC, mg/kg)    Medication: Carboplatin  Dose: AUC = 6  Calculated Dose: 900 mg                             (In mg/m2, AUC, mg/kg)    Carboplatin calculation (if applicable):  (6*(125+25)) = 900     I confirm this process was performed independently with the BSA and all final chemotherapy dosing calculations congruent.  Any discrepancies of 10% or greater have been addressed with the chemotherapy pharmacist. The resolution of the discrepancy has been documented in the EPIC progress notes.

## 2024-01-24 ENCOUNTER — OUTPATIENT INFUSION SERVICES (OUTPATIENT)
Dept: ONCOLOGY | Facility: MEDICAL CENTER | Age: 39
End: 2024-01-24
Attending: INTERNAL MEDICINE
Payer: MEDICAID

## 2024-01-24 ENCOUNTER — PATIENT OUTREACH (OUTPATIENT)
Dept: ONCOLOGY | Facility: MEDICAL CENTER | Age: 39
End: 2024-01-24

## 2024-01-24 VITALS
HEIGHT: 66 IN | SYSTOLIC BLOOD PRESSURE: 125 MMHG | BODY MASS INDEX: 35.89 KG/M2 | DIASTOLIC BLOOD PRESSURE: 70 MMHG | TEMPERATURE: 97.2 F | WEIGHT: 223.33 LBS | RESPIRATION RATE: 18 BRPM | OXYGEN SATURATION: 96 % | HEART RATE: 66 BPM

## 2024-01-24 DIAGNOSIS — C50.411 MALIGNANT NEOPLASM OF UPPER-OUTER QUADRANT OF RIGHT BREAST IN FEMALE, ESTROGEN RECEPTOR NEGATIVE (HCC): ICD-10-CM

## 2024-01-24 DIAGNOSIS — Z17.1 MALIGNANT NEOPLASM OF UPPER-OUTER QUADRANT OF RIGHT BREAST IN FEMALE, ESTROGEN RECEPTOR NEGATIVE (HCC): ICD-10-CM

## 2024-01-24 DIAGNOSIS — Z65.8 PSYCHOSOCIAL DISTRESS: ICD-10-CM

## 2024-01-24 PROCEDURE — 96372 THER/PROPH/DIAG INJ SC/IM: CPT

## 2024-01-24 PROCEDURE — 700111 HCHG RX REV CODE 636 W/ 250 OVERRIDE (IP): Mod: JZ,UD | Performed by: INTERNAL MEDICINE

## 2024-01-24 RX ADMIN — PEGFILGRASTIM-CBQV 6 MG: 6 INJECTION, SOLUTION SUBCUTANEOUS at 17:27

## 2024-01-24 ASSESSMENT — FIBROSIS 4 INDEX: FIB4 SCORE: 1.02

## 2024-01-24 NOTE — PROGRESS NOTES
"[facility] Intake    Diagnosis Type: \"breast cancer\"  Preferred Language: English   Insurance: [place] medicaid   Dental Insurance:Yes; Last Appointment Date:\"about a year ago\"   Primary Care Provider Reviewed: yes  Last Appointment Date: \"February 2023 with Dr. Maier\"  Introduced Fela Tiwari to Oncology Support Services and provided contact information on 1/24/2024 .  Current Barriers Identified Include:  None at this time  MyChart Status: Activated  Communication Preferences:Phone calls & Mychart; Best Contact Number: [phone]  Patient Contact's Reviewed: yes     Inbound call from patient returning voice message for [facility] intake. Patient states she has family and friends as her support system. Patient confirmed that she received [facility] resource folder, reviewed [email or web address] for Cancer Support Groups & Classes, the resource center, and SARAHI Ro and REINALDO Malave contact information. Patient stated she \"only works part time\", has not been able to work as often due to medical appointments, and is behind in home bills such as her power bill. Informed patient that I would place a referral to [facility] for an assessment. Administered Distress Screening, patient scored a ten stating \"uncertainty\". Encouraged patient to reach out to care team for questions or concerns related to treatment. Patient reports no further needs at this time.      Placed referral to [facility].     Outcome:  No further needs at this time.        "

## 2024-01-24 NOTE — PROGRESS NOTES
Patient arrived to unit for C1D1 Perjeta, Kanjinti, Taxotere, and Paraplatin. She denies any signs or symptoms. VSS. Educated patient on these medications, including risks and benefits and when to go to the ER. Handouts provided. Echo from 12/27/2023 showed LVEF is about 65%. Labs from 1/20 reviewed. Per nursing communication, okay to proceed with treatment with those labs.     Premeds administered.     Perjeta administered over 60 minutes. Patient observed for 60 minutes after Perjeta administration.    Patient denies any adverse effects after 60 minute observation.     Kanjinti administered over 90 minutes. Patient started to develop cough and complained of chest pressure and slight shortness of breath. Kanjinti paused. Solumedrol and Benadryl administered. Kanjinti restarted. Patient declines adverse effects after restarting.    Taxotere administered per titration protocol using non-DEHP tubing. It ran for over 2 hours.    Carboplatin administered.     Patient tolerated treatment without any adverse effects. She just states she feels really tired. She knows to come back to unit at 1700 for [employer] tomorrow. Future appointments confirmed. Educated patient to go to the ER if she starts to develop any fever, chest pain, shortness of breath, lightheadedness, dizziness, or syncope. She is agreeable. Patient discharged home in stable condition.

## 2024-01-25 ENCOUNTER — PATIENT OUTREACH (OUTPATIENT)
Dept: ONCOLOGY | Facility: MEDICAL CENTER | Age: 39
End: 2024-01-25
Payer: MEDICAID

## 2024-01-25 NOTE — PROGRESS NOTES
Patient arrived to unit for Udenyca. She said the symptoms she had yesterday after her first TCHP went away. She just feels fatigued today. Educated patient on Udenyca, including risks and benefits. Advised her to take Claritin for the bone pain associated with this medication. She is agreeable.    Udenyca administered subQ to RLQ.     Patient tolerated treatment without any adverse effects. Future appointments confirmed. Patient discharged home in stable condition.

## 2024-01-25 NOTE — PROGRESS NOTES
Oncology Nurse Navigation Assessment  Follow up call to pt.  She states tx is going well.  She reports some nausea which improves throughout the day.  She states she is taking antiemetics ATC.    DX: Invasive ductal carcinoma of the right breast  HR -  HER2 pos    POC: neoadjuvant TCHP with Dr. Thomas.  Cycle 1 on 1/23/24.    FAMHX: Cancer-related family history includes Cancer in an other family member.  Referred for testing.           BARRIERS ASSESSMENT:    TRANSPORTATION: denies barriers  EMPLOYMENT:  part time, no PTO, states she has applied for disability  FINANCIAL:  cites financial concerns as her primary stressor    Income:   1300 Social Security income, 1200 employment income, states she has applied for disability  Expenses:  Rent 1800  Power 400 (behind one month)  Cell: 160 (family)  Car: 563  Car ins: 350  Internet: 100  Food: food stamps 640 in  Misc: 150  Credit Cards: 100    INSURANCE:  Medicaid [place]  SUPPORT SYSTEM:  brother and mom live local.  Pt lives with her two daughters ages 21 and 11.  She states her youngest has been acting out.  She meets with her school counselor daily.  PSYCHOLOGICAL: DST by [facility]  COMMUNICATION: no barrier noted    FAMILY CARE:  denies barriers  SELF CARE:  denies barriers        INTERVENTION:  Referred to Julia Thomas and Corina Jacobson

## 2024-01-26 ENCOUNTER — TELEPHONE (OUTPATIENT)
Dept: HEMATOLOGY ONCOLOGY | Facility: MEDICAL CENTER | Age: 39
End: 2024-01-26
Payer: MEDICAID

## 2024-01-26 ENCOUNTER — APPOINTMENT (OUTPATIENT)
Dept: RADIOLOGY | Facility: MEDICAL CENTER | Age: 39
DRG: 696 | End: 2024-01-26
Attending: EMERGENCY MEDICINE
Payer: MEDICAID

## 2024-01-26 ENCOUNTER — HOSPITAL ENCOUNTER (INPATIENT)
Facility: MEDICAL CENTER | Age: 39
LOS: 2 days | DRG: 696 | End: 2024-01-28
Attending: EMERGENCY MEDICINE | Admitting: FAMILY MEDICINE
Payer: MEDICAID

## 2024-01-26 DIAGNOSIS — N93.9 VAGINAL BLEEDING: ICD-10-CM

## 2024-01-26 DIAGNOSIS — R10.13 EPIGASTRIC PAIN: ICD-10-CM

## 2024-01-26 DIAGNOSIS — K85.90 ACUTE PANCREATITIS, UNSPECIFIED COMPLICATION STATUS, UNSPECIFIED PANCREATITIS TYPE: ICD-10-CM

## 2024-01-26 PROBLEM — R31.9 HEMATURIA: Status: ACTIVE | Noted: 2024-01-26

## 2024-01-26 PROBLEM — E83.51 HYPOCALCEMIA: Status: ACTIVE | Noted: 2024-01-26

## 2024-01-26 PROBLEM — K63.5 COLON POLYPS: Status: ACTIVE | Noted: 2024-01-26

## 2024-01-26 PROBLEM — R10.84 GENERALIZED ABDOMINAL DISCOMFORT: Status: ACTIVE | Noted: 2024-01-26

## 2024-01-26 PROBLEM — M54.50 LUMBAR BACK PAIN: Status: ACTIVE | Noted: 2024-01-26

## 2024-01-26 PROBLEM — N17.9 PRERENAL ACUTE RENAL FAILURE (HCC): Status: ACTIVE | Noted: 2024-01-26

## 2024-01-26 PROBLEM — R10.9 ABDOMINAL PAIN: Status: ACTIVE | Noted: 2024-01-26

## 2024-01-26 PROBLEM — R11.0 NAUSEA: Status: ACTIVE | Noted: 2024-01-26

## 2024-01-26 PROBLEM — C50.919 HORMONE RECEPTOR POSITIVE BREAST CANCER (HCC): Status: ACTIVE | Noted: 2024-01-26

## 2024-01-26 LAB
ALBUMIN SERPL BCP-MCNC: 3.8 G/DL (ref 3.2–4.9)
ALBUMIN/GLOB SERPL: 1.8 G/DL
ALP SERPL-CCNC: 55 U/L (ref 30–99)
ALT SERPL-CCNC: 23 U/L (ref 2–50)
AMPHET UR QL SCN: NEGATIVE
ANION GAP SERPL CALC-SCNC: 9 MMOL/L (ref 7–16)
APPEARANCE UR: ABNORMAL
AST SERPL-CCNC: 14 U/L (ref 12–45)
BACTERIA #/AREA URNS HPF: NEGATIVE /HPF
BARBITURATES UR QL SCN: NEGATIVE
BASOPHILS # BLD AUTO: 0 % (ref 0–1.8)
BASOPHILS # BLD: 0 K/UL (ref 0–0.12)
BENZODIAZ UR QL SCN: NEGATIVE
BILIRUB SERPL-MCNC: 0.9 MG/DL (ref 0.1–1.5)
BILIRUB UR QL STRIP.AUTO: NEGATIVE
BUN SERPL-MCNC: 25 MG/DL (ref 8–22)
BZE UR QL SCN: NEGATIVE
CALCIUM ALBUM COR SERPL-MCNC: 8.1 MG/DL (ref 8.5–10.5)
CALCIUM SERPL-MCNC: 7.9 MG/DL (ref 8.5–10.5)
CANNABINOIDS UR QL SCN: POSITIVE
CHLORIDE SERPL-SCNC: 103 MMOL/L (ref 96–112)
CO2 SERPL-SCNC: 27 MMOL/L (ref 20–33)
COLOR UR: ABNORMAL
CREAT SERPL-MCNC: 0.87 MG/DL (ref 0.5–1.4)
EOSINOPHIL # BLD AUTO: 0 K/UL (ref 0–0.51)
EOSINOPHIL NFR BLD: 0 % (ref 0–6.9)
EPI CELLS #/AREA URNS HPF: NEGATIVE /HPF
ERYTHROCYTE [DISTWIDTH] IN BLOOD BY AUTOMATED COUNT: 41.4 FL (ref 35.9–50)
FENTANYL UR QL: NEGATIVE
GFR SERPLBLD CREATININE-BSD FMLA CKD-EPI: 86 ML/MIN/1.73 M 2
GLOBULIN SER CALC-MCNC: 2.1 G/DL (ref 1.9–3.5)
GLUCOSE SERPL-MCNC: 106 MG/DL (ref 65–99)
GLUCOSE UR STRIP.AUTO-MCNC: NEGATIVE MG/DL
HCT VFR BLD AUTO: 43.6 % (ref 37–47)
HEMOCCULT STL QL: POSITIVE
HGB BLD-MCNC: 15.2 G/DL (ref 12–16)
HYALINE CASTS #/AREA URNS LPF: ABNORMAL /LPF
INR PPP: 1.09 (ref 0.87–1.13)
KETONES UR STRIP.AUTO-MCNC: NEGATIVE MG/DL
LEUKOCYTE ESTERASE UR QL STRIP.AUTO: NEGATIVE
LIPASE SERPL-CCNC: 102 U/L (ref 11–82)
LYMPHOCYTES # BLD AUTO: 0.85 K/UL (ref 1–4.8)
LYMPHOCYTES NFR BLD: 3.5 % (ref 22–41)
MANUAL DIFF BLD: NORMAL
MCH RBC QN AUTO: 32.2 PG (ref 27–33)
MCHC RBC AUTO-ENTMCNC: 34.9 G/DL (ref 32.2–35.5)
MCV RBC AUTO: 92.4 FL (ref 81.4–97.8)
METAMYELOCYTES NFR BLD MANUAL: 6 %
METHADONE UR QL SCN: NEGATIVE
MICRO URNS: ABNORMAL
MONOCYTES # BLD AUTO: 0 K/UL (ref 0–0.85)
MONOCYTES NFR BLD AUTO: 0 % (ref 0–13.4)
MORPHOLOGY BLD-IMP: NORMAL
MYELOCYTES NFR BLD MANUAL: 4.3 %
NEUTROPHILS # BLD AUTO: 20.86 K/UL (ref 1.82–7.42)
NEUTROPHILS NFR BLD: 86.2 % (ref 44–72)
NITRITE UR QL STRIP.AUTO: NEGATIVE
NRBC # BLD AUTO: 0 K/UL
NRBC BLD-RTO: 0 /100 WBC (ref 0–0.2)
OPIATES UR QL SCN: NEGATIVE
OXYCODONE UR QL SCN: NEGATIVE
PCP UR QL SCN: NEGATIVE
PH UR STRIP.AUTO: 6.5 [PH] (ref 5–8)
PLATELET # BLD AUTO: 103 K/UL (ref 164–446)
PLATELET BLD QL SMEAR: NORMAL
PMV BLD AUTO: 12.2 FL (ref 9–12.9)
POTASSIUM SERPL-SCNC: 3.8 MMOL/L (ref 3.6–5.5)
PROPOXYPH UR QL SCN: NEGATIVE
PROT SERPL-MCNC: 5.9 G/DL (ref 6–8.2)
PROT UR QL STRIP: 100 MG/DL
PROTHROMBIN TIME: 14.3 SEC (ref 12–14.6)
RBC # BLD AUTO: 4.72 M/UL (ref 4.2–5.4)
RBC # URNS HPF: >150 /HPF
RBC BLD AUTO: NORMAL
RBC UR QL AUTO: ABNORMAL
SODIUM SERPL-SCNC: 139 MMOL/L (ref 135–145)
SP GR UR STRIP.AUTO: 1.02
UROBILINOGEN UR STRIP.AUTO-MCNC: 0.2 MG/DL
WBC # BLD AUTO: 24.2 K/UL (ref 4.8–10.8)
WBC #/AREA URNS HPF: ABNORMAL /HPF

## 2024-01-26 PROCEDURE — 74177 CT ABD & PELVIS W/CONTRAST: CPT

## 2024-01-26 PROCEDURE — 99222 1ST HOSP IP/OBS MODERATE 55: CPT | Mod: GC | Performed by: FAMILY MEDICINE

## 2024-01-26 PROCEDURE — 72131 CT LUMBAR SPINE W/O DYE: CPT

## 2024-01-26 PROCEDURE — 85007 BL SMEAR W/DIFF WBC COUNT: CPT

## 2024-01-26 PROCEDURE — 83690 ASSAY OF LIPASE: CPT

## 2024-01-26 PROCEDURE — 770004 HCHG ROOM/CARE - ONCOLOGY PRIVATE *

## 2024-01-26 PROCEDURE — 80307 DRUG TEST PRSMV CHEM ANLYZR: CPT

## 2024-01-26 PROCEDURE — 700102 HCHG RX REV CODE 250 W/ 637 OVERRIDE(OP)

## 2024-01-26 PROCEDURE — 87086 URINE CULTURE/COLONY COUNT: CPT

## 2024-01-26 PROCEDURE — 96375 TX/PRO/DX INJ NEW DRUG ADDON: CPT

## 2024-01-26 PROCEDURE — A9270 NON-COVERED ITEM OR SERVICE: HCPCS

## 2024-01-26 PROCEDURE — 82272 OCCULT BLD FECES 1-3 TESTS: CPT

## 2024-01-26 PROCEDURE — 99285 EMERGENCY DEPT VISIT HI MDM: CPT

## 2024-01-26 PROCEDURE — 96374 THER/PROPH/DIAG INJ IV PUSH: CPT

## 2024-01-26 PROCEDURE — 700117 HCHG RX CONTRAST REV CODE 255: Mod: UD | Performed by: EMERGENCY MEDICINE

## 2024-01-26 PROCEDURE — 96376 TX/PRO/DX INJ SAME DRUG ADON: CPT

## 2024-01-26 PROCEDURE — C9113 INJ PANTOPRAZOLE SODIUM, VIA: HCPCS

## 2024-01-26 PROCEDURE — 80053 COMPREHEN METABOLIC PANEL: CPT

## 2024-01-26 PROCEDURE — 85610 PROTHROMBIN TIME: CPT

## 2024-01-26 PROCEDURE — 700101 HCHG RX REV CODE 250

## 2024-01-26 PROCEDURE — 85027 COMPLETE CBC AUTOMATED: CPT

## 2024-01-26 PROCEDURE — 36415 COLL VENOUS BLD VENIPUNCTURE: CPT

## 2024-01-26 PROCEDURE — 700111 HCHG RX REV CODE 636 W/ 250 OVERRIDE (IP)

## 2024-01-26 PROCEDURE — 700105 HCHG RX REV CODE 258

## 2024-01-26 PROCEDURE — 700111 HCHG RX REV CODE 636 W/ 250 OVERRIDE (IP): Mod: JZ,UD | Performed by: EMERGENCY MEDICINE

## 2024-01-26 PROCEDURE — 81001 URINALYSIS AUTO W/SCOPE: CPT

## 2024-01-26 RX ORDER — ONDANSETRON 2 MG/ML
4 INJECTION INTRAMUSCULAR; INTRAVENOUS ONCE
Status: COMPLETED | OUTPATIENT
Start: 2024-01-26 | End: 2024-01-26

## 2024-01-26 RX ORDER — SODIUM CHLORIDE, SODIUM LACTATE, POTASSIUM CHLORIDE, CALCIUM CHLORIDE 600; 310; 30; 20 MG/100ML; MG/100ML; MG/100ML; MG/100ML
INJECTION, SOLUTION INTRAVENOUS CONTINUOUS
Status: DISCONTINUED | OUTPATIENT
Start: 2024-01-26 | End: 2024-01-28

## 2024-01-26 RX ORDER — HYDROMORPHONE HYDROCHLORIDE 1 MG/ML
0.5 INJECTION, SOLUTION INTRAMUSCULAR; INTRAVENOUS; SUBCUTANEOUS EVERY 4 HOURS PRN
Status: DISCONTINUED | OUTPATIENT
Start: 2024-01-26 | End: 2024-01-27

## 2024-01-26 RX ORDER — HYDROMORPHONE HYDROCHLORIDE 1 MG/ML
0.5 INJECTION, SOLUTION INTRAMUSCULAR; INTRAVENOUS; SUBCUTANEOUS ONCE
Status: COMPLETED | OUTPATIENT
Start: 2024-01-26 | End: 2024-01-26

## 2024-01-26 RX ORDER — LORATADINE 10 MG/1
10 TABLET ORAL DAILY
Status: DISCONTINUED | OUTPATIENT
Start: 2024-01-26 | End: 2024-01-28 | Stop reason: HOSPADM

## 2024-01-26 RX ORDER — LORATADINE 10 MG/1
10 TABLET ORAL DAILY
COMMUNITY

## 2024-01-26 RX ORDER — DEXAMETHASONE 4 MG/1
8 TABLET ORAL 2 TIMES DAILY
Status: DISCONTINUED | OUTPATIENT
Start: 2024-01-26 | End: 2024-01-26

## 2024-01-26 RX ORDER — POLYETHYLENE GLYCOL 3350 17 G/17G
1 POWDER, FOR SOLUTION ORAL
Status: DISCONTINUED | OUTPATIENT
Start: 2024-01-26 | End: 2024-01-28 | Stop reason: HOSPADM

## 2024-01-26 RX ORDER — ACETAMINOPHEN 325 MG/1
650 TABLET ORAL EVERY 4 HOURS PRN
Status: DISCONTINUED | OUTPATIENT
Start: 2024-01-26 | End: 2024-01-28 | Stop reason: HOSPADM

## 2024-01-26 RX ORDER — PANTOPRAZOLE SODIUM 40 MG/10ML
40 INJECTION, POWDER, LYOPHILIZED, FOR SOLUTION INTRAVENOUS DAILY
Status: DISCONTINUED | OUTPATIENT
Start: 2024-01-26 | End: 2024-01-28 | Stop reason: HOSPADM

## 2024-01-26 RX ORDER — LIDOCAINE AND PRILOCAINE 25; 25 MG/G; MG/G
CREAM TOPICAL PRN
Status: DISCONTINUED | OUTPATIENT
Start: 2024-01-26 | End: 2024-01-28 | Stop reason: HOSPADM

## 2024-01-26 RX ORDER — CALCIUM CARBONATE 500 MG/1
500 TABLET, CHEWABLE ORAL DAILY
Status: DISCONTINUED | OUTPATIENT
Start: 2024-01-26 | End: 2024-01-28 | Stop reason: HOSPADM

## 2024-01-26 RX ORDER — ALPRAZOLAM 0.5 MG/1
0.5 TABLET ORAL 3 TIMES DAILY PRN
Status: DISCONTINUED | OUTPATIENT
Start: 2024-01-26 | End: 2024-01-28 | Stop reason: HOSPADM

## 2024-01-26 RX ORDER — ONDANSETRON 4 MG/1
4 TABLET, ORALLY DISINTEGRATING ORAL EVERY 4 HOURS PRN
Status: DISCONTINUED | OUTPATIENT
Start: 2024-01-26 | End: 2024-01-28 | Stop reason: HOSPADM

## 2024-01-26 RX ORDER — AMOXICILLIN 250 MG
2 CAPSULE ORAL 2 TIMES DAILY
Status: DISCONTINUED | OUTPATIENT
Start: 2024-01-26 | End: 2024-01-28 | Stop reason: HOSPADM

## 2024-01-26 RX ORDER — BISACODYL 10 MG
10 SUPPOSITORY, RECTAL RECTAL
Status: DISCONTINUED | OUTPATIENT
Start: 2024-01-26 | End: 2024-01-28 | Stop reason: HOSPADM

## 2024-01-26 RX ORDER — LIDOCAINE AND PRILOCAINE 25; 25 MG/G; MG/G
CREAM TOPICAL ONCE
Status: DISCONTINUED | OUTPATIENT
Start: 2024-01-26 | End: 2024-01-26

## 2024-01-26 RX ADMIN — IOHEXOL 100 ML: 350 INJECTION, SOLUTION INTRAVENOUS at 10:30

## 2024-01-26 RX ADMIN — ANTACID TABLETS 500 MG: 500 TABLET, CHEWABLE ORAL at 17:14

## 2024-01-26 RX ADMIN — ONDANSETRON 4 MG: 2 INJECTION INTRAMUSCULAR; INTRAVENOUS at 06:46

## 2024-01-26 RX ADMIN — ALPRAZOLAM 0.5 MG: 0.5 TABLET ORAL at 17:14

## 2024-01-26 RX ADMIN — HYDROMORPHONE HYDROCHLORIDE 0.5 MG: 1 INJECTION, SOLUTION INTRAMUSCULAR; INTRAVENOUS; SUBCUTANEOUS at 08:16

## 2024-01-26 RX ADMIN — HYDROMORPHONE HYDROCHLORIDE 0.5 MG: 1 INJECTION, SOLUTION INTRAMUSCULAR; INTRAVENOUS; SUBCUTANEOUS at 10:16

## 2024-01-26 RX ADMIN — LORATADINE 10 MG: 10 TABLET ORAL at 15:26

## 2024-01-26 RX ADMIN — LIDOCAINE HYDROCHLORIDE 15 ML: 20 SOLUTION OROPHARYNGEAL at 23:30

## 2024-01-26 RX ADMIN — ONDANSETRON 4 MG: 4 TABLET, ORALLY DISINTEGRATING ORAL at 22:11

## 2024-01-26 RX ADMIN — SODIUM CHLORIDE, POTASSIUM CHLORIDE, SODIUM LACTATE AND CALCIUM CHLORIDE: 600; 310; 30; 20 INJECTION, SOLUTION INTRAVENOUS at 14:06

## 2024-01-26 RX ADMIN — ONDANSETRON 4 MG: 4 TABLET, ORALLY DISINTEGRATING ORAL at 15:02

## 2024-01-26 RX ADMIN — ONDANSETRON 4 MG: 2 INJECTION INTRAMUSCULAR; INTRAVENOUS at 05:08

## 2024-01-26 RX ADMIN — HYDROMORPHONE HYDROCHLORIDE 0.5 MG: 1 INJECTION, SOLUTION INTRAMUSCULAR; INTRAVENOUS; SUBCUTANEOUS at 16:59

## 2024-01-26 RX ADMIN — HYDROMORPHONE HYDROCHLORIDE 0.5 MG: 1 INJECTION, SOLUTION INTRAMUSCULAR; INTRAVENOUS; SUBCUTANEOUS at 05:09

## 2024-01-26 RX ADMIN — PANTOPRAZOLE SODIUM 40 MG: 40 INJECTION, POWDER, FOR SOLUTION INTRAVENOUS at 16:50

## 2024-01-26 RX ADMIN — LIDOCAINE AND PRILOCAINE 1 G: 25; 25 CREAM TOPICAL at 22:39

## 2024-01-26 RX ADMIN — HYDROMORPHONE HYDROCHLORIDE 0.5 MG: 1 INJECTION, SOLUTION INTRAMUSCULAR; INTRAVENOUS; SUBCUTANEOUS at 22:08

## 2024-01-26 ASSESSMENT — PATIENT HEALTH QUESTIONNAIRE - PHQ9
8. MOVING OR SPEAKING SO SLOWLY THAT OTHER PEOPLE COULD HAVE NOTICED. OR THE OPPOSITE, BEING SO FIGETY OR RESTLESS THAT YOU HAVE BEEN MOVING AROUND A LOT MORE THAN USUAL: NOT AT ALL
SUM OF ALL RESPONSES TO PHQ9 QUESTIONS 1 AND 2: 4
3. TROUBLE FALLING OR STAYING ASLEEP OR SLEEPING TOO MUCH: NEARLY EVERY DAY
6. FEELING BAD ABOUT YOURSELF - OR THAT YOU ARE A FAILURE OR HAVE LET YOURSELF OR YOUR FAMILY DOWN: NOT AL ALL
4. FEELING TIRED OR HAVING LITTLE ENERGY: NEARLY EVERY DAY
1. LITTLE INTEREST OR PLEASURE IN DOING THINGS: NEARLY EVERY DAY
7. TROUBLE CONCENTRATING ON THINGS, SUCH AS READING THE NEWSPAPER OR WATCHING TELEVISION: NEARLY EVERY DAY
SUM OF ALL RESPONSES TO PHQ QUESTIONS 1-9: 16
5. POOR APPETITE OR OVEREATING: NEARLY EVERY DAY
9. THOUGHTS THAT YOU WOULD BE BETTER OFF DEAD, OR OF HURTING YOURSELF: NOT AT ALL
2. FEELING DOWN, DEPRESSED, IRRITABLE, OR HOPELESS: SEVERAL DAYS

## 2024-01-26 ASSESSMENT — LIFESTYLE VARIABLES
ALCOHOL_USE: YES
HAVE PEOPLE ANNOYED YOU BY CRITICIZING YOUR DRINKING: NO
EVER HAD A DRINK FIRST THING IN THE MORNING TO STEADY YOUR NERVES TO GET RID OF A HANGOVER: NO
TOTAL SCORE: 0
HAVE YOU EVER FELT YOU SHOULD CUT DOWN ON YOUR DRINKING: NO
CONSUMPTION TOTAL: INCOMPLETE
TOTAL SCORE: 0
EVER FELT BAD OR GUILTY ABOUT YOUR DRINKING: NO
DOES PATIENT WANT TO STOP DRINKING: NO
TOTAL SCORE: 0

## 2024-01-26 ASSESSMENT — FIBROSIS 4 INDEX
FIB4 SCORE: 1.13
FIB4 SCORE: 1.02

## 2024-01-26 ASSESSMENT — COGNITIVE AND FUNCTIONAL STATUS - GENERAL
MOBILITY SCORE: 24
SUGGESTED CMS G CODE MODIFIER MOBILITY: CH
SUGGESTED CMS G CODE MODIFIER DAILY ACTIVITY: CH
DAILY ACTIVITIY SCORE: 24

## 2024-01-26 ASSESSMENT — PAIN DESCRIPTION - PAIN TYPE
TYPE: CHRONIC PAIN
TYPE: CHRONIC PAIN
TYPE: ACUTE PAIN
TYPE: ACUTE PAIN
TYPE: CHRONIC PAIN
TYPE: ACUTE PAIN
TYPE: ACUTE PAIN

## 2024-01-26 NOTE — ED PROVIDER NOTES
Emergency Physician Note    Chief Concern:  Chief Complaint   Patient presents with    Vaginal Bleeding     Started receiving chemo for breast cancer on Tuesday and reports having vaginal bleeding that started yesterday morning. Receiving Pertuzumab and Trastuzmab for cancer treatment. Pt is neutropenic and Dr. Thomas is oncologist. Denies any fevers but feels fatigued w/ body aches since receiving injection.        HPI/ROS     External Records Reviewed:  Nurse navigator assessment: Oncology nurse navigation assessment reviewed from yesterday, 1/25/2024.  This was a follow-up call to the patient who reported that her chemotherapy was going well.  She reported some nausea, improving with antiemetics.  Noted to have a past medical history significant for invasive ductal carcinoma of the right breast, HER2 positive.  She is followed by Dr. Thomas, oncologist.    HPI:  Fela Tiwari is a 38 y.o. female who presents to the emergency department today for evaluation of vaginal bleeding.  She has a past surgical history significant for remote hysterectomy, reports that the cervix was removed during that procedure.  She also has a history of breast cancer, and began her first cycle of chemotherapy 3 days ago.  Yesterday she developed vaginal bleeding which she reports has been ongoing since that time.  She has noticed blood in the toilet, states that she is not having rectal bleeding, nor bleeding with urination.  On review of systems, she states that she may have had some mild gingival bleeding as well, she reports no epistaxis, no abnormal bruising, and no other abnormal bleeding.  She does report some cramping abdominal pain and pelvic pain similar to menstrual cramps, however she has not had a period in years due to hysterectomy.  She reports no associated fevers, she has had some nausea and vomiting which she attributed to the chemotherapy.  She is also reporting generalized fatigue, and back pain,  which she also attributed to her oncology treatment.    PAST MEDICAL HISTORY  Past Medical History:   Diagnosis Date    Anesthesia 2023    wakes up disoriented and panics    Asthma 2023    exercise or illness induced, inhalers prn    Breath shortness     Only with pulmonary embolism    Bronchitis 2014    Cancer (HCC) 2023    right outer breast    Cholesterol blood decreased     Heart burn     Indigestion Due to sleeve    Pain 2023    right breast very tender    Personal history of venous thrombosis and embolism &    pulmonary    Psychiatric problem 2023    anxiety    Unspecified hemorrhagic conditions     xarelto       SURGICAL HISTORY  Past Surgical History:   Procedure Laterality Date    DIEGO BY LAPAROSCOPY  2015    Performed by Dre Malone M.D. at SUR[facility]ERY [facility]    GASTRIC SLEEVE LAPAROSCOPY  2014    Performed by Dre Malone M.D. at SURGERY [facility]    LIVER BIOPSY LAPAROSCOPIC  2014    Performed by Dre Malone M.D. at SURGERY [facility]    HYSTERECTOMY ROBOTIC  2013    LIPOSUCTION  2010    Performed by RAMON NUNN at SURGERY [facility]    LIPOSUCTION  2009    Performed by RAMON NUNN at SURGERY [facility]    TONSILLECTOMY  1997    OTHER       x2       FAMILY HISTORY  Family History   Problem Relation Age of Onset    Diabetes Other     Hypertension Other     Cancer Other        SOCIAL HISTORY   reports that she quit smoking about 6 years ago. Her smoking use included cigarettes and electronic cigarettes. She started smoking about 12 years ago. She has a 3.0 pack-year smoking history. She has never used smokeless tobacco. She reports current alcohol use of about 8.4 oz of alcohol per week. She reports current drug use. Drug: Inhaled.    CURRENT MEDICATIONS  Previous Medications    ALBUTEROL (ACCUNEB) 0.63 MG/3ML NEBULIZER SOLUTION    Take 3 mL by nebulization every four  "hours as needed for Shortness of Breath.    ALBUTEROL 108 (90 BASE) MCG/ACT AERO SOLN INHALATION AEROSOL    Inhale 2 Puffs every 6 hours as needed for Shortness of Breath.    ALPRAZOLAM (XANAX) 0.5 MG TAB    Take 1 Tablet by mouth 3 times a day as needed for Anxiety for up to 30 days.    DEXAMETHASONE (DECADRON) 4 MG TAB    Take 2 Tablets by mouth 2 times a day. Take 8 mg two times a day for 3 days, starting 24 hours prior to docetaxel.    EPINEPHRINE (EPIPEN) 0.3 MG/0.3ML SOLUTION AUTO-INJECTOR SOLUTION FOR INJECTION    Inject 0.3 mL into the thigh one time for 1 dose  Indications: Patient advised to present to the ED even after epinephrine administration for further observation and management.    LIDOCAINE-PRILOCAINE (EMLA) 2.5-2.5 % CREAM    Apply to port 1 hour prior to access of port and cover with plastic wrap.    LOPERAMIDE (IMODIUM) 2 MG CAP    Take 1 Capsule by mouth see administration instructions.    ONDANSETRON (ZOFRAN) 4 MG TAB TABLET    Take 1 Tablet by mouth every four hours as needed for Nausea/Vomiting (for nausea, vomiting).    PROCHLORPERAZINE (COMPAZINE) 10 MG TAB    Take 1 Tablet by mouth every 6 hours as needed (for nausea, vomiting).       ALLERGIES  Bee venom, Morphine sulfate, Tape, and Pineapple    PHYSICAL EXAM  Vital Signs: /75   Pulse 64   Temp 36.6 °C (97.8 °F) (Temporal)   Resp 16   Ht 1.676 m (5' 6\")   Wt 101 kg (221 lb 12.5 oz)   LMP 02/10/2012 (Approximate) Comment: Age of first period:10, No HRT  SpO2 97%   BMI 35.80 kg/m²   Constitutional: Alert, no acute distress  HENT: Normocephalic, atraumatic.  Cardiovascular: No tachycardia  Pulmonary: No respiratory distress, normal work of breathing  Abdomen: Soft, non tender, mild discomfort on abdominal palpation without rebound or guarding, no peritoneal signs.  GYN: No blood in the vaginal vault, no evidence of active bleeding, no lesions or lacerations of the vaginal wall identified.  Skin: Warm, dry, no rashes or " lesions  Musculoskeletal: Normal range of motion in all extremities, no swelling or deformity noted  Neurologic: Alert, oriented, normal motor function, no speech deficits    Diagnostic Studies & Procedures    Labs:  All labs reviewed by me as noted below.    Radiology:  The attending Emergency Physician has independently interpreted the following imaging:  I independently interpreted the CT of the abdomen pelvis, do not appreciate any bony spinal abnormalities to suggest metastatic disease, no abnormal masses identified.    CT-LSPINE W/O PLUS RECONS   Final Result      Mild spondylosis without acute abnormality.      CT-ABDOMEN-PELVIS WITH   Final Result      1.  Mild inflammatory changes in the upper abdomen adjacent to the gastric antrum and of the pancreatic neck. Differential considerations would include gastritis or mild pancreatitis.   2.  No free air. Trace free fluid in the pelvis.   3.  Stable postsurgical changes.          Course and Medical Decision Making    Initial Assessment and Plan:  Ms. Tiwari presents to the emergency department due to abdominal pain, and abnormal vaginal bleeding which raises concern as she has a history of hysterectomy.  Pelvic examination does not reveal any blood in the vaginal vault.  She does have some discomfort on abdominal palpation, though no peritoneal signs.  She also reports pain radiating to the back.  Differential diagnosis includes coagulopathy including thrombocytopenia, as well as intra-abdominal pathology including bowel obstruction, metastatic disease, or bony metastases.  Vital signs are reassuring without tachycardia, hypotension, nor fever.    On laboratory evaluation CMP is reassuring without any significant abnormalities that would cause the patient's vaginal bleeding.  Calcium is low at 7.9, liver enzymes are within normal limits.  Lipase is mildly elevated to 102, which may be related to her nausea.  White blood count is 24.2 likely related to Udenyca  injection that she received after chemotherapy, hemoglobin is within normal limits at 15.2.  Platelet count is 103, decreased from 167 one week ago.  INR is 1.09, within normal limits.  Urinalysis is nitrite negative, negative for microscopic bacteria, positive for red blood cells.    I placed a call to Dr. Thomas, oncologist.  As this service did not have overnight coverage, there was a slight delay in contacting consulting physician.  Reviewed CBC and other lab work, no concerns regarding platelet count.  Recommendation is for GYN follow-up regarding vaginal bleeding.    She received multiple doses of IV Dilaudid in the emergency department for back pain, which she reports is radiating to both legs.  She has no lower extremity weakness, no incontinence, no symptoms concerning for cauda equina syndrome.  Due to pelvic pain, abdominal pain, and back pain in the setting of known malignancy, CT abdomen pelvis as well as CT of the lumbar spine ordered for further evaluation.  This demonstrated findings consistent with early pancreatitis, which would also explain the patient's elevated lipase, as well as pain localized to the abdomen but radiating to the back.  No abnormality seen on CT imaging of the spine.    She is required multiple doses of parenteral narcotics, plan at this time is for admission to hospitalist service for pain control, antiemetics, and supportive care for treatment of symptoms likely caused by early pancreatitis.  She will also require continued IV fluids, she is not currently reliable able to tolerate p.o. intake.    Presentation was discussed with Dr. Maier, family medicine physician, and family agrees to admit the patient.    Additional Problems and Disposition    Complexity  Therapeutic intensity: IV hydromorphone administered for pain    I have discussed management of the patient with the following physicians:   Dr. Maier, Family Physician    Disposition:  Admit to [facility] Family  Medicine Service in guarded condition    FINAL IMPRESSION   Vaginal bleeding'  Abdominal Pain  Acute Pancreatitis    The note accurately reflects work and decisions made by me.  Esme Rivas M.D.  1/26/2024  5:15 PM

## 2024-01-26 NOTE — H&P
"       FAMILY MEDICINE HISTORY AND PHYSICAL       PATIENT ID:  NAME:  Fela Tiwari  MRN:               [id number]  YOB: 1985    Date of Admission: 1/26/2024     Attending: Rogerio Alva M.d.     Resident: Shelly Maxwell M.D.    Primary Care Physician:  Francisco Maier M.D.    CC:  per ED report.  Chief Complaint   Patient presents with    Vaginal Bleeding     Started receiving chemo for breast cancer on Tuesday and reports having vaginal bleeding that started yesterday morning. Receiving Pertuzumab and Trastuzmab for cancer treatment. Pt is neutropenic and Dr. Thomas is oncologist. Denies any fevers but feels fatigued w/ body aches since receiving injection.      HPI: Fela Tiwari is a 38 y.o. female who presented with 2-day history of vaginal bleeding following first round of chemotherapy for newly diagnosed HER2/gibran positive breast cancer.  Patient states she received her first treatment of chemo on Tuesday this week and felt fine the following day.  Yesterday the vaginal bleeding started and patient was startled as the \"toilet bowl was full of blood\".  Patient has pictures of these findings.  Patient states she received Udenyca at the outpatient infusion center on 1/24/2024.  Her first cycle of chemotherapy included: epratuzumab, trastuzumab, docetaxel and carboplatin.  Patient is also experiencing continued nausea, headaches and vision changes.  Denies chest pain, or syncope.    Patient states her abdominal pain is more \" on her sides-left and right side\".  Denies midline or epigastric pain.  Patient states that her flank pain radiates to her lumbar back region. Patient is sexually active with 1 male partner (her ).  Last sexually active 1 week ago.  No concerns for STDs and no history of STDs.Denies numbness, tingling, loss of urine or stool, no saddle anesthesia.  Patient states that Dilaudid received in ED relieved her pain for a moment, however pain is dull and " "constant nature, \"so pain comes right back\".    ERCourse:  CBC showed: WBC 24, Hgb/Hct wnl, Platelets 103.   CMP showed: BUN 25, Cr 0.87. Ca 7.9. Lipase 102. PT/PTT wnl.  UA showed blood and protein. UA microscopy positive for RBCs and no bacteria, cells or cast.  CT Abd/pelvis shows: Inflammation noted in epigastric region (gastric antrum and pancreatic neck).  No free air in abdomen.  CT L-spine was done; showing mild spondylosis without acute abnormalities.    REVIEW OF SYSTEMS:   Ten systems reviewed and were negative except as noted in the HPI.                PAST MEDICAL HISTORY:   has a past medical history of Anesthesia (12/28/2023), Asthma (12/28/2023), Breath shortness, Bronchitis (06/01/2014), Cancer (HCC) (12/28/2023), Cholesterol blood decreased, Heart burn, Indigestion (Due to sleeve), Pain (12/28/2023), Personal history of venous thrombosis and embolism (2005&2012), Psychiatric problem (12/28/2023), and Unspecified hemorrhagic conditions.  History of bilateral pulmonary embolus in 2005.  Unilateral pulmonary embolus in 2012.  Robotic hysterectomy 2013, by Dr. Malone.    PAST SURGICAL HISTORY:   has a past surgical history that includes tonsillectomy (01/01/1997); liposuction (12/21/2009); liposuction (03/04/2010); other; hysterectomy robotic (09/01/2013); gastric sleeve laparoscopy (12/31/2014); liver biopsy laparoscopic (12/31/2014); and jordon by laparoscopy (04/29/2015).     FAMILY HISTORY:  family history includes Cancer in an other family member; Diabetes in an other family member; Hypertension in an other family member.     SOCIAL HISTORY:   Employment: [employer] employee  Living Situation: Lives with  and 2 children (21 and 12 yo.)  Smoking: Vaping with marijuana and tobacco use  Etoh: once or twice per week; liquor  Recreational Drug: Marijuana use    DIET:   Orders Placed This Encounter   Procedures    Diet Order Diet: Regular     Standing Status:   Standing     Number of Occurrences:   1    "  Order Specific Question:   Diet:     Answer:   Regular [1]     ALLERGIES:  Allergies   Allergen Reactions    Bee Venom Anaphylaxis    Morphine Sulfate Anaphylaxis and Itching    Tape Rash     Paper tape OK, do not use tegaderm    Pineapple Hives     OUTPATIENT MEDICATIONS:    Current Facility-Administered Medications:     senna-docusate (Pericolace Or Senokot S) 8.6-50 MG per tablet 2 Tablet, 2 Tablet, Oral, BID **AND** polyethylene glycol/lytes (Miralax) Packet 1 Packet, 1 Packet, Oral, QDAY PRN **AND** magnesium hydroxide (Milk Of Magnesia) suspension 30 mL, 30 mL, Oral, QDAY PRN **AND** bisacodyl (Dulcolax) suppository 10 mg, 10 mg, Rectal, QDAY PRN, Shelly Maxwell M.D.    lactated ringers infusion, , Intravenous, Continuous, Shelly Maxwell M.D., Last Rate: 100 mL/hr at 24 1406, New Bag at 24 1406    ondansetron (Zofran ODT) dispertab 4 mg, 4 mg, Oral, Q4HRS PRN, Shelly Maxwell M.D., 4 mg at 24 1502    ALPRAZolam (Xanax) tablet 0.5 mg, 0.5 mg, Oral, TID PRN, Shelly Maxwell M.D., 0.5 mg at 24 1714    loratadine (Claritin) tablet 10 mg, 10 mg, Oral, DAILY, Shelly Maxwell M.D., 10 mg at 24 1526    pantoprazole (Protonix) injection 40 mg, 40 mg, Intravenous, DAILY, Shelyl Maxwell M.D., 40 mg at 24 1650    calcium carbonate (Tums) chewable tab 500 mg, 500 mg, Oral, DAILY, Irma Glasgow M.D., 500 mg at 24 1714    HYDROmorphone (Dilaudid) injection 0.5 mg, 0.5 mg, Intravenous, Q4HRS PRN, Shelly Maxwell M.D., 0.5 mg at 24 1659    acetaminophen (Tylenol) tablet 650 mg, 650 mg, Oral, Q4HRS PRN, Shelly Maxwell M.D.    PHYSICAL EXAM:  Vitals:    24 1345 24 1400 24 1517 24 1950   BP:  109/68 119/70 123/68   Pulse:  74 70 93   Resp: 18 18 18 17   Temp:  36.8 °C (98.2 °F) 36.7 °C (98 °F) 36.8 °C (98.2 °F)   TempSrc:  Temporal Temporal Oral   SpO2:  96% 95% 96%   Weight:  99.1 kg (218 lb 7.6 oz)     Height:       , Temp (24hrs), Av.7 °C (98.1 °F), Min:36.6  "°C (97.8 °F), Max:36.8 °C (98.2 °F)  , Pulse Oximetry: 96 %, O2 (LPM): 0, O2 Delivery Device: None - Room Air    Physical Exam:  HEENT:      Head: Normocephalic and atraumatic.      Mouth/Throat:      Mouth: Mucous membranes are moist.   Eyes:      Pupils: Pupils are equal, round, and reactive to light.   Cardiovascular:      Rate and Rhythm: Normal rate and regular rhythm.   Pulmonary:      Effort: Pulmonary effort is normal. No respiratory distress.      Breath sounds: Clear breath sounds present bilaterally.   Abdominal:      General: Abdomen is flat. There is no distension. Positive bowel sounds. Tenderness to light palpation throughout.       Palpations: Abdomen is soft.   Gyn: No blood in the vaginal vault, no evidence of active bleeding, no lesions or lacerations of vaginal wall.  Musculoskeletal:         General: Normal range of motion. Point tenderness along lumbar spine, paraspinal muscles and hips.   Skin:     General: Skin is warm and dry.   Neurological:      General: No focal deficit present.      Mental Status: He is alert.   Psychiatric:         Mood and Affect: Mood normal.         Behavior: Behavior normal.         Judgment: Judgment normal.     LAB TESTS:   Recent Labs     01/26/24  0452   WBC 24.2*   RBC 4.72   HEMOGLOBIN 15.2   HEMATOCRIT 43.6   MCV 92.4   MCH 32.2   MCHC 34.9   RDW 41.4   PLATELETCT 103*   MPV 12.2     Recent Labs     01/26/24  0452   SODIUM 139   POTASSIUM 3.8   CHLORIDE 103   CO2 27   GLUCOSE 106*   BUN 25*   CREATININE 0.87   CALCIUM 7.9*     Recent Labs     01/26/24  0452   ALTSGPT 23   ASTSGOT 14   ALKPHOSPHAT 55   TBILIRUBIN 0.9   LIPASE 102*   GLUCOSE 106*     Recent Labs     01/26/24  0452   INR 1.09     No results for input(s): \"NTPROBNP\" in the last 72 hours.      No results for input(s): \"TROPONINT\" in the last 72 hours.    CULTURES:   Results       Procedure Component Value Units Date/Time    URINE CULTURE(NEW) [[id number]] Collected: 01/26/24 1426    Order Status: " Sent Specimen: Urine, Clean Catch Updated: 01/26/24 1451    Narrative:      Collected By: [id number] CAITLYN JACOB LANETTE  Indication for culture:->Unexplained new onset of Flank pain  and/or Costovertebral angle tenderness    Urinalysis [[id number]]  (Abnormal) Collected: 01/26/24 0639    Order Status: Completed Specimen: Urine Updated: 01/26/24 0800     Color Red     Character Cloudy     Specific Gravity 1.025     Ph 6.5     Glucose Negative mg/dL      Ketones Negative mg/dL      Protein 100 mg/dL      Bilirubin Negative     Urobilinogen, Urine 0.2     Nitrite Negative     Leukocyte Esterase Negative     Occult Blood Large     Micro Urine Req Microscopic          IMAGES:  CT-LSPINE W/O PLUS RECONS   Final Result      Mild spondylosis without acute abnormality.      CT-ABDOMEN-PELVIS WITH   Final Result      1.  Mild inflammatory changes in the upper abdomen adjacent to the gastric antrum and of the pancreatic neck. Differential considerations would include gastritis or mild pancreatitis.   2.  No free air. Trace free fluid in the pelvis.   3.  Stable postsurgical changes.        CONSULTS:   None     ASSESSMENT/PLAN:   38 y.o. female admitted for abdominal pain and blood in urine.    * Hematuria of unknown etiology  Assessment & Plan  2 day history of gross hematuria following chemo induction therapy on 1/24/2024.  Despite complaints of vaginal bleeding - Vaginal exam showed: no blood in vaginal vault or at cervical os. No evidence of active bleeding.  Patient on chemotherapy agents that can cause nephrotoxicity, however, less likely to cause gross hematuria.     Plan:  - Urology consult in place; Dr. Vaughn to see patient in AM  - Hgb stable.  CBC with AM labs.  - Urine culture pending      Generalized abdominal discomfort  Assessment & Plan  #hip and back pain  Patient describes generalized abdominal pain, localized flank, back and hip pain. In ED, CT showed: nonspecific mild fat stranding in upper abdomen below gastric  antrum and anterior to pancreas. Pancreas itself is unremarkable.  Ddx per impression includes: gastritis vs mild pancreatitis. Can also be a consequence to recent chemo induction therapy.   Lipases 102, WBCs 24.2    Plan:  - will trend lipases with AM labs  - continue LR @ 100 ml/hr and consider discontinuation if lipases wnl  - will discuss chemotherapy regimen with oncology pharmacist and oncologist to ensure they are not the cause of her symptoms.  - Tylenol 650mg q4hrs PRN mild pain and Dilaudid 0.5mg q4 hrs PRN moderate pain  - Encourage oral hydration    Nausea  Assessment & Plan  Acute since chemo induction.     Plan:  - Zofran 4mg q 4hrs PRN    Colon polyps  Assessment & Plan  Family history of gene for colon polyps. Patient unable to identify if blood is in urine alone or also in her stools.     Plan:  -FOBT to ensure no concern for GI bleed  -Hgb stable.  CBC in AM labs.    Hormone receptor positive breast cancer (HCC)  Assessment & Plan  HER2/Jeanette positive breast cancer diagnosis in 2023. First round of chemotherapy on 1/24/2024.  Patient received Udenyca injection on same day; for infectious risk reduction during chemotherapy.  Her first cycle of chemotherapy included: Prednisone, epratuzumab, trastuzumab, docetaxel and carboplatin.  Followed by Dr. Thomas, Oncologist.    Hypocalcemia  Assessment & Plan  Corrected Ca 8.1    Plan:  -monitor for any signs of hypocalcemia  -BMP in AM    Anxiety associated with cancer diagnosis (HCC)  Assessment & Plan  - continue home Ativan    Core Measures:  Fluids: LR @ 100 ml/hr  Lines: PIV  Abx: None  Diet: Regular  PPX: SCDs/TEDs, held in the setting of bleeding  CODE Status: Full Code    Dispo: Inpatient for continued medical management.    Shelly Maxwell MD  PGY2 Resident   [facility], Family Medicine

## 2024-01-26 NOTE — ED NOTES
Pt crying in room. Reports that she needs more pain medication. ERP notified via [employer] @[id number]. Awaiting orders.

## 2024-01-26 NOTE — H&P
Family Medicine Medical Student Admitting History and Physical  Note Author: Ashely Cheatham    Name Fela Tiwari     1985   Age/Sex 38 y.o. female   MRN [id number]   Code Status FULL       Chief Complaint:  Vaginal bleeding, back/abdominal/bone pain    HPI:  Fela Tiwari is a 39 yo F who presented to the ED complaining of vaginal bleeding in addition to back, bone, and abdominal pain. She received her first round of chemotherapy 3 days ago on  for breast cancer (HER2 but no family history) and began developing these symptoms 2 days ago on . Of note, her PMH is significant for hysterectomy in  and two episodes of PEs in  and .     The pt explains that her vaginal bleeding began yesterday and is not associated with pain. She describes it as heavy but only occurring when she urinates. She has not been using pads or tampons as she is not bleeding in between urination. She endorses bright red blood in the toilet after urination. She denies melena or hematochezia, and her last bowel movement was yesterday and it was normal. Ms. Tiwari has not had any vaginal bleeding since her hysterectomy in , and prior to that she had few periods due to her IUD (copper).    Ms. Tiwari explains that her back, bone, and abdominal pain began 2 days ago (), one day after chemo. The pain is dull, gnawing, constant, and 8/10. Her bone pain is notable in her hips and less so in her shoulders. Her back pain is localized to the bilateral lower back including the flanks. The pain in her abdomen is notable in the bilateral lower quadrants, and she explains that it is similar to the menstrual pain she used to experience when she had periods. The pain was somewhat relieved by dilaudid administered in the ED, but the relief did not last long.    ED Course:  Pt was given Zofran 4mg x2, Dilaudid 0.5mg x3.  mL/hr.    Pelvic exam was performed, no blood in the vaginal vault.  CT abd/pelvis  showed mild inflammation in upper abdomen (gastritis vs. Mild pancreatitis), trace free fluid in the pelvis.  CT spine showed mild spondylosis without acute abnormality.  Labs were notable for WBC 24.2, normal H/H, platelets 103, ANC 20.86, BUN 25, Cr 0.87, corrected calcium 8.1, lipase 102, and normal PT/INR. UA was significant for grossly bloody apperance, large occult blood, protein 100, 5-10 WBCs, and  >150 RBCs.     ROS:  Constitutional: Reports headache, general malaise, and nausea.   HEENT: Reports blurring of vision. Denies hearing loss or sinus issues. Denies congestion, runny nose, and sore throat.  CV: denies chest pain, no palpitations.  Resp: Reports SOB. Denies cough.   GI: denies diarrhea, constipation, and vomiting. Reports nausea. Reports abdominal pain as above in HPI.  : denies nocturia, dysuria.  Neuro: denies tingling or loss of sensation.   MSK: Reports bone pain as above in HPI.  Skin: denies rash, discoloration. Reports increased susceptibility to bruises as of late.    OB/GYN History  Ms. Tiwari has two children who were both born by . She was on OCPs in the early  but discontinued these after her second episode of PEs, as she was also smoking at the time. While on OCPs and before she had regular menstrual periods. She used a copper IUD until her second pregnancy, and then again until her hysterectomy in 2013. This was a robotic partial hysterectomy.     Sexual History  Ms. Tiwari is currently sexually active with one male partner and her last time having sex was last week. She does not have a history of STDs nor is she concerned for any infections.    Past Medical History (chronic problems, known complications, current management)     Breast cancer, bilateral PE in , unilateral PE in , hemorrhagic corpus luteum cyst, GERD      Past Surgical History:   Procedure Laterality Date    DIEGO BY LAPAROSCOPY  2015    Performed by Dre Malone M.D. at SURGERY [facility]  "[place] [facility]    GASTRIC SLEEVE LAPAROSCOPY  2014    Performed by Dre Malone M.D. at SURGERY [facility]    LIVER BIOPSY LAPAROSCOPIC  2014    Performed by Dre Malone M.D. at SURGERY [facility]    HYSTERECTOMY ROBOTIC  2013    LIPOSUCTION  2010    Performed by RAMON NUNN at SURGERY [facility]    LIPOSUCTION  2009    Performed by RAMON NUNN at SURGERY [facility]    TONSILLECTOMY  1997    OTHER       x2      Allergies   Allergen Reactions    Bee Venom Anaphylaxis    Morphine Sulfate Anaphylaxis and Itching    Tape Rash     Paper tape OK, do not use tegaderm    Pineapple Hives      Home Medications:  Alprazolam 0.5mg  Loperamide 2mg  Odansetron 4mg  Dexamethasone 4mg  Prochlorperazine 10mg    Family History:  Gene for colon polyps    Social History:  Smoking: vapes tobacco daily, but \"only a few puffs\"  Alcohol: drinks a couple shots of liquor a couple times a week  Illictis: denies  Other: smokes marijuana daily      Physical Exam  General: NAD. Laying in bed, appears uncomfortable and in pain. Alert and cooperative.   HEENT: Normocephalic and atraumatic. Clear conjunctiva bilaterally. Pale mucous membranes. Posterior oropharynx is without erythema or exudate. Uvula is midline.  Neck: supple. No thyromegaly or lymphadenopathy.   Resp: No respiratory distress. No cough and no tachypnea. No wheezes, rales, or crackles. No cyanosis or clubbing.  Cardiovascular: RRR, no murmurs, rubs, or gallops. S1/2 appreciated. Normal JVP. Distal capillary refill less than 2 seconds.    Abdomen: Soft, nondistended, no hepatomegaly or splenomegaly. Diffusely tender to palpation most notable in the lower quadrants. Rebound tenderness in the lower quadrants.  Extremities/skin: No edema in the BLE. No rashes.   MSK: tenderness to palpation along the spine. Bilateral flank tenderness.  Psych: normal affect given clinical situation.      Vitals:    24 1102 " "01/26/24 1132 01/26/24 1202 01/26/24 1232   BP: 102/68 96/60 109/72 115/65   Pulse: (!) 59 61 60 66   Resp: 13 14 16 17   Temp:       TempSrc:       SpO2: 92% 94% 94% 94%   Weight:       Height:         Body mass index is 35.8 kg/m².  /65   Pulse 66   Temp 36.6 °C (97.8 °F) (Temporal)   Resp 17   Ht 1.676 m (5' 6\")   Wt 101 kg (221 lb 12.5 oz)   LMP 02/10/2012 (Approximate) Comment: Age of first period:10, No HRT  SpO2 94%   BMI 35.80 kg/m²   O2 therapy: Pulse Oximetry: 94 %, O2 (LPM): 0, O2 Delivery Device: None - Room Air        Assessment/Plan   Fela Tiwari is a 39 yo F with a PMH significant for breast cancer with first chemotherapy (carboplatin, docetaxel) 3 days ago, partial hysterectomy in 2013, and bilateral PEs not on blood thinners, who was admitted for vaginal bleeding and diffuse 8/10 body pain notable in the back, abdomen, and bones that all began a day after chemotherapy. She denies symptoms of recent infection and endorses slight SOB but no chest pain. The vaginal bleeding only occurs during urination and pelvic exam in the ED revealed no blood in the vaginal vault, so this is likely hematuria. Physical exam revealed bilateral flank tenderness and abdominal pain with rebound tenderness in the lower quadrants. Labs revealed leukocytosis (24.2) with neutrophilic predominance, normal Hb/Hct, normal Cr with elevated BUN (25), hypocalcemia, normal coagulation, and slightly elevated lipase (102). UA significant for hematuria with WBCs (5-10), RBCs (>150), and no bacteria. Imaging on CT abdomen/pelvis revealed mild inflammation (gastritis vs. Mild pancreatitis). Given all of these findings, we are evaluating this patient for etiologies including but not limited to chemotherapy toxicity, RICKEY, and pancreatitis.     Abdominal, back, and bone pain  -diffuse pain likely an adverse reaction to chemotherapy, less likely to be pancreatitis due to only slightly elevated lipase (102)  -trend " lipase  -consider acetaminophen 650mg q6, Dilaudid 0.5mg q3 for pain control (hold nephrotoxic drugs due to possibility of RICKEY/nephrotoxicity from chemotherapy)  -flank pain may be associated with hematuria (UTI vs. RICKEY)  -LR 100mL/hr  -encourage PO hydration    Vaginal bleeding vs. hematuria  -likely hematuria due to blood only occurring during urination, absence of uterus (hysterectomy in 2013), and no blood in vaginal vault on physical exam in the ED  -UA significant for hematuria with WBCs (5-10), RBCs (>150), and no bacteria  -hold nephrotoxic drugs due to possibility of RICKEY/nephrotoxicity from chemotherapy  -platelets 102, Hb and Hct normal, all reassuring considering blood loss  -repeat CBC in the AM  -ordered urine culture  -repeat UA in the AM    Breast cancer, HER2 positive  -Pt received carboplatin which is associated with nephrotoxicity as a side effect, and docetaxel is associated with side effects such as myelosuppression, neuropathy, and hypersensitivity  -contact Pt's oncologist for further recommendations    Nausea  -likely an adverse reaction to chemotherapy  -Zofran 4mg q4 PRN    H/o bilateral PEs   -hold Lovenox for now considering blood loss/hematuria         Overseeing physician: Dr. Nida Cheatham  [facility]

## 2024-01-26 NOTE — ED TRIAGE NOTES
Chief Complaint   Patient presents with    Vaginal Bleeding     Started receiving chemo for breast cancer on Tuesday and reports having vaginal bleeding that started yesterday morning. Receiving Pertuzumab and Trastuzmab for cancer treatment. Pt is neutropenic and Dr. Thomas is oncologist. Denies any fevers but feels fatigued w/ body aches since receiving injection.      Vitals:    01/26/24 0425   BP: 115/75   Pulse: 64   Resp: 16   Temp: 36.6 °C (97.8 °F)   SpO2: 97%     Charge RN notified. Pt roomed to [place] [id number].

## 2024-01-26 NOTE — ED NOTES
Break RN: ambulated to bathroom, pt states not dizzy/lightheaded, no balance issues observed, successful and appropriate walking mobility. IV med given.

## 2024-01-26 NOTE — ED NOTES
Pt medicated per MAR for 10/10 generalized pain. Tearful. Moved to GYN gurney and set up for PELVIC exam. Provided with swabs for comfort.

## 2024-01-26 NOTE — TELEPHONE ENCOUNTER
On Call / After Hours Call  Call on 1/26/2024 at 7:41 AM from ED re Fela Tiwari  Confirmed Dr. Thomas had already responded

## 2024-01-26 NOTE — ED NOTES
Med rec complete per patient  Allergies reviewed.   Outpatient antibiotics in the last 30 days? No   Anticoagulants taken in the last 14 days? No    Pharmacy patient utilizes: [facility]

## 2024-01-26 NOTE — PROGRESS NOTES
4 Eyes Skin Assessment Completed by WILLIAM Seals and WILLIAM Tapia.    Head WDL  Ears WDL  Nose WDL  Mouth WDL  Neck WDL  Breast/Chest Bruising underneath breasts bilaterally   Shoulder Blades WDL  Spine WDL  (R) Arm/Elbow/Hand WDL  (L) Arm/Elbow/Hand WDL  Abdomen WDL  Groin WDL  Scrotum/Coccyx/Buttocks WDL  (R) Leg WDL  (L) Leg WDL  (R) Heel/Foot/Toe WDL  (L) Heel/Foot/Toe WDL          Devices In Places Blood Pressure Cuff      Interventions In Place N/A    Possible Skin Injury No    Pictures Uploaded Into Epic N/A  Wound Consult Placed N/A  RN Wound Prevention Protocol Ordered No

## 2024-01-27 PROBLEM — E87.6 HYPOKALEMIA: Status: ACTIVE | Noted: 2024-01-27

## 2024-01-27 LAB
ANION GAP SERPL CALC-SCNC: 10 MMOL/L (ref 7–16)
BUN SERPL-MCNC: 16 MG/DL (ref 8–22)
CALCIUM SERPL-MCNC: 7.7 MG/DL (ref 8.5–10.5)
CHLORIDE SERPL-SCNC: 101 MMOL/L (ref 96–112)
CO2 SERPL-SCNC: 25 MMOL/L (ref 20–33)
CREAT SERPL-MCNC: 0.76 MG/DL (ref 0.5–1.4)
CRP SERPL HS-MCNC: 0.73 MG/DL (ref 0–0.75)
ERYTHROCYTE [DISTWIDTH] IN BLOOD BY AUTOMATED COUNT: 40 FL (ref 35.9–50)
ERYTHROCYTE [DISTWIDTH] IN BLOOD BY AUTOMATED COUNT: 41.9 FL (ref 35.9–50)
ERYTHROCYTE [SEDIMENTATION RATE] IN BLOOD BY WESTERGREN METHOD: 10 MM/HOUR (ref 0–25)
ERYTHROCYTE [SEDIMENTATION RATE] IN BLOOD BY WESTERGREN METHOD: 5 MM/HOUR (ref 0–25)
GFR SERPLBLD CREATININE-BSD FMLA CKD-EPI: 101 ML/MIN/1.73 M 2
GLUCOSE SERPL-MCNC: 119 MG/DL (ref 65–99)
HCT VFR BLD AUTO: 38.9 % (ref 37–47)
HCT VFR BLD AUTO: 42.8 % (ref 37–47)
HGB BLD-MCNC: 13.9 G/DL (ref 12–16)
HGB BLD-MCNC: 14.8 G/DL (ref 12–16)
LIPASE SERPL-CCNC: 103 U/L (ref 11–82)
MCH RBC QN AUTO: 32 PG (ref 27–33)
MCH RBC QN AUTO: 32.6 PG (ref 27–33)
MCHC RBC AUTO-ENTMCNC: 34.6 G/DL (ref 32.2–35.5)
MCHC RBC AUTO-ENTMCNC: 35.7 G/DL (ref 32.2–35.5)
MCV RBC AUTO: 91.1 FL (ref 81.4–97.8)
MCV RBC AUTO: 92.6 FL (ref 81.4–97.8)
PLATELET # BLD AUTO: 66 K/UL (ref 164–446)
PLATELET # BLD AUTO: 79 K/UL (ref 164–446)
PLATELETS.RETICULATED NFR BLD AUTO: 10.9 % (ref 0.6–13.1)
PLATELETS.RETICULATED NFR BLD AUTO: 9.1 % (ref 0.6–13.1)
PMV BLD AUTO: 12.3 FL (ref 9–12.9)
PMV BLD AUTO: 12.6 FL (ref 9–12.9)
POTASSIUM SERPL-SCNC: 3.5 MMOL/L (ref 3.6–5.5)
PROCALCITONIN SERPL-MCNC: 0.19 NG/ML
RBC # BLD AUTO: 4.27 M/UL (ref 4.2–5.4)
RBC # BLD AUTO: 4.62 M/UL (ref 4.2–5.4)
SODIUM SERPL-SCNC: 136 MMOL/L (ref 135–145)
WBC # BLD AUTO: 11.6 K/UL (ref 4.8–10.8)
WBC # BLD AUTO: 5.8 K/UL (ref 4.8–10.8)

## 2024-01-27 PROCEDURE — 84145 PROCALCITONIN (PCT): CPT

## 2024-01-27 PROCEDURE — A9270 NON-COVERED ITEM OR SERVICE: HCPCS

## 2024-01-27 PROCEDURE — C9113 INJ PANTOPRAZOLE SODIUM, VIA: HCPCS

## 2024-01-27 PROCEDURE — 86140 C-REACTIVE PROTEIN: CPT

## 2024-01-27 PROCEDURE — 85027 COMPLETE CBC AUTOMATED: CPT | Mod: 91

## 2024-01-27 PROCEDURE — 770004 HCHG ROOM/CARE - ONCOLOGY PRIVATE *

## 2024-01-27 PROCEDURE — 700102 HCHG RX REV CODE 250 W/ 637 OVERRIDE(OP)

## 2024-01-27 PROCEDURE — 85652 RBC SED RATE AUTOMATED: CPT | Mod: 91

## 2024-01-27 PROCEDURE — 83690 ASSAY OF LIPASE: CPT

## 2024-01-27 PROCEDURE — 700111 HCHG RX REV CODE 636 W/ 250 OVERRIDE (IP)

## 2024-01-27 PROCEDURE — 80048 BASIC METABOLIC PNL TOTAL CA: CPT

## 2024-01-27 PROCEDURE — 85055 RETICULATED PLATELET ASSAY: CPT | Mod: 91

## 2024-01-27 RX ORDER — POTASSIUM CHLORIDE 20 MEQ/1
20 TABLET, EXTENDED RELEASE ORAL DAILY
Status: DISCONTINUED | OUTPATIENT
Start: 2024-01-27 | End: 2024-01-28 | Stop reason: HOSPADM

## 2024-01-27 RX ORDER — HYDROMORPHONE HYDROCHLORIDE 1 MG/ML
1 INJECTION, SOLUTION INTRAMUSCULAR; INTRAVENOUS; SUBCUTANEOUS EVERY 4 HOURS PRN
Status: DISCONTINUED | OUTPATIENT
Start: 2024-01-27 | End: 2024-01-28 | Stop reason: HOSPADM

## 2024-01-27 RX ADMIN — HYDROMORPHONE HYDROCHLORIDE 0.5 MG: 1 INJECTION, SOLUTION INTRAMUSCULAR; INTRAVENOUS; SUBCUTANEOUS at 06:30

## 2024-01-27 RX ADMIN — HYDROMORPHONE HYDROCHLORIDE 1 MG: 1 INJECTION, SOLUTION INTRAMUSCULAR; INTRAVENOUS; SUBCUTANEOUS at 15:20

## 2024-01-27 RX ADMIN — POTASSIUM CHLORIDE 20 MEQ: 1500 TABLET, EXTENDED RELEASE ORAL at 11:04

## 2024-01-27 RX ADMIN — ALPRAZOLAM 0.5 MG: 0.5 TABLET ORAL at 20:12

## 2024-01-27 RX ADMIN — ONDANSETRON 4 MG: 4 TABLET, ORALLY DISINTEGRATING ORAL at 20:12

## 2024-01-27 RX ADMIN — ONDANSETRON 4 MG: 4 TABLET, ORALLY DISINTEGRATING ORAL at 11:02

## 2024-01-27 RX ADMIN — HYDROMORPHONE HYDROCHLORIDE 1 MG: 1 INJECTION, SOLUTION INTRAMUSCULAR; INTRAVENOUS; SUBCUTANEOUS at 20:12

## 2024-01-27 RX ADMIN — ALPRAZOLAM 0.5 MG: 0.5 TABLET ORAL at 02:10

## 2024-01-27 RX ADMIN — PANTOPRAZOLE SODIUM 40 MG: 40 INJECTION, POWDER, FOR SOLUTION INTRAVENOUS at 06:29

## 2024-01-27 RX ADMIN — LORATADINE 10 MG: 10 TABLET ORAL at 06:30

## 2024-01-27 RX ADMIN — HYDROMORPHONE HYDROCHLORIDE 0.5 MG: 1 INJECTION, SOLUTION INTRAMUSCULAR; INTRAVENOUS; SUBCUTANEOUS at 02:10

## 2024-01-27 RX ADMIN — ACETAMINOPHEN 650 MG: 325 TABLET, FILM COATED ORAL at 06:30

## 2024-01-27 RX ADMIN — HYDROMORPHONE HYDROCHLORIDE 0.5 MG: 1 INJECTION, SOLUTION INTRAMUSCULAR; INTRAVENOUS; SUBCUTANEOUS at 11:02

## 2024-01-27 RX ADMIN — ONDANSETRON 4 MG: 4 TABLET, ORALLY DISINTEGRATING ORAL at 02:10

## 2024-01-27 RX ADMIN — ALPRAZOLAM 0.5 MG: 0.5 TABLET ORAL at 12:39

## 2024-01-27 RX ADMIN — ANTACID TABLETS 500 MG: 500 TABLET, CHEWABLE ORAL at 06:30

## 2024-01-27 ASSESSMENT — PAIN DESCRIPTION - PAIN TYPE
TYPE: ACUTE PAIN

## 2024-01-27 ASSESSMENT — COGNITIVE AND FUNCTIONAL STATUS - GENERAL
MOBILITY SCORE: 24
SUGGESTED CMS G CODE MODIFIER DAILY ACTIVITY: CH
DAILY ACTIVITIY SCORE: 24
SUGGESTED CMS G CODE MODIFIER MOBILITY: CH

## 2024-01-27 NOTE — ASSESSMENT & PLAN NOTE
New problem as of 1/27. 3.7 today. Repleted prior to discharge. PCP to repeat CBC, BMP and Mg outpatient. Replete as indicated.

## 2024-01-27 NOTE — ASSESSMENT & PLAN NOTE
Family history of gene for colon polyps. Patient unable to identify if blood is in urine alone or also in her stools.     Outpatient PCP to follow up on the following:   -FOBT positive while inpatient. Patient denies any visible blood in stools. Hgb stable while inpatient. Consider further GI workup if indicated.

## 2024-01-27 NOTE — CARE PLAN
The patient is Watcher - Medium risk of patient condition declining or worsening    Shift Goals  Clinical Goals: monitor bleeding, pain manage  Patient Goals: comfort  Family Goals: NA    Progress made toward(s) clinical / shift goals:    Problem: Pain - Standard  Goal: Alleviation of pain or a reduction in pain to the patient’s comfort goal  Outcome: Progressing     Problem: Knowledge Deficit - Standard  Goal: Patient and family/care givers will demonstrate understanding of plan of care, disease process/condition, diagnostic tests and medications  Outcome: Progressing     Problem: Depression  Goal: Patient and family/caregiver will verbalize accurate information about at least two of the possible causes of depression, three-four of the signs and symptoms of depression  Outcome: Progressing       Patient is not progressing towards the following goals:

## 2024-01-27 NOTE — ASSESSMENT & PLAN NOTE
#hip and back pain  Patient describes generalized abdominal pain, localized flank, back and hip pain. In ED, CT showed: nonspecific mild fat stranding in upper abdomen below gastric antrum and anterior to pancreas. Pancreas itself is unremarkable.  Ddx per impression includes: gastritis vs mild pancreatitis. Can also be a consequence to recent chemo induction therapy.   Lipases 102, 103 and 36 today. WBCs 2.8.     Plan:  - Patient's pain likely due to recent chemo and Udenyca shot received. Discussed close follow up outpatient if these symptoms persist  - Tylenol 650mg q4hrs PRN mild pain. Dilaudid PRN for severe pain  - Encourage oral hydration

## 2024-01-27 NOTE — PROGRESS NOTES
[facility] MEDICINE PROGRESS NOTE     Attending: MD Roque     Resident: MD Pee PhD    PATIENT: Fela Tiwari; [id number]; 1985    ID:  Fela Tiwari is a 38 y.o. female admitted for 2-day history of vaginal bleeding following first round of chemotherapy for newly diagnosed HER2/gibran positive breast cancer.     SUBJECTIVE: Over might stool occult blood +.  Hb stable - 15.2->14.8.  H/H put on Q12 today.  Ab pain still present - GI cocktail on.  This am symptoms are improved.     OBJECTIVE:     Vitals:    01/26/24 1950 01/27/24 0446 01/27/24 0619 01/27/24 0728   BP: 123/68 95/54 104/62 105/66   Pulse: 93 72  68   Resp: 17 16  15   Temp: 36.8 °C (98.2 °F) 36.7 °C (98 °F)  36.6 °C (97.8 °F)   TempSrc: Oral Temporal  Temporal   SpO2: 96% 93%  96%   Weight:       Height:           Intake/Output Summary (Last 24 hours) at 1/27/2024 0903  Last data filed at 1/27/2024 0600  Gross per 24 hour   Intake --   Output 950 ml   Net -950 ml       PE:   General: No acute distress, resting comfortably in bed.  HEENT: NC/AT.  Cardiovascular: Normal S1/S2, RRR, no m/r/g  Respiratory: Symmetric inspiratory effort. CTAB with no adventitious sounds  Abdomen: Mild tenderness to palpation.   EXT:  RASCON,  no rashes, bruising, or bleeding.  Neuro: Non focal with no numbness, tingling or changes in sensation    LABS:  Recent Labs     01/26/24 0452 01/27/24  0008   WBC 24.2* 11.6*   RBC 4.72 4.62   HEMOGLOBIN 15.2 14.8   HEMATOCRIT 43.6 42.8   MCV 92.4 92.6   MCH 32.2 32.0   RDW 41.4 41.9   PLATELETCT 103* 79*   MPV 12.2 12.3   NEUTSPOLYS 86.20*  --    LYMPHOCYTES 3.50*  --    MONOCYTES 0.00  --    EOSINOPHILS 0.00  --    BASOPHILS 0.00  --    RBCMORPHOLO Normal  --      Recent Labs     01/26/24 0452 01/27/24  0008   SODIUM 139 136   POTASSIUM 3.8 3.5*   CHLORIDE 103 101   CO2 27 25   BUN 25* 16   CREATININE 0.87 0.76   CALCIUM 7.9* 7.7*   ALBUMIN 3.8  --      Estimated GFR/CRCL = Estimated Creatinine Clearance: 116.8 mL/min (by C-G  formula based on SCr of 0.76 mg/dL).  Recent Labs     01/26/24  0452 01/27/24  0008   GLUCOSE 106* 119*     Recent Labs     01/26/24  0452   ASTSGOT 14   ALTSGPT 23   TBILIRUBIN 0.9   ALKPHOSPHAT 55   GLOBULIN 2.1   INR 1.09             Recent Labs     01/26/24  0452   INR 1.09           IMAGING:   CT-LSPINE W/O PLUS RECONS   Final Result      Mild spondylosis without acute abnormality.      CT-ABDOMEN-PELVIS WITH   Final Result      1.  Mild inflammatory changes in the upper abdomen adjacent to the gastric antrum and of the pancreatic neck. Differential considerations would include gastritis or mild pancreatitis.   2.  No free air. Trace free fluid in the pelvis.   3.  Stable postsurgical changes.          MEDS:  Current Facility-Administered Medications   Medication Last Admin    senna-docusate (Pericolace Or Senokot S) 8.6-50 MG per tablet 2 Tablet      And    polyethylene glycol/lytes (Miralax) Packet 1 Packet      And    magnesium hydroxide (Milk Of Magnesia) suspension 30 mL      And    bisacodyl (Dulcolax) suppository 10 mg      lactated ringers infusion New Bag at 01/26/24 1406    ondansetron (Zofran ODT) dispertab 4 mg 4 mg at 01/27/24 0210    ALPRAZolam (Xanax) tablet 0.5 mg 0.5 mg at 01/27/24 0210    loratadine (Claritin) tablet 10 mg 10 mg at 01/27/24 0630    pantoprazole (Protonix) injection 40 mg 40 mg at 01/27/24 0629    calcium carbonate (Tums) chewable tab 500 mg 500 mg at 01/27/24 0630    HYDROmorphone (Dilaudid) injection 0.5 mg 0.5 mg at 01/27/24 0630    acetaminophen (Tylenol) tablet 650 mg 650 mg at 01/27/24 0630    lidocaine-prilocaine (Emla) 2.5-2.5 % cream 1 g at 01/26/24 2239    GI Cocktail (hyoscyamine-lidocaine-Maalox) oral susp cup 15 mL 15 mL at 01/26/24 2330          ASSESSMENT/PLAN: Fela Tiwari is a 38 y.o. female with:    Hormone receptor positive breast cancer (HCC)  Assessment & Plan  HER2/Jeanette positive breast cancer diagnosis in 2023. First round of chemotherapy on 1/24/2024.   Patient received Udenyca injection on same day; for infectious risk reduction during chemotherapy.  Her first cycle of chemotherapy included: Prednisone, epratuzumab, trastuzumab, docetaxel and carboplatin.  Followed by Dr. Thomas, Oncologist.    Generalized abdominal discomfort  Assessment & Plan  #hip and back pain  Patient describes generalized abdominal pain, localized flank, back and hip pain. In ED, CT showed: nonspecific mild fat stranding in upper abdomen below gastric antrum and anterior to pancreas. Pancreas itself is unremarkable.  Ddx per impression includes: gastritis vs mild pancreatitis. Can also be a consequence to recent chemo induction therapy.   Lipases 102, WBCs 24.2    Plan:  - will trend lipases with AM labs  - continue LR @ 100 ml/hr and consider discontinuation if lipases wnl  - will discuss chemotherapy regimen with oncology pharmacist and oncologist to ensure they are not the cause of her symptoms.  - Tylenol 650mg q4hrs PRN mild pain and Dilaudid 0.5mg q4 hrs PRN moderate pain  - Encourage oral hydration    * Hematuria of unknown etiology  Assessment & Plan  2 day history of gross hematuria following chemo induction therapy on 1/24/2024.  Despite complaints of vaginal bleeding - Vaginal exam showed: no blood in vaginal vault or at cervical os. No evidence of active bleeding.  Patient on chemotherapy agents that can cause nephrotoxicity, however, less likely to cause gross hematuria.     Plan:  - Urology consult in place; Dr. Vaughn to see patient in AM  - Hgb stable.  CBC with AM labs.        Hypokalemia  Assessment & Plan  New problem as of 1/27  - Will continue to monitor and replete     Nausea  Assessment & Plan  Acute since chemo induction.     Plan:  - Zofran 4mg q 4hrs PRN    Colon polyps  Assessment & Plan  Family history of gene for colon polyps. Patient unable to identify if blood is in urine alone or also in her stools.     Plan:  -FOBT pos, monitoring  H/H      Hypocalcemia  Assessment & Plan  Corrected Ca 8.1    Plan:  -monitor for any signs of hypocalcemia  -BMP in AM  -TUMS given     Anxiety associated with cancer diagnosis (HCC)  Assessment & Plan  - continue home Ativan         Core Measures:  Fluids: LR @ 100 ml/hr  Lines: PIV  Abx: None  Diet: Regular  PPX: SCDs/TEDs, held in the setting of bleeding  CODE Status: Full Code    This Patient is a Continuity patient - [facility] will continue to follow throughout hospitalization      CODE STATUS: Full  Irma Glasgow MD PhD  PGY3  [facility]

## 2024-01-27 NOTE — CARE PLAN
Pt AO x4.  Pt c/o chronic pain due to chemo, medication intervention per MAR.  Call light and belongings within reach.  Bed locked and in lowest position.  Hourly rounding.  Needs met at this time.           The patient is Stable - Low risk of patient condition declining or worsening    Shift Goals  Clinical Goals: pt pain will be controlled during shift.  Patient Goals: be comfortable  Family Goals: DANYELLE    Progress made toward(s) clinical / shift goals:    Pt pain was controlled after appropriate medication intervention was initiated.       Problem: Knowledge Deficit - Standard  Goal: Patient and family/care givers will demonstrate understanding of plan of care, disease process/condition, diagnostic tests and medications  Outcome: Progressing     Problem: Pain - Standard  Goal: Alleviation of pain or a reduction in pain to the patient’s comfort goal  Outcome: Progressing     Problem: Depression  Goal: Patient and family/caregiver will verbalize accurate information about at least two of the possible causes of depression, three-four of the signs and symptoms of depression  Outcome: Progressing

## 2024-01-27 NOTE — ASSESSMENT & PLAN NOTE
Corrected Ca 8.1. Continued to monitor Ca with daily labs. Repleted at indicated. No new concerns or changes to report while inpatient.  PCP to repeat CBC, BMP, Mg.

## 2024-01-27 NOTE — CARE PLAN
Problem: Pain - Standard  Goal: Alleviation of pain or a reduction in pain to the patient’s comfort goal  Outcome: Progressing     Problem: Knowledge Deficit - Standard  Goal: Patient and family/care givers will demonstrate understanding of plan of care, disease process/condition, diagnostic tests and medications  Outcome: Progressing     Problem: Depression  Goal: Patient and family/caregiver will verbalize accurate information about at least two of the possible causes of depression, three-four of the signs and symptoms of depression  Outcome: Progressing   The patient is Stable - Low risk of patient condition declining or worsening    Shift Goals  Clinical Goals: pain management  Patient Goals: comfort  Family Goals: NA    Progress made toward(s) clinical / shift goals:      Patient is not progressing towards the following goals:    Pt AOx4. Dilaudid 0.5 mg given for pain; per pain medication doesn't last long; MD notified, dose increased to 1 mg. Pt had throat pain last night; MD notified, Halls ordered. Xanax given for anxiety and Zofran for pain. Port patent with positive blood return. Pt up self. Pt is till having hematuria.

## 2024-01-27 NOTE — ASSESSMENT & PLAN NOTE
HER2/Jeanette positive breast cancer diagnosis in 2023. First round of chemotherapy on 1/24/2024.  Patient received Udenyca injection on same day; for infectious risk reduction during chemotherapy.  Her first cycle of chemotherapy included: Prednisone, epratuzumab, trastuzumab, docetaxel and carboplatin.  Followed by Dr. Thomas, Oncologist outpatient.    Outpatient Oncology to follow up on the following:   - please review images and history from this hospitalization.

## 2024-01-27 NOTE — PROGRESS NOTES
Provider notified of positive occult blood stool and patient interest in port access. Patient vitals signs stable. New orders received.

## 2024-01-27 NOTE — ASSESSMENT & PLAN NOTE
Acute since chemo induction. Zofran was continued while inpatient. No new concerns or changes to make at this time.

## 2024-01-27 NOTE — ASSESSMENT & PLAN NOTE
On admission: patient complained of a 2 day history of gross hematuria following chemo induction therapy on 1/24/2024.  Despite complaints of vaginal bleeding - Vaginal exam was done in ED and showed: no blood in vaginal vault or at cervical os. No evidence of active bleeding.  Patient on chemotherapy agents that can cause nephrotoxicity, however, less likely to cause gross hematuria.     Outpatient Urology to follow up on the following:  - Urology consulted; Dr. Vaughn recommended outpatient follow up for cystoscopy and further work up of hemturia in outpatient setting.  - Hgb stable while inpatient.    *no other concerns encountered while inpatient.

## 2024-01-28 VITALS
BODY MASS INDEX: 35.11 KG/M2 | TEMPERATURE: 98.8 F | DIASTOLIC BLOOD PRESSURE: 69 MMHG | OXYGEN SATURATION: 99 % | WEIGHT: 218.48 LBS | HEIGHT: 66 IN | SYSTOLIC BLOOD PRESSURE: 101 MMHG | HEART RATE: 100 BPM | RESPIRATION RATE: 16 BRPM

## 2024-01-28 PROBLEM — J02.9 SORE THROAT: Status: ACTIVE | Noted: 2024-01-28

## 2024-01-28 PROBLEM — M89.8X9 BONE PAIN: Status: ACTIVE | Noted: 2024-01-28

## 2024-01-28 PROBLEM — R10.84 GENERALIZED ABDOMINAL DISCOMFORT: Status: RESOLVED | Noted: 2024-01-26 | Resolved: 2024-01-28

## 2024-01-28 LAB
ALBUMIN SERPL BCP-MCNC: 2.9 G/DL (ref 3.2–4.9)
ALBUMIN/GLOB SERPL: 1.1 G/DL
ALP SERPL-CCNC: 62 U/L (ref 30–99)
ALT SERPL-CCNC: 18 U/L (ref 2–50)
ANION GAP SERPL CALC-SCNC: 14 MMOL/L (ref 7–16)
AST SERPL-CCNC: 20 U/L (ref 12–45)
BACTERIA UR CULT: NORMAL
BASOPHILS # BLD AUTO: 0.8 % (ref 0–1.8)
BASOPHILS # BLD: 0.02 K/UL (ref 0–0.12)
BILIRUB SERPL-MCNC: 1.1 MG/DL (ref 0.1–1.5)
BUN SERPL-MCNC: 8 MG/DL (ref 8–22)
CALCIUM ALBUM COR SERPL-MCNC: 8.6 MG/DL (ref 8.5–10.5)
CALCIUM SERPL-MCNC: 7.7 MG/DL (ref 8.5–10.5)
CHLORIDE SERPL-SCNC: 100 MMOL/L (ref 96–112)
CO2 SERPL-SCNC: 23 MMOL/L (ref 20–33)
CREAT SERPL-MCNC: 0.64 MG/DL (ref 0.5–1.4)
EOSINOPHIL # BLD AUTO: 0.1 K/UL (ref 0–0.51)
EOSINOPHIL NFR BLD: 3.5 % (ref 0–6.9)
ERYTHROCYTE [DISTWIDTH] IN BLOOD BY AUTOMATED COUNT: 40.4 FL (ref 35.9–50)
FLUAV RNA SPEC QL NAA+PROBE: NEGATIVE
FLUBV RNA SPEC QL NAA+PROBE: NEGATIVE
GFR SERPLBLD CREATININE-BSD FMLA CKD-EPI: 114 ML/MIN/1.73 M 2
GLOBULIN SER CALC-MCNC: 2.6 G/DL (ref 1.9–3.5)
GLUCOSE SERPL-MCNC: 143 MG/DL (ref 65–99)
HCT VFR BLD AUTO: 42.5 % (ref 37–47)
HGB BLD-MCNC: 14.7 G/DL (ref 12–16)
LIPASE SERPL-CCNC: 36 U/L (ref 11–82)
LYMPHOCYTES # BLD AUTO: 1.05 K/UL (ref 1–4.8)
LYMPHOCYTES NFR BLD: 37.4 % (ref 22–41)
MANUAL DIFF BLD: NORMAL
MCH RBC QN AUTO: 31.8 PG (ref 27–33)
MCHC RBC AUTO-ENTMCNC: 34.6 G/DL (ref 32.2–35.5)
MCV RBC AUTO: 92 FL (ref 81.4–97.8)
METAMYELOCYTES NFR BLD MANUAL: 1.7 %
MONOCYTES # BLD AUTO: 0.03 K/UL (ref 0–0.85)
MONOCYTES NFR BLD AUTO: 0.9 % (ref 0–13.4)
MORPHOLOGY BLD-IMP: NORMAL
MYELOCYTES NFR BLD MANUAL: 0.9 %
NEUTROPHILS # BLD AUTO: 1.53 K/UL (ref 1.82–7.42)
NEUTROPHILS NFR BLD: 54.8 % (ref 44–72)
NRBC # BLD AUTO: 0 K/UL
NRBC BLD-RTO: 0 /100 WBC (ref 0–0.2)
PLATELET # BLD AUTO: 66 K/UL (ref 164–446)
PLATELET BLD QL SMEAR: NORMAL
PLATELETS.RETICULATED NFR BLD AUTO: 9.2 % (ref 0.6–13.1)
PMV BLD AUTO: 12.4 FL (ref 9–12.9)
POTASSIUM SERPL-SCNC: 3.7 MMOL/L (ref 3.6–5.5)
PROT SERPL-MCNC: 5.5 G/DL (ref 6–8.2)
RBC # BLD AUTO: 4.62 M/UL (ref 4.2–5.4)
RBC BLD AUTO: PRESENT
RSV RNA SPEC QL NAA+PROBE: NEGATIVE
S PYO DNA SPEC NAA+PROBE: NOT DETECTED
SARS-COV-2 RNA RESP QL NAA+PROBE: NOTDETECTED
SIGNIFICANT IND 70042: NORMAL
SITE SITE: NORMAL
SODIUM SERPL-SCNC: 137 MMOL/L (ref 135–145)
SOURCE SOURCE: NORMAL
SPECIMEN SOURCE: NORMAL
WBC # BLD AUTO: 2.8 K/UL (ref 4.8–10.8)
WBC TOXIC VACUOLES BLD QL SMEAR: NORMAL

## 2024-01-28 PROCEDURE — 85027 COMPLETE CBC AUTOMATED: CPT

## 2024-01-28 PROCEDURE — 700102 HCHG RX REV CODE 250 W/ 637 OVERRIDE(OP)

## 2024-01-28 PROCEDURE — A9270 NON-COVERED ITEM OR SERVICE: HCPCS

## 2024-01-28 PROCEDURE — 0241U HCHG SARS-COV-2 COVID-19 NFCT DS RESP RNA 4 TRGT MIC: CPT

## 2024-01-28 PROCEDURE — 85007 BL SMEAR W/DIFF WBC COUNT: CPT

## 2024-01-28 PROCEDURE — 85055 RETICULATED PLATELET ASSAY: CPT

## 2024-01-28 PROCEDURE — 700111 HCHG RX REV CODE 636 W/ 250 OVERRIDE (IP)

## 2024-01-28 PROCEDURE — 80053 COMPREHEN METABOLIC PANEL: CPT

## 2024-01-28 PROCEDURE — 700111 HCHG RX REV CODE 636 W/ 250 OVERRIDE (IP): Performed by: FAMILY MEDICINE

## 2024-01-28 PROCEDURE — 99238 HOSP IP/OBS DSCHRG MGMT 30/<: CPT | Mod: GC | Performed by: FAMILY MEDICINE

## 2024-01-28 PROCEDURE — 83690 ASSAY OF LIPASE: CPT

## 2024-01-28 PROCEDURE — 87651 STREP A DNA AMP PROBE: CPT

## 2024-01-28 PROCEDURE — 700105 HCHG RX REV CODE 258

## 2024-01-28 PROCEDURE — C9113 INJ PANTOPRAZOLE SODIUM, VIA: HCPCS

## 2024-01-28 RX ORDER — SENNOSIDES 8.6 MG
650 CAPSULE ORAL EVERY 6 HOURS PRN
Qty: 100 TABLET | Refills: 0 | Status: SHIPPED | OUTPATIENT
Start: 2024-01-28 | End: 2024-02-15

## 2024-01-28 RX ADMIN — HYDROMORPHONE HYDROCHLORIDE 1 MG: 1 INJECTION, SOLUTION INTRAMUSCULAR; INTRAVENOUS; SUBCUTANEOUS at 00:24

## 2024-01-28 RX ADMIN — HYDROMORPHONE HYDROCHLORIDE 1 MG: 1 INJECTION, SOLUTION INTRAMUSCULAR; INTRAVENOUS; SUBCUTANEOUS at 08:33

## 2024-01-28 RX ADMIN — LORATADINE 10 MG: 10 TABLET ORAL at 04:46

## 2024-01-28 RX ADMIN — HYDROMORPHONE HYDROCHLORIDE 1 MG: 1 INJECTION, SOLUTION INTRAMUSCULAR; INTRAVENOUS; SUBCUTANEOUS at 16:39

## 2024-01-28 RX ADMIN — SODIUM CHLORIDE, POTASSIUM CHLORIDE, SODIUM LACTATE AND CALCIUM CHLORIDE: 600; 310; 30; 20 INJECTION, SOLUTION INTRAVENOUS at 04:46

## 2024-01-28 RX ADMIN — HYDROMORPHONE HYDROCHLORIDE 1 MG: 1 INJECTION, SOLUTION INTRAMUSCULAR; INTRAVENOUS; SUBCUTANEOUS at 12:52

## 2024-01-28 RX ADMIN — HYDROMORPHONE HYDROCHLORIDE 1 MG: 1 INJECTION, SOLUTION INTRAMUSCULAR; INTRAVENOUS; SUBCUTANEOUS at 04:46

## 2024-01-28 RX ADMIN — HEPARIN 300 UNITS: 100 SYRINGE at 16:44

## 2024-01-28 RX ADMIN — ALPRAZOLAM 0.5 MG: 0.5 TABLET ORAL at 08:33

## 2024-01-28 RX ADMIN — POTASSIUM CHLORIDE 20 MEQ: 1500 TABLET, EXTENDED RELEASE ORAL at 04:46

## 2024-01-28 RX ADMIN — PANTOPRAZOLE SODIUM 40 MG: 40 INJECTION, POWDER, FOR SOLUTION INTRAVENOUS at 04:46

## 2024-01-28 RX ADMIN — ANTACID TABLETS 500 MG: 500 TABLET, CHEWABLE ORAL at 04:46

## 2024-01-28 RX ADMIN — ACETAMINOPHEN 650 MG: 325 TABLET, FILM COATED ORAL at 00:28

## 2024-01-28 ASSESSMENT — PAIN DESCRIPTION - PAIN TYPE
TYPE: ACUTE PAIN

## 2024-01-28 NOTE — ASSESSMENT & PLAN NOTE
New complaint today. Likely due to immunocompromised status + hospitalization. step test and viral panel tests completed while inpatient, all negative.    Outpatient PCP to follow up on the following:   - Remind patient of immunocompromised state during chemotherapy treatments. Consider continuing mask while out of home and minimizing exposure with sick contacts.

## 2024-01-28 NOTE — DISCHARGE SUMMARY
[facility] DISCHARGE SUMMARY     Attending:   Frieda Nevarez MD     Resident:   Shelly Maxwell MD (PGY2)    PATIENT: Fela Tiwari; [id number]; 1985    ID: Fela Tiwari is a 38 y.o. female admitted for 2-day history of vaginal bleeding following first round of chemotherapy for newly diagnosed HER2/gibran positive breast cancer.      24 Hour Events: No acute events overnight. Vitally stable and afebrile. Patient had new complaints of sore throat this morning. Strep test and viral panel was ordered and all negative. No other complaints today. Patient was agreeable to discharge today with close followup outpatient with Urology, Oncology team and her Primary care provider.    OBJECTIVE:  Vitals:    01/27/24 1929 01/28/24 0220 01/28/24 0439 01/28/24 0813   BP: 103/68 105/65 112/64 101/69   Pulse: 78 87 84 100   Resp: 16 15 16 16   Temp: 36.9 °C (98.5 °F) 36.9 °C (98.4 °F) 37.2 °C (98.9 °F) 37.1 °C (98.8 °F)   TempSrc: Temporal Oral Temporal Temporal   SpO2: 94% 97% 96% 99%   Weight:       Height:         Intake/Output Summary (Last 24 hours) at 1/28/2024 0643  Last data filed at 1/27/2024 1900  Gross per 24 hour   Intake --   Output 1300 ml   Net -1300 ml     Physical Exam  HENT:      Head: Normocephalic and atraumatic.      Mouth/Throat:      Mouth: Mucous membranes are moist.   Eyes:      Pupils: Pupils are equal, round, and reactive to light.   Cardiovascular:      Rate and Rhythm: Normal rate and regular rhythm.   Pulmonary:      Effort: Pulmonary effort is normal. No respiratory distress.      Breath sounds: Clear breath sounds bilaterally.  Abdominal:      General: Abdomen is flat. There is no distension.      Palpations: Abdomen is soft.   Musculoskeletal:         General: Normal range of motion.   Skin:     General: Skin is warm and dry.   Neurological:      General: No focal deficit present.      Mental Status: He is alert.   Psychiatric:         Mood and Affect: Mood normal.         Behavior:  Behavior normal.         Judgment: Judgment normal.     LABS:  Recent Labs     01/26/24 0452 01/27/24  0008 01/27/24  0854 01/28/24  0104   WBC 24.2* 11.6* 5.8 2.8*   RBC 4.72 4.62 4.27 4.62   HEMOGLOBIN 15.2 14.8 13.9 14.7   HEMATOCRIT 43.6 42.8 38.9 42.5   MCV 92.4 92.6 91.1 92.0   MCH 32.2 32.0 32.6 31.8   RDW 41.4 41.9 40.0 40.4   PLATELETCT 103* 79* 66* 66*   MPV 12.2 12.3 12.6 12.4   NEUTSPOLYS 86.20*  --   --  54.80   LYMPHOCYTES 3.50*  --   --  37.40   MONOCYTES 0.00  --   --  0.90   EOSINOPHILS 0.00  --   --  3.50   BASOPHILS 0.00  --   --  0.80   RBCMORPHOLO Normal  --   --  Present     Recent Labs     01/26/24 0452 01/27/24  0008 01/28/24  0104   SODIUM 139 136 137   POTASSIUM 3.8 3.5* 3.7   CHLORIDE 103 101 100   CO2 27 25 23   BUN 25* 16 8   CREATININE 0.87 0.76 0.64   CALCIUM 7.9* 7.7* 7.7*   ALBUMIN 3.8  --  2.9*     Estimated GFR/CRCL = Estimated Creatinine Clearance: 138.7 mL/min (by C-G formula based on SCr of 0.64 mg/dL).  Recent Labs     01/26/24 0452 01/27/24  0008 01/28/24  0104   GLUCOSE 106* 119* 143*     Recent Labs     01/26/24 0452 01/28/24  0104   ASTSGOT 14 20   ALTSGPT 23 18   TBILIRUBIN 0.9 1.1   ALKPHOSPHAT 55 62   GLOBULIN 2.1 2.6   INR 1.09  --              Recent Labs     01/26/24 0452   INR 1.09     MICROBIOLOGY:   Results for orders placed or performed during the hospital encounter of 01/26/24   CBC with Differential   Result Value Ref Range    WBC 24.2 (H) 4.8 - 10.8 K/uL    RBC 4.72 4.20 - 5.40 M/uL    Hemoglobin 15.2 12.0 - 16.0 g/dL    Hematocrit 43.6 37.0 - 47.0 %    MCV 92.4 81.4 - 97.8 fL    MCH 32.2 27.0 - 33.0 pg    MCHC 34.9 32.2 - 35.5 g/dL    RDW 41.4 35.9 - 50.0 fL    Platelet Count 103 (L) 164 - 446 K/uL    MPV 12.2 9.0 - 12.9 fL    Neutrophils-Polys 86.20 (H) 44.00 - 72.00 %    Lymphocytes 3.50 (L) 22.00 - 41.00 %    Monocytes 0.00 0.00 - 13.40 %    Eosinophils 0.00 0.00 - 6.90 %    Basophils 0.00 0.00 - 1.80 %    Nucleated RBC 0.00 0.00 - 0.20 /100 WBC     Neutrophils (Absolute) 20.86 (H) 1.82 - 7.42 K/uL    Lymphs (Absolute) 0.85 (L) 1.00 - 4.80 K/uL    Monos (Absolute) 0.00 0.00 - 0.85 K/uL    Eos (Absolute) 0.00 0.00 - 0.51 K/uL    Baso (Absolute) 0.00 0.00 - 0.12 K/uL    NRBC (Absolute) 0.00 K/uL   Complete Metabolic Panel   Result Value Ref Range    Sodium 139 135 - 145 mmol/L    Potassium 3.8 3.6 - 5.5 mmol/L    Chloride 103 96 - 112 mmol/L    Co2 27 20 - 33 mmol/L    Anion Gap 9.0 7.0 - 16.0    Glucose 106 (H) 65 - 99 mg/dL    Bun 25 (H) 8 - 22 mg/dL    Creatinine 0.87 0.50 - 1.40 mg/dL    Calcium 7.9 (L) 8.5 - 10.5 mg/dL    Correct Calcium 8.1 (L) 8.5 - 10.5 mg/dL    AST(SGOT) 14 12 - 45 U/L    ALT(SGPT) 23 2 - 50 U/L    Alkaline Phosphatase 55 30 - 99 U/L    Total Bilirubin 0.9 0.1 - 1.5 mg/dL    Albumin 3.8 3.2 - 4.9 g/dL    Total Protein 5.9 (L) 6.0 - 8.2 g/dL    Globulin 2.1 1.9 - 3.5 g/dL    A-G Ratio 1.8 g/dL   Lipase   Result Value Ref Range    Lipase 102 (H) 11 - 82 U/L   Urinalysis    Specimen: Urine   Result Value Ref Range    Color Red (A)     Character Cloudy (A)     Specific Gravity 1.025 <1.035    Ph 6.5 5.0 - 8.0    Glucose Negative Negative mg/dL    Ketones Negative Negative mg/dL    Protein 100 (A) Negative mg/dL    Bilirubin Negative Negative    Urobilinogen, Urine 0.2 Negative    Nitrite Negative Negative    Leukocyte Esterase Negative Negative    Occult Blood Large (A) Negative    Micro Urine Req Microscopic    ESTIMATED GFR   Result Value Ref Range    GFR (CKD-EPI) 86 >60 mL/min/1.73 m 2   PT/INR   Result Value Ref Range    PT 14.3 12.0 - 14.6 sec    INR 1.09 0.87 - 1.13   DIFFERENTIAL MANUAL   Result Value Ref Range    Metamyelocytes 6.00 %    Myelocytes 4.30 %    Manual Diff Status PERFORMED    PERIPHERAL SMEAR REVIEW   Result Value Ref Range    Peripheral Smear Review see below    PLATELET ESTIMATE   Result Value Ref Range    Plt Estimation Decreased    MORPHOLOGY   Result Value Ref Range    RBC Morphology Normal    URINE MICROSCOPIC  (W/UA)   Result Value Ref Range    WBC 5-10 (A) /hpf    RBC >150 (A) /hpf    Bacteria Negative None /hpf    Epithelial Cells Negative /hpf    Hyaline Cast 0-2 /lpf   OCCULT BLOOD STOOL   Result Value Ref Range    Occult Blood Feces Positive (A) Negative   URINE CULTURE(NEW)    Specimen: Urine, Clean Catch   Result Value Ref Range    Significant Indicator NEG     Source UR     Site URINE, CLEAN CATCH     Culture Result Usual urogenital pelon 10-50,000 cfu/mL    PROCALCITONIN   Result Value Ref Range    Procalcitonin 0.19 <0.25 ng/mL   CRP QUANTITIVE (NON-CARDIAC)   Result Value Ref Range    Stat C-Reactive Protein 0.73 0.00 - 0.75 mg/dL   URINE DRUG SCREEN   Result Value Ref Range    Amphetamines Urine Negative Negative    Barbiturates Negative Negative    Benzodiazepines Negative Negative    Cocaine Metabolite Negative Negative    Fentanyl, Urine Negative Negative    Methadone Negative Negative    Opiates Negative Negative    Oxycodone Negative Negative    Phencyclidine -Pcp Negative Negative    Propoxyphene Negative Negative    Cannabinoid Metab Positive (A) Negative   CBC WITHOUT DIFFERENTIAL   Result Value Ref Range    WBC 11.6 (H) 4.8 - 10.8 K/uL    RBC 4.62 4.20 - 5.40 M/uL    Hemoglobin 14.8 12.0 - 16.0 g/dL    Hematocrit 42.8 37.0 - 47.0 %    MCV 92.6 81.4 - 97.8 fL    MCH 32.0 27.0 - 33.0 pg    MCHC 34.6 32.2 - 35.5 g/dL    RDW 41.9 35.9 - 50.0 fL    Platelet Count 79 (L) 164 - 446 K/uL    MPV 12.3 9.0 - 12.9 fL   Sed Rate   Result Value Ref Range    Sed Rate Westergren 10 0 - 25 mm/hour   IMMATURE PLT FRACTION   Result Value Ref Range    Imm. Plt Fraction 10.9 0.6 - 13.1 %   Basic Metabolic Panel   Result Value Ref Range    Sodium 136 135 - 145 mmol/L    Potassium 3.5 (L) 3.6 - 5.5 mmol/L    Chloride 101 96 - 112 mmol/L    Co2 25 20 - 33 mmol/L    Glucose 119 (H) 65 - 99 mg/dL    Bun 16 8 - 22 mg/dL    Creatinine 0.76 0.50 - 1.40 mg/dL    Calcium 7.7 (L) 8.5 - 10.5 mg/dL    Anion Gap 10.0 7.0 - 16.0    LIPASE   Result Value Ref Range    Lipase 103 (H) 11 - 82 U/L   ESTIMATED GFR   Result Value Ref Range    GFR (CKD-EPI) 101 >60 mL/min/1.73 m 2   CBC WITHOUT DIFFERENTIAL   Result Value Ref Range    WBC 5.8 4.8 - 10.8 K/uL    RBC 4.27 4.20 - 5.40 M/uL    Hemoglobin 13.9 12.0 - 16.0 g/dL    Hematocrit 38.9 37.0 - 47.0 %    MCV 91.1 81.4 - 97.8 fL    MCH 32.6 27.0 - 33.0 pg    MCHC 35.7 (H) 32.2 - 35.5 g/dL    RDW 40.0 35.9 - 50.0 fL    Platelet Count 66 (L) 164 - 446 K/uL    MPV 12.6 9.0 - 12.9 fL   Sed Rate   Result Value Ref Range    Sed Rate Westergren 5 0 - 25 mm/hour   IMMATURE PLT FRACTION   Result Value Ref Range    Imm. Plt Fraction 9.1 0.6 - 13.1 %   CBC WITH DIFFERENTIAL   Result Value Ref Range    WBC 2.8 (L) 4.8 - 10.8 K/uL    RBC 4.62 4.20 - 5.40 M/uL    Hemoglobin 14.7 12.0 - 16.0 g/dL    Hematocrit 42.5 37.0 - 47.0 %    MCV 92.0 81.4 - 97.8 fL    MCH 31.8 27.0 - 33.0 pg    MCHC 34.6 32.2 - 35.5 g/dL    RDW 40.4 35.9 - 50.0 fL    Platelet Count 66 (L) 164 - 446 K/uL    MPV 12.4 9.0 - 12.9 fL    Neutrophils-Polys 54.80 44.00 - 72.00 %    Lymphocytes 37.40 22.00 - 41.00 %    Monocytes 0.90 0.00 - 13.40 %    Eosinophils 3.50 0.00 - 6.90 %    Basophils 0.80 0.00 - 1.80 %    Nucleated RBC 0.00 0.00 - 0.20 /100 WBC    Neutrophils (Absolute) 1.53 (L) 1.82 - 7.42 K/uL    Lymphs (Absolute) 1.05 1.00 - 4.80 K/uL    Monos (Absolute) 0.03 0.00 - 0.85 K/uL    Eos (Absolute) 0.10 0.00 - 0.51 K/uL    Baso (Absolute) 0.02 0.00 - 0.12 K/uL    NRBC (Absolute) 0.00 K/uL   Comp Metabolic Panel   Result Value Ref Range    Sodium 137 135 - 145 mmol/L    Potassium 3.7 3.6 - 5.5 mmol/L    Chloride 100 96 - 112 mmol/L    Co2 23 20 - 33 mmol/L    Anion Gap 14.0 7.0 - 16.0    Glucose 143 (H) 65 - 99 mg/dL    Bun 8 8 - 22 mg/dL    Creatinine 0.64 0.50 - 1.40 mg/dL    Calcium 7.7 (L) 8.5 - 10.5 mg/dL    Correct Calcium 8.6 8.5 - 10.5 mg/dL    AST(SGOT) 20 12 - 45 U/L    ALT(SGPT) 18 2 - 50 U/L    Alkaline Phosphatase 62 30 - 99  U/L    Total Bilirubin 1.1 0.1 - 1.5 mg/dL    Albumin 2.9 (L) 3.2 - 4.9 g/dL    Total Protein 5.5 (L) 6.0 - 8.2 g/dL    Globulin 2.6 1.9 - 3.5 g/dL    A-G Ratio 1.1 g/dL   LIPASE   Result Value Ref Range    Lipase 36 11 - 82 U/L   ESTIMATED GFR   Result Value Ref Range    GFR (CKD-EPI) 114 >60 mL/min/1.73 m 2   DIFFERENTIAL MANUAL   Result Value Ref Range    Metamyelocytes 1.70 %    Myelocytes 0.90 %    Manual Diff Status PERFORMED    PERIPHERAL SMEAR REVIEW   Result Value Ref Range    Peripheral Smear Review see below    PLATELET ESTIMATE   Result Value Ref Range    Plt Estimation Decreased    MORPHOLOGY   Result Value Ref Range    RBC Morphology Present     Toxic Vacuoles Few    IMMATURE PLT FRACTION   Result Value Ref Range    Imm. Plt Fraction 9.2 0.6 - 13.1 %   Group A Strep by PCR    Specimen: Throat   Result Value Ref Range    Group A Strep by PCR Not Detected Not Detected   CoV-2, Flu A/B, And RSV by PCR ([employer])    Specimen: Nasopharyngeal; Respirate   Result Value Ref Range    Influenza virus A RNA Negative Negative    Influenza virus B, PCR Negative Negative    RSV, PCR Negative Negative    SARS-CoV-2 by PCR NotDetected     SARS-CoV-2 Source NP Swab         IMAGING:   CT-LSPINE W/O PLUS RECONS   Final Result      Mild spondylosis without acute abnormality.      CT-ABDOMEN-PELVIS WITH   Final Result      1.  Mild inflammatory changes in the upper abdomen adjacent to the gastric antrum and of the pancreatic neck. Differential considerations would include gastritis or mild pancreatitis.   2.  No free air. Trace free fluid in the pelvis.   3.  Stable postsurgical changes.        DISCHARGE SUMMARY:   Brief HPI:  Fela  is a 38 y.o.  Female  who was admitted on 1/26/2024 and medically managed for the following diagnoses. PCP/Specialities to follow up outpatient as outlined in Hospital Course below:    Bone pain  Assessment & Plan  #Hip and back pain  #Generalized bone pain  Patient describes generalized bone  pain, predominantly in bilateral flanks, back and hip. CT was performed while inpatient as patient complained of generalized abdominal pain, which showed: nonspecific mild fat stranding in upper abdomen below gastric antrum and anterior to pancreas. Pancreas itself is unremarkable. Ddx per impression includes: gastritis vs mild pancreatitis. Can also be a consequence to recent chemo induction therapy, however, lipases were trended and as follows: Lipases 102, 103 and 36 on day of discharge.     Outpatient Oncology/PCP to follow up on the following:  - Patient's pain likely due to recent chemo and Udenyca shot received. Discussed close follow up outpatient if these symptoms persist to consider alternative or adjunct medications.  - Tylenol 650mg q4hrs PRN mild pain; sent to bedside prior to discharge (seemed to work well for patient while inpatient).      * Hematuria of unknown etiology  Assessment & Plan  On admission: patient complained of a 2 day history of gross hematuria following chemo induction therapy on 1/24/2024.  Despite complaints of vaginal bleeding - Vaginal exam was done in ED and showed: no blood in vaginal vault or at cervical os. No evidence of active bleeding.  Patient on chemotherapy agents that can cause nephrotoxicity, however, less likely to cause gross hematuria.     Outpatient Urology to follow up on the following:  - Urology consulted; Dr. Vaughn recommended outpatient follow up for cystoscopy and further work up of hemturia in outpatient setting.  - Hgb stable while inpatient.    *no other concerns encountered while inpatient.     Sore throat  Assessment & Plan  New complaint today. Likely due to immunocompromised status + hospitalization. step test and viral panel tests completed while inpatient, all negative.    Outpatient PCP to follow up on the following:   - Remind patient of immunocompromised state during chemotherapy treatments. Consider continuing mask while out of home and minimizing  exposure with sick contacts.      Hypokalemia  Assessment & Plan  New problem as of 1/27. 3.7 today. Repleted prior to discharge. PCP to repeat CBC, BMP and Mg outpatient. Replete as indicated.     Nausea  Assessment & Plan  Acute since chemo induction. Zofran was continued while inpatient. No new concerns or changes to make at this time.    Colon polyps  Assessment & Plan  Family history of gene for colon polyps. Patient unable to identify if blood is in urine alone or also in her stools.     Outpatient PCP to follow up on the following:   -FOBT positive while inpatient. Patient denies any visible blood in stools. Hgb stable while inpatient. Consider further GI workup if indicated.      Hormone receptor positive breast cancer (HCC)  Assessment & Plan  HER2/Jeanette positive breast cancer diagnosis in 2023. First round of chemotherapy on 1/24/2024.  Patient received Udenyca injection on same day; for infectious risk reduction during chemotherapy.  Her first cycle of chemotherapy included: Prednisone, epratuzumab, trastuzumab, docetaxel and carboplatin.  Followed by Dr. Thomas, Oncologist outpatient.    Outpatient Oncology to follow up on the following:   - please review images and history from this hospitalization.     Hypocalcemia  Assessment & Plan  Corrected Ca 8.1. Continued to monitor Ca with daily labs. Repleted at indicated. No new concerns or changes to report while inpatient.  PCP to repeat CBC, BMP, Mg.    Anxiety associated with cancer diagnosis (HCC)  Assessment & Plan  Continued home ativan while inpatient. No new concerns or changes to report while inpatient.      Diagnosis:  Patient Active Problem List   Diagnosis    Morbid obesity (HCC)    Other specified disorder of gallbladder    History of anaphylaxis    Malignant neoplasm of upper-outer quadrant of right breast in female, estrogen receptor negative (HCC)    Anxiety associated with cancer diagnosis (HCC)    Epigastric pain    Lumbar back pain     Hematuria of unknown etiology    Prerenal acute renal failure (HCC)    Hypocalcemia    Hormone receptor positive breast cancer (HCC)    Colon polyps    Nausea    Hypokalemia    Sore throat    Bone pain      Consultants:      Urology    Procedures:        Vaginal exam done on admission- to ensure no vaginal bleeding.    Disposition:   Discharge home with followup withing 1-2 weeks of discharge with outpatient specialists: Urology, Oncology team and her Primary care provider.    Diet:   Regular diet.     Activity:   As tolerated.    Discharge Medications:           Medication List        START taking these medications        Instructions   acetaminophen 650 MG CR tablet  Commonly known as: Tylenol 8 Hour Arthritis Pain   Take 1 Tablet by mouth every 6 hours as needed for Mild Pain, Moderate Pain or Fever.  Dose: 650 mg            CHANGE how you take these medications        Instructions   lidocaine-prilocaine 2.5-2.5 % Crea  What changed:   how much to take  how to take this  when to take this  reasons to take this  Commonly known as: Emla   Apply to port 1 hour prior to access of port and cover with plastic wrap.            CONTINUE taking these medications        Instructions   ALPRAZolam 0.5 MG Tabs  Commonly known as: Xanax   Take 1 Tablet by mouth 3 times a day as needed for Anxiety for up to 30 days.  Dose: 0.5 mg     dexamethasone 4 MG Tabs  Commonly known as: Decadron   Take 2 Tablets by mouth 2 times a day. Take 8 mg two times a day for 3 days, starting 24 hours prior to docetaxel.  Dose: 8 mg     EPINEPHrine 0.3 MG/0.3ML Soaj solution for injection  Commonly known as: Epipen   Doctor's comments: Please provide EpiPen that is patient or insurance preference.  Inject 0.3 mL into the thigh one time for 1 dose  Indications: Patient advised to present to the ED even after epinephrine administration for further observation and management.     loratadine 10 MG Tabs  Commonly known as: Claritin   Take 10 mg by mouth  every day.  Dose: 10 mg     ondansetron 4 MG Tabs tablet  Commonly known as: Zofran   Take 1 Tablet by mouth every four hours as needed for Nausea/Vomiting (for nausea, vomiting).  Dose: 4 mg     prochlorperazine 10 MG Tabs  Commonly known as: Compazine   Take 1 Tablet by mouth every 6 hours as needed (for nausea, vomiting).  Dose: 10 mg            Instructions:      The patient was instructed to return to the ER in the event of worsening symptoms. I have counseled the patient on the importance of compliance and the patient has agreed to proceed with all medical recommendations and follow up plan indicated above.   The patient understands that all medications come with benefits and risks. Risks may include permanent injury or death and these risks can be minimized with close reassessment and monitoring.        Please CC: Francisco Maier M.D.    Shelly Maxwell MD  PGY2 Resident  [facility], Family Medicine

## 2024-01-28 NOTE — PROGRESS NOTES
4 Eyes Skin Assessment Completed by Erna RN and WILLIAM Lucero.    Head WDL  Ears WDL  Nose WDL  Mouth WDL  Neck WDL  Breast/Chest WDL  Shoulder Blades WDL  Spine WDL  (R) Arm/Elbow/Hand WDL  (L) Arm/Elbow/Hand WDL  Abdomen WDL  Groin WDL  Scrotum/Coccyx/Buttocks WDL  (R) Leg right hip/back with bruising  (L) Leg WDL  (R) Heel/Foot/Toe WDL  (L) Heel/Foot/Toe WDL          Devices In Places SCD's      Interventions In Place Waffle Overlay    Possible Skin Injury No    Pictures Uploaded Into Epic N/A  Wound Consult Placed N/A  RN Wound Prevention Protocol Ordered No

## 2024-01-28 NOTE — CONSULTS
UROLOGY Consult Note:    SANGEETA Ortiz  Date & Time note created:    1/27/2024   5:49 PM     Referring MD:  Dr. Glasgow    Patient ID:   Name:             Fela Tiwari   YOB: 1985  Age:                 38 y.o.  female   MRN:               [id number]                                                             Reason for Consult:      Gross hematuria    History of Present Illness:    Fela Tiwari is a 37 yo F who presented to the ED complaining of vaginal bleeding in addition to back, bone, and abdominal pain. She received her first round of chemotherapy 3 days ago on 1/23 for breast cancer (HER2 but no family history) and began developing these symptoms 2 days ago on 1/24. Patient denies any bleeding other than with urination, vaginal exam was done by primary team and there was no vaginal bleeding noted, therefore urology was consulted.  Past medical history is significant for hysterectomy in 2013 as well as PEs in 2005 and 2012. Patient is not currently on anticoagulants.  CT scan was done in ER and is unremarkable.    Review of Systems:      Constitutional: Denies fevers, Denies weight changes  Eyes: Denies changes in vision, no eye pain  Ears/Nose/Throat/Mouth: Denies nasal congestion or sore throat   Cardiovascular: no chest pain, no palpitations   Respiratory: no shortness of breath , Denies cough  Gastrointestinal/Hepatic: Positive for nausea without vomiting  Genitourinary: Positive for hematuria. Denies dysuria or frequency  Musculoskeletal/Rheum: Complains of diffuse bone pain  Skin: Denies rash  All other systems were reviewed and are negative (AMA/CMS criteria)                Past Medical History:   Past Medical History:   Diagnosis Date    Anesthesia 12/28/2023    wakes up disoriented and panics    Asthma 12/28/2023    exercise or illness induced, inhalers prn    Breath shortness     Only with pulmonary embolism    Bronchitis 06/01/2014    Cancer (HCC) 12/28/2023     right outer breast    Cholesterol blood decreased     Heart burn     Indigestion Due to sleeve    Pain 2023    right breast very tender    Personal history of venous thrombosis and embolism &    pulmonary    Psychiatric problem 2023    anxiety    Unspecified hemorrhagic conditions     xarelto     Active Hospital Problems    Diagnosis     Hypokalemia [E87.6]     Generalized abdominal discomfort [R10.84]     Lumbar back pain [M54.50]     Hematuria of unknown etiology [R31.9]     Prerenal acute renal failure (HCC) [N17.9]     Hypocalcemia [E83.51]     Hormone receptor positive breast cancer (HCC) [C50.919]     Colon polyps [K63.5]     Nausea [R11.0]     Anxiety associated with cancer diagnosis (HCC) [F41.1, C80.1]        Past Surgical History:  Past Surgical History:   Procedure Laterality Date    DIEGO BY LAPAROSCOPY  2015    Performed by Dre Malone M.D. at SURGERY [facility]    GASTRIC SLEEVE LAPAROSCOPY  2014    Performed by Dre Malone M.D. at SURGERY [facility]    LIVER BIOPSY LAPAROSCOPIC  2014    Performed by Dre Malone M.D. at SURGERY [facility]    HYSTERECTOMY ROBOTIC  2013    LIPOSUCTION  2010    Performed by RAMON NUNN at SURGERY [facility]    LIPOSUCTION  2009    Performed by RAMON NUNN at SURGERY [facility]    TONSILLECTOMY  1997    OTHER              Hospital Medications:    Current Facility-Administered Medications:     potassium chloride SA (Kdur) tablet 20 mEq, 20 mEq, Oral, DAILY, Irma Glasgow M.D., 20 mEq at 24 1104    menthol (Halls) lozenge 1 Lozenge, 1 Lozenge, Oral, Q2HRS PRN, Irma Glasgow M.D.    HYDROmorphone (Dilaudid) injection 1 mg, 1 mg, Intravenous, Q4HRS PRN, Irma Glasgow M.D., 1 mg at 24 1520    senna-docusate (Pericolace Or Senokot S) 8.6-50 MG per tablet 2 Tablet, 2 Tablet, Oral, BID **AND** polyethylene glycol/lytes (Miralax) Packet 1  Packet, 1 Packet, Oral, QDAY PRN **AND** magnesium hydroxide (Milk Of Magnesia) suspension 30 mL, 30 mL, Oral, QDAY PRN **AND** bisacodyl (Dulcolax) suppository 10 mg, 10 mg, Rectal, QDAY PRN, Shelly Maxwell M.D.    lactated ringers infusion, , Intravenous, Continuous, Shelly Maxwell M.D., Last Rate: 100 mL/hr at 01/26/24 1406, New Bag at 01/26/24 1406    ondansetron (Zofran ODT) dispertab 4 mg, 4 mg, Oral, Q4HRS PRN, Shelly Maxwell M.D., 4 mg at 01/27/24 1102    ALPRAZolam (Xanax) tablet 0.5 mg, 0.5 mg, Oral, TID PRN, Shelly Maxwell M.D., 0.5 mg at 01/27/24 1239    loratadine (Claritin) tablet 10 mg, 10 mg, Oral, DAILY, Shelly Maxwell M.D., 10 mg at 01/27/24 0630    pantoprazole (Protonix) injection 40 mg, 40 mg, Intravenous, DAILY, Shelly Maxwell M.D., 40 mg at 01/27/24 0629    calcium carbonate (Tums) chewable tab 500 mg, 500 mg, Oral, DAILY, Irma Glasgow M.D., 500 mg at 01/27/24 0630    acetaminophen (Tylenol) tablet 650 mg, 650 mg, Oral, Q4HRS PRN, Shelly Maxwell M.D., 650 mg at 01/27/24 0630    lidocaine-prilocaine (Emla) 2.5-2.5 % cream, , Topical, PRN, Sydni Collins, D.O., 1 g at 01/26/24 2239    GI Cocktail (hyoscyamine-lidocaine-Maalox) oral susp cup 15 mL, 15 mL, Oral, Q6HRS PRN, Sydni Collins D.O., 15 mL at 01/26/24 2330    Current Outpatient Medications:  Medications Prior to Admission   Medication Sig Dispense Refill Last Dose    loratadine (CLARITIN) 10 MG Tab Take 10 mg by mouth every day.   1/25/2024 at 1900    ondansetron (ZOFRAN) 4 MG Tab tablet Take 1 Tablet by mouth every four hours as needed for Nausea/Vomiting (for nausea, vomiting). 30 Tablet 6 1/26/2024 at 0400    prochlorperazine (COMPAZINE) 10 MG Tab Take 1 Tablet by mouth every 6 hours as needed (for nausea, vomiting). 30 Tablet 6 NEW RX at NOT STARTED    dexamethasone (DECADRON) 4 MG Tab Take 2 Tablets by mouth 2 times a day. Take 8 mg two times a day for 3 days, starting 24 hours prior to docetaxel. 12 Tablet 6 1/25/2024 at 1900     lidocaine-prilocaine (EMLA) 2.5-2.5 % Cream Apply to port 1 hour prior to access of port and cover with plastic wrap. (Patient taking differently: Apply 1 Application topically as needed. Apply to port 1 hour prior to access of port and cover with plastic wrap.) 30 g 3 2024 at PRN    ALPRAZolam (XANAX) 0.5 MG Tab Take 1 Tablet by mouth 3 times a day as needed for Anxiety for up to 30 days. 30 Tablet 2 2024 at 2100    EPINEPHrine (EPIPEN) 0.3 MG/0.3ML Solution Auto-injector solution for injection Inject 0.3 mL into the thigh one time for 1 dose  Indications: Patient advised to present to the ED even after epinephrine administration for further observation and management. 1 Each 0 PRN at PRN       Medication Allergy:  Allergies   Allergen Reactions    Bee Venom Anaphylaxis    Morphine Sulfate Anaphylaxis and Itching    Tape Rash     Paper tape OK, do not use tegaderm    Pineapple Hives       Family History:  Family History   Problem Relation Age of Onset    Diabetes Other     Hypertension Other     Cancer Other        Social History:  Social History     Socioeconomic History    Marital status:      Spouse name: Not on file    Number of children: Not on file    Years of education: Not on file    Highest education level: Associate degree: academic program   Occupational History    Not on file   Tobacco Use    Smoking status: Former     Current packs/day: 0.00     Average packs/day: 0.5 packs/day for 6.0 years (3.0 ttl pk-yrs)     Types: Cigarettes, Electronic Cigarettes     Start date: 2011     Quit date: 2017     Years since quittin.6    Smokeless tobacco: Never   Vaping Use    Vaping Use: Some days    Substances: Nicotine, THC   Substance and Sexual Activity    Alcohol use: Yes     Alcohol/week: 8.4 oz     Types: 14 Shots of liquor per week     Comment: 2 shots daily    Drug use: Yes     Types: Inhaled     Comment: thc, daily    Sexual activity: Yes     Partners: Male     Birth  "control/protection: Female Sterilization     Comment: Hysterectomy   Other Topics Concern    Not on file   Social History Narrative    Not on file     Social Determinants of Health     Financial Resource Strain: Low Risk  (5/31/2023)    Overall Financial Resource Strain (CARDIA)     Difficulty of Paying Living Expenses: Not very hard   Food Insecurity: No Food Insecurity (5/31/2023)    Hunger Vital Sign     Worried About Running Out of Food in the Last Year: Never true     Ran Out of Food in the Last Year: Never true   Transportation Needs: No Transportation Needs (5/31/2023)    PRAPARE - Transportation     Lack of Transportation (Medical): No     Lack of Transportation (Non-Medical): No   Physical Activity: Insufficiently Active (5/31/2023)    Exercise Vital Sign     Days of Exercise per Week: 3 days     Minutes of Exercise per Session: 30 min   Stress: Stress Concern Present (5/31/2023)    [employer] - Occupational Stress Questionnaire     Feeling of Stress : To some extent   Social Connections: Socially Isolated (5/31/2023)    Social Connection and Isolation Panel [NHANES]     Frequency of Communication with Friends and Family: More than three times a week     Frequency of Social Gatherings with Friends and Family: More than three times a week     Attends Anabaptist Services: Never     Active Member of Clubs or Organizations: No     Attends Club or Organization Meetings: Never     Marital Status:    Intimate Partner Violence: Not on file   Housing Stability: Unknown (5/31/2023)    Housing Stability Vital Sign     Unable to Pay for Housing in the Last Year: No     Number of Places Lived in the Last Year: Not on file     Unstable Housing in the Last Year: No         Physical Exam:  Vitals/ General Appearance:   Weight/BMI: Body mass index is 35.26 kg/m².  /71   Pulse 84   Temp 36.7 °C (98.1 °F) (Temporal)   Resp 17   Ht 1.676 m (5' 6\")   Wt 99.1 kg (218 lb 7.6 oz)   " SpO2 97%   Vitals:    01/27/24 0446 01/27/24 0619 01/27/24 0728 01/27/24 1519   BP: 95/54 104/62 105/66 113/71   Pulse: 72  68 84   Resp: 16  15 17   Temp: 36.7 °C (98 °F)  36.6 °C (97.8 °F) 36.7 °C (98.1 °F)   TempSrc: Temporal  Temporal Temporal   SpO2: 93%  96% 97%   Weight:       Height:         Oxygen Therapy:  Pulse Oximetry: 97 %, O2 (LPM): 0, O2 Delivery Device: None - Room Air    Constitutional:   Well developed, Well nourished in mild distress  HENMT:  Normocephalic, Atraumatic.  Eyes:  EOMI, Conjunctiva normal, No discharge.  Neck:  Normal range of motion.  Lungs:  Non labored breathing.   Abdomen: Soft, TTP.  : No valerio.  Bloody clear in toilet  Skin: Warm, Dry.  Neurologic: Alert & oriented x 3, No focal deficits noted,  Psychiatric: Affect normal, Judgment normal, Mood normal.      MDM (Data Review):     Records reviewed and summarized in current documentation    Lab Data Review:  Recent Results (from the past 24 hour(s))   PROCALCITONIN    Collection Time: 01/27/24 12:08 AM   Result Value Ref Range    Procalcitonin 0.19 <0.25 ng/mL   CRP QUANTITIVE (NON-CARDIAC)    Collection Time: 01/27/24 12:08 AM   Result Value Ref Range    Stat C-Reactive Protein 0.73 0.00 - 0.75 mg/dL   CBC WITHOUT DIFFERENTIAL    Collection Time: 01/27/24 12:08 AM   Result Value Ref Range    WBC 11.6 (H) 4.8 - 10.8 K/uL    RBC 4.62 4.20 - 5.40 M/uL    Hemoglobin 14.8 12.0 - 16.0 g/dL    Hematocrit 42.8 37.0 - 47.0 %    MCV 92.6 81.4 - 97.8 fL    MCH 32.0 27.0 - 33.0 pg    MCHC 34.6 32.2 - 35.5 g/dL    RDW 41.9 35.9 - 50.0 fL    Platelet Count 79 (L) 164 - 446 K/uL    MPV 12.3 9.0 - 12.9 fL   Sed Rate    Collection Time: 01/27/24 12:08 AM   Result Value Ref Range    Sed Rate Westergren 10 0 - 25 mm/hour   IMMATURE PLT FRACTION    Collection Time: 01/27/24 12:08 AM   Result Value Ref Range    Imm. Plt Fraction 10.9 0.6 - 13.1 %   Basic Metabolic Panel    Collection Time: 01/27/24 12:08 AM   Result Value Ref Range    Sodium 136  135 - 145 mmol/L    Potassium 3.5 (L) 3.6 - 5.5 mmol/L    Chloride 101 96 - 112 mmol/L    Co2 25 20 - 33 mmol/L    Glucose 119 (H) 65 - 99 mg/dL    Bun 16 8 - 22 mg/dL    Creatinine 0.76 0.50 - 1.40 mg/dL    Calcium 7.7 (L) 8.5 - 10.5 mg/dL    Anion Gap 10.0 7.0 - 16.0   LIPASE    Collection Time: 01/27/24 12:08 AM   Result Value Ref Range    Lipase 103 (H) 11 - 82 U/L   ESTIMATED GFR    Collection Time: 01/27/24 12:08 AM   Result Value Ref Range    GFR (CKD-EPI) 101 >60 mL/min/1.73 m 2   CBC WITHOUT DIFFERENTIAL    Collection Time: 01/27/24  8:54 AM   Result Value Ref Range    WBC 5.8 4.8 - 10.8 K/uL    RBC 4.27 4.20 - 5.40 M/uL    Hemoglobin 13.9 12.0 - 16.0 g/dL    Hematocrit 38.9 37.0 - 47.0 %    MCV 91.1 81.4 - 97.8 fL    MCH 32.6 27.0 - 33.0 pg    MCHC 35.7 (H) 32.2 - 35.5 g/dL    RDW 40.0 35.9 - 50.0 fL    Platelet Count 66 (L) 164 - 446 K/uL    MPV 12.6 9.0 - 12.9 fL   Sed Rate    Collection Time: 01/27/24  8:54 AM   Result Value Ref Range    Sed Rate Westergren 5 0 - 25 mm/hour   IMMATURE PLT FRACTION    Collection Time: 01/27/24  8:54 AM   Result Value Ref Range    Imm. Plt Fraction 9.1 0.6 - 13.1 %       Imaging/Procedures Review:    Reviewed    MDM (Assessment and Plan):     Active Hospital Problems    Diagnosis     Hypokalemia [E87.6]     Generalized abdominal discomfort [R10.84]     Lumbar back pain [M54.50]     Hematuria of unknown etiology [R31.9]     Prerenal acute renal failure (HCC) [N17.9]     Hypocalcemia [E83.51]     Hormone receptor positive breast cancer (HCC) [C50.919]     Colon polyps [K63.5]     Nausea [R11.0]     Anxiety associated with cancer diagnosis (HCC) [F41.1, C80.1]        At this time, there is nothing further for urology, CT scan shows no evidence of stones, hydro or masses noted.  When patient is medically cleared she can be discharged we will plan for follow-up cystoscopy in clinic to complete the hematuria workup.    Thank you for allowing us to participate in this patient's  care.    Dr. Nelson is aware of this consultation and as directed the plan of care

## 2024-01-29 ENCOUNTER — TELEPHONE (OUTPATIENT)
Dept: HEMATOLOGY ONCOLOGY | Facility: MEDICAL CENTER | Age: 39
End: 2024-01-29
Payer: MEDICAID

## 2024-01-29 NOTE — ASSESSMENT & PLAN NOTE
#Hip and back pain  #Generalized bone pain  Patient describes generalized bone pain, predominantly in bilateral flanks, back and hip. CT was performed while inpatient as patient complained of generalized abdominal pain, which showed: nonspecific mild fat stranding in upper abdomen below gastric antrum and anterior to pancreas. Pancreas itself is unremarkable. Ddx per impression includes: gastritis vs mild pancreatitis. Can also be a consequence to recent chemo induction therapy, however, lipases were trended and as follows: Lipases 102, 103 and 36 on day of discharge.     Outpatient Oncology/PCP to follow up on the following:  - Patient's pain likely due to recent chemo and Udenyca shot received. Discussed close follow up outpatient if these symptoms persist to consider alternative or adjunct medications.  - Tylenol 650mg q4hrs PRN mild pain; sent to bedside prior to discharge (seemed to work well for patient while inpatient).

## 2024-01-29 NOTE — TELEPHONE ENCOUNTER
Contacted patient in regard to pain patient is feeling. Patient c/o pain, chills, feeling warm. Inquired if patient had a thermometer and if she is able to take temperature. Patient does not have a thermometer. Patient states the last temperature they took in hospital was 99F. Covid, flu, and strep all negative. Patient is taking 1000mg of tylenol every 6 hours. Will update Gillian GARCÍA APRN, and update patient with any changes in plan of care.

## 2024-01-29 NOTE — PROGRESS NOTES
Discharge instructions gone over with pt. Pt verbalized understanding. Port flushed with heparin and deaccessed. Pt opted out of meds to bed.

## 2024-01-29 NOTE — DISCHARGE PLANNING
1650: Call from [facility] requesting taxi voucher for pt as she has no way home.    1700: Call to [place] to tube voucher and he reports that pt did find a ride home now.

## 2024-01-30 ENCOUNTER — HOSPITAL ENCOUNTER (OUTPATIENT)
Dept: HEMATOLOGY ONCOLOGY | Facility: MEDICAL CENTER | Age: 39
End: 2024-01-30
Attending: NURSE PRACTITIONER
Payer: MEDICAID

## 2024-01-30 VITALS
OXYGEN SATURATION: 98 % | SYSTOLIC BLOOD PRESSURE: 102 MMHG | BODY MASS INDEX: 33.84 KG/M2 | WEIGHT: 210.54 LBS | HEIGHT: 66 IN | DIASTOLIC BLOOD PRESSURE: 62 MMHG | HEART RATE: 98 BPM | TEMPERATURE: 98.6 F | RESPIRATION RATE: 12 BRPM

## 2024-01-30 DIAGNOSIS — T45.1X5A CHEMOTHERAPY INDUCED NAUSEA AND VOMITING: ICD-10-CM

## 2024-01-30 DIAGNOSIS — R11.2 CHEMOTHERAPY INDUCED NAUSEA AND VOMITING: ICD-10-CM

## 2024-01-30 DIAGNOSIS — B37.0 THRUSH, ORAL: ICD-10-CM

## 2024-01-30 DIAGNOSIS — M79.10 MYALGIA: ICD-10-CM

## 2024-01-30 DIAGNOSIS — C50.411 MALIGNANT NEOPLASM OF UPPER-OUTER QUADRANT OF RIGHT BREAST IN FEMALE, ESTROGEN RECEPTOR NEGATIVE (HCC): ICD-10-CM

## 2024-01-30 DIAGNOSIS — K52.1 DIARRHEA DUE TO DRUG: ICD-10-CM

## 2024-01-30 DIAGNOSIS — Z79.899 ENCOUNTER FOR LONG-TERM (CURRENT) USE OF HIGH-RISK MEDICATION: ICD-10-CM

## 2024-01-30 DIAGNOSIS — Z17.1 MALIGNANT NEOPLASM OF UPPER-OUTER QUADRANT OF RIGHT BREAST IN FEMALE, ESTROGEN RECEPTOR NEGATIVE (HCC): ICD-10-CM

## 2024-01-30 PROCEDURE — 99212 OFFICE O/P EST SF 10 MIN: CPT | Performed by: NURSE PRACTITIONER

## 2024-01-30 PROCEDURE — 99215 OFFICE O/P EST HI 40 MIN: CPT | Performed by: NURSE PRACTITIONER

## 2024-01-30 RX ORDER — DIPHENOXYLATE HYDROCHLORIDE AND ATROPINE SULFATE 2.5; .025 MG/1; MG/1
1 TABLET ORAL 4 TIMES DAILY PRN
Qty: 60 TABLET | Refills: 0 | Status: SHIPPED | OUTPATIENT
Start: 2024-01-30 | End: 2024-02-14

## 2024-01-30 RX ORDER — HYDROCODONE BITARTRATE AND ACETAMINOPHEN 5; 325 MG/1; MG/1
1 TABLET ORAL EVERY 8 HOURS PRN
Qty: 30 TABLET | Refills: 0 | Status: SHIPPED | OUTPATIENT
Start: 2024-01-30 | End: 2024-02-09

## 2024-01-30 ASSESSMENT — ENCOUNTER SYMPTOMS
MYALGIAS: 1
SHORTNESS OF BREATH: 1
WEIGHT LOSS: 1
DIAPHORESIS: 1
TINGLING: 0
DIARRHEA: 1
FEVER: 1
CHILLS: 1
DIZZINESS: 0
CONSTIPATION: 0
VOMITING: 1
HEADACHES: 0
PALPITATIONS: 0
NAUSEA: 1
COUGH: 1

## 2024-01-30 ASSESSMENT — PAIN SCALES - GENERAL: PAINLEVEL: 10=SEVERE PAIN

## 2024-01-30 ASSESSMENT — FIBROSIS 4 INDEX: FIB4 SCORE: 2.86

## 2024-01-30 NOTE — ADDENDUM NOTE
Encounter addended by: Judith Woods, Med Ass't on: 1/30/2024 12:36 PM   Actions taken: Charge Capture section accepted

## 2024-01-30 NOTE — PROGRESS NOTES
Subjective     Fela Tiwari is a 38 y.o. female who presents with Breast Cancer (Schwartzberg/ Tox check)          HPI    Referring Physician: Rochelle Gaviria MD   Primary Care:  Francisco Maier M.D.      Diagnosis: Invasive ductal carcinoma of the right breast, grade 3, clinically stage IIa (cT2, cN0) ER negative CT negative, HER2 3+ positive, Ki-67 20 to 30%      Chief Complaint: Patient seen today for evaluation of her ER negative HER2 positive breast cancer, for continued monitoring of symptoms and side effects of cancer treatments, employing TCHP.    Oncology history of presenting illness:  Fela Tiwari is a 38 y.o. likely premenopausal white female who self detected a mass in her lateral right breast in 2023.  She had a mammogram on 2023 which showed the palpable mass is likely malignant.  Ultrasound showed it was 2.6 cm irregular hypoechoic spiculated mass.  Left breast was normal.  She underwent a ultrasound-guided biopsy of the right breast on 2023: This revealed an invasive ductal carcinoma, grade 3, ER negative, CT negative, HER2 3+ by IHC, Ki-67 20 to 30%.  An MRI was done yesterday and results are pending.  She had a hysterectomy in  but her ovaries are in place.  She is  3 para 2 and has not taken any hormone replacement therapy.  No family history of breast or ovarian cancer.  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  She was on anticoagulation for a while but has been off for several years.  She has also had gastric sleeve laparoscopic and cholecystectomy and does note chronic nausea.     Interval histories:  Interval history 24 (CAlsop, APRN):  Patient had a difficult time with her first cycle of chemotherapy.  Day after she received the Udenyca shot she presented to the emergency department with severe pain.  Pain was excruciating throughout her entire body.  She was given IV  Dilaudid in the hospital and discharged with Tylenol.  She has been trying to take 1000 g of Tylenol with no relief.  Patient tearful today due to the severity of pain.  She also had experienced blood when urinating.  She did have a positive occult blood on stool and urine during hospitalization.  She was asked to follow-up with urology in the outpatient setting.  She stated since being discharged from the hospital she has not had any blood whatsoever in the urine or will.  She has been experiencing diarrhea anywhere from 5-10 episodes per day likely related to Perjeta.  She has been taking Imodium, approximately 2-3 pills per day with minimal relief.  She does have nausea and vomiting but she does state that Zofran does help.  She does also note thrush which was appreciated on physical examination today.    Treatment history:  01/23/24: C1D1 TCHP with Udenyca      Allergies   Allergen Reactions    Bee Venom Anaphylaxis    Morphine Sulfate Anaphylaxis and Itching    Tape Rash     Paper tape OK, do not use tegaderm    Pineapple Hives       Current Outpatient Medications on File Prior to Encounter   Medication Sig Dispense Refill    acetaminophen (TYLENOL 8 HOUR ARTHRITIS PAIN) 650 MG CR tablet Take 1 Tablet by mouth every 6 hours as needed for Mild Pain, Moderate Pain or Fever. 100 Tablet 0    loratadine (CLARITIN) 10 MG Tab Take 10 mg by mouth every day.      ondansetron (ZOFRAN) 4 MG Tab tablet Take 1 Tablet by mouth every four hours as needed for Nausea/Vomiting (for nausea, vomiting). 30 Tablet 6    prochlorperazine (COMPAZINE) 10 MG Tab Take 1 Tablet by mouth every 6 hours as needed (for nausea, vomiting). 30 Tablet 6    dexamethasone (DECADRON) 4 MG Tab Take 2 Tablets by mouth 2 times a day. Take 8 mg two times a day for 3 days, starting 24 hours prior to docetaxel. 12 Tablet 6    lidocaine-prilocaine (EMLA) 2.5-2.5 % Cream Apply to port 1 hour prior to access of port and cover with plastic wrap. (Patient  "taking differently: Apply 1 Application topically as needed. Apply to port 1 hour prior to access of port and cover with plastic wrap.) 30 g 3    ALPRAZolam (XANAX) 0.5 MG Tab Take 1 Tablet by mouth 3 times a day as needed for Anxiety for up to 30 days. 30 Tablet 2    EPINEPHrine (EPIPEN) 0.3 MG/0.3ML Solution Auto-injector solution for injection Inject 0.3 mL into the thigh one time for 1 dose  Indications: Patient advised to present to the ED even after epinephrine administration for further observation and management. 1 Each 0     No current facility-administered medications on file prior to encounter.       Review of Systems   Constitutional:  Positive for chills, diaphoresis, fever, malaise/fatigue and weight loss.   Respiratory:  Positive for cough and shortness of breath.    Cardiovascular:  Negative for chest pain and palpitations.   Gastrointestinal:  Positive for diarrhea, nausea and vomiting. Negative for constipation.   Genitourinary:  Negative for dysuria.   Musculoskeletal:  Positive for myalgias.   Neurological:  Negative for dizziness, tingling and headaches.              Objective     /62 (BP Location: Right arm, Patient Position: Sitting, BP Cuff Size: Large adult)   Pulse 98   Temp 37 °C (98.6 °F) (Temporal)   Resp 12   Ht 1.676 m (5' 5.98\")   Wt 95.5 kg (210 lb 8.6 oz)   LMP 02/10/2012 (Approximate) Comment: Age of first period:10, No HRT  SpO2 98%   BMI 34.00 kg/m²      Physical Exam  Vitals reviewed.   Constitutional:       Appearance: She is not diaphoretic.   HENT:      Head: Normocephalic and atraumatic.   Cardiovascular:      Rate and Rhythm: Normal rate and regular rhythm.      Heart sounds: Normal heart sounds. No murmur heard.     No friction rub. No gallop.   Pulmonary:      Effort: Pulmonary effort is normal. No respiratory distress.      Breath sounds: Normal breath sounds. No wheezing.   Musculoskeletal:         General: No swelling or tenderness. Normal range of " motion.   Skin:     General: Skin is warm and dry.   Neurological:      Mental Status: She is alert.   Psychiatric:      Comments: Very tearful today                     Assessment & Plan          1. Malignant neoplasm of upper-outer quadrant of right breast in female, estrogen receptor negative (HCC)  diphenoxylate-atropine (LOMOTIL) 2.5-0.025 MG Tab    HYDROcodone-acetaminophen (NORCO) 5-325 MG Tab per tablet    nystatin (MYCOSTATIN) [id number] UNIT/ML Suspension      2. Myalgia  HYDROcodone-acetaminophen (NORCO) 5-325 MG Tab per tablet      3. Diarrhea due to drug  diphenoxylate-atropine (LOMOTIL) 2.5-0.025 MG Tab      4. Chemotherapy induced nausea and vomiting        5. Thrush, oral  nystatin (MYCOSTATIN) [id number] UNIT/ML Suspension      6. Encounter for long-term (current) use of high-risk medication               Impression:  1.  Invasive ductal carcinoma of the right breast, grade 3, clinically stage IIa (cT2, cN0) ER negative MN negative, HER2 3+ positive, Ki-67 20 to 30%.  She is a good candidate for neoadjuvant chemotherapy with TCHP x 6.  I have discussed this with her in full and she will initiate in approximately 2 weeks.  2.  Chronic nausea after gastric sleeve and cholecystectomy mild.  3.  History of DVT/PE off anticoagulation for several years  4.  Severe myalgias likely from Udenyca - patient okay to take Tylenol and Ibuprofen - will provide patient with low dose Norco for severe pain  5.  Bleeding urine and stool  6.  Chemo nausea and vomiting - will change Zofran to Aloxi to see if she has better coverage post chemo. Continue zofran and Compazine as she does have improvement when she takes it.   7.  Diarrhea d/t Perjeta - Imodium not effective - will start Lomotil  8.  Thrush - mild and will start Nystatin QID - recommend baking soda and salt water rinses as well.    Plan:  As discussed above will provide patient with Lomotil for diarrhea.    Severe myalgias from Udenyca -I discussed with patient that  she can take ibuprofen 600 mg every 8-12 hours just for the next few days.  Have also started her on a low-dose Norco to help with the severity of pain.    Started patient on nystatin for her oral thrush.    Patient to return to the clinic in 2 weeks, or sooner if needed.      Please note that this dictation was created using voice recognition software. I have made every reasonable attempt to correct obvious errors, but I expect that there are errors of grammar and possibly content that I did not discover before finalizing the note.

## 2024-02-01 ENCOUNTER — TELEPHONE (OUTPATIENT)
Dept: HEMATOLOGY ONCOLOGY | Facility: MEDICAL CENTER | Age: 39
End: 2024-02-01
Payer: MEDICAID

## 2024-02-01 NOTE — TELEPHONE ENCOUNTER
Followed up with patient regarding pain symptoms, patient is feeling better pain wise, patient is still fatigued. Patient is having diarrhea and is advised to make sure she is taking the lomotil 4 times a day. Will follow up with patient on Monday regarding symptoms. No other questions or concerns at this time.

## 2024-02-06 ENCOUNTER — TELEPHONE (OUTPATIENT)
Dept: HEMATOLOGY ONCOLOGY | Facility: MEDICAL CENTER | Age: 39
End: 2024-02-06
Payer: MEDICAID

## 2024-02-06 DIAGNOSIS — G89.3 CANCER ASSOCIATED PAIN: ICD-10-CM

## 2024-02-06 RX ORDER — OXYCODONE AND ACETAMINOPHEN 2.5; 325 MG/1; MG/1
1 TABLET ORAL EVERY 6 HOURS PRN
Qty: 28 TABLET | Refills: 0 | Status: SHIPPED | OUTPATIENT
Start: 2024-02-06 | End: 2024-02-13

## 2024-02-06 NOTE — TELEPHONE ENCOUNTER
Contacted patient to follow up on how patient is feeling. Pt is feeling much better. Discussed the change of growth factor medication from the long acting to the short acting requiring more appointments. Advised patient to keep an eye on that in the [facility] patient portal. Patient verbalizes understanding. Pt did discuss that she is getting a scope for her bladder completed on Monday, Feb 12th. This nurse will follow up with patient after scope next week. No other questions or concerns at this time.

## 2024-02-08 ENCOUNTER — APPOINTMENT (OUTPATIENT)
Dept: HEMATOLOGY ONCOLOGY | Facility: MEDICAL CENTER | Age: 39
End: 2024-02-08
Payer: MEDICAID

## 2024-02-14 RX ORDER — DIPHENHYDRAMINE HYDROCHLORIDE 50 MG/ML
50 INJECTION INTRAMUSCULAR; INTRAVENOUS PRN
Status: CANCELLED | OUTPATIENT
Start: 2024-02-16

## 2024-02-14 RX ORDER — 0.9 % SODIUM CHLORIDE 0.9 %
3 VIAL (ML) INJECTION PRN
Status: CANCELLED | OUTPATIENT
Start: 2024-02-15

## 2024-02-14 RX ORDER — 0.9 % SODIUM CHLORIDE 0.9 %
10 VIAL (ML) INJECTION PRN
Status: CANCELLED | OUTPATIENT
Start: 2024-02-15

## 2024-02-14 RX ORDER — SODIUM CHLORIDE 9 MG/ML
INJECTION, SOLUTION INTRAVENOUS CONTINUOUS
Status: CANCELLED | OUTPATIENT
Start: 2024-02-16

## 2024-02-14 RX ORDER — 0.9 % SODIUM CHLORIDE 0.9 %
10 VIAL (ML) INJECTION PRN
Status: CANCELLED | OUTPATIENT
Start: 2024-02-16

## 2024-02-14 RX ORDER — EPINEPHRINE 1 MG/ML(1)
0.5 AMPUL (ML) INJECTION PRN
Status: CANCELLED | OUTPATIENT
Start: 2024-02-16

## 2024-02-14 RX ORDER — 0.9 % SODIUM CHLORIDE 0.9 %
VIAL (ML) INJECTION PRN
Status: CANCELLED | OUTPATIENT
Start: 2024-02-16

## 2024-02-14 RX ORDER — 0.9 % SODIUM CHLORIDE 0.9 %
VIAL (ML) INJECTION PRN
Status: CANCELLED | OUTPATIENT
Start: 2024-02-15

## 2024-02-14 RX ORDER — PALONOSETRON 0.05 MG/ML
0.25 INJECTION, SOLUTION INTRAVENOUS ONCE
Status: CANCELLED
Start: 2024-02-16 | End: 2024-02-16

## 2024-02-14 RX ORDER — ONDANSETRON 8 MG/1
8 TABLET, ORALLY DISINTEGRATING ORAL PRN
Status: CANCELLED | OUTPATIENT
Start: 2024-02-16

## 2024-02-14 RX ORDER — 0.9 % SODIUM CHLORIDE 0.9 %
3 VIAL (ML) INJECTION PRN
Status: CANCELLED | OUTPATIENT
Start: 2024-02-16

## 2024-02-14 RX ORDER — ONDANSETRON 2 MG/ML
4 INJECTION INTRAMUSCULAR; INTRAVENOUS PRN
Status: CANCELLED | OUTPATIENT
Start: 2024-02-16

## 2024-02-14 RX ORDER — PROCHLORPERAZINE MALEATE 10 MG
10 TABLET ORAL EVERY 6 HOURS PRN
Status: CANCELLED | OUTPATIENT
Start: 2024-02-16

## 2024-02-14 RX ORDER — METHYLPREDNISOLONE SODIUM SUCCINATE 125 MG/2ML
125 INJECTION, POWDER, LYOPHILIZED, FOR SOLUTION INTRAMUSCULAR; INTRAVENOUS PRN
Status: CANCELLED | OUTPATIENT
Start: 2024-02-16

## 2024-02-15 ENCOUNTER — OUTPATIENT INFUSION SERVICES (OUTPATIENT)
Dept: ONCOLOGY | Facility: MEDICAL CENTER | Age: 39
End: 2024-02-15
Attending: INTERNAL MEDICINE
Payer: MEDICAID

## 2024-02-15 ENCOUNTER — HOSPITAL ENCOUNTER (OUTPATIENT)
Dept: HEMATOLOGY ONCOLOGY | Facility: MEDICAL CENTER | Age: 39
End: 2024-02-15
Attending: INTERNAL MEDICINE
Payer: MEDICAID

## 2024-02-15 VITALS
HEART RATE: 76 BPM | BODY MASS INDEX: 34.44 KG/M2 | HEIGHT: 66 IN | WEIGHT: 214.29 LBS | RESPIRATION RATE: 17 BRPM | DIASTOLIC BLOOD PRESSURE: 64 MMHG | SYSTOLIC BLOOD PRESSURE: 100 MMHG | TEMPERATURE: 98.6 F | OXYGEN SATURATION: 96 %

## 2024-02-15 VITALS
TEMPERATURE: 98.7 F | SYSTOLIC BLOOD PRESSURE: 102 MMHG | OXYGEN SATURATION: 95 % | RESPIRATION RATE: 18 BRPM | HEART RATE: 84 BPM | DIASTOLIC BLOOD PRESSURE: 66 MMHG

## 2024-02-15 DIAGNOSIS — C50.411 MALIGNANT NEOPLASM OF UPPER-OUTER QUADRANT OF RIGHT BREAST IN FEMALE, ESTROGEN RECEPTOR NEGATIVE (HCC): ICD-10-CM

## 2024-02-15 DIAGNOSIS — Z17.1 MALIGNANT NEOPLASM OF UPPER-OUTER QUADRANT OF RIGHT BREAST IN FEMALE, ESTROGEN RECEPTOR NEGATIVE (HCC): ICD-10-CM

## 2024-02-15 LAB
ALBUMIN SERPL BCP-MCNC: 2.2 G/DL (ref 3.2–4.9)
ALBUMIN SERPL BCP-MCNC: 4.2 G/DL (ref 3.2–4.9)
ALBUMIN/GLOB SERPL: 1.7 G/DL
ALBUMIN/GLOB SERPL: 1.8 G/DL
ALP SERPL-CCNC: 27 U/L (ref 30–99)
ALP SERPL-CCNC: 60 U/L (ref 30–99)
ALT SERPL-CCNC: 16 U/L (ref 2–50)
ALT SERPL-CCNC: 28 U/L (ref 2–50)
ANION GAP SERPL CALC-SCNC: 13 MMOL/L (ref 7–16)
ANION GAP SERPL CALC-SCNC: 9 MMOL/L (ref 7–16)
AST SERPL-CCNC: 16 U/L (ref 12–45)
AST SERPL-CCNC: 9 U/L (ref 12–45)
BASOPHILS # BLD AUTO: 0.3 % (ref 0–1.8)
BASOPHILS # BLD AUTO: 0.4 % (ref 0–1.8)
BASOPHILS # BLD: 0.03 K/UL (ref 0–0.12)
BASOPHILS # BLD: 0.03 K/UL (ref 0–0.12)
BILIRUB SERPL-MCNC: 0.3 MG/DL (ref 0.1–1.5)
BILIRUB SERPL-MCNC: 0.6 MG/DL (ref 0.1–1.5)
BUN SERPL-MCNC: 16 MG/DL (ref 8–22)
BUN SERPL-MCNC: 9 MG/DL (ref 8–22)
CALCIUM ALBUM COR SERPL-MCNC: 6.1 MG/DL (ref 8.5–10.5)
CALCIUM ALBUM COR SERPL-MCNC: 9 MG/DL (ref 8.5–10.5)
CALCIUM SERPL-MCNC: 4.7 MG/DL (ref 8.5–10.5)
CALCIUM SERPL-MCNC: 9.2 MG/DL (ref 8.5–10.5)
CHLORIDE SERPL-SCNC: 108 MMOL/L (ref 96–112)
CHLORIDE SERPL-SCNC: 125 MMOL/L (ref 96–112)
CO2 SERPL-SCNC: 13 MMOL/L (ref 20–33)
CO2 SERPL-SCNC: 21 MMOL/L (ref 20–33)
CREAT SERPL-MCNC: 0.36 MG/DL (ref 0.5–1.4)
CREAT SERPL-MCNC: 0.85 MG/DL (ref 0.5–1.4)
EOSINOPHIL # BLD AUTO: 0.01 K/UL (ref 0–0.51)
EOSINOPHIL # BLD AUTO: 0.03 K/UL (ref 0–0.51)
EOSINOPHIL NFR BLD: 0.1 % (ref 0–6.9)
EOSINOPHIL NFR BLD: 0.4 % (ref 0–6.9)
ERYTHROCYTE [DISTWIDTH] IN BLOOD BY AUTOMATED COUNT: 44.5 FL (ref 35.9–50)
ERYTHROCYTE [DISTWIDTH] IN BLOOD BY AUTOMATED COUNT: 44.9 FL (ref 35.9–50)
GFR SERPLBLD CREATININE-BSD FMLA CKD-EPI: 131 ML/MIN/1.73 M 2
GFR SERPLBLD CREATININE-BSD FMLA CKD-EPI: 88 ML/MIN/1.73 M 2
GLOBULIN SER CALC-MCNC: 1.2 G/DL (ref 1.9–3.5)
GLOBULIN SER CALC-MCNC: 2.5 G/DL (ref 1.9–3.5)
GLUCOSE SERPL-MCNC: 141 MG/DL (ref 65–99)
GLUCOSE SERPL-MCNC: 82 MG/DL (ref 65–99)
HCT VFR BLD AUTO: 38.9 % (ref 37–47)
HCT VFR BLD AUTO: 41.1 % (ref 37–47)
HGB BLD-MCNC: 13.7 G/DL (ref 12–16)
HGB BLD-MCNC: 14.2 G/DL (ref 12–16)
IMM GRANULOCYTES # BLD AUTO: 0.02 K/UL (ref 0–0.11)
IMM GRANULOCYTES # BLD AUTO: 0.04 K/UL (ref 0–0.11)
IMM GRANULOCYTES NFR BLD AUTO: 0.3 % (ref 0–0.9)
IMM GRANULOCYTES NFR BLD AUTO: 0.4 % (ref 0–0.9)
LYMPHOCYTES # BLD AUTO: 0.71 K/UL (ref 1–4.8)
LYMPHOCYTES # BLD AUTO: 1.01 K/UL (ref 1–4.8)
LYMPHOCYTES NFR BLD: 13.6 % (ref 22–41)
LYMPHOCYTES NFR BLD: 6.5 % (ref 22–41)
MCH RBC QN AUTO: 32.5 PG (ref 27–33)
MCH RBC QN AUTO: 33 PG (ref 27–33)
MCHC RBC AUTO-ENTMCNC: 34.5 G/DL (ref 32.2–35.5)
MCHC RBC AUTO-ENTMCNC: 35.2 G/DL (ref 32.2–35.5)
MCV RBC AUTO: 93.7 FL (ref 81.4–97.8)
MCV RBC AUTO: 94.1 FL (ref 81.4–97.8)
MONOCYTES # BLD AUTO: 0.16 K/UL (ref 0–0.85)
MONOCYTES # BLD AUTO: 0.25 K/UL (ref 0–0.85)
MONOCYTES NFR BLD AUTO: 1.5 % (ref 0–13.4)
MONOCYTES NFR BLD AUTO: 3.4 % (ref 0–13.4)
NEUTROPHILS # BLD AUTO: 10.04 K/UL (ref 1.82–7.42)
NEUTROPHILS # BLD AUTO: 6.11 K/UL (ref 1.82–7.42)
NEUTROPHILS NFR BLD: 81.9 % (ref 44–72)
NEUTROPHILS NFR BLD: 91.2 % (ref 44–72)
NRBC # BLD AUTO: 0 K/UL
NRBC # BLD AUTO: 0 K/UL
NRBC BLD-RTO: 0 /100 WBC (ref 0–0.2)
NRBC BLD-RTO: 0 /100 WBC (ref 0–0.2)
OUTPT INFUS CBC COMMENT OICOM: ABNORMAL
OUTPT INFUS CBC COMMENT OICOM: ABNORMAL
PLATELET # BLD AUTO: 179 K/UL (ref 164–446)
PLATELET # BLD AUTO: 193 K/UL (ref 164–446)
PMV BLD AUTO: 10.7 FL (ref 9–12.9)
PMV BLD AUTO: 10.8 FL (ref 9–12.9)
POTASSIUM SERPL-SCNC: 2.3 MMOL/L (ref 3.6–5.5)
POTASSIUM SERPL-SCNC: 4.1 MMOL/L (ref 3.6–5.5)
PROT SERPL-MCNC: 3.4 G/DL (ref 6–8.2)
PROT SERPL-MCNC: 6.7 G/DL (ref 6–8.2)
RBC # BLD AUTO: 4.15 M/UL (ref 4.2–5.4)
RBC # BLD AUTO: 4.37 M/UL (ref 4.2–5.4)
SODIUM SERPL-SCNC: 142 MMOL/L (ref 135–145)
SODIUM SERPL-SCNC: 147 MMOL/L (ref 135–145)
WBC # BLD AUTO: 11 K/UL (ref 4.8–10.8)
WBC # BLD AUTO: 7.5 K/UL (ref 4.8–10.8)

## 2024-02-15 PROCEDURE — 36591 DRAW BLOOD OFF VENOUS DEVICE: CPT

## 2024-02-15 PROCEDURE — A4212 NON CORING NEEDLE OR STYLET: HCPCS

## 2024-02-15 PROCEDURE — 85025 COMPLETE CBC W/AUTO DIFF WBC: CPT | Mod: 91

## 2024-02-15 PROCEDURE — 700111 HCHG RX REV CODE 636 W/ 250 OVERRIDE (IP): Mod: UD | Performed by: NURSE PRACTITIONER

## 2024-02-15 PROCEDURE — 99215 OFFICE O/P EST HI 40 MIN: CPT | Performed by: INTERNAL MEDICINE

## 2024-02-15 PROCEDURE — 80053 COMPREHEN METABOLIC PANEL: CPT | Mod: 91

## 2024-02-15 PROCEDURE — 99212 OFFICE O/P EST SF 10 MIN: CPT | Performed by: INTERNAL MEDICINE

## 2024-02-15 RX ORDER — OLANZAPINE 5 MG/1
5 TABLET ORAL NIGHTLY
Qty: 30 TABLET | Refills: 1 | Status: SHIPPED | OUTPATIENT
Start: 2024-02-15

## 2024-02-15 RX ORDER — OXYCODONE AND ACETAMINOPHEN 2.5; 325 MG/1; MG/1
1 TABLET ORAL EVERY 4 HOURS PRN
COMMUNITY
End: 2024-02-19

## 2024-02-15 RX ADMIN — HEPARIN 500 UNITS: 100 SYRINGE at 11:43

## 2024-02-15 RX ADMIN — HEPARIN 500 UNITS: 100 SYRINGE at 10:29

## 2024-02-15 ASSESSMENT — ENCOUNTER SYMPTOMS
SHORTNESS OF BREATH: 1
PALPITATIONS: 0
CONSTIPATION: 0
DIARRHEA: 1
DIAPHORESIS: 1
FEVER: 1
CHILLS: 1
WEIGHT LOSS: 1
MYALGIAS: 1
COUGH: 1
DIZZINESS: 0
VOMITING: 1
TINGLING: 0
NAUSEA: 1
HEADACHES: 0

## 2024-02-15 ASSESSMENT — PAIN SCALES - GENERAL: PAINLEVEL: NO PAIN

## 2024-02-15 ASSESSMENT — FIBROSIS 4 INDEX: FIB4 SCORE: 1.05

## 2024-02-15 NOTE — PROGRESS NOTES
Subjective     Fela Tiwari is a 38 y.o. female who presents with HER2 positive breast cancer receiving neoadjuvant chemotherapy.        HPI    Referring Physician: Rochelle Gaviria MD   Primary Care:  Francisco Maier M.D.      Diagnosis: Invasive ductal carcinoma of the right breast, grade 3, clinically stage IIa (cT2, cN0) ER negative TX negative, HER2 3+ positive, Ki-67 20 to 30%      Chief Complaint: Patient seen today for evaluation of her ER negative HER2 positive breast cancer, for continued monitoring of symptoms and side effects of cancer treatments, employing TC.    Oncology history of presenting illness:  Fela Tiwari is a 38 y.o. likely premenopausal white female who self detected a mass in her lateral right breast in 2023.  She had a mammogram on 2023 which showed the palpable mass is likely malignant.  Ultrasound showed it was 2.6 cm irregular hypoechoic spiculated mass.  Left breast was normal.  She underwent a ultrasound-guided biopsy of the right breast on 2023: This revealed an invasive ductal carcinoma, grade 3, ER negative, TX negative, HER2 3+ by IHC, Ki-67 20 to 30%.  An MRI was done yesterday and results are pending.  She had a hysterectomy in  but her ovaries are in place.  She is  3 para 2 and has not taken any hormone replacement therapy.  No family history of breast or ovarian cancer.  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  She was on anticoagulation for a while but has been off for several years.  She has also had gastric sleeve laparoscopic and cholecystectomy and does note chronic nausea.     Interval histories:  Interval history 24 (CAlsop, APRN):  Patient had a difficult time with her first cycle of chemotherapy.  Day after she received the Udenyca shot she presented to the emergency department with severe pain.  Pain was excruciating throughout her entire body.   She was given IV Dilaudid in the hospital and discharged with Tylenol.  She has been trying to take 1000 g of Tylenol with no relief.  Patient tearful today due to the severity of pain.  She also had experienced blood when urinating.  She did have a positive occult blood on stool and urine during hospitalization.  She was asked to follow-up with urology in the outpatient setting.  She stated since being discharged from the hospital she has not had any blood whatsoever in the urine or will.  She has been experiencing diarrhea anywhere from 5-10 episodes per day likely related to Perjeta.  She has been taking Imodium, approximately 2-3 pills per day with minimal relief.  She does have nausea and vomiting but she does state that Zofran does help.  She does also note thrush which was appreciated on physical examination today.    Interval history 2/15/2024: She had a terrible time with her first cycle of TCHP as noted above.  Her diarrhea has slowed dramatically but she still is using Lomotil occasionally.  She also had severe eye tearing likely due to the Taxotere which persists although it is better.  She has no neuropathy but did have a rash which responded to corticosteroids.  She also had persistent nausea which has finally resolved.  Her thrush is resolved as well.  She has not had any further vaginal bleeding but this has not been evaluated adequately except for a bladder scan which as expected was negative.  She feels like her tumor shrunk some.    Treatment history:  01/23/24: C1D1 TCHP with Udenyca  2/16/2024: C2 D2 TCHP with 20-25% reduction in dosing of the chemotherapy and 5 days of Zarxio instead of Udenyca      Allergies   Allergen Reactions    Bee Venom Anaphylaxis    Morphine Sulfate Anaphylaxis and Itching    Tape Rash     Paper tape OK, do not use tegaderm    Pineapple Hives       Current Outpatient Medications on File Prior to Encounter   Medication Sig Dispense Refill    acetaminophen (TYLENOL 8 HOUR  ARTHRITIS PAIN) 650 MG CR tablet Take 1 Tablet by mouth every 6 hours as needed for Mild Pain, Moderate Pain or Fever. 100 Tablet 0    loratadine (CLARITIN) 10 MG Tab Take 10 mg by mouth every day.      ondansetron (ZOFRAN) 4 MG Tab tablet Take 1 Tablet by mouth every four hours as needed for Nausea/Vomiting (for nausea, vomiting). 30 Tablet 6    prochlorperazine (COMPAZINE) 10 MG Tab Take 1 Tablet by mouth every 6 hours as needed (for nausea, vomiting). 30 Tablet 6    dexamethasone (DECADRON) 4 MG Tab Take 2 Tablets by mouth 2 times a day. Take 8 mg two times a day for 3 days, starting 24 hours prior to docetaxel. 12 Tablet 6    lidocaine-prilocaine (EMLA) 2.5-2.5 % Cream Apply to port 1 hour prior to access of port and cover with plastic wrap. (Patient taking differently: Apply 1 Application topically as needed. Apply to port 1 hour prior to access of port and cover with plastic wrap.) 30 g 3    EPINEPHrine (EPIPEN) 0.3 MG/0.3ML Solution Auto-injector solution for injection Inject 0.3 mL into the thigh one time for 1 dose  Indications: Patient advised to present to the ED even after epinephrine administration for further observation and management. 1 Each 0     Current Facility-Administered Medications on File Prior to Encounter   Medication Dose Route Frequency Provider Last Rate Last Admin    heparin lock flush 100 unit/mL injection 500 Units  500 Units Intracatheter PRN Gillian Garces, A.P.N.   500 Units at 02/15/24 1029       Review of Systems   Constitutional:  Positive for chills, diaphoresis, fever, malaise/fatigue and weight loss.   Respiratory:  Positive for cough and shortness of breath.    Cardiovascular:  Negative for chest pain and palpitations.   Gastrointestinal:  Positive for diarrhea, nausea and vomiting. Negative for constipation.   Genitourinary:  Negative for dysuria.   Musculoskeletal:  Positive for myalgias.   Neurological:  Negative for dizziness, tingling and headaches.               Objective     LMP 02/10/2012 (Approximate) Comment: Age of first period:10, No HRT     Physical Exam  Vitals reviewed.   Constitutional:       Appearance: Normal appearance. She is not diaphoretic.   HENT:      Head: Normocephalic and atraumatic.      Mouth/Throat:      Mouth: Mucous membranes are moist.      Pharynx: Oropharynx is clear.   Eyes:      Extraocular Movements: Extraocular movements intact.      Conjunctiva/sclera: Conjunctivae normal.      Pupils: Pupils are equal, round, and reactive to light.   Cardiovascular:      Rate and Rhythm: Normal rate and regular rhythm.      Pulses: Normal pulses.      Heart sounds: Normal heart sounds. No murmur heard.     No friction rub. No gallop.   Pulmonary:      Effort: Pulmonary effort is normal. No respiratory distress.      Breath sounds: Normal breath sounds. No wheezing.   Chest:      Comments: 2/15/2024: Mass at 1030 o'clock 5 cm from the right nipple measures 1.5 x 2 cm and is irregular.  No right axillary adenopathy  12/22/2023: Mass at 1030 o'clock 5 cm from the right nipple measuring 3.5 x 3.5 cm.  No right axillary adenopathy is noted.  The left breast is normal.  Abdominal:      General: Abdomen is flat. Bowel sounds are normal.      Palpations: Abdomen is soft. There is no mass.   Musculoskeletal:         General: No swelling or tenderness. Normal range of motion.   Skin:     General: Skin is warm and dry.   Neurological:      General: No focal deficit present.      Mental Status: She is alert and oriented to person, place, and time.   Psychiatric:         Mood and Affect: Mood normal.         Behavior: Behavior normal.                     Assessment & Plan          1. Malignant neoplasm of upper-outer quadrant of right breast in female, estrogen receptor negative (HCC)               Impression:  1.  Invasive ductal carcinoma of the right breast, grade 3, clinically stage IIa (cT2, cN0) ER negative MS negative, HER2 3+ positive, Ki-67 20 to 30%.   Poor tolerance.  A full dose TCHP.  Will proceed with cycle 2 at reduced dosing  2.  Chronic nausea after gastric sleeve and cholecystectomy severe after chemotherapy now minimal  3.  History of DVT/PE off anticoagulation for several years  4.  Severe myalgias likely from Udenyca - patient okay to take Tylenol and Ibuprofen - will provide patient with low dose Norco for severe pain to have daily Zarxio days 2 through 5 of the cycle.   5.  Bleeding urine and stool resolved etiology unclear  6.  Chemo nausea and vomiting -she will use Aloxi and olanzapine for cycle 2 and be on  7.  Diarrhea d/t Perjeta - Imodium not effective - will start Lomotil.  Better but not resolved  8.  Thrush - mild and will start Nystatin QID - recommend baking soda and salt water rinses as well.  Resolved    Plan:  Reducing chemotherapy doses by 20 to 25%.  Full dose trastuzumab epratuzumab.  Added olanzapine and Aloxi.  Substituting daily Zarxio for 5 days for Neulasta.  She will follow-up with Patricia in 1 week.  Please note that this dictation was created using voice recognition software. I have made every reasonable attempt to correct obvious errors, but I expect that there are errors of grammar and possibly content that I did not discover before finalizing the note.

## 2024-02-15 NOTE — PROGRESS NOTES
"Pharmacy Chemotherapy Calculation:    Dx: Invasive ductal carcinoma of the right breast WA/ER (-) HER2 (+) stage IIa (cT2, cN0)        Protocol: TCHP (DOCEtaxel/CARBOplatin + Pertuzumab + Trastuzumab)    *Dosing Reference*   Pertuzumab 840 mg IV over 60 minutes on Day 1 of Cycle 1   followed by 420 mg IV over 30 minutes on Day 1 of Cycles 2 - 6   Trastuzumab 8 mg/kg IV over 90 minutes on Day 1 of Cycle 1   followed by 6 mg/kg IV over 30 minutes on Day 1 of Cycles 2 - 6 followed by   DOCEtaxel  IV over 60 minutes on Day 1 followed by   *dose reduced to 60 mg/m2 starting C2 per Dr. Thomas due to tolerance  CARBOplatin  IV over 30 minutes on Day 1   *dose reduced to AUC starting C2 per Dr. Thomas due to tolerance  21-day cycle for 6 cycles     ~Followed by~    Pertuzumab 420 mg IV over 30 minutes on Day 1 beginning with Week 19   Trastuzumab 6 mg/kg IV over 30 minutes on Day 1 beginning with Week 19  21-day cycle to complete 52 weeks total of trastuzumab and pertuzumab therapy    NCCN Guidelines® for Breast Cancer V.4.2023.   Josef LEON, et al. Gay Oncol. 2013;24(9):2278-84.a    Allergies:  Bee venom, Morphine sulfate, Tape, and Pineapple       /64   Pulse 74   Temp 36.5 °C (97.7 °F) (Temporal)   Resp 18   Ht 1.68 m (5' 6.14\")   Wt 96.2 kg (212 lb 1.3 oz)   LMP 02/10/2012 (Approximate) Comment: Age of first period:10, No HRT  SpO2 95%   BMI 34.08 kg/m²  Body surface area is 2.12 meters squared.    Labs 2/15/24:  ANC~ 74817 Plt = 193k   Hgb = 14.2     SCr = 0.85 mg/dL CrCl > 125 mL/min   AST/ALT/AP = 16/28/60 TBili = 0.6      ECHO:   12/27/23: LVEF~65%    Drug Order   (Drug name, dose, route, IV Fluid & volume, frequency, number of doses) Cycle 2  Previous treatment: C1 1/23/24     Medication = Pertuzumab  Base Dose = 420 mg  Fixed dose; no calc required  Final Dose = 420 mg  Route = IV  Fluid & Volume =  mL  Admin Duration = Over 60 minutes          Fixed dose, OK to treat with final " dose   Medication = Trastuzumab  Base Dose = 6 mg/m2  Calc Dose: Base Dose x 96.2 kg = 577.2 mg  Final Dose = 592 mg  Route = IV  Fluid & Volume =  mL  Admin Duration = Over 90 minutes          <10% difference, OK to treat with final dose   Medication = DOCEtaxel  Base Dose = 60 mg/m2  Calc Dose:Base Dose x 2.12 m2 = 127.2 mg  Final Dose = 128 mg  Route = IV  Fluid & Volume =  mL  Admin Duration = Over 60 minutes          <10% difference, OK to treat with final dose   Medication = CARBOplatin  Base Dose = AUC 4.5  Calc Dose: Base Dose x (125 mL/min + 25) = 675 mg  Final Dose = 675 mg  Route = IV  Fluid & Volume =  mL  Admin Duration = Over 30 minutes          <10% difference, OK to treat with final dose     By my signature below, I confirm this process was performed independently with the BSA and all final chemotherapy dosing calculations congruent. I have reviewed the above chemotherapy order and that my calculation of the final dose and BSA (when applicable) corroborate those calculations of the  pharmacist. Discrepancies of 10% or greater in the written dose have been addressed and documented within the EPIC Progress notes.    Kori NunezD

## 2024-02-15 NOTE — PROGRESS NOTES
Pt presented to Infusion for  labs only. POC discussed, orders reviewed with pt and pt verbalized understanding.   Port accessed per [facility] policy, brisk blood return noted, line flushes with ease, and labs drawn. Pt tolerated well. Covered with hypoallergenic dressing. Port was flushed per [facility] policy and was left in place per pt request for tomorrow's use.  Future appts confirmed with pt. Pt discharged ambulatory in [facility].     Lab called to notify of critical results (4.7 Ca, 2.3 K+, 4.1 Corrected Ca), DANNY Cordova was notified. A redraw was ordered due to potential dilution of previous specimen. Patient was called and she reported that she was still in the building. She arrived for specimen redraw. Previous Heparin was aspirated and discarded, line was flushed, and 10cc of blood was wasted. Labs were recollected, port flushed per [facility] policy and was left in place per pt request for tomorrow's use. Patient was discharged ambulatory in [facility].     Redrawn lab results were reviewed; labs within parameters to initiate chemo tomorrow.

## 2024-02-16 ENCOUNTER — OUTPATIENT INFUSION SERVICES (OUTPATIENT)
Dept: ONCOLOGY | Facility: MEDICAL CENTER | Age: 39
End: 2024-02-16
Attending: INTERNAL MEDICINE
Payer: MEDICAID

## 2024-02-16 VITALS
TEMPERATURE: 97.5 F | HEIGHT: 66 IN | WEIGHT: 212.08 LBS | SYSTOLIC BLOOD PRESSURE: 93 MMHG | DIASTOLIC BLOOD PRESSURE: 53 MMHG | BODY MASS INDEX: 34.08 KG/M2 | OXYGEN SATURATION: 97 % | RESPIRATION RATE: 18 BRPM | HEART RATE: 82 BPM

## 2024-02-16 DIAGNOSIS — C50.411 MALIGNANT NEOPLASM OF UPPER-OUTER QUADRANT OF RIGHT BREAST IN FEMALE, ESTROGEN RECEPTOR NEGATIVE (HCC): ICD-10-CM

## 2024-02-16 DIAGNOSIS — Z17.1 MALIGNANT NEOPLASM OF UPPER-OUTER QUADRANT OF RIGHT BREAST IN FEMALE, ESTROGEN RECEPTOR NEGATIVE (HCC): ICD-10-CM

## 2024-02-16 PROCEDURE — 700105 HCHG RX REV CODE 258: Mod: UD | Performed by: INTERNAL MEDICINE

## 2024-02-16 PROCEDURE — 700111 HCHG RX REV CODE 636 W/ 250 OVERRIDE (IP): Mod: UD | Performed by: INTERNAL MEDICINE

## 2024-02-16 PROCEDURE — 700102 HCHG RX REV CODE 250 W/ 637 OVERRIDE(OP): Mod: UD | Performed by: NURSE PRACTITIONER

## 2024-02-16 PROCEDURE — 700111 HCHG RX REV CODE 636 W/ 250 OVERRIDE (IP): Mod: JZ,UD | Performed by: NURSE PRACTITIONER

## 2024-02-16 PROCEDURE — 700111 HCHG RX REV CODE 636 W/ 250 OVERRIDE (IP): Mod: UD | Performed by: STUDENT IN AN ORGANIZED HEALTH CARE EDUCATION/TRAINING PROGRAM

## 2024-02-16 PROCEDURE — 700102 HCHG RX REV CODE 250 W/ 637 OVERRIDE(OP): Mod: UD | Performed by: STUDENT IN AN ORGANIZED HEALTH CARE EDUCATION/TRAINING PROGRAM

## 2024-02-16 PROCEDURE — A9270 NON-COVERED ITEM OR SERVICE: HCPCS | Mod: UD | Performed by: STUDENT IN AN ORGANIZED HEALTH CARE EDUCATION/TRAINING PROGRAM

## 2024-02-16 PROCEDURE — 700105 HCHG RX REV CODE 258: Mod: UD | Performed by: NURSE PRACTITIONER

## 2024-02-16 PROCEDURE — 700105 HCHG RX REV CODE 258: Mod: UD | Performed by: STUDENT IN AN ORGANIZED HEALTH CARE EDUCATION/TRAINING PROGRAM

## 2024-02-16 PROCEDURE — 304540 HCHG NITRO SET VENT 2ND TUB

## 2024-02-16 PROCEDURE — 96376 TX/PRO/DX INJ SAME DRUG ADON: CPT

## 2024-02-16 PROCEDURE — 96415 CHEMO IV INFUSION ADDL HR: CPT

## 2024-02-16 PROCEDURE — 96413 CHEMO IV INFUSION 1 HR: CPT

## 2024-02-16 PROCEDURE — 96367 TX/PROPH/DG ADDL SEQ IV INF: CPT

## 2024-02-16 PROCEDURE — 96375 TX/PRO/DX INJ NEW DRUG ADDON: CPT

## 2024-02-16 PROCEDURE — 96417 CHEMO IV INFUS EACH ADDL SEQ: CPT

## 2024-02-16 RX ORDER — PALONOSETRON 0.05 MG/ML
0.25 INJECTION, SOLUTION INTRAVENOUS ONCE
Status: COMPLETED | OUTPATIENT
Start: 2024-02-16 | End: 2024-02-16

## 2024-02-16 RX ORDER — DIPHENHYDRAMINE HYDROCHLORIDE 50 MG/ML
25 INJECTION INTRAMUSCULAR; INTRAVENOUS ONCE
Status: DISCONTINUED | OUTPATIENT
Start: 2024-02-16 | End: 2024-02-16

## 2024-02-16 RX ORDER — PROCHLORPERAZINE MALEATE 10 MG
10 TABLET ORAL EVERY 6 HOURS PRN
Status: DISCONTINUED | OUTPATIENT
Start: 2024-02-16 | End: 2024-02-16 | Stop reason: HOSPADM

## 2024-02-16 RX ORDER — LOPERAMIDE HYDROCHLORIDE 2 MG/1
2 CAPSULE ORAL 4 TIMES DAILY PRN
Status: DISCONTINUED | OUTPATIENT
Start: 2024-02-16 | End: 2024-02-16 | Stop reason: HOSPADM

## 2024-02-16 RX ORDER — DIPHENHYDRAMINE HYDROCHLORIDE 50 MG/ML
50 INJECTION INTRAMUSCULAR; INTRAVENOUS PRN
Status: COMPLETED | OUTPATIENT
Start: 2024-02-16 | End: 2024-02-16

## 2024-02-16 RX ORDER — METHYLPREDNISOLONE SODIUM SUCCINATE 125 MG/2ML
125 INJECTION, POWDER, LYOPHILIZED, FOR SOLUTION INTRAMUSCULAR; INTRAVENOUS PRN
Status: COMPLETED | OUTPATIENT
Start: 2024-02-16 | End: 2024-02-16

## 2024-02-16 RX ADMIN — LOPERAMIDE HYDROCHLORIDE 2 MG: 2 CAPSULE ORAL at 17:42

## 2024-02-16 RX ADMIN — DOCETAXEL 128 MG: 20 INJECTION, SOLUTION, CONCENTRATE INTRAVENOUS at 14:31

## 2024-02-16 RX ADMIN — FOSAPREPITANT 150 MG: 150 INJECTION, POWDER, LYOPHILIZED, FOR SOLUTION INTRAVENOUS at 11:58

## 2024-02-16 RX ADMIN — METHYLPREDNISOLONE SODIUM SUCCINATE 125 MG: 125 INJECTION, POWDER, FOR SOLUTION INTRAMUSCULAR; INTRAVENOUS at 14:59

## 2024-02-16 RX ADMIN — PROCHLORPERAZINE MALEATE 10 MG: 10 TABLET ORAL at 15:18

## 2024-02-16 RX ADMIN — HEPARIN 500 UNITS: 100 SYRINGE at 18:34

## 2024-02-16 RX ADMIN — DIPHENHYDRAMINE HYDROCHLORIDE 25 MG: 50 INJECTION, SOLUTION INTRAMUSCULAR; INTRAVENOUS at 15:07

## 2024-02-16 RX ADMIN — DEXAMETHASONE SODIUM PHOSPHATE 12 MG: 4 INJECTION, SOLUTION INTRA-ARTICULAR; INTRALESIONAL; INTRAMUSCULAR; INTRAVENOUS; SOFT TISSUE at 11:25

## 2024-02-16 RX ADMIN — PALONOSETRON 0.25 MG: 0.05 INJECTION, SOLUTION INTRAVENOUS at 11:24

## 2024-02-16 RX ADMIN — TRASTUZUMAB-ANNS 592 MG: 420 INJECTION, POWDER, LYOPHILIZED, FOR SOLUTION INTRAVENOUS at 13:44

## 2024-02-16 RX ADMIN — DIPHENHYDRAMINE HYDROCHLORIDE 25 MG: 50 INJECTION, SOLUTION INTRAMUSCULAR; INTRAVENOUS at 11:40

## 2024-02-16 RX ADMIN — FAMOTIDINE 20 MG: 10 INJECTION, SOLUTION INTRAVENOUS at 11:55

## 2024-02-16 RX ADMIN — PERTUZUMAB 420 MG: 30 INJECTION, SOLUTION, CONCENTRATE INTRAVENOUS at 12:39

## 2024-02-16 RX ADMIN — CARBOPLATIN 675 MG: 10 INJECTION, SOLUTION INTRAVENOUS at 17:55

## 2024-02-16 ASSESSMENT — FIBROSIS 4 INDEX: FIB4 SCORE: 0.63

## 2024-02-16 NOTE — PROGRESS NOTES
"Pharmacy Chemotherapy Calculations    Dx: Breast cancer, ER-, MD-, HER2+  Cycle 2  Previous treatment = C1 on 1/23/24    Protocol: TCHP  Pertuzumab 840 mg IV over 60 minutes on Day 1 of Cycle 1 followed by  Pertuzumab 420 mg IV over 30 minutes on Day 1 of Cycles 2-6  Trastuzumab 8 mg/kg IV over 90 minutes on Day 1 of Cycle 1 followed by  Trastuzumab 6 mg/kg IV over 30 minutes on Day 1 of Cycles 2-6 followed by  Docetaxel  IV over 60 minutes on Day 1 followed by   2/16/24 - Dose reduced to 60 mg/m2 for tolerance starting with C2  Carboplatin  IV over 30 minutes on Day 1   2/16/24 - Dose reduced to AUC 4.5 for tolerance starting with C2  21-day cycle for 6 cycles  ~Followed by~  Pertuzumab 420 mg IV over 30 minutes on Day 1 beginning with Week 19  Trastuzumab 6 mg/kg IV over 30 minutes on Day 1 beginning with Week 19  21-day cycle to complete 52 weeks total of trastuzumab and pertuzumab therapy  NCCN Guidelines for Breast Cancer V.2.2022.  Josef LEON et al. Gay Oncol. 2013;24(9):2278-84.    Allergies:  Bee venom, Morphine sulfate, Tape, and Pineapple     /64   Pulse 74   Temp 36.5 °C (97.7 °F) (Temporal)   Resp 18   Ht 1.68 m (5' 6.14\")   Wt 96.2 kg (212 lb 1.3 oz)   LMP 02/10/2012 (Approximate) Comment: Age of first period:10, No HRT  SpO2 95%   BMI 34.08 kg/m²  Body surface area is 2.12 meters squared.    ECHO  12/27/23 LVEF 65%    Labs 2/15/24  ANC~ 15486 Plt = 193k   Hgb = 14.2     SCr = 0.85 mg/dL CrCl ~ >125 mL/min   LFT's = WNLs  TBili = 0.6     Pertuzumab 420 mg fixed dose              Fixed dose, OK to treat with final dose = 420 mg IV     Trastuzumab-anns 6 mg/kg x 96.2 kg = 577.2 mg              <10% difference, OK to treat with final dose = 592 mg IV     Docetaxel 60 mg/m² x 2.12 m² = 127.2 mg              <10% difference, OK to treat with final dose = 128 mg IV     Carboplatin AUC 4.5 x (125 mL/min + 25) = 675 mg              <10% difference, OK to treat with final dose = 675 mg " IV    Casimiro Peres, PharmD

## 2024-02-16 NOTE — PROGRESS NOTES
Chemotherapy Verification - SECONDARY RN       Height = 1.68m  Weight = 96.2kg  BSA = 2.12m2       Medication: pertuzumab  Dose: flat dose  Calculated Dose: 420mg                             (In mg/m2, AUC, mg/kg)     Medication: trastuzumab-anns  Dose: 6mg/kg  Calculated Dose: 577.2mg                             (In mg/m2, AUC, mg/kg)    Medication: docetaxel  Dose: 60mg/m2  Calculated Dose: 127.2mg                             (In mg/m2, AUC, mg/kg)    Medication: carboplatin  Dose: AUC 4.5  Calculated Dose: 707.8mg                             (In mg/m2, AUC, mg/kg)      Carboplatin calculation (if applicable):  (4.5*(132.3+25)) = 707.85     I confirm that this process was performed independently.

## 2024-02-16 NOTE — PROGRESS NOTES
Chemotherapy Verification - PRIMARY RN      Height = 168 cm  Weight = 96.2 kg  BSA = 2.12 m^2       Medication: Pertuzumab  Dose: SET DOSE  Calculated Dose: 420 mg                             (In mg/m2, AUC, mg/kg)     Medication: Trastuzumab-anns Dose: 6 mg/kg  Calculated Dose: 577.2 mg                             (In mg/m2, AUC, mg/kg)    Medication: Docetaxel  Dose: 60 mg/m^2  Calculated Dose: 127.2 mg                             (In mg/m2, AUC, mg/kg)    Medication: Carboplatin  Dose: AUC=4.5  Calculated Dose: 713.75 mg                             (In mg/m2, AUC, mg/kg)        Carboplatin calculation (if applicable): (((140-40)*[id number])*(0.7+(1*0.15))/(0.85*72)+25)*4.5*((1=1)+(1=2)) = 713.75       I confirm this process was performed independently with the BSA and all final chemotherapy dosing calculations congruent.  Any discrepancies of 10% or greater have been addressed with the chemotherapy pharmacist. The resolution of the discrepancy has been documented in the EPIC progress notes.

## 2024-02-17 ENCOUNTER — APPOINTMENT (OUTPATIENT)
Dept: ONCOLOGY | Facility: MEDICAL CENTER | Age: 39
End: 2024-02-17
Attending: INTERNAL MEDICINE
Payer: MEDICAID

## 2024-02-17 NOTE — PROGRESS NOTES
Pt presented to infusion for C2 of TCHP. She had labs drawn and port accessed yesterday, met parameters. Port flushed and brisk blood return observed. Obtained order to add pepcid and benadryl as pre-meds as pt reacted to C1 (see note). Pre-meds given. Perjeta infused over 30 mins followed by 30 min obs. Kanjinti infused over 30 mins. Pt tolerated both well. Taxotere started at 100 ml/hr (no rxn C1). Upon first titration, pt c/o chest tightness and cough. Infusion stopped, prn benadryl and solu-medrol given. Pt also given prn compazine and imodium. Symptoms resolved. Taxotere re-started at 50 ml/hr, titrated 25 ml/hr q 30 mins. Achieved max rate of 150 ml/hr. Finished without further rxn. Carbo infused without issue. Port flushed, heparinized and de-accessed, gauze drsg placed. Gets 5 days of zarxio starting Sunday, pt knows to take her Claritin. Has next cycle scheduled as well. Left on foot to self care.

## 2024-02-18 ENCOUNTER — OUTPATIENT INFUSION SERVICES (OUTPATIENT)
Dept: ONCOLOGY | Facility: MEDICAL CENTER | Age: 39
End: 2024-02-18
Attending: INTERNAL MEDICINE
Payer: MEDICAID

## 2024-02-18 VITALS
HEART RATE: 57 BPM | SYSTOLIC BLOOD PRESSURE: 118 MMHG | RESPIRATION RATE: 18 BRPM | HEIGHT: 66 IN | WEIGHT: 218.92 LBS | DIASTOLIC BLOOD PRESSURE: 69 MMHG | OXYGEN SATURATION: 94 % | TEMPERATURE: 98.8 F | BODY MASS INDEX: 35.18 KG/M2

## 2024-02-18 DIAGNOSIS — C50.411 MALIGNANT NEOPLASM OF UPPER-OUTER QUADRANT OF RIGHT BREAST IN FEMALE, ESTROGEN RECEPTOR NEGATIVE (HCC): ICD-10-CM

## 2024-02-18 DIAGNOSIS — Z17.1 MALIGNANT NEOPLASM OF UPPER-OUTER QUADRANT OF RIGHT BREAST IN FEMALE, ESTROGEN RECEPTOR NEGATIVE (HCC): ICD-10-CM

## 2024-02-18 LAB — EKG IMPRESSION: NORMAL

## 2024-02-18 PROCEDURE — 700111 HCHG RX REV CODE 636 W/ 250 OVERRIDE (IP): Performed by: NURSE PRACTITIONER

## 2024-02-18 PROCEDURE — 36415 COLL VENOUS BLD VENIPUNCTURE: CPT

## 2024-02-18 PROCEDURE — 83690 ASSAY OF LIPASE: CPT

## 2024-02-18 PROCEDURE — 85610 PROTHROMBIN TIME: CPT

## 2024-02-18 PROCEDURE — 85027 COMPLETE CBC AUTOMATED: CPT

## 2024-02-18 PROCEDURE — 99285 EMERGENCY DEPT VISIT HI MDM: CPT

## 2024-02-18 PROCEDURE — 85730 THROMBOPLASTIN TIME PARTIAL: CPT

## 2024-02-18 PROCEDURE — 93005 ELECTROCARDIOGRAM TRACING: CPT

## 2024-02-18 PROCEDURE — 93005 ELECTROCARDIOGRAM TRACING: CPT | Performed by: EMERGENCY MEDICINE

## 2024-02-18 PROCEDURE — 96372 THER/PROPH/DIAG INJ SC/IM: CPT

## 2024-02-18 PROCEDURE — 85007 BL SMEAR W/DIFF WBC COUNT: CPT

## 2024-02-18 PROCEDURE — 80053 COMPREHEN METABOLIC PANEL: CPT

## 2024-02-18 RX ADMIN — FILGRASTIM-SNDZ 480 MCG: 480 INJECTION, SOLUTION INTRAVENOUS; SUBCUTANEOUS at 14:11

## 2024-02-18 ASSESSMENT — FIBROSIS 4 INDEX
FIB4 SCORE: 0.63
FIB4 SCORE: 0.63

## 2024-02-18 NOTE — PROGRESS NOTES
Pt arrived to IS, ambulatory, for Zarxio injection. Pt c/o fatigue post chemotherapy. Pt educated about Zarxio injection and potential side effects, pt states she has Claritin at home and plans to begin taking it today. Zarxio injected into the RLQ abdomen with no complications. Pt left IS with no s/sx of distress. Follow up appointment confirmed for tomorrow.

## 2024-02-19 ENCOUNTER — TELEPHONE (OUTPATIENT)
Dept: HEMATOLOGY ONCOLOGY | Facility: MEDICAL CENTER | Age: 39
End: 2024-02-19
Payer: MEDICAID

## 2024-02-19 ENCOUNTER — APPOINTMENT (OUTPATIENT)
Dept: RADIOLOGY | Facility: MEDICAL CENTER | Age: 39
DRG: 690 | End: 2024-02-19
Attending: EMERGENCY MEDICINE
Payer: MEDICAID

## 2024-02-19 ENCOUNTER — HOSPITAL ENCOUNTER (INPATIENT)
Facility: MEDICAL CENTER | Age: 39
LOS: 1 days | DRG: 690 | End: 2024-02-20
Attending: EMERGENCY MEDICINE | Admitting: FAMILY MEDICINE
Payer: MEDICAID

## 2024-02-19 DIAGNOSIS — C50.411 MALIGNANT NEOPLASM OF UPPER-OUTER QUADRANT OF RIGHT BREAST IN FEMALE, ESTROGEN RECEPTOR NEGATIVE (HCC): ICD-10-CM

## 2024-02-19 DIAGNOSIS — N93.9 VAGINAL BLEEDING: ICD-10-CM

## 2024-02-19 DIAGNOSIS — R10.30 LOWER ABDOMINAL PAIN: ICD-10-CM

## 2024-02-19 DIAGNOSIS — R52 INTRACTABLE PAIN: ICD-10-CM

## 2024-02-19 DIAGNOSIS — Z17.1 MALIGNANT NEOPLASM OF UPPER-OUTER QUADRANT OF RIGHT BREAST IN FEMALE, ESTROGEN RECEPTOR NEGATIVE (HCC): ICD-10-CM

## 2024-02-19 PROBLEM — R31.0 GROSS HEMATURIA: Status: ACTIVE | Noted: 2024-02-19

## 2024-02-19 LAB
ALBUMIN SERPL BCP-MCNC: 3.8 G/DL (ref 3.2–4.9)
ALBUMIN/GLOB SERPL: 2.2 G/DL
ALP SERPL-CCNC: 45 U/L (ref 30–99)
ALT SERPL-CCNC: 24 U/L (ref 2–50)
ANION GAP SERPL CALC-SCNC: 9 MMOL/L (ref 7–16)
ANISOCYTOSIS BLD QL SMEAR: ABNORMAL
APPEARANCE UR: ABNORMAL
APTT PPP: 21.5 SEC (ref 24.7–36)
AST SERPL-CCNC: 13 U/L (ref 12–45)
BACTERIA #/AREA URNS HPF: ABNORMAL /HPF
BASOPHILS # BLD AUTO: 0 % (ref 0–1.8)
BASOPHILS # BLD: 0 K/UL (ref 0–0.12)
BILIRUB SERPL-MCNC: 0.4 MG/DL (ref 0.1–1.5)
BILIRUB UR QL STRIP.AUTO: ABNORMAL
BUN SERPL-MCNC: 26 MG/DL (ref 8–22)
C TRACH DNA GENITAL QL NAA+PROBE: NEGATIVE
CALCIUM ALBUM COR SERPL-MCNC: 8.2 MG/DL (ref 8.5–10.5)
CALCIUM SERPL-MCNC: 8 MG/DL (ref 8.5–10.5)
CANDIDA DNA VAG QL PROBE+SIG AMP: NEGATIVE
CHLORIDE SERPL-SCNC: 106 MMOL/L (ref 96–112)
CO2 SERPL-SCNC: 26 MMOL/L (ref 20–33)
COLOR UR: ABNORMAL
CREAT SERPL-MCNC: 0.93 MG/DL (ref 0.5–1.4)
EOSINOPHIL # BLD AUTO: 0 K/UL (ref 0–0.51)
EOSINOPHIL NFR BLD: 0 % (ref 0–6.9)
EPI CELLS #/AREA URNS HPF: NEGATIVE /HPF
ERYTHROCYTE [DISTWIDTH] IN BLOOD BY AUTOMATED COUNT: 44.5 FL (ref 35.9–50)
G VAGINALIS DNA VAG QL PROBE+SIG AMP: POSITIVE
GFR SERPLBLD CREATININE-BSD FMLA CKD-EPI: 79 ML/MIN/1.73 M 2
GLOBULIN SER CALC-MCNC: 1.7 G/DL (ref 1.9–3.5)
GLUCOSE SERPL-MCNC: 133 MG/DL (ref 65–99)
GLUCOSE UR STRIP.AUTO-MCNC: NEGATIVE MG/DL
HCT VFR BLD AUTO: 36.7 % (ref 37–47)
HGB BLD-MCNC: 12.8 G/DL (ref 12–16)
INR PPP: 1.02 (ref 0.87–1.13)
KETONES UR STRIP.AUTO-MCNC: ABNORMAL MG/DL
LEUKOCYTE ESTERASE UR QL STRIP.AUTO: NEGATIVE
LIPASE SERPL-CCNC: 46 U/L (ref 11–82)
LYMPHOCYTES # BLD AUTO: 1.18 K/UL (ref 1–4.8)
LYMPHOCYTES NFR BLD: 5.8 % (ref 22–41)
MANUAL DIFF BLD: NORMAL
MCH RBC QN AUTO: 32.7 PG (ref 27–33)
MCHC RBC AUTO-ENTMCNC: 34.9 G/DL (ref 32.2–35.5)
MCV RBC AUTO: 93.9 FL (ref 81.4–97.8)
METAMYELOCYTES NFR BLD MANUAL: 1.7 %
MICRO URNS: ABNORMAL
MICROCYTES BLD QL SMEAR: ABNORMAL
MONOCYTES # BLD AUTO: 0 K/UL (ref 0–0.85)
MONOCYTES NFR BLD AUTO: 0 % (ref 0–13.4)
MORPHOLOGY BLD-IMP: NORMAL
N GONORRHOEA DNA GENITAL QL NAA+PROBE: NEGATIVE
NEUTROPHILS # BLD AUTO: 18.78 K/UL (ref 1.82–7.42)
NEUTROPHILS NFR BLD: 92.5 % (ref 44–72)
NITRITE UR QL STRIP.AUTO: POSITIVE
NRBC # BLD AUTO: 0 K/UL
NRBC BLD-RTO: 0 /100 WBC (ref 0–0.2)
PH UR STRIP.AUTO: 6.5 [PH] (ref 5–8)
PLATELET # BLD AUTO: 131 K/UL (ref 164–446)
PLATELET BLD QL SMEAR: NORMAL
PMV BLD AUTO: 11.3 FL (ref 9–12.9)
POTASSIUM SERPL-SCNC: 3.9 MMOL/L (ref 3.6–5.5)
PROT SERPL-MCNC: 5.5 G/DL (ref 6–8.2)
PROT UR QL STRIP: 100 MG/DL
PROTHROMBIN TIME: 13.5 SEC (ref 12–14.6)
RBC # BLD AUTO: 3.91 M/UL (ref 4.2–5.4)
RBC # URNS HPF: >150 /HPF
RBC BLD AUTO: PRESENT
RBC UR QL AUTO: ABNORMAL
SODIUM SERPL-SCNC: 141 MMOL/L (ref 135–145)
SP GR UR STRIP.AUTO: >=1.03
SPECIMEN SOURCE: NORMAL
T VAGINALIS DNA VAG QL PROBE+SIG AMP: NEGATIVE
UROBILINOGEN UR STRIP.AUTO-MCNC: 0.2 MG/DL
WBC # BLD AUTO: 20.3 K/UL (ref 4.8–10.8)
WBC #/AREA URNS HPF: ABNORMAL /HPF
YEAST BUDDING URNS QL: PRESENT /HPF

## 2024-02-19 PROCEDURE — 96376 TX/PRO/DX INJ SAME DRUG ADON: CPT

## 2024-02-19 PROCEDURE — 96375 TX/PRO/DX INJ NEW DRUG ADDON: CPT

## 2024-02-19 PROCEDURE — A9270 NON-COVERED ITEM OR SERVICE: HCPCS

## 2024-02-19 PROCEDURE — 700111 HCHG RX REV CODE 636 W/ 250 OVERRIDE (IP): Mod: JZ | Performed by: FAMILY MEDICINE

## 2024-02-19 PROCEDURE — 74177 CT ABD & PELVIS W/CONTRAST: CPT

## 2024-02-19 PROCEDURE — 87491 CHLMYD TRACH DNA AMP PROBE: CPT

## 2024-02-19 PROCEDURE — 87510 GARDNER VAG DNA DIR PROBE: CPT

## 2024-02-19 PROCEDURE — 96374 THER/PROPH/DIAG INJ IV PUSH: CPT

## 2024-02-19 PROCEDURE — A9270 NON-COVERED ITEM OR SERVICE: HCPCS | Mod: UD | Performed by: EMERGENCY MEDICINE

## 2024-02-19 PROCEDURE — 700102 HCHG RX REV CODE 250 W/ 637 OVERRIDE(OP): Performed by: FAMILY MEDICINE

## 2024-02-19 PROCEDURE — 700102 HCHG RX REV CODE 250 W/ 637 OVERRIDE(OP)

## 2024-02-19 PROCEDURE — A9270 NON-COVERED ITEM OR SERVICE: HCPCS | Performed by: FAMILY MEDICINE

## 2024-02-19 PROCEDURE — 87660 TRICHOMONAS VAGIN DIR PROBE: CPT

## 2024-02-19 PROCEDURE — 81001 URINALYSIS AUTO W/SCOPE: CPT

## 2024-02-19 PROCEDURE — 700117 HCHG RX CONTRAST REV CODE 255: Mod: UD | Performed by: EMERGENCY MEDICINE

## 2024-02-19 PROCEDURE — 770004 HCHG ROOM/CARE - ONCOLOGY PRIVATE *

## 2024-02-19 PROCEDURE — 99222 1ST HOSP IP/OBS MODERATE 55: CPT | Mod: GC | Performed by: FAMILY MEDICINE

## 2024-02-19 PROCEDURE — 700111 HCHG RX REV CODE 636 W/ 250 OVERRIDE (IP): Mod: JZ

## 2024-02-19 PROCEDURE — 700102 HCHG RX REV CODE 250 W/ 637 OVERRIDE(OP): Mod: UD | Performed by: EMERGENCY MEDICINE

## 2024-02-19 PROCEDURE — 87480 CANDIDA DNA DIR PROBE: CPT

## 2024-02-19 PROCEDURE — 87591 N.GONORRHOEAE DNA AMP PROB: CPT

## 2024-02-19 PROCEDURE — 700111 HCHG RX REV CODE 636 W/ 250 OVERRIDE (IP): Mod: UD | Performed by: EMERGENCY MEDICINE

## 2024-02-19 PROCEDURE — 700105 HCHG RX REV CODE 258: Performed by: FAMILY MEDICINE

## 2024-02-19 RX ORDER — CEPHALEXIN 500 MG/1
500 CAPSULE ORAL ONCE
Status: COMPLETED | OUTPATIENT
Start: 2024-02-19 | End: 2024-02-19

## 2024-02-19 RX ORDER — CALCIUM CARBONATE 500 MG/1
500 TABLET, CHEWABLE ORAL 4 TIMES DAILY PRN
Status: DISCONTINUED | OUTPATIENT
Start: 2024-02-19 | End: 2024-02-19

## 2024-02-19 RX ORDER — CALCIUM CARBONATE 750 MG/1
2 TABLET, CHEWABLE ORAL 4 TIMES DAILY
COMMUNITY

## 2024-02-19 RX ORDER — ONDANSETRON 2 MG/ML
4 INJECTION INTRAMUSCULAR; INTRAVENOUS EVERY 4 HOURS PRN
Status: DISCONTINUED | OUTPATIENT
Start: 2024-02-19 | End: 2024-02-20 | Stop reason: HOSPADM

## 2024-02-19 RX ORDER — SODIUM CHLORIDE 9 MG/ML
INJECTION, SOLUTION INTRAVENOUS CONTINUOUS
Status: DISCONTINUED | OUTPATIENT
Start: 2024-02-19 | End: 2024-02-20 | Stop reason: HOSPADM

## 2024-02-19 RX ORDER — HYDROCODONE BITARTRATE AND ACETAMINOPHEN 5; 325 MG/1; MG/1
1 TABLET ORAL EVERY 6 HOURS PRN
COMMUNITY
End: 2024-03-28 | Stop reason: SDUPTHER

## 2024-02-19 RX ORDER — ACETAMINOPHEN 325 MG/1
650 TABLET ORAL EVERY 6 HOURS PRN
Status: DISCONTINUED | OUTPATIENT
Start: 2024-02-19 | End: 2024-02-20 | Stop reason: HOSPADM

## 2024-02-19 RX ORDER — LABETALOL HYDROCHLORIDE 5 MG/ML
10 INJECTION, SOLUTION INTRAVENOUS EVERY 4 HOURS PRN
Status: DISCONTINUED | OUTPATIENT
Start: 2024-02-19 | End: 2024-02-20 | Stop reason: HOSPADM

## 2024-02-19 RX ORDER — HYDROMORPHONE HYDROCHLORIDE 1 MG/ML
0.5 INJECTION, SOLUTION INTRAMUSCULAR; INTRAVENOUS; SUBCUTANEOUS
Status: DISCONTINUED | OUTPATIENT
Start: 2024-02-19 | End: 2024-02-19

## 2024-02-19 RX ORDER — OXYCODONE HYDROCHLORIDE 5 MG/1
5 TABLET ORAL
Status: DISCONTINUED | OUTPATIENT
Start: 2024-02-19 | End: 2024-02-20 | Stop reason: HOSPADM

## 2024-02-19 RX ORDER — HYDROMORPHONE HYDROCHLORIDE 1 MG/ML
1 INJECTION, SOLUTION INTRAMUSCULAR; INTRAVENOUS; SUBCUTANEOUS ONCE
Status: COMPLETED | OUTPATIENT
Start: 2024-02-19 | End: 2024-02-19

## 2024-02-19 RX ORDER — DIPHENOXYLATE HYDROCHLORIDE AND ATROPINE SULFATE 2.5; .025 MG/1; MG/1
1 TABLET ORAL 4 TIMES DAILY PRN
COMMUNITY
End: 2024-03-26

## 2024-02-19 RX ORDER — ALPRAZOLAM 0.5 MG/1
0.5 TABLET ORAL NIGHTLY PRN
COMMUNITY
Start: 2024-02-16

## 2024-02-19 RX ORDER — DIPHENHYDRAMINE HYDROCHLORIDE 50 MG/ML
25 INJECTION INTRAMUSCULAR; INTRAVENOUS EVERY 6 HOURS PRN
Status: DISCONTINUED | OUTPATIENT
Start: 2024-02-19 | End: 2024-02-20 | Stop reason: HOSPADM

## 2024-02-19 RX ORDER — OXYCODONE HYDROCHLORIDE 10 MG/1
10 TABLET ORAL
Status: DISCONTINUED | OUTPATIENT
Start: 2024-02-19 | End: 2024-02-20 | Stop reason: HOSPADM

## 2024-02-19 RX ORDER — CEPHALEXIN 500 MG/1
500 CAPSULE ORAL 4 TIMES DAILY
Qty: 20 CAPSULE | Refills: 0 | Status: ACTIVE | OUTPATIENT
Start: 2024-02-19 | End: 2024-02-24

## 2024-02-19 RX ORDER — ONDANSETRON 2 MG/ML
4 INJECTION INTRAMUSCULAR; INTRAVENOUS ONCE
Status: COMPLETED | OUTPATIENT
Start: 2024-02-19 | End: 2024-02-19

## 2024-02-19 RX ORDER — ENOXAPARIN SODIUM 100 MG/ML
40 INJECTION SUBCUTANEOUS DAILY
Status: DISCONTINUED | OUTPATIENT
Start: 2024-02-19 | End: 2024-02-20 | Stop reason: HOSPADM

## 2024-02-19 RX ORDER — ALUMINA, MAGNESIA, AND SIMETHICONE 2400; 2400; 240 MG/30ML; MG/30ML; MG/30ML
20 SUSPENSION ORAL ONCE
Status: COMPLETED | OUTPATIENT
Start: 2024-02-19 | End: 2024-02-19

## 2024-02-19 RX ORDER — OMEPRAZOLE 20 MG/1
20 CAPSULE, DELAYED RELEASE ORAL DAILY
Status: DISCONTINUED | OUTPATIENT
Start: 2024-02-19 | End: 2024-02-20 | Stop reason: HOSPADM

## 2024-02-19 RX ORDER — METRONIDAZOLE 500 MG/1
500 TABLET ORAL 2 TIMES DAILY
Qty: 14 TABLET | Refills: 0 | Status: ACTIVE | OUTPATIENT
Start: 2024-02-19 | End: 2024-02-20

## 2024-02-19 RX ADMIN — LIDOCAINE HYDROCHLORIDE 30 ML: 20 SOLUTION ORAL; TOPICAL at 13:02

## 2024-02-19 RX ADMIN — CEPHALEXIN 500 MG: 500 CAPSULE ORAL at 05:27

## 2024-02-19 RX ADMIN — OXYCODONE HYDROCHLORIDE 10 MG: 10 TABLET ORAL at 13:26

## 2024-02-19 RX ADMIN — ONDANSETRON 4 MG: 2 INJECTION INTRAMUSCULAR; INTRAVENOUS at 01:07

## 2024-02-19 RX ADMIN — ANTACID TABLETS 500 MG: 500 TABLET, CHEWABLE ORAL at 08:28

## 2024-02-19 RX ADMIN — OMEPRAZOLE 20 MG: 20 CAPSULE, DELAYED RELEASE ORAL at 10:11

## 2024-02-19 RX ADMIN — OXYCODONE 5 MG: 5 TABLET ORAL at 23:08

## 2024-02-19 RX ADMIN — ONDANSETRON 4 MG: 2 INJECTION INTRAMUSCULAR; INTRAVENOUS at 15:24

## 2024-02-19 RX ADMIN — HYDROMORPHONE HYDROCHLORIDE 1 MG: 1 INJECTION, SOLUTION INTRAMUSCULAR; INTRAVENOUS; SUBCUTANEOUS at 05:27

## 2024-02-19 RX ADMIN — ONDANSETRON 4 MG: 2 INJECTION INTRAMUSCULAR; INTRAVENOUS at 23:09

## 2024-02-19 RX ADMIN — ONDANSETRON 4 MG: 2 INJECTION INTRAMUSCULAR; INTRAVENOUS at 05:27

## 2024-02-19 RX ADMIN — ALUMINUM HYDROXIDE, MAGNESIUM HYDROXIDE, AND DIMETHICONE 20 ML: 400; 400; 40 SUSPENSION ORAL at 05:26

## 2024-02-19 RX ADMIN — ENOXAPARIN SODIUM 40 MG: 100 INJECTION SUBCUTANEOUS at 17:21

## 2024-02-19 RX ADMIN — SODIUM CHLORIDE: 9 INJECTION, SOLUTION INTRAVENOUS at 08:36

## 2024-02-19 RX ADMIN — DIPHENHYDRAMINE HYDROCHLORIDE 25 MG: 50 INJECTION, SOLUTION INTRAMUSCULAR; INTRAVENOUS at 08:27

## 2024-02-19 RX ADMIN — TBO-FILGRASTIM 480 MCG: 480 INJECTION, SOLUTION SUBCUTANEOUS at 13:16

## 2024-02-19 RX ADMIN — HYDROMORPHONE HYDROCHLORIDE 1 MG: 1 INJECTION, SOLUTION INTRAMUSCULAR; INTRAVENOUS; SUBCUTANEOUS at 01:06

## 2024-02-19 RX ADMIN — IOHEXOL 100 ML: 350 INJECTION, SOLUTION INTRAVENOUS at 02:45

## 2024-02-19 RX ADMIN — OXYCODONE HYDROCHLORIDE 10 MG: 10 TABLET ORAL at 08:27

## 2024-02-19 RX ADMIN — ONDANSETRON 4 MG: 2 INJECTION INTRAMUSCULAR; INTRAVENOUS at 01:49

## 2024-02-19 RX ADMIN — OXYCODONE 5 MG: 5 TABLET ORAL at 17:20

## 2024-02-19 ASSESSMENT — COGNITIVE AND FUNCTIONAL STATUS - GENERAL
MOBILITY SCORE: 24
SUGGESTED CMS G CODE MODIFIER MOBILITY: CH
DAILY ACTIVITIY SCORE: 24
SUGGESTED CMS G CODE MODIFIER DAILY ACTIVITY: CH

## 2024-02-19 ASSESSMENT — PAIN DESCRIPTION - PAIN TYPE
TYPE: CHRONIC PAIN
TYPE: ACUTE PAIN

## 2024-02-19 NOTE — CARE PLAN
The patient is Stable - Low risk of patient condition declining or worsening    Shift Goals  Clinical Goals: Pain, nausea, and heartburn management  Patient Goals: Pain, nausea, and heartburn management; Rest  Family Goals: None present x    Progress made toward(s) clinical / shift goals:    Problem: Knowledge Deficit - Standard  Goal: Patient and family/care givers will demonstrate understanding of plan of care, disease process/condition, diagnostic tests and medications  Outcome: Progressing     Problem: Pain - Standard  Goal: Alleviation of pain or a reduction in pain to the patient’s comfort goal  Outcome: Progressing       Patient is not progressing towards the following goals:

## 2024-02-19 NOTE — ED PROVIDER NOTES
"                                                        ED Provider Note    CHIEF COMPLAINT  Chief Complaint   Patient presents with    Vaginal Bleeding     Pt started new chemo medication today at 1400, reports new vaginal bleeding around 1630. Pt reports similar issue with admission when starting previous treatments. Lower back pain and SOB associated        HPI    Primary care provider: Francisco Maier M.D.   History obtained from: Patient  History limited by: None     Fela Tiwari is a 38 y.o. female who presents to the ED complaining of heavy vaginal bleeding that started around 430 this afternoon.  Patient on chemotherapy for breast cancer.  She reports similar episode 21 days ago and states that she had her immunotherapy changed.  She states that she has had a hysterectomy so \"I do not know where the bleeding is coming from.\"  She states that she had evaluation for urinary bleeding with her last episode and everything was fine.  She also had evaluation for GI bleeding with her last episode and everything was fine.  She reports 3 episodes of nausea and vomiting today.  She denies fever.  She has had burning sensation from her throat down into her chest with some shortness of breath.  She reports pain to her lower abdomen and around her lower back described as \"like labor pains.\"  No rash or edema noted.    REVIEW OF SYSTEMS  Please see HPI for pertinent positives/negatives.  All other systems reviewed and are negative.     PAST MEDICAL HISTORY  Past Medical History:   Diagnosis Date    Cancer (HCC) 12/28/2023    right outer breast    Anesthesia 12/28/2023    wakes up disoriented and panics    Pain 12/28/2023    right breast very tender    Psychiatric problem 12/28/2023    anxiety    Asthma 12/28/2023    exercise or illness induced, inhalers prn    Bronchitis 06/01/2014    Breath shortness     Only with pulmonary embolism    Cholesterol blood decreased     Heart burn     Indigestion Due to " sleeve    Personal history of venous thrombosis and embolism &    pulmonary    Unspecified hemorrhagic conditions     xarelto        SURGICAL HISTORY  Past Surgical History:   Procedure Laterality Date    DIEGO BY LAPAROSCOPY  2015    Performed by Dre Malone M.D. at SURGERY [facility]    GASTRIC SLEEVE LAPAROSCOPY  2014    Performed by Dre Malone M.D. at SURGERY [facility]    LIVER BIOPSY LAPAROSCOPIC  2014    Performed by Dre Malone M.D. at SURGERY [facility]    HYSTERECTOMY ROBOTIC  2013    LIPOSUCTION  2010    Performed by RAMON NUNN at SURGERY [facility]    LIPOSUCTION  2009    Performed by RAMON NUNN at SURGERY [facility]    TONSILLECTOMY  1997    OTHER       x2        SOCIAL HISTORY  Social History     Tobacco Use    Smoking status: Former     Current packs/day: 0.00     Average packs/day: 0.5 packs/day for 6.0 years (3.0 ttl pk-yrs)     Types: Cigarettes, Electronic Cigarettes     Start date: 2011     Quit date: 2017     Years since quittin.7    Smokeless tobacco: Never   Vaping Use    Vaping Use: Some days    Substances: Nicotine, THC   Substance and Sexual Activity    Alcohol use: Yes     Alcohol/week: 8.4 oz     Types: 14 Shots of liquor per week     Comment: 2 shots daily    Drug use: Yes     Types: Inhaled     Comment: thc, daily    Sexual activity: Yes     Partners: Male     Birth control/protection: Female Sterilization     Comment: Hysterectomy        FAMILY HISTORY  Family History   Problem Relation Age of Onset    Diabetes Other     Hypertension Other     Cancer Other         CURRENT MEDICATIONS  Home Medications       Reviewed by Maria Luisa Elias (Pharmacy Tech) on 24 at 0618  Med List Status: Complete     Medication Last Dose Status   ALPRAZolam (XANAX) 0.5 MG Tab 2024 Active   calcium carbonate (TUMS CHEWY BITES) 750 MG chewable tablet 2024 Active  "  dexamethasone (DECADRON) 4 MG Tab 2/17/2024 Active   diphenoxylate-atropine (LOMOTIL) 2.5-0.025 MG Tab 6 DAYS AGO Active   EPINEPHrine (EPIPEN) 0.3 MG/0.3ML Solution Auto-injector solution for injection PRN Active   HYDROcodone-acetaminophen (NORCO) 5-325 MG Tab per tablet FEW DAYS AGO Active   lidocaine-prilocaine (EMLA) 2.5-2.5 % Cream PRN Active   loratadine (CLARITIN) 10 MG Tab 2/18/2024 Active   OLANZapine (ZYPREXA) 5 MG Tab 2/17/2024 Active   ondansetron (ZOFRAN) 4 MG Tab tablet PRN Active   prochlorperazine (COMPAZINE) 10 MG Tab PRN Active                     ALLERGIES  Allergies   Allergen Reactions    Bee Venom Anaphylaxis    Morphine Sulfate Anaphylaxis and Itching    Skin Adhesives     Tape Rash     Paper tape OK, do not use tegaderm    Pineapple Hives        PHYSICAL EXAM  VITAL SIGNS: /60   Pulse 66   Temp 37.1 °C (98.8 °F) (Temporal)   Resp 18   Ht 1.676 m (5' 6\")   Wt 101 kg (222 lb 0.1 oz)   LMP 02/10/2012 (Approximate) Comment: Age of first period:10, No HRT  SpO2 96%   BMI 35.83 kg/m²  @AGUSTIN[[id number]::@     Pulse ox interpretation: 96% I interpret this pulse ox as normal     Cardiac monitor interpretation: Sinus rhythm with heart rate in the 70s as interpreted by me.  The patient presented with chest pain and shortness of breath and cardiac monitor was ordered to monitor for dysrhythmia.    Constitutional: Well developed, well nourished, alert in no apparent distress, nontoxic appearance    HENT: No external signs of trauma, normocephalic, oropharynx moist and clear   Eyes: PERRL, conjunctiva without erythema, no discharge, no icterus    Neck: Soft and supple, trachea midline, no stridor, no tenderness, no LAD, good ROM    Cardiovascular: Regular rate and rhythm, no murmurs/rubs/gallops, strong distal pulses and good perfusion    Thorax & Lungs: No respiratory distress, CTAB    Abdomen: Soft, tenderness across lower abdomen, nondistended, no guarding, no rebound, normal BS    : " Female chaperon present for exam.  NEFG, vaginal canal without discharge/blood/FB  Back: No CVAT     Extremities: No cyanosis, no edema, no gross deformity, good ROM, intact distal pulses with brisk cap refill    Skin: Warm, dry, no pallor/cyanosis, no rash noted      Neuro: A/O times 3, no focal deficits noted    Psychiatric: Cooperative, slightly anxious      DIAGNOSTIC STUDIES / PROCEDURES    EKG  12 Lead EKG obtained at 2249 and interpreted by me:   Rate: 67   Rhythm: Sinus rhythm   Ectopy: None  Intervals: Normal   Axis: Normal   QRS: Normal   ST segments: Normal  T Waves: Normal    Clinical Impression: Sinus rhythm without acute ischemic changes or dysrhythmia       LABS  All labs reviewed by me.     Results for orders placed or performed during the hospital encounter of 02/19/24   CBC with Differential   Result Value Ref Range    WBC 20.3 (H) 4.8 - 10.8 K/uL    RBC 3.91 (L) 4.20 - 5.40 M/uL    Hemoglobin 12.8 12.0 - 16.0 g/dL    Hematocrit 36.7 (L) 37.0 - 47.0 %    MCV 93.9 81.4 - 97.8 fL    MCH 32.7 27.0 - 33.0 pg    MCHC 34.9 32.2 - 35.5 g/dL    RDW 44.5 35.9 - 50.0 fL    Platelet Count 131 (L) 164 - 446 K/uL    MPV 11.3 9.0 - 12.9 fL    Neutrophils-Polys 92.50 (H) 44.00 - 72.00 %    Lymphocytes 5.80 (L) 22.00 - 41.00 %    Monocytes 0.00 0.00 - 13.40 %    Eosinophils 0.00 0.00 - 6.90 %    Basophils 0.00 0.00 - 1.80 %    Nucleated RBC 0.00 0.00 - 0.20 /100 WBC    Neutrophils (Absolute) 18.78 (H) 1.82 - 7.42 K/uL    Lymphs (Absolute) 1.18 1.00 - 4.80 K/uL    Monos (Absolute) 0.00 0.00 - 0.85 K/uL    Eos (Absolute) 0.00 0.00 - 0.51 K/uL    Baso (Absolute) 0.00 0.00 - 0.12 K/uL    NRBC (Absolute) 0.00 K/uL    Anisocytosis 1+     Microcytosis 1+    Complete Metabolic Panel   Result Value Ref Range    Sodium 141 135 - 145 mmol/L    Potassium 3.9 3.6 - 5.5 mmol/L    Chloride 106 96 - 112 mmol/L    Co2 26 20 - 33 mmol/L    Anion Gap 9.0 7.0 - 16.0    Glucose 133 (H) 65 - 99 mg/dL    Bun 26 (H) 8 - 22 mg/dL     Creatinine 0.93 0.50 - 1.40 mg/dL    Calcium 8.0 (L) 8.5 - 10.5 mg/dL    Correct Calcium 8.2 (L) 8.5 - 10.5 mg/dL    AST(SGOT) 13 12 - 45 U/L    ALT(SGPT) 24 2 - 50 U/L    Alkaline Phosphatase 45 30 - 99 U/L    Total Bilirubin 0.4 0.1 - 1.5 mg/dL    Albumin 3.8 3.2 - 4.9 g/dL    Total Protein 5.5 (L) 6.0 - 8.2 g/dL    Globulin 1.7 (L) 1.9 - 3.5 g/dL    A-G Ratio 2.2 g/dL   Lipase   Result Value Ref Range    Lipase 46 11 - 82 U/L   Urinalysis    Specimen: Urine   Result Value Ref Range    Color Red (A)     Character Cloudy (A)     Specific Gravity >=1.030 <1.035    Ph 6.5 5.0 - 8.0    Glucose Negative Negative mg/dL    Ketones Trace (A) Negative mg/dL    Protein 100 (A) Negative mg/dL    Bilirubin Small (A) Negative    Urobilinogen, Urine 0.2 Negative    Nitrite Positive (A) Negative    Leukocyte Esterase Negative Negative    Occult Blood Large (A) Negative    Micro Urine Req Microscopic    ESTIMATED GFR   Result Value Ref Range    GFR (CKD-EPI) 79 >60 mL/min/1.73 m 2   DIFFERENTIAL MANUAL   Result Value Ref Range    Metamyelocytes 1.70 %    Manual Diff Status PERFORMED    PERIPHERAL SMEAR REVIEW   Result Value Ref Range    Peripheral Smear Review see below    PLATELET ESTIMATE   Result Value Ref Range    Plt Estimation Decreased    MORPHOLOGY   Result Value Ref Range    RBC Morphology Present    PROTHROMBIN TIME (INR)   Result Value Ref Range    PT 13.5 12.0 - 14.6 sec    INR 1.02 0.87 - 1.13   APTT   Result Value Ref Range    APTT 21.5 (L) 24.7 - 36.0 sec   URINE MICROSCOPIC (W/UA)   Result Value Ref Range    WBC 0-2 /hpf    RBC >150 (A) /hpf    Bacteria Rare (A) None /hpf    Epithelial Cells Negative /hpf    Budding Yeast Present (A) Absent /hpf   EKG   Result Value Ref Range    Report       [facility] Emergency Dept.    Test Date:  2024-02-18  Pt Name:    ELIAS MCBRIDE              Department: ER  MRN:        [id number]                      Room:  Gender:     Female                        Technician: [id number]  :        [id number]                   Requested By:ER TRIAGE PROTOCOL  Order #:    [id number]                    Reading MD:    Measurements  Intervals                                Axis  Rate:       67                           P:          42  GA:         130                          QRS:        32  QRSD:       89                           T:          47  QT:         397  QTc:        419    Interpretive Statements  Sinus rhythm  No previous ECG available for comparison          RADIOLOGY  I have independently interpreted the diagnostic imaging associated with this visit and am waiting the final reading from the radiologist.   My preliminary interpretation is as follows: No perforation/obstruction.    CT-ABDOMEN-PELVIS WITH   Final Result      1.  No acute abnormality of the abdomen or pelvis.   2.  Small hiatal hernia.   3.  Status post cholecystectomy.   4.  Status post gastric sleeve.   5.  Trace free pelvic fluid of uncertain etiology and significance.   6.  Status post hysterectomy.             COURSE & MEDICAL DECISION MAKING  Nursing notes, VS, PMSFHx reviewed in chart.     Review of past medical records shows the patient had outpatient infusion therapy yesterday for malignant neoplasm of right breast.      Differential diagnoses considered include but are not limited to: Side effects from chemo/immunotherapy treatment, anemia, vaginitis, appendicitis, KS/renal colic       ED Observation Status? Yes; I am placing the patient in to an observation status due to a diagnostic uncertainty as well as therapeutic intensity. Patient placed in observation status at 12:31 AM, 2024.     Observation plan is as follows: We will obtain EKG, laboratory and imaging studies and monitor patient in the ED.    Upon Reevaluation, the patient's condition has: Not improved and will be hospitalized    Patient discharged from ED Observation status at 0611 on 2024.      INITIAL ASSESSMENT AND  PLAN  Care Narrative: This is a 38-year-old female patient with history including asthma, heartburn, DVT/PE, anxiety, breast cancer currently on chemo and immunotherapy who presents to the ED complaining of heavy vaginal bleeding with lower abdominal pain and low back pain.  Will obtain EKG, laboratory and imaging studies and treat patient with nausea and pain medicine and closely monitor patient in the ED.    Discussion of management with other QHP or appropriate source(s): Oncologist and hospitalist    0557: D/W Dr. Garces, oncologist.  Patient can be admitted by hospitalist if unable to obtain pain control and oncology can be consulted if patient hospitalized.    0611: D/W hospitalist who will admit patient      History and physical exam as above.  Patient with mild decrease in her H&H.  No indications for emergent transfusion.  Leukocytosis and mild thrombocytopenia likely from her cancer treatment.  No significant electrolyte derangement or evidence for renal or hepatic dysfunction.  UA with hematuria and rare bacteria with 0-2 WBC and also has budding yeast.  Pelvic exam does not show any blood or source for bleeding in the vaginal vault.  CT abdomen/pelvis with findings as above.  Patient required several doses of Zofran and Dilaudid and still continues to have pain.  She also requested GI cocktail and was given Maalox.  At this time, no clear etiology for her reported vaginal bleeding although I believe it may be from her urinary tract.  Patient denies rectal bleeding this time.  I discussed the findings with oncologist with above recommendations.  Patient updated on the findings and would like to be admitted for pain control and hydration.  I discussed with the hospitalist who graciously agreed to admit patient.      FINAL IMPRESSION  1. Vaginal bleeding Acute   2. Lower abdominal pain Acute          DISPOSITION  Patient will be admitted to hospitalist for further care      Electronically signed by: Saman PLAZA  JAC Oliva, 2/19/2024 12:31 AM      Portions of this record were made with voice recognition software.  Despite my review, errors may remain.  Please interpret this chart in the appropriate context.

## 2024-02-19 NOTE — TELEPHONE ENCOUNTER
After Hours Call    Medical Hematologist/Oncologist: Dr. Thomas  Diagnosis: Invasive ductal carcinoma of the right breast, grade 3, clinically stage IIa (cT2, cN0) ER negative [place] negative, HER2 3+ positive, Ki-67 20 to 30%   Treatment: Docetaxel, Carboplatin and Trastuzumab - cycle 2, 2/26/24    Reason for call:  Patient presented to ER for vaginal bleeding and pain    Pertinent Information:  Patient currently day 4 of cycle 2 of chemo.   She had bleeding and pain with cycle 1 requiring 2 day admission.   Bleeding resolved after 2 days.   Per ER MD vaginal exam did not reveal bleeding.  Per ER MD - CT abdomen pelvis was unremarkable    With cycle 1 pain lasted at least 10 days - likely related to pegfilgrastim (Udenyca) - pain so severe cycle 2 changed to short acting filgrastim (Zarxio) scheduled for days 3-8.   Pain at this time likely r/t to filgrastim.   ER MD gave patient IV Dilaudid d/t severity of pain.     Plan:  Discussed with ER MD and plan will likely be admission for pain management and hydration.  Discussed with discharge to ensure patient has pain medication.    Patient will need continued filgrastim support daily for total of 5 days - last day at least Thursday, 2/22 with repeat CBC to ensure ANC is greater than 1500. If after 5 days ANC less than 1500 then continue daily until ANC is greater than 1500.       NIYA Cordova  [facility]  Office of Maria Scales Schwartzberg, and Justin

## 2024-02-19 NOTE — ED TRIAGE NOTES
Chief Complaint   Patient presents with    Vaginal Bleeding     Pt started new chemo medication today at 1400, reports new vaginal bleeding around 1630. Pt reports similar issue with admission when starting previous treatments. Lower back pain and SOB associated     Pt ambulatory to triage for above complaint. Hx of hysterectomy    Pt is alert & oriented and follows commands. Pt speaking in full sentences and responds appropriately to questions. No acute distress noted in triage. Respirations are even and unlabored. Skin is pink/warm/dry.    Pt placed in family room and educated on triage process. Pt encouraged to alert staff to any changes in condition.

## 2024-02-19 NOTE — ASSESSMENT & PLAN NOTE
Pt also complaining of lumbar pain. She feels this is due from chemo as well however recent CT L spine during last hospitalization indicated Mild spondylosis without acute abnormality.  Likely chronic pain.   Plan:   - Continue tylenol PRN as well as ice and lidocaine patches as needed.    9.13

## 2024-02-19 NOTE — ED NOTES
Pharmacy Medication Reconciliation      ~Medication reconciliation updated and complete per patient at bedside.   ~Allergies have been verified and updated   ~No oral ABX within the last 30 days  ~Patient home pharmacy: [facility]      ~Anticoagulants (rivaroxaban, apixaban, edoxaban, dabigatran, warfarin, enoxaparin) taken in the last 14 days? NO  ~Anticoagulant: N/A Last dose: N/A

## 2024-02-19 NOTE — ASSESSMENT & PLAN NOTE
Patient with gross hematuria again today. This occurred initially after first round of TCHP. Previous admission after this occurrence gross hematuria self resolve. Patient had outpatient follow up with urology and cystoscopy was wnl. Hematuria likely adverse reaction of chemotherapy. Maybe UTI, hemorrhagic cystitis, or nephrotoxicity related to chemotherapy.  UA at this admission with trace ketones, 100 mg/dL protein, nitrites, large amount of blood present. ERP treated with one time Rocephin and Flagyl. CT abdomen wnl. ER also performed repeat vaginal exam, no blood present in vaginal vault. Obtained chlamydia/gonorrhea and BV swab.   Plan:   - Touched base with oncology, they do feel hematuria is likely due to current TCHP therapy likely carboplatin. Oncology team to decide on chemo  - Will continue treatment for UTI pending urine cultures   - Pending STD swabs   - May need to consult nephrology for further work up as cystoscopy was normal.    - Continue maintenance fluids    Review at followup

## 2024-02-19 NOTE — ASSESSMENT & PLAN NOTE
From oncology note 2/15/2024:  Invasive ductal carcinoma of the right breast, grade 3, clinically stage IIa (cT2, cN0) ER negative [place] negative, HER2 3+ positive, Ki-67 20 to 30%.  Poor tolerance at full dose TCH (trastuzumab,pertuzumab, carboplatin, and docetaxel).  Reduced chemotherapy doses by 20 to 25%.  Full dose trastuzumab epratuzumab.  Added olanzapine and Aloxi.  Substituting daily Zarxio for 5 days for Neulasta.   Plan:   - Discussed case with Patricia NÚÑEZ patient to be on Filgrastim for 5 day course, last day at least Thursday, 2/22 with repeat CBC to ensure ANC is greater than 1500. If after 5 days ANC less than 1500 then continue daily until ANC is greater than 1500.  Filgrastim was started on patient. Continue to follow their recommendations throughout hospitalization.   - Pt with abdominal pain secondary to recent chemotherapy regimen. Will continue tylenol PRN and  oxycodone 5 mg, oxycodone 10 mg PRN for breakthrough pain   - Patient also has nausea, appears chronic but worsens with chemo. Tums prn per patient request, Zofran PRN, and omeprazole daily for GERD symptoms

## 2024-02-19 NOTE — DISCHARGE INSTRUCTIONS
Hematuria, Adult  Hematuria is blood in the urine. Blood may be visible in the urine, or it may be identified with a test. This condition can be caused by infections of the bladder, urethra, kidney, or prostate. Other possible causes include:  Kidney stones.  Cancer of the urinary tract.  Too much calcium in the urine.  Conditions that are passed from parent to child (inherited conditions).  Exercise that requires a lot of energy.  Infections can usually be treated with medicine, and a kidney stone usually will pass through your urine. If neither of these is the cause of your hematuria, more tests may be needed to identify the cause of your symptoms.  It is very important to tell your health care provider about any blood in your urine, even if it is painless or the blood stops without treatment. Blood in the urine, when it happens and then stops and then happens again, can be a symptom of a very serious condition, including cancer. There is no pain in the initial stages of many urinary cancers.  Follow these instructions at home:  Medicines  Take over-the-counter and prescription medicines only as told by your health care provider.  If you were prescribed an antibiotic medicine, take it as told by your health care provider. Do not stop taking the antibiotic even if you start to feel better.  Eating and drinking  Drink enough fluid to keep your urine pale yellow. It is recommended that you drink 3-4 quarts (2.8-3.8 L) a day. If you have been diagnosed with an infection, drinking cranberry juice in addition to large amounts of water is recommended.  Avoid caffeine, tea, and carbonated beverages. These tend to irritate the bladder.  Avoid alcohol because it may irritate the prostate (in males).  General instructions  If you have been diagnosed with a kidney stone, follow your health care provider's instructions about straining your urine to catch the stone.  Empty your bladder often. Avoid holding urine for long  periods of time.  If you are female:  After a bowel movement, wipe from front to back and use each piece of toilet paper only once.  Empty your bladder before and after sex.  Pay attention to any changes in your symptoms. Tell your health care provider about any changes or any new symptoms.  It is up to you to get the results of any tests. Ask your health care provider, or the department that is doing the test, when your results will be ready.  Keep all follow-up visits. This is important.  Contact a health care provider if:  You develop back pain.  You have a fever or chills.  You have nausea or vomiting.  Your symptoms do not improve after 3 days.  Your symptoms get worse.  Get help right away if:  You develop severe vomiting and are unable to take medicine without vomiting.  You develop severe pain in your back or abdomen even though you are taking medicine.  You pass a large amount of blood in your urine.  You pass blood clots in your urine.  You feel very weak or like you might faint.  You faint.  Summary  Hematuria is blood in the urine. It has many possible causes.  It is very important that you tell your health care provider about any blood in your urine, even if it is painless or the blood stops without treatment.  Take over-the-counter and prescription medicines only as told by your health care provider.  Drink enough fluid to keep your urine pale yellow.  This information is not intended to replace advice given to you by your health care provider. Make sure you discuss any questions you have with your health care provider.  Document Revised: 08/18/2021 Document Reviewed: 08/18/2021  Elsevier Patient Education © 2023 [employer]

## 2024-02-19 NOTE — PROGRESS NOTES
4 Eyes Skin Assessment Completed by Neena JEFF RN and Tenisha TIM RN.    Head WDL  Ears WDL  Nose WDL  Mouth WDL  Neck WDL  Breast/Chest WDL  Shoulder Blades WDL  Spine WDL  (R) Arm/Elbow/Hand WDL  (L) Arm/Elbow/Hand WDL  Abdomen WDL  Groin WDL  Scrotum/Coccyx/Buttocks WDL  (R) Leg WDL  (L) Leg Bruising to left hip  (R) Heel/Foot/Toe WDL  (L) Heel/Foot/Toe WDL          Devices In Places PIV      Interventions In Place Pressure Redistribution Mattress    Possible Skin Injury No    Pictures Uploaded Into Epic N/A  Wound Consult Placed N/A  RN Wound Prevention Protocol Ordered No

## 2024-02-19 NOTE — ASSESSMENT & PLAN NOTE
Patient with abdominal pain and back pain that began after second round of chemotherapy. Patient was recently admitted after chemotherapy for pain and hematuria. Discharge on tylenol and ibuprofen as needed with no relief of her symptoms. PCP and oncology ordered oxycodone 2.5mg outpatient.   Plan:   - Continue pain management with tylenol PRN and oxycodone 5 mg/10 mg PRN for break through pain. Pain should improve with time since chemotherapy was completed. Will also get patient connected with oncology pain specialist at discharge

## 2024-02-19 NOTE — H&P
[facility] MEDICINE H&P    PATIENT ID:  NAME:  Fela Tiwari  MRN:               [id number]  YOB: 1985    Date of Admission: 2/19/2024     Attending: Yulia Quiles M.d.    Resident: Betsy Maradiaga M.D. PGY-2    Primary Care Physician:  Francisco Maier M.D.    CC:  blood in urine, lower abdominal pains     HPI: Fela Tiwari is a 38 y.o. female who presented with recent diagnosis of invasive ductal carcinoma of the right breast, grade 3, clinically stage IIa (cT2, cN0) ER negative LA negative HER2 3+ positive Ki-67 20 to 30% who presented to the ER with blood in her urine and uncontrolled abdominal and back pain. Currently on chemotherapy that was started 21 days ago. Blood in urine started the day after chemotherapy was completed and then resolved. She received repeat chemo and then had repeat blood in urine which led her to return to our ER. Side effects patient has also experienced include nausea, severe myalgias,  diarrhea, and thrush. Followed by Dr. Thomas. This is her second admission for hematuria as well as intractable pain. Last admitted 1/26/24. During that admission urology was consulted and advised outpatient cystoscopy. Patient reports that she did complete outpatient cystoscopy on 2/12 that was wnl. Cystoscopy is not in our chart. Patient at this time has lower abdominal pain and low back pain as well. She feels abdominal pain is related to blood in her urine. She has been taking tylenol and oxycodone 2.5 mg at home with no relief of symptoms.Patient has chronic back pain. Previous history of lumbar fusion no recent known injury to the back.       ERCourse:  Pt with normal range vital signs.     REVIEW OF SYSTEMS:   Ten systems reviewed and were negative except as noted in the HPI.                PAST MEDICAL HISTORY:  Past Medical History:   Diagnosis Date    Anesthesia 12/28/2023    wakes up disoriented and panics    Asthma 12/28/2023    exercise or  illness induced, inhalers prn    Breath shortness     Only with pulmonary embolism    Bronchitis 2014    Cancer (HCC) 2023    right outer breast    Cholesterol blood decreased     Heart burn     Indigestion Due to sleeve    Pain 2023    right breast very tender    Personal history of venous thrombosis and embolism &    pulmonary    Psychiatric problem 2023    anxiety    Unspecified hemorrhagic conditions     xarelto       PAST SURGICAL HISTORY:  Past Surgical History:   Procedure Laterality Date    DIEGO BY LAPAROSCOPY  2015    Performed by Dre Malone M.D. at SURGERY [facility]    GASTRIC SLEEVE LAPAROSCOPY  2014    Performed by Dre Malone M.D. at SURGERY [facility]    LIVER BIOPSY LAPAROSCOPIC  2014    Performed by Dre Malone M.D. at SURGERY [facility]    HYSTERECTOMY ROBOTIC  2013    LIPOSUCTION  2010    Performed by RAMON NUNN at SURGERY [facility]    LIPOSUCTION  2009    Performed by RAMON NUNN at SURGERY [facility]    TONSILLECTOMY  1997    OTHER       x2       FAMILY HISTORY:  Family History   Problem Relation Age of Onset    Diabetes Other     Hypertension Other     Cancer Other        SOCIAL HISTORY:     Smoking denies   Etoh use denies   Drug use THC use    DIET:   Orders Placed This Encounter   Procedures    Diet Order Diet: Regular     Standing Status:   Standing     Number of Occurrences:   1     Order Specific Question:   Diet:     Answer:   Regular [1]       ALLERGIES:  Allergies   Allergen Reactions    Bee Venom Anaphylaxis    Morphine Sulfate Anaphylaxis and Itching    Skin Adhesives     Tape Rash     Paper tape OK, do not use tegaderm    Pineapple Hives       OUTPATIENT MEDICATIONS:    Current Facility-Administered Medications:     NS infusion, , Intravenous, Continuous, Yulia Shi M.D., Last Rate: 125 mL/hr at 24, New Bag at 24     enoxaparin (Lovenox) inj 40 mg, 40 mg, Subcutaneous, DAILY AT 1800, Yulia Shi M.D.    acetaminophen (Tylenol) tablet 650 mg, 650 mg, Oral, Q6HRS PRN, Yulia Shi M.D.    labetalol (Normodyne/Trandate) injection 10 mg, 10 mg, Intravenous, Q4HRS PRN, Yulia Shi M.D.    Notify provider if pain remains uncontrolled, , , CONTINUOUS **AND** Use the Numeric Rating Scale (NRS), Nelson-Baker Faces (WBF), or FLACC on regular floors and Critical-Care Pain Observation Tool (CPOT) on ICUs/Trauma to assess pain, , , CONTINUOUS **AND** Pulse Ox, , , CONTINUOUS **AND** Pharmacy Consult Request ...Pain Management Review 1 Each, 1 Each, Other, PHARMACY TO DOSE **AND** If patient difficult to arouse and/or has respiratory depression (respiratory rate of 10 or less), stop any opiates that are currently infusing and call a Rapid Response., , , CONTINUOUS, Yulia Shi M.D.    oxyCODONE immediate-release (Roxicodone) tablet 5 mg, 5 mg, Oral, Q3HRS PRN **OR** oxyCODONE immediate release (Roxicodone) tablet 10 mg, 10 mg, Oral, Q3HRS PRN, 10 mg at 24 0827 **OR** [DISCONTINUED] HYDROmorphone (Dilaudid) injection 0.5 mg, 0.5 mg, Intravenous, Q3HRS PRN, Yulia Shi M.D.    diphenhydrAMINE (Benadryl) injection 25 mg, 25 mg, Intravenous, Q6HRS PRN, Betsy Maradiaga M.D., 25 mg at 24 0827    ondansetron (Zofran) syringe/vial injection 4 mg, 4 mg, Intravenous, Q4HRS PRN, Betsy Maradiaga M.D.    omeprazole (PriLOSEC) capsule 20 mg, 20 mg, Oral, DAILY, Betsy Maradiaga M.D., 20 mg at 24 1011    GI Cocktail (hyoscyamine-lidocaine-Maalox) oral susp cup 30 mL, 30 mL, Oral, Once, Betsy Maradiaga M.D.    PHYSICAL EXAM:  Vitals:    24 0425 24 0525 24 0625 24 0643   BP: 103/57 101/56 95/54 101/60   Pulse: 62 65 63 66   Resp:    18   Temp:    37.1 °C (98.8 °F)   TempSrc:    Temporal   SpO2: 93% 94% 91% 96%   Weight:       Height:       , Temp (24hrs), Av.1 °C (98.7 °F),  Min:36.9 °C (98.4 °F), Max:37.1 °C (98.8 °F)  , Pulse Oximetry: 96 %, O2 (LPM): 0, O2 Delivery Device: None - Room Air    General: Pt resting in NAD, cooperative   Skin:  Pink, warm and dry.  No rashes  HEENT: NC/AT. PERRL. EOMI. MMM. No nasal discharge. Oropharynx nonerythematous without exudate/plaques  Neck:  Supple without lymphadenopathy or rigidity. No JVD   Lungs:  Symmetrical.  CTAB with no W/R/R.  Good air movement   Cardiovascular:  S1/S2 RRR without M/R/G.  Abdomen:  Abdomen is soft and nondistended, tenderness along the lower abdomen, bilateral CVA tenderness   Extremities:  Full range of motion. No gross deformities noted. 2+ pulses in all extremities. No C/C/E   Spine:  Straight without vertebral anomalies.  CNS:  Muscle tone is normal. Cranial nerves II-XII grossly intact. 2+ DTRs.       LAB TESTS:   Recent Labs     02/18/24  2331   WBC 20.3*   RBC 3.91*   HEMOGLOBIN 12.8   HEMATOCRIT 36.7*   MCV 93.9   MCH 32.7   RDW 44.5   PLATELETCT 131*   MPV 11.3   NEUTSPOLYS 92.50*   LYMPHOCYTES 5.80*   MONOCYTES 0.00   EOSINOPHILS 0.00   BASOPHILS 0.00   RBCMORPHOLO Present         Recent Labs     02/18/24  2331   SODIUM 141   POTASSIUM 3.9   CHLORIDE 106   CO2 26   BUN 26*   CREATININE 0.93   CALCIUM 8.0*   ALBUMIN 3.8       CULTURES:   Results       Procedure Component Value Units Date/Time    Urinalysis [[id number]]  (Abnormal) Collected: 02/19/24 0033    Order Status: Completed Specimen: Urine Updated: 02/19/24 0117     Color Red     Character Cloudy     Specific Gravity >=1.030     Ph 6.5     Glucose Negative mg/dL      Ketones Trace mg/dL      Protein 100 mg/dL      Bilirubin Small     Urobilinogen, Urine 0.2     Nitrite Positive     Leukocyte Esterase Negative     Occult Blood Large     Micro Urine Req Microscopic            IMAGES:  CT-ABDOMEN-PELVIS WITH   Final Result      1.  No acute abnormality of the abdomen or pelvis.   2.  Small hiatal hernia.   3.  Status post cholecystectomy.   4.  Status post  gastric sleeve.   5.  Trace free pelvic fluid of uncertain etiology and significance.   6.  Status post hysterectomy.          CONSULTS:   Oncology     ASSESSMENT/PLAN: This is a 38 year old female with hx of hysterectomy, cholecystectomy, gastric sleeve, newly diagnosis of invasive ductal carcinoma of the right breast, grade 3, clinically stage IIa (cT2, cN) ER negative [place] negative HER2 positive who was admitted for pain control as well as hematuria.      * Intractable pain- (present on admission)  Assessment & Plan  Patient with abdominal pain and back pain that began after second round of chemotherapy. Patient was recently admitted after chemotherapy for pain and hematuria. Discharge on tylenol and ibuprofen as needed with no relief of her symptoms. PCP and oncology ordered oxycodone 2.5mg outpatient.   Plan:   - Continue pain management with tylenol PRN and oxycodone 5 mg/10 mg PRN for break through pain. Pain should improve with time since chemotherapy was completed. Will also get patient connected with oncology pain specialist at discharge     Gross hematuria  Assessment & Plan  Patient with gross hematuria again today. This occurred initially after first round of TCHP. Previous admission after this occurrence gross hematuria self resolve. Patient had outpatient follow up with urology and cystoscopy was wnl. Hematuria likely adverse reaction of chemotherapy. Maybe UTI, hemorrhagic cystitis, or nephrotoxicity related to chemotherapy.  UA at this admission with trace ketones, 100 mg/dL protein, nitrites, large amount of blood present. ERP treated with one time Rocephin and Flagyl. CT abdomen wnl. ER also performed repeat vaginal exam, no blood present in vaginal vault. Obtained chlamydia/gonorrhea and BV swab.   Plan:   - Touched base with oncology, they do feel hematuria is likely due to current TCHP therapy likely carboplatin. Oncology team to decide on chemo  - Will continue treatment for UTI pending urine  cultures   - Pending STD swabs   - May need to consult nephrology for further work up as cystoscopy was normal.    - Continue maintenance fluids     Lumbar back pain- (present on admission)  Assessment & Plan  Pt also complaining of lumbar pain. She feels this is due from chemo as well however recent CT L spine during last hospitalization indicated Mild spondylosis without acute abnormality.  Likely chronic pain.   Plan:   - Continue tylenol PRN as well as ice and lidocaine patches as needed.     Epigastric pain- (present on admission)  Assessment & Plan  Pt reported GERD symptoms since getting chemotherapy.   Plan:   - Tums PRN and omeprazole daily     Malignant neoplasm of upper-outer quadrant of right breast in female, estrogen receptor negative (HCC)- (present on admission)  Assessment & Plan  From oncology note 2/15/2024:  Invasive ductal carcinoma of the right breast, grade 3, clinically stage IIa (cT2, cN0) ER negative CT negative, HER2 3+ positive, Ki-67 20 to 30%.  Poor tolerance at full dose TCH (trastuzumab,pertuzumab, carboplatin, and docetaxel).  Reduced chemotherapy doses by 20 to 25%.  Full dose trastuzumab epratuzumab.  Added olanzapine and Aloxi.  Substituting daily Zarxio for 5 days for Neulasta.   Plan:   - Discussed case with Patricia NÚÑEZ patient to be on Filgrastim for 5 day course, last day at least Thursday, 2/22 with repeat CBC to ensure ANC is greater than 1500. If after 5 days ANC less than 1500 then continue daily until ANC is greater than 1500.  Filgrastim was started on patient. Continue to follow their recommendations throughout hospitalization.   - Pt with abdominal pain secondary to recent chemotherapy regimen. Will continue tylenol PRN and  oxycodone 5 mg, oxycodone 10 mg PRN for breakthrough pain   - Patient also has nausea, appears chronic but worsens with chemo. Tums prn per patient request, Zofran PRN, and omeprazole daily for GERD symptoms       Core Measures:  Fluids:    mL/hr     Lines: PIV  Abx: C3  Diet: Regular   PPX:  Lovenox  CODE STATUS:Full code   DISPO:  Inpatient     Betsy Maradiaga M.D. PGY-2  [facility]

## 2024-02-20 ENCOUNTER — PHARMACY VISIT (OUTPATIENT)
Dept: PHARMACY | Facility: MEDICAL CENTER | Age: 39
End: 2024-02-20
Payer: COMMERCIAL

## 2024-02-20 ENCOUNTER — APPOINTMENT (OUTPATIENT)
Dept: ONCOLOGY | Facility: MEDICAL CENTER | Age: 39
End: 2024-02-20
Attending: INTERNAL MEDICINE
Payer: MEDICAID

## 2024-02-20 VITALS
BODY MASS INDEX: 35.68 KG/M2 | DIASTOLIC BLOOD PRESSURE: 62 MMHG | OXYGEN SATURATION: 95 % | HEART RATE: 81 BPM | SYSTOLIC BLOOD PRESSURE: 105 MMHG | RESPIRATION RATE: 18 BRPM | WEIGHT: 222 LBS | TEMPERATURE: 97.9 F | HEIGHT: 66 IN

## 2024-02-20 PROBLEM — R10.13 EPIGASTRIC PAIN: Status: RESOLVED | Noted: 2024-01-26 | Resolved: 2024-02-20

## 2024-02-20 PROBLEM — R31.0 GROSS HEMATURIA: Status: RESOLVED | Noted: 2024-02-19 | Resolved: 2024-02-20

## 2024-02-20 LAB
ALBUMIN SERPL BCP-MCNC: 3.2 G/DL (ref 3.2–4.9)
ALBUMIN/GLOB SERPL: 1.7 G/DL
ALP SERPL-CCNC: 49 U/L (ref 30–99)
ALT SERPL-CCNC: 20 U/L (ref 2–50)
ANION GAP SERPL CALC-SCNC: 10 MMOL/L (ref 7–16)
AST SERPL-CCNC: 16 U/L (ref 12–45)
BASOPHILS # BLD AUTO: 0 % (ref 0–1.8)
BASOPHILS # BLD: 0 K/UL (ref 0–0.12)
BILIRUB SERPL-MCNC: 0.5 MG/DL (ref 0.1–1.5)
BUN SERPL-MCNC: 13 MG/DL (ref 8–22)
CALCIUM ALBUM COR SERPL-MCNC: 8.1 MG/DL (ref 8.5–10.5)
CALCIUM SERPL-MCNC: 7.5 MG/DL (ref 8.5–10.5)
CHLORIDE SERPL-SCNC: 103 MMOL/L (ref 96–112)
CO2 SERPL-SCNC: 24 MMOL/L (ref 20–33)
CREAT SERPL-MCNC: 0.7 MG/DL (ref 0.5–1.4)
EOSINOPHIL # BLD AUTO: 0 K/UL (ref 0–0.51)
EOSINOPHIL NFR BLD: 0 % (ref 0–6.9)
ERYTHROCYTE [DISTWIDTH] IN BLOOD BY AUTOMATED COUNT: 43.6 FL (ref 35.9–50)
GFR SERPLBLD CREATININE-BSD FMLA CKD-EPI: 111 ML/MIN/1.73 M 2
GLOBULIN SER CALC-MCNC: 1.9 G/DL (ref 1.9–3.5)
GLUCOSE SERPL-MCNC: 85 MG/DL (ref 65–99)
HCT VFR BLD AUTO: 36.5 % (ref 37–47)
HGB BLD-MCNC: 12.7 G/DL (ref 12–16)
LYMPHOCYTES # BLD AUTO: 1.51 K/UL (ref 1–4.8)
LYMPHOCYTES NFR BLD: 13.7 % (ref 22–41)
MANUAL DIFF BLD: NORMAL
MCH RBC QN AUTO: 32.4 PG (ref 27–33)
MCHC RBC AUTO-ENTMCNC: 34.8 G/DL (ref 32.2–35.5)
MCV RBC AUTO: 93.1 FL (ref 81.4–97.8)
MONOCYTES # BLD AUTO: 0 K/UL (ref 0–0.85)
MONOCYTES NFR BLD AUTO: 0 % (ref 0–13.4)
MORPHOLOGY BLD-IMP: NORMAL
MYELOCYTES NFR BLD MANUAL: 4.3 %
NEUTROPHILS # BLD AUTO: 9.02 K/UL (ref 1.82–7.42)
NEUTROPHILS NFR BLD: 82 % (ref 44–72)
NRBC # BLD AUTO: 0 K/UL
NRBC BLD-RTO: 0 /100 WBC (ref 0–0.2)
PLATELET # BLD AUTO: 109 K/UL (ref 164–446)
PLATELET BLD QL SMEAR: NORMAL
PMV BLD AUTO: 11.2 FL (ref 9–12.9)
POTASSIUM SERPL-SCNC: 4 MMOL/L (ref 3.6–5.5)
PROT SERPL-MCNC: 5.1 G/DL (ref 6–8.2)
RBC # BLD AUTO: 3.92 M/UL (ref 4.2–5.4)
RBC BLD AUTO: NORMAL
SODIUM SERPL-SCNC: 137 MMOL/L (ref 135–145)
WBC # BLD AUTO: 11 K/UL (ref 4.8–10.8)

## 2024-02-20 PROCEDURE — RXMED WILLOW AMBULATORY MEDICATION CHARGE

## 2024-02-20 PROCEDURE — 700111 HCHG RX REV CODE 636 W/ 250 OVERRIDE (IP)

## 2024-02-20 PROCEDURE — 85007 BL SMEAR W/DIFF WBC COUNT: CPT

## 2024-02-20 PROCEDURE — 700102 HCHG RX REV CODE 250 W/ 637 OVERRIDE(OP): Performed by: FAMILY MEDICINE

## 2024-02-20 PROCEDURE — 85027 COMPLETE CBC AUTOMATED: CPT

## 2024-02-20 PROCEDURE — A9270 NON-COVERED ITEM OR SERVICE: HCPCS | Performed by: FAMILY MEDICINE

## 2024-02-20 PROCEDURE — 700102 HCHG RX REV CODE 250 W/ 637 OVERRIDE(OP)

## 2024-02-20 PROCEDURE — 80053 COMPREHEN METABOLIC PANEL: CPT

## 2024-02-20 PROCEDURE — A9270 NON-COVERED ITEM OR SERVICE: HCPCS

## 2024-02-20 PROCEDURE — 99238 HOSP IP/OBS DSCHRG MGMT 30/<: CPT | Mod: GC | Performed by: FAMILY MEDICINE

## 2024-02-20 RX ORDER — OMEPRAZOLE 20 MG/1
20 CAPSULE, DELAYED RELEASE ORAL DAILY
Qty: 30 CAPSULE | Refills: 0 | Status: SHIPPED | OUTPATIENT
Start: 2024-02-21

## 2024-02-20 RX ORDER — FLUCONAZOLE 150 MG/1
150 TABLET ORAL ONCE
Qty: 1 TABLET | Refills: 2 | Status: ACTIVE | OUTPATIENT
Start: 2024-02-20 | End: 2024-02-21

## 2024-02-20 RX ORDER — DIPHENHYDRAMINE HCL 25 MG
25 CAPSULE ORAL EVERY 6 HOURS PRN
COMMUNITY
Start: 2024-02-20

## 2024-02-20 RX ORDER — OXYCODONE HYDROCHLORIDE 5 MG/1
5-10 TABLET ORAL
Qty: 30 TABLET | Refills: 0 | Status: SHIPPED | OUTPATIENT
Start: 2024-02-20 | End: 2024-03-01

## 2024-02-20 RX ADMIN — OXYCODONE 5 MG: 5 TABLET ORAL at 04:21

## 2024-02-20 RX ADMIN — DIPHENHYDRAMINE HYDROCHLORIDE 25 MG: 50 INJECTION, SOLUTION INTRAMUSCULAR; INTRAVENOUS at 08:44

## 2024-02-20 RX ADMIN — OMEPRAZOLE 20 MG: 20 CAPSULE, DELAYED RELEASE ORAL at 04:22

## 2024-02-20 RX ADMIN — TBO-FILGRASTIM 480 MCG: 480 INJECTION, SOLUTION SUBCUTANEOUS at 14:04

## 2024-02-20 RX ADMIN — DIPHENHYDRAMINE HYDROCHLORIDE 25 MG: 50 INJECTION, SOLUTION INTRAMUSCULAR; INTRAVENOUS at 04:21

## 2024-02-20 RX ADMIN — OXYCODONE 5 MG: 5 TABLET ORAL at 08:43

## 2024-02-20 RX ADMIN — ONDANSETRON 4 MG: 2 INJECTION INTRAMUSCULAR; INTRAVENOUS at 08:43

## 2024-02-20 ASSESSMENT — PAIN DESCRIPTION - PAIN TYPE
TYPE: ACUTE PAIN
TYPE: ACUTE PAIN

## 2024-02-20 NOTE — CARE PLAN
Problem: Knowledge Deficit - Standard  Goal: Patient and family/care givers will demonstrate understanding of plan of care, disease process/condition, diagnostic tests and medications  Outcome: Progressing     Problem: Pain - Standard  Goal: Alleviation of pain or a reduction in pain to the patient’s comfort goal  Outcome: Progressing   The patient is Stable - Low risk of patient condition declining or worsening    Shift Goals pain mgmt, maintain vss  Clinical Goals: VSS, control pain and heartburn  Patient Goals: Sleep  Family Goals: No family present    Progress made toward(s) clinical / shift goals:  vss, pain control    Patient is not progressing towards the following goals:

## 2024-02-20 NOTE — DISCHARGE SUMMARY
"[facility] MEDICINE DISCHARGE SUMMARY     Admit Date:  2/19/2024       Discharge Date:   2/20/2024    Attending: Yulia Quiles M.d.     Resident: Edwardo Joseph M.D. (PGY-1)    PATIENT: Fela Tiwari; [id number]; 1985    Diagnoses (includes active and resolved):  Principal Problem:    Intractable pain (POA: Yes)  Active Problems:    Malignant neoplasm of upper-outer quadrant of right breast in female, estrogen receptor negative (HCC) (POA: Yes)    Lumbar back pain (POA: Yes)  Resolved Problems:    Epigastric pain (POA: Yes)    Gross hematuria (POA: Unknown)        Hospital Summary (Brief Narrative):       Fela  is a 38 y.o.  Female with hx of hysterectomy, cholecystectomy, and gastric sleeve with HER2+ grade III, stage IIA breast cancer on chemotherapy admitted 2/19/2024 with chemotherapy-induced cystitis.     From H&P by Dr. Maradiaga on 2/19:   \"Currently on chemotherapy that was started 21 days ago. Blood in urine started the day after chemotherapy was completed and then resolved. She received repeat chemo and then had repeat blood in urine which led her to return to our ER. Side effects patient has also experienced include nausea, severe myalgias,  diarrhea, and thrush. Followed by Dr. Thomas. This is her second admission for hematuria as well as intractable pain. Last admitted 1/26/24. During that admission urology was consulted and advised outpatient cystoscopy. Patient reports that she did complete outpatient cystoscopy on 2/12 that was wnl. Cystoscopy is not in our chart. Patient at this time has lower abdominal pain and low back pain as well. She feels abdominal pain is related to blood in her urine. She has been taking tylenol and oxycodone 2.5 mg at home with no relief of symptoms. Patient has chronic back pain. Previous history of lumbar fusion no recent known injury to the back.\"    On presentation, patient was found to have gross hematuria, no blood in vaginal vault.  She was " treated with Keflex x 1 in the ED for apparent UTI versus BV.  She was monitored inpatient, with gradual resolution in hematuria throughout the admission, transition from bright red blood to pink-tinged urine on the day of discharge.  Oncology was consulted, they state carboplatin is a likely cause of the hemorrhagic cystitis.  Nephrology was consulted, state hemorrhagic cystitis is also the most likely diagnosis.  However, they request cystoscopy records prior to confirming this diagnosis.  The patient's urology office was called to request records, they are sent and pending arrival at the time of discharge.    Plan is to send patient home with 5-day course of Keflex for suspected BV/UTI in the setting of hemorrhagic cystitis.  Patient given strict return cautions with new or worsening symptoms.  Patient instructed to follow-up with oncologist for additional management of her breast cancer, as well as long-term pain management.  Patient is sent home with 10-day supply of pain medication, as well as preventative fluconazole as she has a history of yeast infection following antibiotics for UTI.    Consultants:      Oncology, nephrology    Procedures:        None    Discharge Medications:        Medication Reconciliation Completed     Medication List        START taking these medications        Instructions   cephALEXin 500 MG Caps  Commonly known as: Keflex   Take 1 Capsule by mouth 4 times a day for 5 days.  Dose: 500 mg     diphenhydrAMINE 25 MG capsule  Commonly known as: Benadryl   Take 1 Capsule by mouth every 6 hours as needed for Itching.  Dose: 25 mg     fluconazole 150 MG tablet  Commonly known as: Diflucan   Take 1 Tablet by mouth one time for 1 dose. As needed for symptoms of vaginal yeast infection.  Dose: 150 mg     omeprazole 20 MG delayed-release capsule  Start taking on: February 21, 2024  Commonly known as: PriLOSEC   Take 1 Capsule by mouth every day.  Dose: 20 mg     oxyCODONE immediate-release 5  MG Tabs  Commonly known as: Roxicodone   Take 1-2 Tablets by mouth every 3 hours as needed for Severe Pain for up to 10 days.  Dose: 5-10 mg            CHANGE how you take these medications        Instructions   lidocaine-prilocaine 2.5-2.5 % Crea  What changed:   how much to take  how to take this  when to take this  reasons to take this  Commonly known as: Emla   Apply to port 1 hour prior to access of port and cover with plastic wrap.            CONTINUE taking these medications        Instructions   ALPRAZolam 0.5 MG Tabs  Commonly known as: Xanax   Take 0.5 mg by mouth at bedtime as needed for Anxiety or Sleep.  Dose: 0.5 mg     dexamethasone 4 MG Tabs  Commonly known as: Decadron   Take 2 Tablets by mouth 2 times a day. Take 8 mg two times a day for 3 days, starting 24 hours prior to docetaxel.  Dose: 8 mg     diphenoxylate-atropine 2.5-0.025 MG Tabs  Commonly known as: Lomotil   Take 1 Tablet by mouth 4 times a day as needed for Diarrhea.  Dose: 1 Tablet     EPINEPHrine 0.3 MG/0.3ML Soaj solution for injection  Commonly known as: Epipen   Doctor's comments: Please provide EpiPen that is patient or insurance preference.  Inject 0.3 mL into the thigh one time for 1 dose  Indications: Patient advised to present to the ED even after epinephrine administration for further observation and management.     HYDROcodone-acetaminophen 5-325 MG Tabs per tablet  Commonly known as: Norco   Take 1 Tablet by mouth every 6 hours as needed. Indications: Pain  Dose: 1 Tablet     loratadine 10 MG Tabs  Commonly known as: Claritin   Take 10 mg by mouth every day.  Dose: 10 mg     OLANZapine 5 MG Tabs  Commonly known as: ZyPREXA   Take 1 Tablet by mouth every evening. For 5 days starting the day of chemotherapy  Dose: 5 mg     ondansetron 4 MG Tabs tablet  Commonly known as: Zofran   Take 1 Tablet by mouth every four hours as needed for Nausea/Vomiting (for nausea, vomiting).  Dose: 4 mg     prochlorperazine 10 MG Tabs  Commonly  known as: Compazine   Take 1 Tablet by mouth every 6 hours as needed (for nausea, vomiting).  Dose: 10 mg     Tums Chewy Bites 750 MG chewable tablet  Generic drug: calcium carbonate   Chew 2 Tablets 4 times a day.  Dose: 2 Tablet              Discharge Criteria: The patient recovered much more quickly than anticipated on admission.    Disposition:  to home with close outpatient follow-up    Diet:  regular diet    Activity:  Activity as tolerated.    Code Status: Full Code      PCP: Francisco Maier M.D.    Follow Up:  No follow-up provider specified.   Future Appointments   Date Time Provider Department Center   2/20/2024  4:30 PM RN 6 ON [facility] [place]   2/21/2024  5:30 PM [facility] [place]   2/22/2024  5:30 PM [facility] [place]   2/23/2024  4:00 PM [facility]   [facility]   3/7/2024 11:30 AM SONIA Cordova [facility]   3/8/2024 10:30 AM [facility] [place]   3/10/2024  4:00 PM [facility] [place]   3/11/2024  4:00 PM [facility] [place]   3/12/2024  4:00 [facility]   3/13/2024  4:00 PM [facility] [place]   3/14/2024  4:00 PM [facility] [place]   3/15/2024  4:00 PM [facility] [place]   3/28/2024 10:00 AM [facility] [place]   3/28/2024 11:30 AM Lizandro Thomas M.D. [facility] None   3/29/2024 10:00 AM [facility] [place]   3/31/2024  4:00 PM [facility] [place]   4/1/2024  4:30 PM [facility] [place]   4/2/2024  4:00 PM [facility] [place]   4/3/2024  4:00 PM [facility] [place]   4/4/2024  4:00 PM [facility] [place]   4/5/2024  4:00 PM [facility]        Instructions:         The patient was instructed to return to the ER in the event of worsening  symptoms. I have counseled the patient on the importance of compliance and the patient has agreed to proceed with all medical recommendations and follow up plan indicated above.   The patient understands that all medications come with benefits and risks. Risks may include permanent injury or death and these risks can be minimized with close reassessment and monitoring.            #########################################################    24 Hour Events: No acute events overnight, patient states her hematuria seems to be resolving, bleeding has improved from bright red blood in her urine to pink-tinged urine only.  She is still having urinary frequency, urinating every 30 to 60 minutes.  Discussed plan for home, including outpatient antibiotics and close follow-up with oncology.  Discussed our consultation with nephrology and that definitive recommendations from their side was dependent on her cystoscopy records, which we do not yet have.  She understands this, states that she is okay with discharging home prior to their full recommendations.  She would prefer to have medication for a possible UTI/BV, also states she has a history of yeast infections following treatment for UTI.  She is agreeable to discharge home with these oral medications, as well as fluconazole in case of a yeast infection following Keflex.    OBJECTIVE:     Vitals:    02/19/24 2308 02/20/24 0000 02/20/24 0426 02/20/24 0830   BP:   111/66 105/62   Pulse:   67 81   Resp: 17 16 16 18   Temp:   36.4 °C (97.6 °F) 36.6 °C (97.9 °F)   TempSrc:   Oral Temporal   SpO2:   95% 95%   Weight:       Height:           Intake/Output Summary (Last 24 hours) at 2/20/2024 1231  Last data filed at 2/19/2024 1320  Gross per 24 hour   Intake 120 ml   Output --   Net 120 ml       Physical Exam  Vitals reviewed.   Constitutional:       General: She is not in acute distress.     Appearance: Normal appearance. She is not toxic-appearing.      Comments: Pleasant young  female.  Head shaved following chemotherapy   HENT:      Head: Normocephalic and atraumatic.      Right Ear: External ear normal.      Left Ear: External ear normal.      Nose: Nose normal. No congestion.      Mouth/Throat:      Mouth: Mucous membranes are moist.      Pharynx: Oropharynx is clear.   Eyes:      Extraocular Movements: Extraocular movements intact.      Pupils: Pupils are equal, round, and reactive to light.   Cardiovascular:      Rate and Rhythm: Normal rate and regular rhythm.      Heart sounds: No murmur heard.  Pulmonary:      Effort: Pulmonary effort is normal. No respiratory distress.      Breath sounds: Normal breath sounds.   Abdominal:      General: Abdomen is flat. Bowel sounds are normal. There is no distension.      Palpations: Abdomen is soft.      Tenderness: There is abdominal tenderness (Bilateral lower quadrant, mild). There is no guarding or rebound.   Musculoskeletal:         General: No swelling or deformity. Normal range of motion.      Cervical back: Normal range of motion and neck supple.   Skin:     General: Skin is warm and dry.      Capillary Refill: Capillary refill takes less than 2 seconds.      Findings: No rash.   Neurological:      General: No focal deficit present.      Mental Status: She is alert and oriented to person, place, and time.      Cranial Nerves: No cranial nerve deficit.   Psychiatric:         Mood and Affect: Mood normal.         Behavior: Behavior normal.           LABS:  Recent Labs     02/18/24 2331 02/20/24  0431   WBC 20.3* 11.0*   RBC 3.91* 3.92*   HEMOGLOBIN 12.8 12.7   HEMATOCRIT 36.7* 36.5*   MCV 93.9 93.1   MCH 32.7 32.4   RDW 44.5 43.6   PLATELETCT 131* 109*   MPV 11.3 11.2   NEUTSPOLYS 92.50* 82.00*   LYMPHOCYTES 5.80* 13.70*   MONOCYTES 0.00 0.00   EOSINOPHILS 0.00 0.00   BASOPHILS 0.00 0.00   RBCMORPHOLO Present Normal     Recent Labs     02/18/24 2331 02/20/24  0431   SODIUM 141 137   POTASSIUM 3.9 4.0   CHLORIDE 106 103   CO2 26 24   BUN  26* 13   CREATININE 0.93 0.70   CALCIUM 8.0* 7.5*   ALBUMIN 3.8 3.2     Estimated GFR/CRCL = Estimated Creatinine Clearance: 128.2 mL/min (by C-G formula based on SCr of 0.7 mg/dL).  Recent Labs     02/18/24 2331 02/20/24  0431   GLUCOSE 133* 85     Recent Labs     02/18/24 2331 02/20/24  0431   ASTSGOT 13 16   ALTSGPT 24 20   TBILIRUBIN 0.4 0.5   ALKPHOSPHAT 45 49   GLOBULIN 1.7* 1.9   INR 1.02  --              Recent Labs     02/18/24 2331   INR 1.02   APTT 21.5*       MICROBIOLOGY:   Results       Procedure Component Value Units Date/Time    Chlamydia/GC, PCR (Genital/Anal swab) [[id number]] Collected: 02/19/24 0518    Order Status: Completed Specimen: Genital Updated: 02/19/24 2135     C. trachomatis by PCR Negative     N. gonorrhoeae by PCR Negative     Source Cervical    Narrative:      Collected By: [id number] SHAHANA LACEY    Urinalysis [[id number]]  (Abnormal) Collected: 02/19/24 0033    Order Status: Completed Specimen: Urine Updated: 02/19/24 0117     Color Red     Character Cloudy     Specific Gravity >=1.030     Ph 6.5     Glucose Negative mg/dL      Ketones Trace mg/dL      Protein 100 mg/dL      Bilirubin Small     Urobilinogen, Urine 0.2     Nitrite Positive     Leukocyte Esterase Negative     Occult Blood Large     Micro Urine Req Microscopic              IMAGING:   CT-ABDOMEN-PELVIS WITH   Final Result      1.  No acute abnormality of the abdomen or pelvis.   2.  Small hiatal hernia.   3.  Status post cholecystectomy.   4.  Status post gastric sleeve.   5.  Trace free pelvic fluid of uncertain etiology and significance.   6.  Status post hysterectomy.            Edwardo Joseph M.D.   PGY-1  [facility]

## 2024-02-20 NOTE — CARE PLAN
The patient is Stable - Low risk of patient condition declining or worsening    Shift Goals  Clinical Goals: VSS, control pain and heartburn  Patient Goals: Sleep  Family Goals: No family present    Progress made toward(s) clinical / shift goals:      Problem: Knowledge Deficit - Standard  Goal: Patient and family/care givers will demonstrate understanding of plan of care, disease process/condition, diagnostic tests and medications  Description: Target End Date:  1-3 days or as soon as patient condition allows    Document in Patient Education    1.  Patient and family/caregiver oriented to unit, equipment, visitation policy and means for communicating concern  2.  Complete/review Learning Assessment  3.  Assess knowledge level of disease process/condition, treatment plan, diagnostic tests and medications  4.  Explain disease process/condition, treatment plan, diagnostic tests and medications  Outcome: Progressing     Problem: Pain - Standard  Goal: Alleviation of pain or a reduction in pain to the patient’s comfort goal  Description: Target End Date:  Prior to discharge or change in level of care    Document on Vitals flowsheet    1.  Document pain using the appropriate pain scale per order or unit policy  2.  Educate and implement non-pharmacologic comfort measures (i.e. relaxation, distraction, massage, cold/heat therapy, etc.)  3.  Pain management medications as ordered  4.  Reassess pain after pain med administration per policy  5.  If opiods administered assess patient's response to pain medication is appropriate per POSS sedation scale  6.  Follow pain management plan developed in collaboration with patient and interdisciplinary team (including palliative care or pain specialists if applicable)  Outcome: Progressing

## 2024-02-21 ENCOUNTER — OUTPATIENT INFUSION SERVICES (OUTPATIENT)
Dept: ONCOLOGY | Facility: MEDICAL CENTER | Age: 39
End: 2024-02-21
Attending: INTERNAL MEDICINE
Payer: MEDICAID

## 2024-02-21 ENCOUNTER — TELEPHONE (OUTPATIENT)
Dept: HEMATOLOGY ONCOLOGY | Facility: MEDICAL CENTER | Age: 39
End: 2024-02-21
Payer: MEDICAID

## 2024-02-21 VITALS
DIASTOLIC BLOOD PRESSURE: 65 MMHG | HEART RATE: 107 BPM | WEIGHT: 212.52 LBS | TEMPERATURE: 98.5 F | HEIGHT: 66 IN | RESPIRATION RATE: 18 BRPM | BODY MASS INDEX: 34.16 KG/M2 | OXYGEN SATURATION: 95 % | SYSTOLIC BLOOD PRESSURE: 103 MMHG

## 2024-02-21 DIAGNOSIS — C50.411 MALIGNANT NEOPLASM OF UPPER-OUTER QUADRANT OF RIGHT BREAST IN FEMALE, ESTROGEN RECEPTOR NEGATIVE (HCC): ICD-10-CM

## 2024-02-21 DIAGNOSIS — Z17.1 MALIGNANT NEOPLASM OF UPPER-OUTER QUADRANT OF RIGHT BREAST IN FEMALE, ESTROGEN RECEPTOR NEGATIVE (HCC): ICD-10-CM

## 2024-02-21 PROCEDURE — 700111 HCHG RX REV CODE 636 W/ 250 OVERRIDE (IP): Performed by: NURSE PRACTITIONER

## 2024-02-21 PROCEDURE — 96372 THER/PROPH/DIAG INJ SC/IM: CPT

## 2024-02-21 RX ADMIN — FILGRASTIM-SNDZ 480 MCG: 480 INJECTION, SOLUTION INTRAVENOUS; SUBCUTANEOUS at 17:38

## 2024-02-21 ASSESSMENT — FIBROSIS 4 INDEX
FIB4 SCORE: 1.31
FIB4 SCORE: 1.31

## 2024-02-21 NOTE — TELEPHONE ENCOUNTER
Contacted patient to follow up on how patient is doing. Pt reported to the ER this past weekend per chart review. Patient states she is feeling better now that she has slept. Patient does have a question regarding keflex prescription she was prescribed in ER. Patient states she lost the paper prescription and asks if we are able to resend. Due to being prescribed by ER physician, reached out to ER physician to resend if possible. Updated patient via [employer] message.

## 2024-02-22 ENCOUNTER — OUTPATIENT INFUSION SERVICES (OUTPATIENT)
Dept: ONCOLOGY | Facility: MEDICAL CENTER | Age: 39
End: 2024-02-22
Attending: INTERNAL MEDICINE
Payer: MEDICAID

## 2024-02-22 VITALS
OXYGEN SATURATION: 95 % | BODY MASS INDEX: 33.94 KG/M2 | HEIGHT: 66 IN | SYSTOLIC BLOOD PRESSURE: 111 MMHG | DIASTOLIC BLOOD PRESSURE: 74 MMHG | RESPIRATION RATE: 18 BRPM | WEIGHT: 211.2 LBS | HEART RATE: 90 BPM | TEMPERATURE: 96.8 F

## 2024-02-22 DIAGNOSIS — Z17.1 MALIGNANT NEOPLASM OF UPPER-OUTER QUADRANT OF RIGHT BREAST IN FEMALE, ESTROGEN RECEPTOR NEGATIVE (HCC): ICD-10-CM

## 2024-02-22 DIAGNOSIS — C50.411 MALIGNANT NEOPLASM OF UPPER-OUTER QUADRANT OF RIGHT BREAST IN FEMALE, ESTROGEN RECEPTOR NEGATIVE (HCC): ICD-10-CM

## 2024-02-22 PROCEDURE — 700111 HCHG RX REV CODE 636 W/ 250 OVERRIDE (IP): Performed by: NURSE PRACTITIONER

## 2024-02-22 PROCEDURE — 96372 THER/PROPH/DIAG INJ SC/IM: CPT

## 2024-02-22 RX ADMIN — FILGRASTIM-SNDZ 480 MCG: 480 INJECTION, SOLUTION INTRAVENOUS; SUBCUTANEOUS at 17:30

## 2024-02-22 ASSESSMENT — FIBROSIS 4 INDEX: FIB4 SCORE: 1.31

## 2024-02-22 NOTE — PROGRESS NOTES
Fela came into infusion services today for zarxio injection. No complaints. Injection given in the LLQ of abdomen. Tolerated well. Pt has future appointments. Discharged to self care.

## 2024-02-23 ENCOUNTER — APPOINTMENT (OUTPATIENT)
Dept: ONCOLOGY | Facility: MEDICAL CENTER | Age: 39
End: 2024-02-23
Attending: INTERNAL MEDICINE
Payer: MEDICAID

## 2024-02-23 NOTE — PROGRESS NOTES
Fela came into infusion services today for zarxio injection. No complaints. Injection given in the LLQ of abdomen. Tolerated well. Appointment for 2/23/24 canceled by pt since they had 2 doses of granix while hospitalized on 2/19/24 and 2/20/24. Pt received zarxio with infusion services on 2/18/24, 2/21/24, and 2/22/24. Pt has future appointments. Discharged to self care.

## 2024-02-26 ENCOUNTER — TELEPHONE (OUTPATIENT)
Dept: MEDICAL GROUP | Facility: OTHER | Age: 39
End: 2024-02-26
Payer: MEDICAID

## 2024-02-26 DIAGNOSIS — C50.911 MALIGNANT NEOPLASM OF RIGHT FEMALE BREAST, UNSPECIFIED ESTROGEN RECEPTOR STATUS, UNSPECIFIED SITE OF BREAST (HCC): ICD-10-CM

## 2024-02-26 NOTE — TELEPHONE ENCOUNTER
Patient is requesting referral to be placed for NEW PATIENT AT [facility] BRE[facility]CER TRANSFER OF CARE ATTENTION TO [facility]  [phone] please, thank you

## 2024-03-05 RX ORDER — PALONOSETRON 0.05 MG/ML
0.25 INJECTION, SOLUTION INTRAVENOUS ONCE
Status: CANCELLED
Start: 2024-03-08 | End: 2024-03-08

## 2024-03-05 RX ORDER — 0.9 % SODIUM CHLORIDE 0.9 %
10 VIAL (ML) INJECTION PRN
Status: CANCELLED | OUTPATIENT
Start: 2024-03-08

## 2024-03-05 RX ORDER — ONDANSETRON 8 MG/1
8 TABLET, ORALLY DISINTEGRATING ORAL PRN
Status: CANCELLED | OUTPATIENT
Start: 2024-03-08

## 2024-03-05 RX ORDER — 0.9 % SODIUM CHLORIDE 0.9 %
VIAL (ML) INJECTION PRN
Status: CANCELLED | OUTPATIENT
Start: 2024-03-07

## 2024-03-05 RX ORDER — EPINEPHRINE 1 MG/ML(1)
0.5 AMPUL (ML) INJECTION PRN
Status: CANCELLED | OUTPATIENT
Start: 2024-03-08

## 2024-03-05 RX ORDER — PROCHLORPERAZINE MALEATE 10 MG
10 TABLET ORAL EVERY 6 HOURS PRN
Status: CANCELLED | OUTPATIENT
Start: 2024-03-08

## 2024-03-05 RX ORDER — SODIUM CHLORIDE 9 MG/ML
INJECTION, SOLUTION INTRAVENOUS CONTINUOUS
Status: CANCELLED | OUTPATIENT
Start: 2024-03-08

## 2024-03-05 RX ORDER — 0.9 % SODIUM CHLORIDE 0.9 %
VIAL (ML) INJECTION PRN
Status: CANCELLED | OUTPATIENT
Start: 2024-03-08

## 2024-03-05 RX ORDER — 0.9 % SODIUM CHLORIDE 0.9 %
3 VIAL (ML) INJECTION PRN
Status: CANCELLED | OUTPATIENT
Start: 2024-03-08

## 2024-03-05 RX ORDER — ONDANSETRON 2 MG/ML
4 INJECTION INTRAMUSCULAR; INTRAVENOUS PRN
Status: CANCELLED | OUTPATIENT
Start: 2024-03-08

## 2024-03-05 RX ORDER — 0.9 % SODIUM CHLORIDE 0.9 %
10 VIAL (ML) INJECTION PRN
Status: CANCELLED | OUTPATIENT
Start: 2024-03-07

## 2024-03-05 RX ORDER — DIPHENHYDRAMINE HYDROCHLORIDE 50 MG/ML
50 INJECTION INTRAMUSCULAR; INTRAVENOUS PRN
Status: CANCELLED | OUTPATIENT
Start: 2024-03-08

## 2024-03-05 RX ORDER — METHYLPREDNISOLONE SODIUM SUCCINATE 125 MG/2ML
125 INJECTION, POWDER, LYOPHILIZED, FOR SOLUTION INTRAMUSCULAR; INTRAVENOUS PRN
Status: CANCELLED | OUTPATIENT
Start: 2024-03-08

## 2024-03-05 RX ORDER — 0.9 % SODIUM CHLORIDE 0.9 %
3 VIAL (ML) INJECTION PRN
Status: CANCELLED | OUTPATIENT
Start: 2024-03-07

## 2024-03-07 ENCOUNTER — OUTPATIENT INFUSION SERVICES (OUTPATIENT)
Dept: ONCOLOGY | Facility: MEDICAL CENTER | Age: 39
End: 2024-03-07
Attending: INTERNAL MEDICINE
Payer: MEDICAID

## 2024-03-07 ENCOUNTER — HOSPITAL ENCOUNTER (OUTPATIENT)
Dept: HEMATOLOGY ONCOLOGY | Facility: MEDICAL CENTER | Age: 39
End: 2024-03-07
Attending: NURSE PRACTITIONER
Payer: MEDICAID

## 2024-03-07 VITALS
RESPIRATION RATE: 18 BRPM | OXYGEN SATURATION: 98 % | HEART RATE: 90 BPM | HEIGHT: 67 IN | BODY MASS INDEX: 33.91 KG/M2 | TEMPERATURE: 97 F | DIASTOLIC BLOOD PRESSURE: 65 MMHG | SYSTOLIC BLOOD PRESSURE: 107 MMHG | WEIGHT: 216.05 LBS

## 2024-03-07 VITALS
HEART RATE: 82 BPM | RESPIRATION RATE: 17 BRPM | HEIGHT: 67 IN | DIASTOLIC BLOOD PRESSURE: 54 MMHG | SYSTOLIC BLOOD PRESSURE: 106 MMHG | OXYGEN SATURATION: 95 % | BODY MASS INDEX: 34.22 KG/M2 | WEIGHT: 218.03 LBS | TEMPERATURE: 97.7 F

## 2024-03-07 DIAGNOSIS — Z79.899 ENCOUNTER FOR LONG-TERM (CURRENT) USE OF HIGH-RISK MEDICATION: ICD-10-CM

## 2024-03-07 DIAGNOSIS — M79.10 MYALGIA: ICD-10-CM

## 2024-03-07 DIAGNOSIS — K52.1 DIARRHEA DUE TO DRUG: ICD-10-CM

## 2024-03-07 DIAGNOSIS — C50.411 MALIGNANT NEOPLASM OF UPPER-OUTER QUADRANT OF RIGHT BREAST IN FEMALE, ESTROGEN RECEPTOR NEGATIVE (HCC): ICD-10-CM

## 2024-03-07 DIAGNOSIS — Z17.1 MALIGNANT NEOPLASM OF UPPER-OUTER QUADRANT OF RIGHT BREAST IN FEMALE, ESTROGEN RECEPTOR NEGATIVE (HCC): ICD-10-CM

## 2024-03-07 DIAGNOSIS — T45.1X5A CHEMOTHERAPY INDUCED NAUSEA AND VOMITING: ICD-10-CM

## 2024-03-07 DIAGNOSIS — R11.2 CHEMOTHERAPY INDUCED NAUSEA AND VOMITING: ICD-10-CM

## 2024-03-07 LAB
ALBUMIN SERPL BCP-MCNC: 3.8 G/DL (ref 3.2–4.9)
ALBUMIN/GLOB SERPL: 1.7 G/DL
ALP SERPL-CCNC: 63 U/L (ref 30–99)
ALT SERPL-CCNC: 31 U/L (ref 2–50)
ANION GAP SERPL CALC-SCNC: 13 MMOL/L (ref 7–16)
AST SERPL-CCNC: 20 U/L (ref 12–45)
BASOPHILS # BLD AUTO: 0.2 % (ref 0–1.8)
BASOPHILS # BLD: 0.03 K/UL (ref 0–0.12)
BILIRUB SERPL-MCNC: 0.4 MG/DL (ref 0.1–1.5)
BUN SERPL-MCNC: 13 MG/DL (ref 8–22)
CALCIUM ALBUM COR SERPL-MCNC: 8.9 MG/DL (ref 8.5–10.5)
CALCIUM SERPL-MCNC: 8.7 MG/DL (ref 8.5–10.5)
CHLORIDE SERPL-SCNC: 108 MMOL/L (ref 96–112)
CO2 SERPL-SCNC: 20 MMOL/L (ref 20–33)
CREAT SERPL-MCNC: 0.89 MG/DL (ref 0.5–1.4)
EOSINOPHIL # BLD AUTO: 0 K/UL (ref 0–0.51)
EOSINOPHIL NFR BLD: 0 % (ref 0–6.9)
ERYTHROCYTE [DISTWIDTH] IN BLOOD BY AUTOMATED COUNT: 49.2 FL (ref 35.9–50)
GFR SERPLBLD CREATININE-BSD FMLA CKD-EPI: 83 ML/MIN/1.73 M 2
GLOBULIN SER CALC-MCNC: 2.3 G/DL (ref 1.9–3.5)
GLUCOSE SERPL-MCNC: 174 MG/DL (ref 65–99)
HCT VFR BLD AUTO: 38.2 % (ref 37–47)
HGB BLD-MCNC: 13.3 G/DL (ref 12–16)
IMM GRANULOCYTES # BLD AUTO: 0.05 K/UL (ref 0–0.11)
IMM GRANULOCYTES NFR BLD AUTO: 0.4 % (ref 0–0.9)
LYMPHOCYTES # BLD AUTO: 0.82 K/UL (ref 1–4.8)
LYMPHOCYTES NFR BLD: 6.7 % (ref 22–41)
MCH RBC QN AUTO: 32.9 PG (ref 27–33)
MCHC RBC AUTO-ENTMCNC: 34.8 G/DL (ref 32.2–35.5)
MCV RBC AUTO: 94.6 FL (ref 81.4–97.8)
MONOCYTES # BLD AUTO: 0.23 K/UL (ref 0–0.85)
MONOCYTES NFR BLD AUTO: 1.9 % (ref 0–13.4)
NEUTROPHILS # BLD AUTO: 11.14 K/UL (ref 1.82–7.42)
NEUTROPHILS NFR BLD: 90.8 % (ref 44–72)
NRBC # BLD AUTO: 0 K/UL
NRBC BLD-RTO: 0 /100 WBC (ref 0–0.2)
OUTPT INFUS CBC COMMENT OICOM: ABNORMAL
PLATELET # BLD AUTO: 134 K/UL (ref 164–446)
PMV BLD AUTO: 10.9 FL (ref 9–12.9)
POTASSIUM SERPL-SCNC: 3.9 MMOL/L (ref 3.6–5.5)
PROT SERPL-MCNC: 6.1 G/DL (ref 6–8.2)
RBC # BLD AUTO: 4.04 M/UL (ref 4.2–5.4)
SODIUM SERPL-SCNC: 141 MMOL/L (ref 135–145)
WBC # BLD AUTO: 12.3 K/UL (ref 4.8–10.8)

## 2024-03-07 PROCEDURE — 99212 OFFICE O/P EST SF 10 MIN: CPT | Performed by: NURSE PRACTITIONER

## 2024-03-07 PROCEDURE — 80053 COMPREHEN METABOLIC PANEL: CPT

## 2024-03-07 PROCEDURE — 36415 COLL VENOUS BLD VENIPUNCTURE: CPT

## 2024-03-07 PROCEDURE — 85025 COMPLETE CBC W/AUTO DIFF WBC: CPT

## 2024-03-07 PROCEDURE — 99214 OFFICE O/P EST MOD 30 MIN: CPT | Performed by: NURSE PRACTITIONER

## 2024-03-07 RX ORDER — ACYCLOVIR 400 MG/1
400 TABLET ORAL 2 TIMES DAILY
Qty: 14 TABLET | Refills: 0 | Status: SHIPPED | OUTPATIENT
Start: 2024-03-07 | End: 2024-03-11

## 2024-03-07 ASSESSMENT — ENCOUNTER SYMPTOMS
COUGH: 0
WEIGHT LOSS: 0
HEADACHES: 0
CONSTIPATION: 0
DIZZINESS: 0
NAUSEA: 0
VOMITING: 0
CHILLS: 0
SHORTNESS OF BREATH: 0
MYALGIAS: 0
PALPITATIONS: 0
FEVER: 0
DIARRHEA: 0

## 2024-03-07 ASSESSMENT — FIBROSIS 4 INDEX
FIB4 SCORE: 1.09
FIB4 SCORE: 1.35

## 2024-03-07 ASSESSMENT — PAIN SCALES - GENERAL: PAINLEVEL: 6=MODERATE PAIN

## 2024-03-07 NOTE — PROGRESS NOTES
Ms Rea is here today for pre chemo labs.    Pt prefers venipuncture for lab drawn.  CBC and CMP drawn per orders from left AC.    Gauze and coban applied to the site.     Discharged in stable condition.

## 2024-03-07 NOTE — ADDENDUM NOTE
Encounter addended by: Judith Woods, Med Ass't on: 3/7/2024 1:07 PM   Actions taken: Charge Capture section accepted

## 2024-03-07 NOTE — PROGRESS NOTES
Subjective     Fela Tiwari is a 39 y.o. female who presents with Breast Cancer ([place] / Pre Chemo )          HPI    Referring Physician: Rochelle Gaviria MD   Primary Care:  Francisco Maier M.D.      Diagnosis: Invasive ductal carcinoma of the right breast, grade 3, clinically stage IIa (cT2, cN0) ER negative [place] negative, HER2 3+ positive, Ki-67 20 to 30%      Chief Complaint: Patient seen today for evaluation of her ER negative HER2 positive breast cancer, for continued monitoring of symptoms and side effects of cancer treatments, employing TC.     Oncology history of presenting illness:  Fela Tiwari is a 38 y.o. likely premenopausal white female who self detected a mass in her lateral right breast in 2023.  She had a mammogram on 2023 which showed the palpable mass is likely malignant.  Ultrasound showed it was 2.6 cm irregular hypoechoic spiculated mass.  Left breast was normal.  She underwent a ultrasound-guided biopsy of the right breast on 2023: This revealed an invasive ductal carcinoma, grade 3, ER negative, [place] negative, HER2 3+ by IHC, Ki-67 20 to 30%.  An MRI was done yesterday and results are pending.  She had a hysterectomy in  but her ovaries are in place.  She is  3 para 2 and has not taken any hormone replacement therapy.  No family history of breast or ovarian cancer.  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  She was on anticoagulation for a while but has been off for several years.  She has also had gastric sleeve laparoscopic and cholecystectomy and does note chronic nausea.      Interval histories:  Interval history 24 (CAlsop, APRN):  Patient had a difficult time with her first cycle of chemotherapy.  Day after she received the Udenyca shot she presented to the emergency department with severe pain.  Pain was excruciating throughout her entire body.  She was given IV  Dilaudid in the hospital and discharged with Tylenol.  She has been trying to take 1000 g of Tylenol with no relief.  Patient tearful today due to the severity of pain.  She also had experienced blood when urinating.  She did have a positive occult blood on stool and urine during hospitalization.  She was asked to follow-up with urology in the outpatient setting.  She stated since being discharged from the hospital she has not had any blood whatsoever in the urine or will.  She has been experiencing diarrhea anywhere from 5-10 episodes per day likely related to Perjeta.  She has been taking Imodium, approximately 2-3 pills per day with minimal relief.  She does have nausea and vomiting but she does state that Zofran does help.  She does also note thrush which was appreciated on physical examination today.    Interval history 2/15/2024: She had a terrible time with her first cycle of TCHP as noted above.  Her diarrhea has slowed dramatically but she still is using Lomotil occasionally.  She also had severe eye tearing likely due to the Taxotere which persists although it is better.  She has no neuropathy but did have a rash which responded to corticosteroids.  She also had persistent nausea which has finally resolved.  Her thrush is resolved as well.  She has not had any further vaginal bleeding but this has not been evaluated adequately except for a bladder scan which as expected was negative.  She feels like her tumor shrunk some.     Interval history 03/07/24 (Basim, APRN):  Patient still had difficult time with cycle 2.  She did end up in the ER the day after receiving treatment with bleeding again.  She did have bleeding with her first cycle and did end up being seen by urology and underwent a cystoscopy which was negative.  She stated that the bleeding lasted approximately 3-4 days.  She did experience significant nausea and vomiting for the first 2 weeks.  She also had severe diarrhea up to 10 times per day  for the first 2 weeks.  She did take Lomotil with no improvement.  She has noticed some bleeding from the rectum which is from her hemorrhoids with the diarrhea.  She did state that she was given the olanzapine to take for the first 5 days after treatment which did help immensely with her nausea and vomiting as well as she believes to help with her diarrhea as well as sleep.  When she stopped that medication the symptoms returned.  She is noticing her vision feels a little differently.  Her eyes are very dry and watery.  We attempted to give patient days of Zarzio for neutropenia support as patient had significant pain with the Udenyca shot.  She stated that she did not notice any improvement or difference as she did still have the severe myalgias that lasted approximately 2 weeks with the Zarzio injection.  She did state this last week she has felt back to normal.  She is anxious to receive her neck cycle of chemo.    Treatment history:  01/23/24: C1D1 TCHP with Udenyca  02/16/24: C2D2 TCHP with 20-25% reduction in dosing of the chemotherapy and 5 days of Zarxio instead of Udenyca   03/08/24: C3D1 THP (20% dose reduction of Docetaxel) - will hold on Xarzio or Udenyca (d/c Carboplatin d/t tolerability)  03/29/24: C4D1 THP with addition of Cytoxan (continue with dose reduction of Docetaxel by 20%) with Udenyca support - PLANNED pending insurance auth    Allergies   Allergen Reactions    Bee Venom Anaphylaxis    Morphine Sulfate Anaphylaxis and Itching    Skin Adhesives     Tape Rash     Paper tape OK, do not use tegaderm    Pineapple Hives     Current Outpatient Medications on File Prior to Encounter   Medication Sig Dispense Refill    diphenhydrAMINE (BENADRYL) 25 MG capsule Take 1 Capsule by mouth every 6 hours as needed for Itching.      omeprazole (PRILOSEC) 20 MG delayed-release capsule Take 1 Capsule by mouth every day. 30 Capsule 0    ALPRAZolam (XANAX) 0.5 MG Tab Take 0.5 mg by mouth at bedtime as needed for  Anxiety or Sleep.      HYDROcodone-acetaminophen (NORCO) 5-325 MG Tab per tablet Take 1 Tablet by mouth every 6 hours as needed. Indications: Pain      diphenoxylate-atropine (LOMOTIL) 2.5-0.025 MG Tab Take 1 Tablet by mouth 4 times a day as needed for Diarrhea.      calcium carbonate (TUMS CHEWY BITES) 750 MG chewable tablet Chew 2 Tablets 4 times a day.      OLANZapine (ZYPREXA) 5 MG Tab Take 1 Tablet by mouth every evening. For 5 days starting the day of chemotherapy 30 Tablet 1    loratadine (CLARITIN) 10 MG Tab Take 10 mg by mouth every day.      ondansetron (ZOFRAN) 4 MG Tab tablet Take 1 Tablet by mouth every four hours as needed for Nausea/Vomiting (for nausea, vomiting). 30 Tablet 6    prochlorperazine (COMPAZINE) 10 MG Tab Take 1 Tablet by mouth every 6 hours as needed (for nausea, vomiting). 30 Tablet 6    dexamethasone (DECADRON) 4 MG Tab Take 2 Tablets by mouth 2 times a day. Take 8 mg two times a day for 3 days, starting 24 hours prior to docetaxel. 12 Tablet 6    lidocaine-prilocaine (EMLA) 2.5-2.5 % Cream Apply to port 1 hour prior to access of port and cover with plastic wrap. (Patient taking differently: Apply 1 Application topically as needed. Apply to port 1 hour prior to access of port and cover with plastic wrap.) 30 g 3    EPINEPHrine (EPIPEN) 0.3 MG/0.3ML Solution Auto-injector solution for injection Inject 0.3 mL into the thigh one time for 1 dose  Indications: Patient advised to present to the ED even after epinephrine administration for further observation and management. 1 Each 0     Current Facility-Administered Medications on File Prior to Encounter   Medication Dose Route Frequency Provider Last Rate Last Admin    heparin lock flush 100 unit/mL injection 500 Units  500 Units Intracatheter PRN Gillian Garces, A.P.N.           Review of Systems   Constitutional:  Positive for malaise/fatigue. Negative for chills, fever and weight loss.   Respiratory:  Negative for cough and shortness of  "breath.    Cardiovascular:  Negative for chest pain and palpitations.   Gastrointestinal:  Negative for constipation, diarrhea, nausea and vomiting.   Genitourinary:  Negative for dysuria.   Musculoskeletal:  Negative for myalgias.   Neurological:  Negative for dizziness and headaches.              Objective     /54 (BP Location: Right arm, Patient Position: Sitting, BP Cuff Size: Adult)   Pulse 82   Temp 36.5 °C (97.7 °F) (Temporal)   Resp 17   Ht 1.69 m (5' 6.54\")   Wt 98.9 kg (218 lb 0.6 oz)   LMP 02/10/2012 (Approximate) Comment: Age of first period:10, No HRT  SpO2 95%   BMI 34.63 kg/m²      Physical Exam  Vitals reviewed.   Constitutional:       General: She is not in acute distress.     Appearance: Normal appearance. She is not diaphoretic.   HENT:      Head: Normocephalic and atraumatic.   Cardiovascular:      Rate and Rhythm: Normal rate and regular rhythm.      Heart sounds: Normal heart sounds. No murmur heard.     No friction rub. No gallop.   Pulmonary:      Effort: Pulmonary effort is normal. No respiratory distress.      Breath sounds: Normal breath sounds. No wheezing.   Chest:      Comments: 03/07/24: Mass at 1030 o'clock 5 cm from the right nipple measures 1.5 x 1.5 cm and is irregular.  No right axillary adenopathy  2/15/2024: Mass at 1030 o'clock 5 cm from the right nipple measures 1.5 x 2 cm and is irregular.  No right axillary adenopathy  12/22/2023: Mass at 1030 o'clock 5 cm from the right nipple measuring 3.5 x 3.5 cm.  No right axillary adenopathy is noted.  The left breast is normal.    Abdominal:      General: Bowel sounds are normal. There is no distension.      Palpations: Abdomen is soft.      Tenderness: There is no abdominal tenderness.   Musculoskeletal:         General: No swelling or tenderness. Normal range of motion.   Skin:     General: Skin is warm and dry.   Neurological:      Mental Status: She is alert and oriented to person, place, and time.   Psychiatric:    "      Mood and Affect: Mood normal.         Behavior: Behavior normal.                        Assessment & Plan       1. Malignant neoplasm of upper-outer quadrant of right breast in female, estrogen receptor negative (HCC)        2. Myalgia        3. Diarrhea due to drug        4. Chemotherapy induced nausea and vomiting        5. Encounter for long-term (current) use of high-risk medication                Impression:  1.  Invasive ductal carcinoma of the right breast, grade 3, clinically stage IIa (cT2, cN0) ER negative [place] negative, HER2 3+ positive, Ki-67 20 to 30%.  Poor tolerance.  A full dose TCHP.  Will proceed with cycle 2 at reduced dosing.  Patient with continued decrease in breast mass.  2.  Chronic nausea after gastric sleeve and cholecystectomy severe after chemotherapy now minimal  3.  History of DVT/PE off anticoagulation for several years  4.  Severe myalgias likely from Udenyca - patient okay to take Tylenol and Ibuprofen - will provide patient with low dose Norco for severe pain to have daily Zarxio days 2 through 5 of the cycle. With cycle 2 patient with severe myalgias for up to 2 weeks despite change to Zarxio. Will return to Udenyca.   5.  Bleeding urine and stool resolved etiology unclear  6.  Chemo nausea and vomiting -she will use Aloxi and olanzapine - patient to take Olanzepine x10 days  7.  Diarrhea d/t Perjeta - Imodium not effective - will start Lomotil. Still severe with cycle 2 - patient ok to take Lomotil with Imodium.   8.  Thrush - mild and will start Nystatin QID - recommend baking soda and salt water rinses as well.  Resolved  9. Hematuria - presented for first 3-4 days after both cycles 1 and 2.  Cystoscopy completed by urology and was negative.  Questioning whether this may be related to carboplatin, and will plan to change treatment with discontinuation of carboplatin and add Cytoxan.      Plan:  Plan will be to discontinue carboplatin and add in Cytoxan 600 mg/m2.  However we  do need authorization approval by insurance and therefore we will go ahead with cycle 3 with holding carboplatin altogether and she will receive docetaxel, Herceptin and Perjeta only.  Working on insurance authorization for  cycle 4-6 with the addition of Cytoxan.    For the upcoming cycle of treatment, cycle 3, will hold on any G-CSF for this cycle only.     For cycles 4-6 will return to [facility] beds at the [facility] since patient still had significant pain with the short acting Zarxio.     Patient to follow-up in the clinic in 3 weeks, or sooner if needed.      Please note that this dictation was created using voice recognition software. I have made every reasonable attempt to correct obvious errors, but I expect that there are errors of grammar and possibly content that I did not discover before finalizing the note.

## 2024-03-08 ENCOUNTER — OUTPATIENT INFUSION SERVICES (OUTPATIENT)
Dept: ONCOLOGY | Facility: MEDICAL CENTER | Age: 39
End: 2024-03-08
Attending: INTERNAL MEDICINE
Payer: MEDICAID

## 2024-03-08 VITALS
DIASTOLIC BLOOD PRESSURE: 64 MMHG | HEIGHT: 67 IN | HEART RATE: 77 BPM | BODY MASS INDEX: 33.81 KG/M2 | WEIGHT: 215.39 LBS | OXYGEN SATURATION: 96 % | SYSTOLIC BLOOD PRESSURE: 110 MMHG | TEMPERATURE: 99.5 F | RESPIRATION RATE: 18 BRPM

## 2024-03-08 DIAGNOSIS — Z17.1 MALIGNANT NEOPLASM OF UPPER-OUTER QUADRANT OF RIGHT BREAST IN FEMALE, ESTROGEN RECEPTOR NEGATIVE (HCC): ICD-10-CM

## 2024-03-08 DIAGNOSIS — C50.411 MALIGNANT NEOPLASM OF UPPER-OUTER QUADRANT OF RIGHT BREAST IN FEMALE, ESTROGEN RECEPTOR NEGATIVE (HCC): ICD-10-CM

## 2024-03-08 PROCEDURE — 96375 TX/PRO/DX INJ NEW DRUG ADDON: CPT

## 2024-03-08 PROCEDURE — 700111 HCHG RX REV CODE 636 W/ 250 OVERRIDE (IP): Mod: UD | Performed by: NURSE PRACTITIONER

## 2024-03-08 PROCEDURE — 700105 HCHG RX REV CODE 258: Mod: UD | Performed by: NURSE PRACTITIONER

## 2024-03-08 PROCEDURE — 96367 TX/PROPH/DG ADDL SEQ IV INF: CPT

## 2024-03-08 PROCEDURE — 96415 CHEMO IV INFUSION ADDL HR: CPT

## 2024-03-08 PROCEDURE — 304540 HCHG NITRO SET VENT 2ND TUB

## 2024-03-08 PROCEDURE — 96417 CHEMO IV INFUS EACH ADDL SEQ: CPT

## 2024-03-08 PROCEDURE — 96413 CHEMO IV INFUSION 1 HR: CPT

## 2024-03-08 PROCEDURE — A4212 NON CORING NEEDLE OR STYLET: HCPCS

## 2024-03-08 RX ORDER — PALONOSETRON 0.05 MG/ML
0.25 INJECTION, SOLUTION INTRAVENOUS ONCE
Status: COMPLETED | OUTPATIENT
Start: 2024-03-08 | End: 2024-03-08

## 2024-03-08 RX ADMIN — PALONOSETRON 0.25 MG: 0.05 INJECTION, SOLUTION INTRAVENOUS at 11:14

## 2024-03-08 RX ADMIN — DEXAMETHASONE SODIUM PHOSPHATE 12 MG: 4 INJECTION, SOLUTION INTRA-ARTICULAR; INTRALESIONAL; INTRAMUSCULAR; INTRAVENOUS; SOFT TISSUE at 11:18

## 2024-03-08 RX ADMIN — FOSAPREPITANT 150 MG: 150 INJECTION, POWDER, LYOPHILIZED, FOR SOLUTION INTRAVENOUS at 12:14

## 2024-03-08 RX ADMIN — HEPARIN 500 UNITS: 100 SYRINGE at 17:33

## 2024-03-08 RX ADMIN — PERTUZUMAB 420 MG: 30 INJECTION, SOLUTION, CONCENTRATE INTRAVENOUS at 13:05

## 2024-03-08 RX ADMIN — DIPHENHYDRAMINE HYDROCHLORIDE 25 MG: 50 INJECTION, SOLUTION INTRAMUSCULAR; INTRAVENOUS at 11:35

## 2024-03-08 RX ADMIN — DOCETAXEL 128 MG: 20 INJECTION, SOLUTION, CONCENTRATE INTRAVENOUS at 15:01

## 2024-03-08 RX ADMIN — FAMOTIDINE 20 MG: 10 INJECTION, SOLUTION INTRAVENOUS at 11:15

## 2024-03-08 RX ADMIN — TRASTUZUMAB-ANNS 592 MG: 420 INJECTION, POWDER, LYOPHILIZED, FOR SOLUTION INTRAVENOUS at 14:20

## 2024-03-08 ASSESSMENT — FIBROSIS 4 INDEX: FIB4 SCORE: 1.1

## 2024-03-08 NOTE — PROGRESS NOTES
Pt presented to infusion for D1C3 OP Breast TCHP. POC discussed and pt verbalized understanding. Per provider notes, pt will not be receiving Carboplatin or Udenyca due to pt symptoms on last cycle  Port accessed per [facility] policy and pt tolerated well. Brisk blood return noted, line flushed with ease and hypoallergenic dressing applied to port. Pre-medications and chemotherapy administered per MAR. Perjeta infused over 30 mins followed by 30 min obs. Kanjinti infused over 30 mins. Pt tolerated both well. Taxotere started at 100 ml/hr and increased to a max rate of 150 ml/hr due to pt with reaction on last cycle. Pt tolerated well. No s/s of reactions or complications noted. Pt able to eat and sleep during treatment today. Port flushed per policy and de-accessed with needle intact; site covered with sterile gauze/paper tape and pt tolerated well. Pt confirmed her next appt and discharged ambulatory in NAD at time of discharge.

## 2024-03-08 NOTE — PROGRESS NOTES
Patient has c/o cold sores both inside and outside the mouth. Spoke with Gillian NÚÑEZ, patient is able to take Lysine supplement. Per Dr. Thomas, order for acyclovir 400mg BID x7days. Order sent to patient preferred pharmacy and patient updated via phone.

## 2024-03-08 NOTE — PROGRESS NOTES
"Pharmacy Chemotherapy Verification    Dx: Breast cancer, ER-, [place]-, HER2+  Cycle 3  Previous treatment = C2 2/16/24    Protocol: TCHP  Pertuzumab 840 mg IV over 60 minutes on Day 1 of Cycle 1 followed by  Pertuzumab 420 mg IV over 30 minutes on Day 1 of Cycles 2-6  Trastuzumab 8 mg/kg IV over 90 minutes on Day 1 of Cycle 1 followed by  Trastuzumab 6 mg/kg IV over 30 minutes on Day 1 of Cycles 2-6 followed by  DOCetaxel  IV over 60 minutes on Day 1 followed by   2/16/24 - Dose reduced to 60 mg/m2 for tolerance starting with C2   2/16/24 - Dose reduced to AUC 4.5 for tolerance starting with C2   -Removed from treatment plan starting Cycle 3  21-day cycle for 6 cycles  ~Followed by~  Pertuzumab 420 mg IV over 30 minutes on Day 1 beginning with Week 19  Trastuzumab 6 mg/kg IV over 30 minutes on Day 1 beginning with Week 19  21-day cycle to complete 52 weeks total of trastuzumab and pertuzumab therapy  NCCN Guidelines for Breast Cancer V.2.2022.  Josef LEON et al. Gay Oncol. 2013;24(9):2278-84.    Allergies:  Bee venom, Morphine sulfate, Tape, and Pineapple     /64   Pulse 77   Temp 37.5 °C (99.5 °F) (Temporal)   Resp 18   Ht 1.69 m (5' 6.54\")   Wt 97.7 kg (215 lb 6.2 oz)   LMP 02/10/2012 (Approximate) Comment: Age of first period:10, No HRT  SpO2 96%   BMI 34.21 kg/m²  Body surface area is 2.14 meters squared.    Labs 3/7/24  ANC 82803 Hgb 13.3 Plt 134k  SCr 0.89 CrCl >125 mL/min   AST/ALT/AP = 20/31/63 Tbili = 0.4    ECHO  12/27/23 LVEF 65%    Pertuzumab 420 mg fixed dose              Fixed dose, OK to treat with final dose = 420 mg IV     Trastuzumab-anns 6 mg/kg x 97.7 kg = 586.2 mg              <10% difference, OK to treat with final dose = 592 mg IV     DOCEtaxel 60 mg/m² x 2.14 m² = 128.4 mga              <10% difference, OK to treat with final dose = 128 mg IV    Shayy Veras, PharmD, BCOP  "

## 2024-03-08 NOTE — PROGRESS NOTES
"Pharmacy Chemotherapy Calculation:    Dx: Invasive ductal carcinoma of the right breast [place]/ER (-) HER2 (+) stage IIa (cT2, cN0)        Protocol: TCHP (DOCEtaxel/CARBOplatin + Pertuzumab + Trastuzumab)    *Dosing Reference*   Pertuzumab 840 mg IV over 60 minutes on Day 1 of Cycle 1   followed by 420 mg IV over 30 minutes on Day 1 of Cycles 2 - 6   Trastuzumab 8 mg/kg IV over 90 minutes on Day 1 of Cycle 1   followed by 6 mg/kg IV over 30 minutes on Day 1 of Cycles 2 - 6 followed by   DOCEtaxel  IV over 60 minutes on Day 1 followed by   *dose reduced to 60 mg/m2 starting C2 per Dr. Thomas due to tolerance  *dose reduced to AUC starting C2 per Dr. Thomas due to tolerance  3/8/24 - Discontinued from treatment plan starting with C3 due to tolerance. Plan to add cyclophosphamide once insurance approval has been authorized.  21-day cycle for 6 cycles     ~Followed by~    Pertuzumab 420 mg IV over 30 minutes on Day 1 beginning with Week 19   Trastuzumab 6 mg/kg IV over 30 minutes on Day 1 beginning with Week 19  21-day cycle to complete 52 weeks total of trastuzumab and pertuzumab therapy    NCCN Guidelines® for Breast Cancer V.4.2023.   Josef LEON, et al. Gay Oncol. 2013;24(9):2278-84.a    Allergies:  Bee venom, Morphine sulfate, Tape, and Pineapple     /64   Pulse 77   Temp 37.5 °C (99.5 °F) (Temporal)   Resp 18   Ht 1.69 m (5' 6.54\")   Wt 97.7 kg (215 lb 6.2 oz)   LMP 02/10/2012 (Approximate) Comment: Age of first period:10, No HRT  SpO2 96%   BMI 34.21 kg/m²  Body surface area is 2.14 meters squared.    Labs 3/7/24  ANC~ 04710 Plt = 134k   Hgb = 13.3     SCr = 0.89 mg/dL CrCl ~ >125 mL/min   LFT's = WNLs  TBili = 0.4     ECHO:   12/27/23: LVEF~65%    Drug Order   (Drug name, dose, route, IV Fluid & volume, frequency, number of doses) Cycle 3  Previous treatment: C2 2/16/24     Medication = Pertuzumab  Base Dose = 420 mg  Fixed dose; no calc required  Final Dose = 420 mg  Route = IV  Fluid & " Volume =  mL  Admin Duration = Over 30 minutes          Fixed dose, OK to treat with final dose   Medication = Trastuzumab-anns  Base Dose = 6 mg/m2  Calc Dose: Base Dose x 97.7 kg = 586.2 mg  Final Dose = 592 mg  Route = IV  Fluid & Volume =  mL  Admin Duration = Over 30 minutes          <10% difference, OK to treat with final dose   Medication = DOCEtaxel  Base Dose = 60 mg/m2  Calc Dose:Base Dose x 2.14 m2 = 128.4 mg  Final Dose = 128 mg  Route = IV  Fluid & Volume =  mL  Admin Duration = Over 60 minutes          <10% difference, OK to treat with final dose   By my signature below, I confirm this process was performed independently with the BSA and all final chemotherapy dosing calculations congruent. I have reviewed the above chemotherapy order and that my calculation of the final dose and BSA (when applicable) corroborate those calculations of the  pharmacist. Discrepancies of 10% or greater in the written dose have been addressed and documented within the EPIC Progress notes.    Casimiro Peres, KoriD

## 2024-03-08 NOTE — PROGRESS NOTES
Chemotherapy Verification - SECONDARY RN       Height = 1.69m  Weight = 97.7kg  BSA = 2.14m2       Medication: pertuzumab  Dose: flat dose  Calculated Dose: 420mg                             (In mg/m2, AUC, mg/kg)     Medication: trastuzumab-anns Dose: 6mg/kg  Calculated Dose: 586.2mg                             (In mg/m2, AUC, mg/kg)    Medication: docetaxel  Dose: 60mg/m2  Calculated Dose: 128.4mg                             (In mg/m2, AUC, mg/kg)    I confirm that this process was performed independently.

## 2024-03-08 NOTE — PROGRESS NOTES
Chemotherapy Verification - PRIMARY RN      Height = 169 cm  Weight = 97.7 kg  BSA = 2.14 m2     Medication: Trastuzumab-anns  Dose: 6 mg/kg  Calculated Dose: 586.2 mg                             (In mg/m2, AUC, mg/kg)     Medication: Pertuzumab  Dose: 420 mg fixed   Calculated Dose: 420 mg                             (In mg/m2, AUC, mg/kg)    Medication: Docetaxel  Dose: 60 mg/m2  Calculated Dose: 128.4 mg                             (In mg/m2, AUC, mg/kg)    I confirm this process was performed independently with the BSA and all final chemotherapy dosing calculations congruent.  Any discrepancies of 10% or greater have been addressed with the chemotherapy pharmacist. The resolution of the discrepancy has been documented in the EPIC progress notes.

## 2024-03-10 ENCOUNTER — APPOINTMENT (OUTPATIENT)
Dept: ONCOLOGY | Facility: MEDICAL CENTER | Age: 39
End: 2024-03-10
Attending: INTERNAL MEDICINE
Payer: MEDICAID

## 2024-03-11 ENCOUNTER — APPOINTMENT (OUTPATIENT)
Dept: ONCOLOGY | Facility: MEDICAL CENTER | Age: 39
End: 2024-03-11
Attending: INTERNAL MEDICINE
Payer: MEDICAID

## 2024-03-11 ENCOUNTER — TELEPHONE (OUTPATIENT)
Dept: HEMATOLOGY ONCOLOGY | Facility: MEDICAL CENTER | Age: 39
End: 2024-03-11
Payer: MEDICAID

## 2024-03-11 DIAGNOSIS — Z79.899 ENCOUNTER FOR LONG-TERM (CURRENT) USE OF HIGH-RISK MEDICATION: ICD-10-CM

## 2024-03-11 RX ORDER — ACYCLOVIR 400 MG/1
400 TABLET ORAL 2 TIMES DAILY
Qty: 140 TABLET | Refills: 0 | Status: SHIPPED | OUTPATIENT
Start: 2024-03-11

## 2024-03-11 NOTE — TELEPHONE ENCOUNTER
Contacted patient to follow up on how patient is doing and to update on plan of care, adding prophylactic acyclovir 400mg twice daily. Patient states she has mostly slept since receiving last cycle on 3/8/24. Patient states this cycle has been the worst so far in regard to symptoms. Will update Gillian on symptoms and update on questions patient has.

## 2024-03-12 ENCOUNTER — APPOINTMENT (OUTPATIENT)
Dept: ONCOLOGY | Facility: MEDICAL CENTER | Age: 39
End: 2024-03-12
Attending: INTERNAL MEDICINE
Payer: MEDICAID

## 2024-03-13 ENCOUNTER — APPOINTMENT (OUTPATIENT)
Dept: ONCOLOGY | Facility: MEDICAL CENTER | Age: 39
End: 2024-03-13
Attending: INTERNAL MEDICINE
Payer: MEDICAID

## 2024-03-14 ENCOUNTER — APPOINTMENT (OUTPATIENT)
Dept: ONCOLOGY | Facility: MEDICAL CENTER | Age: 39
End: 2024-03-14
Attending: INTERNAL MEDICINE
Payer: MEDICAID

## 2024-03-15 ENCOUNTER — APPOINTMENT (OUTPATIENT)
Dept: ONCOLOGY | Facility: MEDICAL CENTER | Age: 39
End: 2024-03-15
Payer: MEDICAID

## 2024-03-19 ENCOUNTER — PATIENT OUTREACH (OUTPATIENT)
Dept: ONCOLOGY | Facility: MEDICAL CENTER | Age: 39
End: 2024-03-19
Payer: MEDICAID

## 2024-03-25 RX ORDER — ONDANSETRON 2 MG/ML
4 INJECTION INTRAMUSCULAR; INTRAVENOUS PRN
Status: CANCELLED | OUTPATIENT
Start: 2024-03-29

## 2024-03-25 RX ORDER — METHYLPREDNISOLONE SODIUM SUCCINATE 125 MG/2ML
125 INJECTION, POWDER, LYOPHILIZED, FOR SOLUTION INTRAMUSCULAR; INTRAVENOUS PRN
Status: CANCELLED | OUTPATIENT
Start: 2024-03-29

## 2024-03-25 RX ORDER — EPINEPHRINE 1 MG/ML(1)
0.5 AMPUL (ML) INJECTION PRN
Status: CANCELLED | OUTPATIENT
Start: 2024-03-29

## 2024-03-25 RX ORDER — 0.9 % SODIUM CHLORIDE 0.9 %
3 VIAL (ML) INJECTION PRN
Status: CANCELLED | OUTPATIENT
Start: 2024-03-28

## 2024-03-25 RX ORDER — PROCHLORPERAZINE MALEATE 10 MG
10 TABLET ORAL EVERY 6 HOURS PRN
Status: CANCELLED | OUTPATIENT
Start: 2024-03-29

## 2024-03-25 RX ORDER — 0.9 % SODIUM CHLORIDE 0.9 %
VIAL (ML) INJECTION PRN
Status: CANCELLED | OUTPATIENT
Start: 2024-03-28

## 2024-03-25 RX ORDER — 0.9 % SODIUM CHLORIDE 0.9 %
10 VIAL (ML) INJECTION PRN
Status: CANCELLED | OUTPATIENT
Start: 2024-03-29

## 2024-03-25 RX ORDER — 0.9 % SODIUM CHLORIDE 0.9 %
10 VIAL (ML) INJECTION PRN
Status: CANCELLED | OUTPATIENT
Start: 2024-03-28

## 2024-03-25 RX ORDER — PALONOSETRON 0.05 MG/ML
0.25 INJECTION, SOLUTION INTRAVENOUS ONCE
Status: CANCELLED
Start: 2024-03-29 | End: 2024-03-29

## 2024-03-25 RX ORDER — ONDANSETRON 8 MG/1
8 TABLET, ORALLY DISINTEGRATING ORAL PRN
Status: CANCELLED | OUTPATIENT
Start: 2024-03-29

## 2024-03-25 RX ORDER — SODIUM CHLORIDE 9 MG/ML
INJECTION, SOLUTION INTRAVENOUS CONTINUOUS
Status: CANCELLED | OUTPATIENT
Start: 2024-03-29

## 2024-03-25 RX ORDER — DIPHENHYDRAMINE HYDROCHLORIDE 50 MG/ML
50 INJECTION INTRAMUSCULAR; INTRAVENOUS PRN
Status: CANCELLED | OUTPATIENT
Start: 2024-03-29

## 2024-03-25 RX ORDER — 0.9 % SODIUM CHLORIDE 0.9 %
VIAL (ML) INJECTION PRN
Status: CANCELLED | OUTPATIENT
Start: 2024-03-29

## 2024-03-25 RX ORDER — 0.9 % SODIUM CHLORIDE 0.9 %
3 VIAL (ML) INJECTION PRN
Status: CANCELLED | OUTPATIENT
Start: 2024-03-29

## 2024-03-26 DIAGNOSIS — Z17.1 MALIGNANT NEOPLASM OF UPPER-OUTER QUADRANT OF RIGHT BREAST IN FEMALE, ESTROGEN RECEPTOR NEGATIVE (HCC): ICD-10-CM

## 2024-03-26 DIAGNOSIS — K52.1 DIARRHEA DUE TO DRUG: ICD-10-CM

## 2024-03-26 DIAGNOSIS — C50.411 MALIGNANT NEOPLASM OF UPPER-OUTER QUADRANT OF RIGHT BREAST IN FEMALE, ESTROGEN RECEPTOR NEGATIVE (HCC): ICD-10-CM

## 2024-03-26 RX ORDER — DIPHENOXYLATE HYDROCHLORIDE AND ATROPINE SULFATE 2.5; .025 MG/1; MG/1
TABLET ORAL
Qty: 60 TABLET | Refills: 0 | Status: SHIPPED | OUTPATIENT
Start: 2024-03-26 | End: 2024-04-10

## 2024-03-28 ENCOUNTER — OUTPATIENT INFUSION SERVICES (OUTPATIENT)
Dept: ONCOLOGY | Facility: MEDICAL CENTER | Age: 39
End: 2024-03-28
Attending: INTERNAL MEDICINE
Payer: MEDICAID

## 2024-03-28 ENCOUNTER — HOSPITAL ENCOUNTER (OUTPATIENT)
Dept: HEMATOLOGY ONCOLOGY | Facility: MEDICAL CENTER | Age: 39
End: 2024-03-28
Attending: NURSE PRACTITIONER
Payer: MEDICAID

## 2024-03-28 VITALS
TEMPERATURE: 97.9 F | BODY MASS INDEX: 35.17 KG/M2 | HEIGHT: 67 IN | HEART RATE: 85 BPM | RESPIRATION RATE: 16 BRPM | OXYGEN SATURATION: 95 % | SYSTOLIC BLOOD PRESSURE: 118 MMHG | DIASTOLIC BLOOD PRESSURE: 64 MMHG | WEIGHT: 224.1 LBS

## 2024-03-28 VITALS
HEART RATE: 89 BPM | TEMPERATURE: 98.3 F | RESPIRATION RATE: 18 BRPM | SYSTOLIC BLOOD PRESSURE: 108 MMHG | OXYGEN SATURATION: 96 % | DIASTOLIC BLOOD PRESSURE: 64 MMHG

## 2024-03-28 DIAGNOSIS — C50.411 MALIGNANT NEOPLASM OF UPPER-OUTER QUADRANT OF RIGHT BREAST IN FEMALE, ESTROGEN RECEPTOR NEGATIVE (HCC): ICD-10-CM

## 2024-03-28 DIAGNOSIS — K52.1 DIARRHEA DUE TO DRUG: ICD-10-CM

## 2024-03-28 DIAGNOSIS — R23.2 HOT FLASHES: ICD-10-CM

## 2024-03-28 DIAGNOSIS — Z17.1 MALIGNANT NEOPLASM OF UPPER-OUTER QUADRANT OF RIGHT BREAST IN FEMALE, ESTROGEN RECEPTOR NEGATIVE (HCC): ICD-10-CM

## 2024-03-28 DIAGNOSIS — R10.9 FLANK PAIN: ICD-10-CM

## 2024-03-28 DIAGNOSIS — Z79.899 ENCOUNTER FOR LONG-TERM (CURRENT) USE OF HIGH-RISK MEDICATION: ICD-10-CM

## 2024-03-28 DIAGNOSIS — G89.3 CANCER RELATED PAIN: ICD-10-CM

## 2024-03-28 LAB
ALBUMIN SERPL BCP-MCNC: 4.1 G/DL (ref 3.2–4.9)
ALBUMIN/GLOB SERPL: 1.7 G/DL
ALP SERPL-CCNC: 69 U/L (ref 30–99)
ALT SERPL-CCNC: 24 U/L (ref 2–50)
ANION GAP SERPL CALC-SCNC: 13 MMOL/L (ref 7–16)
AST SERPL-CCNC: 19 U/L (ref 12–45)
BASOPHILS # BLD AUTO: 0.2 % (ref 0–1.8)
BASOPHILS # BLD: 0.03 K/UL (ref 0–0.12)
BILIRUB SERPL-MCNC: 0.4 MG/DL (ref 0.1–1.5)
BUN SERPL-MCNC: 20 MG/DL (ref 8–22)
CALCIUM ALBUM COR SERPL-MCNC: 9.1 MG/DL (ref 8.5–10.5)
CALCIUM SERPL-MCNC: 9.2 MG/DL (ref 8.5–10.5)
CHLORIDE SERPL-SCNC: 105 MMOL/L (ref 96–112)
CO2 SERPL-SCNC: 22 MMOL/L (ref 20–33)
CREAT SERPL-MCNC: 0.93 MG/DL (ref 0.5–1.4)
EOSINOPHIL # BLD AUTO: 0 K/UL (ref 0–0.51)
EOSINOPHIL NFR BLD: 0 % (ref 0–6.9)
ERYTHROCYTE [DISTWIDTH] IN BLOOD BY AUTOMATED COUNT: 50 FL (ref 35.9–50)
GFR SERPLBLD CREATININE-BSD FMLA CKD-EPI: 79 ML/MIN/1.73 M 2
GLOBULIN SER CALC-MCNC: 2.4 G/DL (ref 1.9–3.5)
GLUCOSE SERPL-MCNC: 158 MG/DL (ref 65–99)
HCT VFR BLD AUTO: 40.7 % (ref 37–47)
HGB BLD-MCNC: 13.8 G/DL (ref 12–16)
IMM GRANULOCYTES # BLD AUTO: 0.06 K/UL (ref 0–0.11)
IMM GRANULOCYTES NFR BLD AUTO: 0.5 % (ref 0–0.9)
LYMPHOCYTES # BLD AUTO: 1.16 K/UL (ref 1–4.8)
LYMPHOCYTES NFR BLD: 9.4 % (ref 22–41)
MCH RBC QN AUTO: 32.6 PG (ref 27–33)
MCHC RBC AUTO-ENTMCNC: 33.9 G/DL (ref 32.2–35.5)
MCV RBC AUTO: 96.2 FL (ref 81.4–97.8)
MONOCYTES # BLD AUTO: 0.21 K/UL (ref 0–0.85)
MONOCYTES NFR BLD AUTO: 1.7 % (ref 0–13.4)
NEUTROPHILS # BLD AUTO: 10.9 K/UL (ref 1.82–7.42)
NEUTROPHILS NFR BLD: 88.2 % (ref 44–72)
NRBC # BLD AUTO: 0 K/UL
NRBC BLD-RTO: 0 /100 WBC (ref 0–0.2)
OUTPT INFUS CBC COMMENT OICOM: ABNORMAL
PLATELET # BLD AUTO: 151 K/UL (ref 164–446)
PMV BLD AUTO: 10.2 FL (ref 9–12.9)
POTASSIUM SERPL-SCNC: 4.1 MMOL/L (ref 3.6–5.5)
PROT SERPL-MCNC: 6.5 G/DL (ref 6–8.2)
RBC # BLD AUTO: 4.23 M/UL (ref 4.2–5.4)
SODIUM SERPL-SCNC: 140 MMOL/L (ref 135–145)
WBC # BLD AUTO: 12.4 K/UL (ref 4.8–10.8)

## 2024-03-28 PROCEDURE — 99212 OFFICE O/P EST SF 10 MIN: CPT | Performed by: NURSE PRACTITIONER

## 2024-03-28 PROCEDURE — 99214 OFFICE O/P EST MOD 30 MIN: CPT | Performed by: NURSE PRACTITIONER

## 2024-03-28 PROCEDURE — 80053 COMPREHEN METABOLIC PANEL: CPT

## 2024-03-28 PROCEDURE — 85025 COMPLETE CBC W/AUTO DIFF WBC: CPT

## 2024-03-28 PROCEDURE — 36415 COLL VENOUS BLD VENIPUNCTURE: CPT

## 2024-03-28 RX ORDER — OXYBUTYNIN CHLORIDE 5 MG/1
5 TABLET ORAL 2 TIMES DAILY
Qty: 60 TABLET | Refills: 0 | Status: SHIPPED | OUTPATIENT
Start: 2024-03-28

## 2024-03-28 RX ORDER — HYDROCODONE BITARTRATE AND ACETAMINOPHEN 5; 325 MG/1; MG/1
1 TABLET ORAL EVERY 6 HOURS PRN
Qty: 30 TABLET | Refills: 0 | Status: SHIPPED | OUTPATIENT
Start: 2024-03-28 | End: 2024-04-17

## 2024-03-28 ASSESSMENT — ENCOUNTER SYMPTOMS
TINGLING: 1
COUGH: 0
FEVER: 0
SHORTNESS OF BREATH: 0
WEIGHT LOSS: 0
PALPITATIONS: 0
DIARRHEA: 1
FLANK PAIN: 1
VOMITING: 0
CHILLS: 0
DIZZINESS: 0
HEADACHES: 0
DIAPHORESIS: 1
NAUSEA: 0
CONSTIPATION: 0

## 2024-03-28 ASSESSMENT — PAIN SCALES - GENERAL: PAINLEVEL: 7=MODERATE-SEVERE PAIN

## 2024-03-28 ASSESSMENT — FIBROSIS 4 INDEX: FIB4 SCORE: 1.05

## 2024-03-28 NOTE — ADDENDUM NOTE
Encounter addended by: Judith Woods, Med Ass't on: 3/28/2024 1:10 PM   Actions taken: Charge Capture section accepted

## 2024-03-28 NOTE — PROGRESS NOTES
Pt arrives ambulatory to [facility] for pre-chemotherapy lab draw.  Denies any new or acute changes.  Labs collected via 23g butterfly to left AC, gauze and tape to site.  Discharged in NAD, next appointment confirmed.

## 2024-03-28 NOTE — PROGRESS NOTES
"Pharmacy Chemotherapy Calculation:    Dx: Invasive ductal carcinoma of the right breast HI/ER (-) HER2 (+) stage IIa (cT2, cN0)        Protocol: TCHP (DOCEtaxel/CARBOplatin + Pertuzumab + Trastuzumab)    *Dosing Reference*   Pertuzumab 840 mg IV over 60 minutes on Day 1 of Cycle 1   followed by 420 mg IV over 30 minutes on Day 1 of Cycles 2 - 6   Trastuzumab 8 mg/kg IV over 90 minutes on Day 1 of Cycle 1   followed by 6 mg/kg IV over 30 minutes on Day 1 of Cycles 2 - 6 followed by   DOCEtaxel  IV over 60 minutes on Day 1 followed by   - dose reduced to 60 mg/m2 starting C2 per Dr. Thomas due to tolerance  - dose reduced to AUC starting C2 per Dr. Thomas due to tolerance  - 3/8/24 - Discontinued from treatment plan starting with C3 due to tolerance. Plan to add cyclophosphamide once insurance approval has been authorized  - 3/29/24: substituted with Cyclophosphamide 600 mg/m2 IV over 30 min on Day 1 per chemo regimen TC on NCCN Guidelines for Breast Cancer V.2.2024  21-day cycle for 6 cycles   ~Followed by~    Pertuzumab 420 mg IV over 30 minutes on Day 1 beginning with Week 19   Trastuzumab 6 mg/kg IV over 30 minutes on Day 1 beginning with Week 19  21-day cycle to complete 52 weeks total of trastuzumab and pertuzumab therapy  NCCN Guidelines® for Breast Cancer V.4.2023.   Josef LEON, et al. Gay Oncol. 2013;24(9):2278-84.a    Allergies:  Bee venom, Morphine sulfate, Tape, and Pineapple     /65   Pulse 75   Temp 36.8 °C (98.3 °F) (Temporal)   Resp 18   Ht 1.69 m (5' 6.54\")   Wt 100 kg (221 lb 1.9 oz)   LMP 02/10/2012 (Approximate) Comment: Age of first period:10, No HRT  SpO2 95%   BMI 35.12 kg/m²  Body surface area is 2.17 meters squared.    ECHO:   12/27/23: LVEF~65%    All labs (3/28/24) within treatment plan parameters.         Drug Order   (Drug name, dose, route, IV Fluid & volume, frequency, number of doses) Cycle 4  Previous treatment: C3 on 3/8/24     Medication = Pertuzumab " (Perjeta)  Base Dose = 420 mg  Fixed dose; no calc required  Final Dose = 420 mg  Route = IV  Fluid & Volume =  mL  Admin Duration = Over 30 minutes          No calculation required, okay to treat with final dose   Medication = Trastuzumab-anns (Kanjinti)  Base Dose = 6 mg/m2  Calc Dose: Base Dose x 100 kg = 600 mg  Final Dose = 592 mg  Route = IV  Fluid & Volume =  mL  Admin Duration = Over 30 minutes          <10% difference, okay to treat with final dose   Medication = DOCEtaxel (Taxotere)  Base Dose = 60 mg/m2  Calc Dose:Base Dose x 2.17 m2 = 130.2 mg  Final Dose = 128 mg  Route = IV  Fluid & Volume =  mL  Admin Duration = Over 60 minutes          <10% difference, okay to treat with final dose   Medication = Cyclophosphamide (Cytoxan)  Base Dose = 600 mg/m2  Calc Dose: Base Dose x 2.17 m2 = 1302 mg  Final Dose = 1300 mg  Route = IV  Fluid & Volume =  mL  Admin Duration = Over 30 min          <10% difference, okay to treat with final dose     By my signature below, I confirm this process was performed independently with the BSA and all final chemotherapy dosing calculations congruent. I have reviewed the above chemotherapy order and that my calculation of the final dose and BSA (when applicable) corroborate those calculations of the  pharmacist. Discrepancies of 10% or greater in the written dose have been addressed and documented within the Psychiatric Progress notes.      Elsi Schaffer, KoriD

## 2024-03-28 NOTE — PROGRESS NOTES
Subjective     Fela Tiwari is a 39 y.o. female who presents with Breast Cancer ([place]/ Pre chemo )          HPI    Referring Physician: Rochelle Gaviria MD   Primary Care:  Francisco Maier M.D.      Diagnosis: Invasive ductal carcinoma of the right breast, grade 3, clinically stage IIa (cT2, cN0) ER negative MO negative, HER2 3+ positive, Ki-67 20 to 30%      Chief Complaint: Patient seen today for evaluation of her ER negative HER2 positive breast cancer, for continued monitoring of symptoms and side effects of cancer treatments, employing TC.     Oncology history of presenting illness:  Fela Tiwari is a 38 y.o. likely premenopausal white female who self detected a mass in her lateral right breast in 2023.  She had a mammogram on 2023 which showed the palpable mass is likely malignant.  Ultrasound showed it was 2.6 cm irregular hypoechoic spiculated mass.  Left breast was normal.  She underwent a ultrasound-guided biopsy of the right breast on 2023: This revealed an invasive ductal carcinoma, grade 3, ER negative, MO negative, HER2 3+ by IHC, Ki-67 20 to 30%.  An MRI was done yesterday and results are pending.  She had a hysterectomy in  but her ovaries are in place.  She is  3 para 2 and has not taken any hormone replacement therapy.  No family history of breast or ovarian cancer.  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  She was on anticoagulation for a while but has been off for several years.  She has also had gastric sleeve laparoscopic and cholecystectomy and does note chronic nausea.      Interval histories:  Interval history 24 (CAlsop, APRN):  Patient had a difficult time with her first cycle of chemotherapy.  Day after she received the Udenyca shot she presented to the emergency department with severe pain.  Pain was excruciating throughout her entire body.  She was given IV  Dilaudid in the hospital and discharged with Tylenol.  She has been trying to take 1000 g of Tylenol with no relief.  Patient tearful today due to the severity of pain.  She also had experienced blood when urinating.  She did have a positive occult blood on stool and urine during hospitalization.  She was asked to follow-up with urology in the outpatient setting.  She stated since being discharged from the hospital she has not had any blood whatsoever in the urine or will.  She has been experiencing diarrhea anywhere from 5-10 episodes per day likely related to Perjeta.  She has been taking Imodium, approximately 2-3 pills per day with minimal relief.  She does have nausea and vomiting but she does state that Zofran does help.  She does also note thrush which was appreciated on physical examination today.     Interval history 2/15/2024: She had a terrible time with her first cycle of TCHP as noted above.  Her diarrhea has slowed dramatically but she still is using Lomotil occasionally.  She also had severe eye tearing likely due to the Taxotere which persists although it is better.  She has no neuropathy but did have a rash which responded to corticosteroids.  She also had persistent nausea which has finally resolved.  Her thrush is resolved as well.  She has not had any further vaginal bleeding but this has not been evaluated adequately except for a bladder scan which as expected was negative.  She feels like her tumor shrunk some.      Interval history 03/07/24 (Basim, APRN):  Patient still had difficult time with cycle 2.  She did end up in the ER the day after receiving treatment with bleeding again.  She did have bleeding with her first cycle and did end up being seen by urology and underwent a cystoscopy which was negative.  She stated that the bleeding lasted approximately 3-4 days.  She did experience significant nausea and vomiting for the first 2 weeks.  She also had severe diarrhea up to 10 times per day  for the first 2 weeks.  She did take Lomotil with no improvement.  She has noticed some bleeding from the rectum which is from her hemorrhoids with the diarrhea.  She did state that she was given the olanzapine to take for the first 5 days after treatment which did help immensely with her nausea and vomiting as well as she believes to help with her diarrhea as well as sleep.  When she stopped that medication the symptoms returned.  She is noticing her vision feels a little differently.  Her eyes are very dry and watery.  We attempted to give patient days of Zarzio for neutropenia support as patient had significant pain with the Udenyca shot.  She stated that she did not notice any improvement or difference as she did still have the severe myalgias that lasted approximately 2 weeks with the Zarzio injection.  She did state this last week she has felt back to normal.  She is anxious to receive her next cycle of chemo.    Interval history 03/28/24 (CAlsop, APRN):  Patient tolerated her last cycle well as cycle 3 we held carboplatin and awaited approval for Cytoxan.  We also held Udenyca.  She does still have diarrhea noted but that is controlled with Lomotil.  She is noticing some bilateral flank pain but no other urinary symptoms.  With her last cycle she did not experience any blood in the urine/vaginal/rectal area.  She does have some fatigue and has noticed increase in hot flashes.  Hot flashes are quite bothersome to her at this time.  She also noticed peripheral neuropathy in her toes, worse at night.  She is unable to let the covers to touch her feet.    Treatment history:  01/23/24: C1D1 TCHP with Udenyca  02/16/24: C2D2 TCHP with 20-25% reduction in dosing of the chemotherapy and 5 days of Zarxio instead of Udenyca   03/08/24: C3D1 THP (20% dose reduction of Docetaxel) - will hold on Xarzio or Udenyca (d/c Carboplatin d/t tolerability)  03/29/24: C4D1 THP with addition of Cytoxan (continue with dose reduction  of Docetaxel by 20%) with Udenyca support       Allergies   Allergen Reactions    Bee Venom Anaphylaxis    Morphine Sulfate Anaphylaxis and Itching    Skin Adhesives     Tape Rash     Paper tape OK, do not use tegaderm    Pineapple Hives     Current Outpatient Medications on File Prior to Encounter   Medication Sig Dispense Refill    diphenoxylate-atropine (LOMOTIL) 2.5-0.025 MG Tab TAKE 1 TABLET BY MOUTH FOUR TIMES DAILY FOR UP TO 15 DAYS AS NEEDED FOR DIARRHEA 60 Tablet 0    acyclovir (ZOVIRAX) 400 MG tablet Take 1 Tablet by mouth 2 times a day. 140 Tablet 0    diphenhydrAMINE (BENADRYL) 25 MG capsule Take 1 Capsule by mouth every 6 hours as needed for Itching.      omeprazole (PRILOSEC) 20 MG delayed-release capsule Take 1 Capsule by mouth every day. 30 Capsule 0    ALPRAZolam (XANAX) 0.5 MG Tab Take 0.5 mg by mouth at bedtime as needed for Anxiety or Sleep.      HYDROcodone-acetaminophen (NORCO) 5-325 MG Tab per tablet Take 1 Tablet by mouth every 6 hours as needed. Indications: Pain      calcium carbonate (TUMS CHEWY BITES) 750 MG chewable tablet Chew 2 Tablets 4 times a day.      OLANZapine (ZYPREXA) 5 MG Tab Take 1 Tablet by mouth every evening. For 5 days starting the day of chemotherapy 30 Tablet 1    loratadine (CLARITIN) 10 MG Tab Take 10 mg by mouth every day.      ondansetron (ZOFRAN) 4 MG Tab tablet Take 1 Tablet by mouth every four hours as needed for Nausea/Vomiting (for nausea, vomiting). 30 Tablet 6    prochlorperazine (COMPAZINE) 10 MG Tab Take 1 Tablet by mouth every 6 hours as needed (for nausea, vomiting). 30 Tablet 6    dexamethasone (DECADRON) 4 MG Tab Take 2 Tablets by mouth 2 times a day. Take 8 mg two times a day for 3 days, starting 24 hours prior to docetaxel. 12 Tablet 6    lidocaine-prilocaine (EMLA) 2.5-2.5 % Cream Apply to port 1 hour prior to access of port and cover with plastic wrap. (Patient taking differently: Apply 1 Application topically as needed. Apply to port 1 hour prior  "to access of port and cover with plastic wrap.) 30 g 3    EPINEPHrine (EPIPEN) 0.3 MG/0.3ML Solution Auto-injector solution for injection Inject 0.3 mL into the thigh one time for 1 dose  Indications: Patient advised to present to the ED even after epinephrine administration for further observation and management. 1 Each 0     No current facility-administered medications on file prior to encounter.         Review of Systems   Constitutional:  Positive for diaphoresis (severe hot flashes) and malaise/fatigue. Negative for chills, fever and weight loss.   Respiratory:  Negative for cough and shortness of breath.    Cardiovascular:  Negative for chest pain and palpitations.   Gastrointestinal:  Positive for diarrhea (controlled with Lomotil). Negative for constipation, nausea and vomiting.   Genitourinary:  Positive for flank pain (bilateral). Negative for dysuria.   Skin:  Negative for itching and rash.   Neurological:  Positive for tingling (new onset of peripheral neuropathy on her toes). Negative for dizziness and headaches.              Objective     /64 (BP Location: Right arm, Patient Position: Sitting, BP Cuff Size: Adult)   Pulse 85   Temp 36.6 °C (97.9 °F) (Temporal)   Resp 16   Ht 1.69 m (5' 6.54\")   Wt 102 kg (224 lb 1.6 oz)   LMP 02/10/2012 (Approximate) Comment: Age of first period:10, No HRT  SpO2 95%   BMI 35.59 kg/m²      Physical Exam  Vitals reviewed.   Constitutional:       General: She is not in acute distress.     Appearance: Normal appearance. She is not diaphoretic.   HENT:      Head: Normocephalic and atraumatic.   Cardiovascular:      Rate and Rhythm: Normal rate and regular rhythm.      Heart sounds: Normal heart sounds. No murmur heard.     No friction rub. No gallop.   Pulmonary:      Effort: Pulmonary effort is normal. No respiratory distress.      Breath sounds: Normal breath sounds. No wheezing.   Chest:      Comments: 03/28/24: Mass at 1030 o'clock 5 cm from the right " nipple, very difficult to palpate. Measuring approx 1.0 cm horizontally x 0.5 cm vertically. No right axillary adenopathy  03/07/24: Mass at 1030 o'clock 5 cm from the right nipple measures 1.5 x 1.5 cm and is irregular.  No right axillary adenopathy  2/15/2024: Mass at 1030 o'clock 5 cm from the right nipple measures 1.5 x 2 cm and is irregular.  No right axillary adenopathy  12/22/2023: Mass at 1030 o'clock 5 cm from the right nipple measuring 3.5 x 3.5 cm.  No right axillary adenopathy is noted.  The left breast is normal.    Abdominal:      General: Bowel sounds are normal. There is no distension.      Palpations: Abdomen is soft.      Tenderness: There is no abdominal tenderness.   Musculoskeletal:         General: No swelling or tenderness. Normal range of motion.   Skin:     General: Skin is warm and dry.   Neurological:      Mental Status: She is alert and oriented to person, place, and time.   Psychiatric:         Mood and Affect: Mood normal.         Behavior: Behavior normal.             Latest Reference Range & Units 03/28/24 10:19   WBC 4.8 - 10.8 K/uL 12.4 (H)   RBC 4.20 - 5.40 M/uL 4.23   Hemoglobin 12.0 - 16.0 g/dL 13.8   Hematocrit 37.0 - 47.0 % 40.7   MCV 81.4 - 97.8 fL 96.2   MCH 27.0 - 33.0 pg 32.6   MCHC 32.2 - 35.5 g/dL 33.9   RDW 35.9 - 50.0 fL 50.0   Platelet Count 164 - 446 K/uL 151 (L)   MPV 9.0 - 12.9 fL 10.2   Neutrophils-Polys 44.00 - 72.00 % 88.20 (H)   Neutrophils (Absolute) 1.82 - 7.42 K/uL 10.90 (H)   Lymphocytes 22.00 - 41.00 % 9.40 (L)   Lymphs (Absolute) 1.00 - 4.80 K/uL 1.16   Monocytes 0.00 - 13.40 % 1.70   Monos (Absolute) 0.00 - 0.85 K/uL 0.21   Eosinophils 0.00 - 6.90 % 0.00   Eos (Absolute) 0.00 - 0.51 K/uL 0.00   Basophils 0.00 - 1.80 % 0.20   Baso (Absolute) 0.00 - 0.12 K/uL 0.03   Immature Granulocytes 0.00 - 0.90 % 0.50   Immature Granulocytes (abs) 0.00 - 0.11 K/uL 0.06   Nucleated RBC 0.00 - 0.20 /100 WBC 0.00   NRBC (Absolute) K/uL 0.00   Outpt Infus CBC Comment  see  below   Sodium 135 - 145 mmol/L 140   Potassium 3.6 - 5.5 mmol/L 4.1   Chloride 96 - 112 mmol/L 105   Co2 20 - 33 mmol/L 22   Anion Gap 7.0 - 16.0  13.0   Glucose 65 - 99 mg/dL 158 (H)   Bun 8 - 22 mg/dL 20   Creatinine 0.50 - 1.40 mg/dL 0.93   GFR (CKD-EPI) >60 mL/min/1.73 m 2 79   Calcium 8.5 - 10.5 mg/dL 9.2   Correct Calcium 8.5 - 10.5 mg/dL 9.1   AST(SGOT) 12 - 45 U/L 19   ALT(SGPT) 2 - 50 U/L 24   Alkaline Phosphatase 30 - 99 U/L 69   Total Bilirubin 0.1 - 1.5 mg/dL 0.4   Albumin 3.2 - 4.9 g/dL 4.1   Total Protein 6.0 - 8.2 g/dL 6.5   Globulin 1.9 - 3.5 g/dL 2.4   A-G Ratio g/dL 1.7                Assessment & Plan       1. Malignant neoplasm of upper-outer quadrant of right breast in female, estrogen receptor negative (HCC)  Consent for Opiate Prescription    oxybutynin (DITROPAN) 5 MG Tab    URINALYSIS,CULTURE IF INDICATED      2. Diarrhea due to drug        3. Cancer related pain  Consent for Opiate Prescription      4. Flank pain  URINALYSIS,CULTURE IF INDICATED      5. Hot flashes  oxybutynin (DITROPAN) 5 MG Tab      6. Encounter for long-term (current) use of high-risk medication                Impression:  1.  Invasive ductal carcinoma of the right breast, grade 3, clinically stage IIa (cT2, cN0) ER negative OR negative, HER2 3+ positive, Ki-67 20 to 30%.  Poor tolerance.  A full dose TCHP.  Will proceed with cycle 2 at reduced dosing.  Patient with continued decrease in breast mass.  2.  Chronic nausea after gastric sleeve and cholecystectomy severe after chemotherapy now minimal  3.  History of DVT/PE off anticoagulation for several years  4.  Severe myalgias likely from Udenyca - patient okay to take Tylenol and Ibuprofen - will provide patient with low dose Norco for severe pain to have daily Zarxio days 2 through 5 of the cycle. With cycle 2 patient with severe myalgias for up to 2 weeks despite change to Zarxio. Will return to Udena.   5.  Chemo nausea and vomiting -she will use Aloxi and  olanzapine - patient to take Olanzepine x10 days  6.  Diarrhea d/t Perjeta - Imodium not effective - will start Lomotil. Still severe with cycle 2 - patient ok to take Lomotil with Imodium.   7.  Thrush - mild and will start Nystatin QID - recommend baking soda and salt water rinses as well.  Resolved  8. Hematuria - presented for first 3-4 days after both cycles 1 and 2.  Cystoscopy completed by urology and was negative.  Questioning whether this may be related to carboplatin, and will plan to change treatment with discontinuation of carboplatin and add Cytoxan. Resolved with cycle 3.   9.  Hot flashes - will start oxybutynin 5 mg p.o. twice daily  10.  Peripheral neuropathy noted in her toes.  Will continue to monitor closely.  Docetaxel already dose reduced at this time.  Discussed with patient would not want to start any medication to mask any worsening of neuropathy.  Will evaluate at next visit.  11.  Bilateral flank pain noted -will order UA      Plan:  Patient scheduled for cycle 4.  I did review her CBC and CMP and okay to proceed with treatment as planned.  Cycle 4 will add Cytoxan versus carboplatin with Udenyca.    Patient stated that she is decided to undergo bilateral mastectomies and is hoping to be able to do reconstruction at the same time.  I recommended that she contact her surgeon, Dr. Gaviria to get in for a follow-up visit to start working on the process and scheduling treatment.  I did send a message to surgeon's PA as well.    Patient with significant myalgias related to the Udenyca.  She is okay to take Claritin.  Refill prescription sent to pharmacy for hydrocodone during severe days.    Opioid Risk Score: 2    Interpretation of Opioid Risk Score   Score 0-3 = Low risk of abuse. Do UDS at least once per year.  Score 4-7 = Moderate risk of abuse. Do UDS 1-4 times per year.  Score 8+ = High risk of abuse. Refer to specialist.     In prescribing controlled substances to this patient, I certify  that I have obtained and reviewed the medical history of Fela Tiwari. I have also made a good rainer effort to obtain applicable records from other providers who have treated the patient and records did not demonstrate any increased risk of substance abuse that would prevent me from prescribing controlled substances.     I have conducted a physical exam and documented it. I have reviewed Ms. Tiwari’s prescription history as maintained by the [place] Prescription Monitoring Program.     I have assessed the patient’s risk for abuse, dependency, and addiction using the validated Opioid Risk Tool available at https://www.mdcalc.com/okvabx-orso-mfxv-ort-narcotic-abuse.     Given the above, I believe the benefits of controlled substance therapy outweigh the risks. The reasons for prescribing controlled substances include non-narcotic, oral analgesic alternatives have been inadequate for pain control. Accordingly, I have discussed the risk and benefits, treatment plan, and alternative therapies with the patient.       Please note that this dictation was created using voice recognition software. I have made every reasonable attempt to correct obvious errors, but I expect that there are errors of grammar and possibly content that I did not discover before finalizing the note.

## 2024-03-29 ENCOUNTER — OUTPATIENT INFUSION SERVICES (OUTPATIENT)
Dept: ONCOLOGY | Facility: MEDICAL CENTER | Age: 39
End: 2024-03-29
Attending: INTERNAL MEDICINE
Payer: MEDICAID

## 2024-03-29 VITALS
OXYGEN SATURATION: 95 % | HEART RATE: 75 BPM | BODY MASS INDEX: 34.71 KG/M2 | TEMPERATURE: 98.3 F | WEIGHT: 221.12 LBS | DIASTOLIC BLOOD PRESSURE: 65 MMHG | SYSTOLIC BLOOD PRESSURE: 106 MMHG | HEIGHT: 67 IN | RESPIRATION RATE: 18 BRPM

## 2024-03-29 DIAGNOSIS — Z17.1 MALIGNANT NEOPLASM OF UPPER-OUTER QUADRANT OF RIGHT BREAST IN FEMALE, ESTROGEN RECEPTOR NEGATIVE (HCC): ICD-10-CM

## 2024-03-29 DIAGNOSIS — C50.411 MALIGNANT NEOPLASM OF UPPER-OUTER QUADRANT OF RIGHT BREAST IN FEMALE, ESTROGEN RECEPTOR NEGATIVE (HCC): ICD-10-CM

## 2024-03-29 LAB
APPEARANCE UR: CLEAR
BILIRUB UR QL STRIP.AUTO: NEGATIVE
COLOR UR: YELLOW
GLUCOSE UR STRIP.AUTO-MCNC: NEGATIVE MG/DL
KETONES UR STRIP.AUTO-MCNC: ABNORMAL MG/DL
LEUKOCYTE ESTERASE UR QL STRIP.AUTO: NEGATIVE
MICRO URNS: ABNORMAL
NITRITE UR QL STRIP.AUTO: NEGATIVE
PH UR STRIP.AUTO: 5 [PH] (ref 5–8)
PROT UR QL STRIP: NEGATIVE MG/DL
RBC UR QL AUTO: NEGATIVE
SP GR UR STRIP.AUTO: 1.03
UROBILINOGEN UR STRIP.AUTO-MCNC: 0.2 MG/DL

## 2024-03-29 PROCEDURE — 96417 CHEMO IV INFUS EACH ADDL SEQ: CPT

## 2024-03-29 PROCEDURE — 700105 HCHG RX REV CODE 258: Mod: UD | Performed by: NURSE PRACTITIONER

## 2024-03-29 PROCEDURE — 304540 HCHG NITRO SET VENT 2ND TUB

## 2024-03-29 PROCEDURE — A4212 NON CORING NEEDLE OR STYLET: HCPCS

## 2024-03-29 PROCEDURE — 96413 CHEMO IV INFUSION 1 HR: CPT

## 2024-03-29 PROCEDURE — 96376 TX/PRO/DX INJ SAME DRUG ADON: CPT

## 2024-03-29 PROCEDURE — 700111 HCHG RX REV CODE 636 W/ 250 OVERRIDE (IP): Mod: UD | Performed by: NURSE PRACTITIONER

## 2024-03-29 PROCEDURE — 96375 TX/PRO/DX INJ NEW DRUG ADDON: CPT

## 2024-03-29 PROCEDURE — 81003 URINALYSIS AUTO W/O SCOPE: CPT

## 2024-03-29 PROCEDURE — 96415 CHEMO IV INFUSION ADDL HR: CPT

## 2024-03-29 PROCEDURE — 96367 TX/PROPH/DG ADDL SEQ IV INF: CPT

## 2024-03-29 RX ORDER — DIPHENHYDRAMINE HYDROCHLORIDE 50 MG/ML
50 INJECTION INTRAMUSCULAR; INTRAVENOUS PRN
Status: COMPLETED | OUTPATIENT
Start: 2024-03-29 | End: 2024-03-29

## 2024-03-29 RX ORDER — PALONOSETRON 0.05 MG/ML
0.25 INJECTION, SOLUTION INTRAVENOUS ONCE
Status: COMPLETED | OUTPATIENT
Start: 2024-03-29 | End: 2024-03-29

## 2024-03-29 RX ADMIN — TRASTUZUMAB-ANNS 592 MG: 420 INJECTION, POWDER, LYOPHILIZED, FOR SOLUTION INTRAVENOUS at 12:47

## 2024-03-29 RX ADMIN — PALONOSETRON 0.25 MG: 0.05 INJECTION, SOLUTION INTRAVENOUS at 10:38

## 2024-03-29 RX ADMIN — CYCLOPHOSPHAMIDE 1300 MG: 200 INJECTION, SOLUTION INTRAVENOUS at 15:47

## 2024-03-29 RX ADMIN — FOSAPREPITANT 150 MG: 150 INJECTION, POWDER, LYOPHILIZED, FOR SOLUTION INTRAVENOUS at 11:03

## 2024-03-29 RX ADMIN — DIPHENHYDRAMINE HYDROCHLORIDE 25 MG: 50 INJECTION, SOLUTION INTRAMUSCULAR; INTRAVENOUS at 10:35

## 2024-03-29 RX ADMIN — DIPHENHYDRAMINE HYDROCHLORIDE 50 MG: 50 INJECTION, SOLUTION INTRAMUSCULAR; INTRAVENOUS at 15:55

## 2024-03-29 RX ADMIN — DOCETAXEL 128 MG: 20 INJECTION, SOLUTION, CONCENTRATE INTRAVENOUS at 13:26

## 2024-03-29 RX ADMIN — HEPARIN 500 UNITS: 100 SYRINGE at 17:11

## 2024-03-29 RX ADMIN — PERTUZUMAB 420 MG: 30 INJECTION, SOLUTION, CONCENTRATE INTRAVENOUS at 11:37

## 2024-03-29 RX ADMIN — FAMOTIDINE 20 MG: 10 INJECTION, SOLUTION INTRAVENOUS at 10:36

## 2024-03-29 RX ADMIN — DEXAMETHASONE SODIUM PHOSPHATE 12 MG: 4 INJECTION, SOLUTION INTRA-ARTICULAR; INTRALESIONAL; INTRAMUSCULAR; INTRAVENOUS; SOFT TISSUE at 10:46

## 2024-03-29 ASSESSMENT — FIBROSIS 4 INDEX: FIB4 SCORE: 1.05

## 2024-03-29 NOTE — PROGRESS NOTES
"Pharmacy Chemotherapy Verification    Dx: Breast cancer, ER-, ME-, HER2+  Cycle 4  Previous treatment = C3 3/8/24    Protocol: TCHP  Pertuzumab 840 mg IV over 60 minutes on Day 1 of Cycle 1 followed by  Pertuzumab 420 mg IV over 30 minutes on Day 1 of Cycles 2-6  Trastuzumab 8 mg/kg IV over 90 minutes on Day 1 of Cycle 1 followed by  Trastuzumab 6 mg/kg IV over 30 minutes on Day 1 of Cycles 2-6 followed by  DOCetaxel  IV over 60 minutes on Day 1 followed by   2/16/24 - Dose reduced to 60 mg/m2 for tolerance starting with C2   2/16/24 - Dose reduced to AUC 4.5 for tolerance starting with C2   -Removed from treatment plan starting Cycle 3  Cyclophosphamide 600 mg/m2 IV over 30 minutes on Day 1   3/29/24 - Added starting C4 to replace carboplatin due to intolerance  21-day cycle for 6 cycles  ~Followed by~  Pertuzumab 420 mg IV over 30 minutes on Day 1 beginning with Week 19  Trastuzumab 6 mg/kg IV over 30 minutes on Day 1 beginning with Week 19  21-day cycle to complete 52 weeks total of trastuzumab and pertuzumab therapy  NCCN Guidelines for Breast Cancer V.2.2022.  Josef LEON et al. Gay Oncol. 2013;24(9):2278-84.    Allergies:  Bee venom, Morphine sulfate, Tape, and Pineapple     /65   Pulse 75   Temp 36.8 °C (98.3 °F) (Temporal)   Resp 18   Ht 1.69 m (5' 6.54\")   Wt 100 kg (221 lb 1.9 oz)   LMP 02/10/2012 (Approximate) Comment: Age of first period:10, No HRT  SpO2 95%   BMI 35.12 kg/m²  Body surface area is 2.17 meters squared.    Labs 3/28/24  ANC~ 77186 Plt = 151k   Hgb = 13.8     SCr = 0.93 mg/dL CrCl ~ >125 mL/min   LFT's = WNLs  TBili = 0.4     ECHO  12/27/23 LVEF 65% (Okay to proceed with treatment today. Pt to schedule ECHO after 6 cycles of TCHP then every 4 months thereafter per Dr. Thomas)    Pertuzumab 420 mg fixed dose              Fixed dose, OK to treat with final dose = 420 mg IV     Trastuzumab-anns 6 mg/kg x 100 kg = 600 mg              <10% difference, OK to treat with " final dose = 592 mg IV     DOCEtaxel 60 mg/m² x 2.17 m² = 130.2 mg              <10% difference, OK to treat with final dose = 128 mg IV    Cyclophosphamide 600 mg/m2 x 2.17 m2 = 1302 mg   <10% difference, okay to treat with final dose = 1300 mg IV    Casimiro Peres, PharmD

## 2024-03-29 NOTE — PROGRESS NOTES
Chemotherapy Verification - SECONDARY RN       Height = 1.69m  Weight = 100kg  BSA = 2.17m2       Medication: pertuzumab  Dose: flat dose  Calculated Dose: 420mg                             (In mg/m2, AUC, mg/kg)     Medication: trastuzumab-anns  Dose: 6mg/kg  Calculated Dose: 600mg                             (In mg/m2, AUC, mg/kg)    Medication: docetaxel  Dose: 60mg/m2  Calculated Dose: 130.2mg                             (In mg/m2, AUC, mg/kg)    Medication: cyclophosphamide  Dose: 600mg/m2  Calculated Dose: 1302mg                             (In mg/m2, AUC, mg/kg)      I confirm that this process was performed independently.

## 2024-03-29 NOTE — PROGRESS NOTES
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 Pt denied having any new or acute complaints today. EMLA applied over left chest port site. Port accessed in a sterile manner, had positive blood return and flushed briskly. Labs were drawn 03/28/2024 and reviewed. UA sent as ordered per NIYA Cordova. Pt meets parameters for treatment today. Premeds given as ordered. Pt given Perjeta over 30 minutes and observed for 30 minutes. Pt tolerated well. Kanjinti infused over 30 minutes without adverse reaction. Taxotere started at 100 ml/hr and after 40 minutes increased to 150 ml/hr.  After 35 minutes at 150 ml/hr, the patient began having a dry cough but denied shortness of breath. Infusion rate was decreased to 100 ml/hr for the remainder of the infusion.  After the infusion, pt still c/o dry cough and some shortness of breath after getting up to the bathroom. PRN Benadryl given for continued cough with good results. Cytoxan infused as prescribed, tolerated well, denied having any complaints during or after infusion. Port had positive blood return after, flushed per [facility] policy, de-accessed, needle intact, insertion site covered with sterile gauze and paper tape. Schedulers emailed about future appointments.. Pt discharged to home in good condition.

## 2024-03-29 NOTE — PROGRESS NOTES
Chemotherapy Verification - PRIMARY RN      Height = 169 cm  Weight = 100 kg  BSA = 2.17 m^2       Medication: pertuzumab (Perjeta)  Dose: 420 mg set dose  Calculated Dose: 420 mg set dose                             (In mg/m2, AUC, mg/kg)     Medication: trastuzumab-anns (Kanjinti)  Dose: 6 mg/kg  Calculated Dose: 600 mg                             (In mg/m2, AUC, mg/kg)    Medication: docetaxel (Taxotere)  Dose: 60 mg/m^2  Calculated Dose: 130.2 mg                             (In mg/m2, AUC, mg/kg)    Medication: cyclophosphamide (Cytoxan)  Dose: 600 mg/m^2  Calculated Dose: 1302 mg                             (In mg/m2, AUC, mg/kg)    I confirm this process was performed independently with the BSA and all final chemotherapy dosing calculations congruent.  Any discrepancies of 10% or greater have been addressed with the chemotherapy pharmacist. The resolution of the discrepancy has been documented in the EPIC progress notes.

## 2024-03-31 ENCOUNTER — OUTPATIENT INFUSION SERVICES (OUTPATIENT)
Dept: ONCOLOGY | Facility: MEDICAL CENTER | Age: 39
End: 2024-03-31
Attending: INTERNAL MEDICINE
Payer: MEDICAID

## 2024-03-31 VITALS
RESPIRATION RATE: 18 BRPM | SYSTOLIC BLOOD PRESSURE: 111 MMHG | HEART RATE: 63 BPM | HEIGHT: 66 IN | WEIGHT: 227.51 LBS | TEMPERATURE: 98.2 F | OXYGEN SATURATION: 94 % | BODY MASS INDEX: 36.56 KG/M2 | DIASTOLIC BLOOD PRESSURE: 70 MMHG

## 2024-03-31 DIAGNOSIS — C50.411 MALIGNANT NEOPLASM OF UPPER-OUTER QUADRANT OF RIGHT BREAST IN FEMALE, ESTROGEN RECEPTOR NEGATIVE (HCC): ICD-10-CM

## 2024-03-31 DIAGNOSIS — Z17.1 MALIGNANT NEOPLASM OF UPPER-OUTER QUADRANT OF RIGHT BREAST IN FEMALE, ESTROGEN RECEPTOR NEGATIVE (HCC): ICD-10-CM

## 2024-03-31 PROCEDURE — 96372 THER/PROPH/DIAG INJ SC/IM: CPT

## 2024-03-31 PROCEDURE — 700111 HCHG RX REV CODE 636 W/ 250 OVERRIDE (IP): Mod: JZ,UD | Performed by: NURSE PRACTITIONER

## 2024-03-31 RX ADMIN — PEGFILGRASTIM-CBQV 6 MG: 6 INJECTION, SOLUTION SUBCUTANEOUS at 16:06

## 2024-03-31 ASSESSMENT — FIBROSIS 4 INDEX: FIB4 SCORE: 1.05

## 2024-03-31 NOTE — PROGRESS NOTES
Fela arrives to [facility] for post-chemo Udenyca. Patient reports feeling fatigued today. Reports that the cough and dyspnea has resolved since day of the reaction. Denies utilizing Claritin for bone pain prevention as it has not worked in the past. Patient takes norco for the pain. Udenyca administered SQ to RLQ of abdomen without issues. Patient tolerated injection well. Patient has her next appt. Discharged home to self care in no apparent distress.

## 2024-04-01 ENCOUNTER — APPOINTMENT (OUTPATIENT)
Dept: ONCOLOGY | Facility: MEDICAL CENTER | Age: 39
End: 2024-04-01
Payer: MEDICAID

## 2024-04-02 ENCOUNTER — TELEPHONE (OUTPATIENT)
Dept: HEMATOLOGY ONCOLOGY | Facility: MEDICAL CENTER | Age: 39
End: 2024-04-02
Payer: MEDICAID

## 2024-04-02 ENCOUNTER — APPOINTMENT (OUTPATIENT)
Dept: ONCOLOGY | Facility: MEDICAL CENTER | Age: 39
End: 2024-04-02
Payer: MEDICAID

## 2024-04-02 DIAGNOSIS — Z17.1 MALIGNANT NEOPLASM OF UPPER-OUTER QUADRANT OF RIGHT BREAST IN FEMALE, ESTROGEN RECEPTOR NEGATIVE (HCC): ICD-10-CM

## 2024-04-02 DIAGNOSIS — C50.411 MALIGNANT NEOPLASM OF UPPER-OUTER QUADRANT OF RIGHT BREAST IN FEMALE, ESTROGEN RECEPTOR NEGATIVE (HCC): ICD-10-CM

## 2024-04-02 NOTE — TELEPHONE ENCOUNTER
Called patient to let her know that Dewayne Garces her Urinalysis results came back negative . We also discussed scheduling of her Breast MRI in beginning of may. Pt is okay with any day, mid mornings. Pt verbalized understanding of her urinalysis results.

## 2024-04-03 ENCOUNTER — APPOINTMENT (OUTPATIENT)
Dept: ONCOLOGY | Facility: MEDICAL CENTER | Age: 39
End: 2024-04-03
Payer: MEDICAID

## 2024-04-04 ENCOUNTER — APPOINTMENT (OUTPATIENT)
Dept: ONCOLOGY | Facility: MEDICAL CENTER | Age: 39
End: 2024-04-04
Payer: MEDICAID

## 2024-04-05 ENCOUNTER — APPOINTMENT (OUTPATIENT)
Dept: ONCOLOGY | Facility: MEDICAL CENTER | Age: 39
End: 2024-04-05
Payer: MEDICAID

## 2024-04-09 DIAGNOSIS — T45.1X5A CHEMOTHERAPY-INDUCED PERIPHERAL NEUROPATHY (HCC): ICD-10-CM

## 2024-04-09 DIAGNOSIS — G62.0 CHEMOTHERAPY-INDUCED PERIPHERAL NEUROPATHY (HCC): ICD-10-CM

## 2024-04-09 DIAGNOSIS — G62.9 NEUROPATHY: ICD-10-CM

## 2024-04-09 RX ORDER — GABAPENTIN 100 MG/1
100 CAPSULE ORAL DAILY
Qty: 3 CAPSULE | Refills: 0 | Status: SHIPPED | OUTPATIENT
Start: 2024-04-09

## 2024-04-09 NOTE — PROGRESS NOTES
Patient contacted office via phone in regard to symptoms. Patient is having neuropathy pains in toes that are worse at night. Patient states last night pain was a 10 out of 10 pain and was affecting sleep. Patient states she had to take pain medication last night. Spoke with Gillian NÚÑEZ, patient prescribed gabapentin 100mg capsules quantity 3, advises patient to start 100mg capsule tonight, take 200mg tomorrow night and increase to 300mg the following night. Contacted patient to update on plan of care. Patient prescribed gabapentin 300mg from primary and has not started taken the 300mg dose. Patient has full bottle of 300mg gabapentin at home. Patient was not prescribed 300mg due to full bottle from pharmacy at home. Patient requested letter for financial assistance, this nurse sent letter for [employer] and to patient for applying to grants and other assistance programs. No other questions or concerns at this time.

## 2024-04-17 RX ORDER — METHYLPREDNISOLONE SODIUM SUCCINATE 125 MG/2ML
125 INJECTION, POWDER, LYOPHILIZED, FOR SOLUTION INTRAMUSCULAR; INTRAVENOUS PRN
Status: CANCELLED | OUTPATIENT
Start: 2024-04-19

## 2024-04-17 RX ORDER — 0.9 % SODIUM CHLORIDE 0.9 %
VIAL (ML) INJECTION PRN
Status: CANCELLED | OUTPATIENT
Start: 2024-04-18

## 2024-04-17 RX ORDER — PROCHLORPERAZINE MALEATE 10 MG
10 TABLET ORAL EVERY 6 HOURS PRN
Status: CANCELLED | OUTPATIENT
Start: 2024-04-19

## 2024-04-17 RX ORDER — EPINEPHRINE 1 MG/ML(1)
0.5 AMPUL (ML) INJECTION PRN
Status: CANCELLED | OUTPATIENT
Start: 2024-04-19

## 2024-04-17 RX ORDER — PALONOSETRON 0.05 MG/ML
0.25 INJECTION, SOLUTION INTRAVENOUS ONCE
Status: CANCELLED
Start: 2024-04-19 | End: 2024-04-19

## 2024-04-17 RX ORDER — 0.9 % SODIUM CHLORIDE 0.9 %
10 VIAL (ML) INJECTION PRN
Status: CANCELLED | OUTPATIENT
Start: 2024-04-19

## 2024-04-17 RX ORDER — 0.9 % SODIUM CHLORIDE 0.9 %
VIAL (ML) INJECTION PRN
Status: CANCELLED | OUTPATIENT
Start: 2024-04-19

## 2024-04-17 RX ORDER — 0.9 % SODIUM CHLORIDE 0.9 %
3 VIAL (ML) INJECTION PRN
Status: CANCELLED | OUTPATIENT
Start: 2024-04-19

## 2024-04-17 RX ORDER — 0.9 % SODIUM CHLORIDE 0.9 %
10 VIAL (ML) INJECTION PRN
Status: CANCELLED | OUTPATIENT
Start: 2024-04-18

## 2024-04-17 RX ORDER — DIPHENHYDRAMINE HYDROCHLORIDE 50 MG/ML
50 INJECTION INTRAMUSCULAR; INTRAVENOUS PRN
Status: CANCELLED | OUTPATIENT
Start: 2024-04-19

## 2024-04-17 RX ORDER — ONDANSETRON 2 MG/ML
4 INJECTION INTRAMUSCULAR; INTRAVENOUS PRN
Status: CANCELLED | OUTPATIENT
Start: 2024-04-19

## 2024-04-17 RX ORDER — 0.9 % SODIUM CHLORIDE 0.9 %
3 VIAL (ML) INJECTION PRN
Status: CANCELLED | OUTPATIENT
Start: 2024-04-18

## 2024-04-17 RX ORDER — ONDANSETRON 8 MG/1
8 TABLET, ORALLY DISINTEGRATING ORAL PRN
Status: CANCELLED | OUTPATIENT
Start: 2024-04-19

## 2024-04-17 RX ORDER — SODIUM CHLORIDE 9 MG/ML
INJECTION, SOLUTION INTRAVENOUS CONTINUOUS
Status: CANCELLED | OUTPATIENT
Start: 2024-04-19

## 2024-04-18 ENCOUNTER — HOSPITAL ENCOUNTER (OUTPATIENT)
Dept: HEMATOLOGY ONCOLOGY | Facility: MEDICAL CENTER | Age: 39
End: 2024-04-18
Attending: INTERNAL MEDICINE
Payer: MEDICAID

## 2024-04-18 ENCOUNTER — HOSPITAL ENCOUNTER (OUTPATIENT)
Facility: MEDICAL CENTER | Age: 39
End: 2024-04-18
Attending: INTERNAL MEDICINE
Payer: MEDICAID

## 2024-04-18 VITALS
BODY MASS INDEX: 36.16 KG/M2 | OXYGEN SATURATION: 95 % | HEIGHT: 66 IN | RESPIRATION RATE: 15 BRPM | SYSTOLIC BLOOD PRESSURE: 98 MMHG | HEART RATE: 81 BPM | TEMPERATURE: 97.6 F | WEIGHT: 224.98 LBS | DIASTOLIC BLOOD PRESSURE: 60 MMHG

## 2024-04-18 DIAGNOSIS — Z17.1 MALIGNANT NEOPLASM OF UPPER-OUTER QUADRANT OF RIGHT BREAST IN FEMALE, ESTROGEN RECEPTOR NEGATIVE (HCC): ICD-10-CM

## 2024-04-18 DIAGNOSIS — T45.1X5A CHEMOTHERAPY-INDUCED PERIPHERAL NEUROPATHY (HCC): ICD-10-CM

## 2024-04-18 DIAGNOSIS — C80.1 ANXIETY ASSOCIATED WITH CANCER DIAGNOSIS (HCC): ICD-10-CM

## 2024-04-18 DIAGNOSIS — F41.1 ANXIETY ASSOCIATED WITH CANCER DIAGNOSIS (HCC): ICD-10-CM

## 2024-04-18 DIAGNOSIS — C50.411 MALIGNANT NEOPLASM OF UPPER-OUTER QUADRANT OF RIGHT BREAST IN FEMALE, ESTROGEN RECEPTOR NEGATIVE (HCC): ICD-10-CM

## 2024-04-18 DIAGNOSIS — Z79.899 ENCOUNTER FOR LONG-TERM (CURRENT) USE OF HIGH-RISK MEDICATION: ICD-10-CM

## 2024-04-18 DIAGNOSIS — G62.0 CHEMOTHERAPY-INDUCED PERIPHERAL NEUROPATHY (HCC): ICD-10-CM

## 2024-04-18 DIAGNOSIS — K52.1 DIARRHEA DUE TO DRUG: ICD-10-CM

## 2024-04-18 LAB
ALBUMIN SERPL BCP-MCNC: 3.6 G/DL (ref 3.2–4.9)
ALBUMIN/GLOB SERPL: 1.4 G/DL
ALP SERPL-CCNC: 58 U/L (ref 30–99)
ALT SERPL-CCNC: 20 U/L (ref 2–50)
ANION GAP SERPL CALC-SCNC: 10 MMOL/L (ref 7–16)
AST SERPL-CCNC: 16 U/L (ref 12–45)
BASOPHILS # BLD AUTO: 0.5 % (ref 0–1.8)
BASOPHILS # BLD: 0.03 K/UL (ref 0–0.12)
BILIRUB SERPL-MCNC: 0.3 MG/DL (ref 0.1–1.5)
BUN SERPL-MCNC: 14 MG/DL (ref 8–22)
CALCIUM ALBUM COR SERPL-MCNC: 9.2 MG/DL (ref 8.5–10.5)
CALCIUM SERPL-MCNC: 8.9 MG/DL (ref 8.5–10.5)
CHLORIDE SERPL-SCNC: 110 MMOL/L (ref 96–112)
CO2 SERPL-SCNC: 24 MMOL/L (ref 20–33)
CREAT SERPL-MCNC: 0.73 MG/DL (ref 0.5–1.4)
EOSINOPHIL # BLD AUTO: 0.01 K/UL (ref 0–0.51)
EOSINOPHIL NFR BLD: 0.2 % (ref 0–6.9)
ERYTHROCYTE [DISTWIDTH] IN BLOOD BY AUTOMATED COUNT: 53.9 FL (ref 35.9–50)
GFR SERPLBLD CREATININE-BSD FMLA CKD-EPI: 106 ML/MIN/1.73 M 2
GLOBULIN SER CALC-MCNC: 2.5 G/DL (ref 1.9–3.5)
GLUCOSE SERPL-MCNC: 109 MG/DL (ref 65–99)
HCT VFR BLD AUTO: 36.9 % (ref 37–47)
HGB BLD-MCNC: 12.3 G/DL (ref 12–16)
IMM GRANULOCYTES # BLD AUTO: 0.02 K/UL (ref 0–0.11)
IMM GRANULOCYTES NFR BLD AUTO: 0.3 % (ref 0–0.9)
LYMPHOCYTES # BLD AUTO: 1.26 K/UL (ref 1–4.8)
LYMPHOCYTES NFR BLD: 21.7 % (ref 22–41)
MCH RBC QN AUTO: 33.1 PG (ref 27–33)
MCHC RBC AUTO-ENTMCNC: 33.3 G/DL (ref 32.2–35.5)
MCV RBC AUTO: 99.2 FL (ref 81.4–97.8)
MONOCYTES # BLD AUTO: 0.52 K/UL (ref 0–0.85)
MONOCYTES NFR BLD AUTO: 9 % (ref 0–13.4)
NEUTROPHILS # BLD AUTO: 3.97 K/UL (ref 1.82–7.42)
NEUTROPHILS NFR BLD: 68.3 % (ref 44–72)
NRBC # BLD AUTO: 0 K/UL
NRBC BLD-RTO: 0 /100 WBC (ref 0–0.2)
PLATELET # BLD AUTO: 185 K/UL (ref 164–446)
PMV BLD AUTO: 10.7 FL (ref 9–12.9)
POTASSIUM SERPL-SCNC: 4.5 MMOL/L (ref 3.6–5.5)
PROT SERPL-MCNC: 6.1 G/DL (ref 6–8.2)
RBC # BLD AUTO: 3.72 M/UL (ref 4.2–5.4)
SODIUM SERPL-SCNC: 144 MMOL/L (ref 135–145)
WBC # BLD AUTO: 5.8 K/UL (ref 4.8–10.8)

## 2024-04-18 PROCEDURE — 85025 COMPLETE CBC W/AUTO DIFF WBC: CPT

## 2024-04-18 PROCEDURE — 99214 OFFICE O/P EST MOD 30 MIN: CPT | Performed by: NURSE PRACTITIONER

## 2024-04-18 PROCEDURE — 99212 OFFICE O/P EST SF 10 MIN: CPT | Performed by: NURSE PRACTITIONER

## 2024-04-18 PROCEDURE — 80053 COMPREHEN METABOLIC PANEL: CPT

## 2024-04-18 ASSESSMENT — ENCOUNTER SYMPTOMS
CHILLS: 0
FEVER: 0
VOMITING: 1
WEIGHT LOSS: 0
PALPITATIONS: 0
CONSTIPATION: 0
HEADACHES: 0
TINGLING: 1
ABDOMINAL PAIN: 0
NAUSEA: 1
DIZZINESS: 1
COUGH: 0
DIAPHORESIS: 1
DIARRHEA: 1
SHORTNESS OF BREATH: 1
MYALGIAS: 1

## 2024-04-18 ASSESSMENT — FIBROSIS 4 INDEX: FIB4 SCORE: 1.05

## 2024-04-18 ASSESSMENT — PAIN SCALES - GENERAL: PAINLEVEL: 6=MODERATE PAIN

## 2024-04-18 NOTE — PROGRESS NOTES
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 Thomas due to tolerance  *removed from plan starting C5 per NIYA Cordova due to worsening neutropenia     *dose reduced to AUC 4.5 starting C2 per Dr. Thomas due to tolerance  *removed from plan starting C4 per Dr. Thomas due to tolerance  *substituted with Cyclophosphamide 600 mg/m2 IV over 30 minutes on Day 1 per chemo regimen TC on NCCN Guidelines for Breast Cancer V.2.2024  21-day cycle for 6 cycles     ~Followed by~    Pertuzumab 420 mg IV over 30 minutes on Day 1 beginning with Week 19   Trastuzumab 6 mg/kg IV over 30 minutes on Day 1 beginning with Week 19  21-day cycle to complete 52 weeks total of trastuzumab and pertuzumab therapy    NCCN Guidelines® for Breast Cancer V.4.2023.   Josef LEON, et al. Gay Oncol. 2013;24(9):2278-84.a    Allergies:  Bee venom, Morphine sulfate, Tape, and Pineapple       /56   Pulse 63   Temp 36.6 °C (97.9 °F) (Temporal)   Resp 16   Ht 1.676 m (5' 5.98\")   Wt 103 kg (227 lb 1.2 oz)   LMP 02/10/2012 (Approximate) Comment: Age of first period:10, No HRT  SpO2 95%   BMI 36.67 kg/m²  Body surface area is 2.19 meters squared.    Labs 4/18/24:  ANC~ 3970 Plt = 185k   Hgb = 12.3     SCr = 0.73 mg/dL CrCl >125 mL/min   AST/ALT/AP = 16/20/58 TBili = 0.3     ECHO (every 4 months per Dr. Mccurdy):   12/27/23: LVEF~65%    Drug Order   (Drug name, dose, route, IV Fluid & volume, frequency, number of doses) Cycle " 5  Previous treatment: C4 3/29/24     Medication = Pertuzumab  Base Dose = 420 mg  Fixed dose; no calc required  Final Dose = 420 mg  Route = IV  Fluid & Volume =  mL  Admin Duration = Over 30 minutes          Fixed dose, OK to treat with final dose   Medication = Trastuzumab  Base Dose = 6 mg/m2  Calc Dose: Base Dose x 103 kg = 618 mg  Final Dose = 592 mg  Route = IV  Fluid & Volume =  mL  Admin Duration = Over 30 minutes          <10% difference, OK to treat with final dose   Medication = Cyclophosphamide   Base Dose = 600 mg/m2  Calc Dose: Base Dose x 2.19 m2 = 1314 mg  Final Dose = 1300 mg  Route = IV  Fluid & Volume =  mL  Admin Duration = Over 30 minutes          <10% difference, OK to treat with final dose     By my signature below, I confirm this process was performed independently with the BSA and all final chemotherapy dosing calculations congruent. I have reviewed the above chemotherapy order and that my calculation of the final dose and BSA (when applicable) corroborate those calculations of the  pharmacist. Discrepancies of 10% or greater in the written dose have been addressed and documented within the [facility] Progress notes.    Brenda Hernandez, KoriD

## 2024-04-18 NOTE — PROGRESS NOTES
Subjective     Fela Tiwari is a 39 y.o. female who presents with Breast Cancer ([facility] / Pre chemo )          HPI    Referring Physician: Rochelle Gaviria MD   Primary Care:  Francisco Maeir M.D.      Diagnosis: Invasive ductal carcinoma of the right breast, grade 3, clinically stage IIa (cT2, cN0) ER negative OR negative, HER2 3+ positive, Ki-67 20 to 30%      Chief Complaint: Patient seen today for evaluation of her ER negative HER2 positive breast cancer, for continued monitoring of symptoms and side effects of cancer treatments, employing TC.     Oncology history of presenting illness:  Fela Tiwari is a 38 y.o. likely premenopausal white female who self detected a mass in her lateral right breast in 2023.  She had a mammogram on 2023 which showed the palpable mass is likely malignant.  Ultrasound showed it was 2.6 cm irregular hypoechoic spiculated mass.  Left breast was normal.  She underwent a ultrasound-guided biopsy of the right breast on 2023: This revealed an invasive ductal carcinoma, grade 3, ER negative, OR negative, HER2 3+ by IHC, Ki-67 20 to 30%.  An MRI was done yesterday and results are pending.  She had a hysterectomy in  but her ovaries are in place.  She is  3 para 2 and has not taken any hormone replacement therapy.  No family history of breast or ovarian cancer.  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  She was on anticoagulation for a while but has been off for several years.  She has also had gastric sleeve laparoscopic and cholecystectomy and does note chronic nausea.      Interval histories:  Interval history 24 (CAlsop, APRN):  Patient had a difficult time with her first cycle of chemotherapy.  Day after she received the Udenyca shot she presented to the emergency department with severe pain.  Pain was excruciating throughout her entire body.  She was given IV  Dilaudid in the hospital and discharged with Tylenol.  She has been trying to take 1000 g of Tylenol with no relief.  Patient tearful today due to the severity of pain.  She also had experienced blood when urinating.  She did have a positive occult blood on stool and urine during hospitalization.  She was asked to follow-up with urology in the outpatient setting.  She stated since being discharged from the hospital she has not had any blood whatsoever in the urine or will.  She has been experiencing diarrhea anywhere from 5-10 episodes per day likely related to Perjeta.  She has been taking Imodium, approximately 2-3 pills per day with minimal relief.  She does have nausea and vomiting but she does state that Zofran does help.  She does also note thrush which was appreciated on physical examination today.     Interval history 2/15/2024: She had a terrible time with her first cycle of TCHP as noted above.  Her diarrhea has slowed dramatically but she still is using Lomotil occasionally.  She also had severe eye tearing likely due to the Taxotere which persists although it is better.  She has no neuropathy but did have a rash which responded to corticosteroids.  She also had persistent nausea which has finally resolved.  Her thrush is resolved as well.  She has not had any further vaginal bleeding but this has not been evaluated adequately except for a bladder scan which as expected was negative.  She feels like her tumor shrunk some.      Interval history 03/07/24 (Basim, APRN):  Patient still had difficult time with cycle 2.  She did end up in the ER the day after receiving treatment with bleeding again.  She did have bleeding with her first cycle and did end up being seen by urology and underwent a cystoscopy which was negative.  She stated that the bleeding lasted approximately 3-4 days.  She did experience significant nausea and vomiting for the first 2 weeks.  She also had severe diarrhea up to 10 times per day  for the first 2 weeks.  She did take Lomotil with no improvement.  She has noticed some bleeding from the rectum which is from her hemorrhoids with the diarrhea.  She did state that she was given the olanzapine to take for the first 5 days after treatment which did help immensely with her nausea and vomiting as well as she believes to help with her diarrhea as well as sleep.  When she stopped that medication the symptoms returned.  She is noticing her vision feels a little differently.  Her eyes are very dry and watery.  We attempted to give patient days of Zarzio for neutropenia support as patient had significant pain with the Udenyca shot.  She stated that she did not notice any improvement or difference as she did still have the severe myalgias that lasted approximately 2 weeks with the Zarzio injection.  She did state this last week she has felt back to normal.  She is anxious to receive her next cycle of chemo.     Interval history 03/28/24 (CAlsop, APRN):  Patient tolerated her last cycle well as cycle 3 we held carboplatin and awaited approval for Cytoxan.  We also held Udenyca.  She does still have diarrhea noted but that is controlled with Lomotil.  She is noticing some bilateral flank pain but no other urinary symptoms.  With her last cycle she did not experience any blood in the urine/vaginal/rectal area.  She does have some fatigue and has noticed increase in hot flashes.  Hot flashes are quite bothersome to her at this time.  She also noticed peripheral neuropathy in her toes, worse at night.  She is unable to let the covers to touch her feet.    Interval history 04/18/24 (CAlsop, APRN):  Patient biggest complaint at this time as peripheral neuropathy in her feet.  She has the pain, burning and numbness.  She was taking gabapentin 300 mg at night but did take 2 tablets last night because her feet were severe which did help.  She has noticed some nausea and vomiting worse with this past cycle likely with  the addition of Cytoxan.  She does note she has consistently loose bowel movements, but no significant diarrhea.  She does have some hemorrhoids and does have pain with bowel movements at times.  She still has a hot flashes and did take the oxybutynin but she stated that she thought it made him worse.  She stopped taking the oxybutynin and noticed that her hot flashes are better.  She may be getting some relief from the gabapentin.  She also noticed some blisters in her mouth but the mouth rinses did help.  No mouth sores at this time.  She does complain of significant myalgias and arthralgias from the Udenyca shot that last about 3 days.  However she does continue to have the myalgias and arthralgias up till today still present.     Treatment history:  01/23/24: C1D1 TCHP with Udenyca  02/16/24: C2D2 TCHP with 20-25% reduction in dosing of the chemotherapy and 5 days of Zarxio instead of Udenyca   03/08/24: C3D1 THP (20% dose reduction of Docetaxel) - will hold on Xarzio or Udenyca (d/c Carboplatin d/t tolerability)  03/29/24: C4D1 THP with addition of Cytoxan (continue with dose reduction of Docetaxel by 20%) with Udenyca support   04/19/24: C5D1 HP plus Cytoxan (d/c Docetaxel d/t worsening peripheral neuropathy) - no need for Udenyca      Allergies   Allergen Reactions    Bee Venom Anaphylaxis    Morphine Sulfate Anaphylaxis and Itching    Skin Adhesives     Tape Rash     Paper tape OK, do not use tegaderm    Pineapple Hives     Current Outpatient Medications on File Prior to Encounter   Medication Sig Dispense Refill    gabapentin (NEURONTIN) 100 MG Cap Take 1 Capsule by mouth every day. Take 100mg capsule tonight, take 200mg the second night then follow with 300mg at bedtime until next physician appointment. 3 Capsule 0    omeprazole (PRILOSEC) 20 MG delayed-release capsule Take 1 Capsule by mouth every day. 30 Capsule 0    calcium carbonate (TUMS CHEWY BITES) 750 MG chewable tablet Chew 2 Tablets 4 times a  day.      ondansetron (ZOFRAN) 4 MG Tab tablet Take 1 Tablet by mouth every four hours as needed for Nausea/Vomiting (for nausea, vomiting). 30 Tablet 6    prochlorperazine (COMPAZINE) 10 MG Tab Take 1 Tablet by mouth every 6 hours as needed (for nausea, vomiting). 30 Tablet 6    dexamethasone (DECADRON) 4 MG Tab Take 2 Tablets by mouth 2 times a day. Take 8 mg two times a day for 3 days, starting 24 hours prior to docetaxel. 12 Tablet 6    lidocaine-prilocaine (EMLA) 2.5-2.5 % Cream Apply to port 1 hour prior to access of port and cover with plastic wrap. (Patient taking differently: Apply 1 Application topically as needed. Apply to port 1 hour prior to access of port and cover with plastic wrap.) 30 g 3    EPINEPHrine (EPIPEN) 0.3 MG/0.3ML Solution Auto-injector solution for injection Inject 0.3 mL into the thigh one time for 1 dose  Indications: Patient advised to present to the ED even after epinephrine administration for further observation and management. 1 Each 0    oxybutynin (DITROPAN) 5 MG Tab Take 1 Tablet by mouth 2 times a day. (Patient not taking: Reported on 4/18/2024) 60 Tablet 0    acyclovir (ZOVIRAX) 400 MG tablet Take 1 Tablet by mouth 2 times a day. (Patient not taking: Reported on 4/18/2024) 140 Tablet 0    diphenhydrAMINE (BENADRYL) 25 MG capsule Take 1 Capsule by mouth every 6 hours as needed for Itching. (Patient not taking: Reported on 4/18/2024)      ALPRAZolam (XANAX) 0.5 MG Tab Take 0.5 mg by mouth at bedtime as needed for Anxiety or Sleep. (Patient not taking: Reported on 4/18/2024)      OLANZapine (ZYPREXA) 5 MG Tab Take 1 Tablet by mouth every evening. For 5 days starting the day of chemotherapy (Patient not taking: Reported on 4/18/2024) 30 Tablet 1    loratadine (CLARITIN) 10 MG Tab Take 10 mg by mouth every day. (Patient not taking: Reported on 4/18/2024)       No current facility-administered medications on file prior to encounter.         Review of Systems   Constitutional:   "Positive for diaphoresis and malaise/fatigue. Negative for chills, fever and weight loss.   Respiratory:  Positive for shortness of breath (with exertion such as when going up to her second floor in her home). Negative for cough.    Cardiovascular:  Negative for chest pain and palpitations.   Gastrointestinal:  Positive for diarrhea, nausea and vomiting. Negative for abdominal pain and constipation.   Genitourinary:  Negative for dysuria.   Musculoskeletal:  Positive for myalgias.   Skin:  Negative for itching and rash.   Neurological:  Positive for dizziness and tingling. Negative for headaches.              Objective     BP 98/60 (BP Location: Right arm, Patient Position: Sitting, BP Cuff Size: Adult)   Pulse 81   Temp 36.4 °C (97.6 °F) (Temporal)   Resp 15   Ht 1.676 m (5' 5.98\")   Wt 102 kg (224 lb 15.7 oz)   LMP 02/10/2012 (Approximate) Comment: Age of first period:10, No HRT  SpO2 95%   BMI 36.33 kg/m²      Physical Exam  Vitals reviewed.   Constitutional:       General: She is not in acute distress.     Appearance: Normal appearance. She is not diaphoretic.   HENT:      Head: Normocephalic and atraumatic.   Cardiovascular:      Rate and Rhythm: Normal rate and regular rhythm.      Heart sounds: Normal heart sounds. No murmur heard.     No friction rub. No gallop.   Pulmonary:      Effort: Pulmonary effort is normal. No respiratory distress.      Breath sounds: Normal breath sounds. No wheezing.   Chest:      Comments: 04/18/24: Mass at 1030 o'clock 5 cm from the right nipple, unable to palpate.   03/28/24: Mass at 1030 o'clock 5 cm from the right nipple, very difficult to palpate. Measuring approx 1.0 cm horizontally x 0.5 cm vertically. No right axillary adenopathy  03/07/24: Mass at 1030 o'clock 5 cm from the right nipple measures 1.5 x 1.5 cm and is irregular.  No right axillary adenopathy  2/15/2024: Mass at 1030 o'clock 5 cm from the right nipple measures 1.5 x 2 cm and is irregular.  No right " axillary adenopathy  12/22/2023: Mass at 1030 o'clock 5 cm from the right nipple measuring 3.5 x 3.5 cm.  No right axillary adenopathy is noted.  The left breast is normal.    Abdominal:      General: Bowel sounds are normal. There is no distension.      Palpations: Abdomen is soft.      Tenderness: There is no abdominal tenderness.   Musculoskeletal:         General: No swelling or tenderness. Normal range of motion.   Skin:     General: Skin is warm and dry.   Neurological:      Mental Status: She is alert and oriented to person, place, and time.   Psychiatric:         Mood and Affect: Mood normal.         Behavior: Behavior normal.              Latest Reference Range & Units 04/18/24 13:10   WBC 4.8 - 10.8 K/uL 5.8   RBC 4.20 - 5.40 M/uL 3.72 (L)   Hemoglobin 12.0 - 16.0 g/dL 12.3   Hematocrit 37.0 - 47.0 % 36.9 (L)   MCV 81.4 - 97.8 fL 99.2 (H)   MCH 27.0 - 33.0 pg 33.1 (H)   MCHC 32.2 - 35.5 g/dL 33.3   RDW 35.9 - 50.0 fL 53.9 (H)   Platelet Count 164 - 446 K/uL 185   MPV 9.0 - 12.9 fL 10.7   Neutrophils-Polys 44.00 - 72.00 % 68.30   Neutrophils (Absolute) 1.82 - 7.42 K/uL 3.97   Lymphocytes 22.00 - 41.00 % 21.70 (L)   Lymphs (Absolute) 1.00 - 4.80 K/uL 1.26   Monocytes 0.00 - 13.40 % 9.00   Monos (Absolute) 0.00 - 0.85 K/uL 0.52   Eosinophils 0.00 - 6.90 % 0.20   Eos (Absolute) 0.00 - 0.51 K/uL 0.01   Basophils 0.00 - 1.80 % 0.50   Baso (Absolute) 0.00 - 0.12 K/uL 0.03   Immature Granulocytes 0.00 - 0.90 % 0.30   Immature Granulocytes (abs) 0.00 - 0.11 K/uL 0.02   Nucleated RBC 0.00 - 0.20 /100 WBC 0.00   NRBC (Absolute) K/uL 0.00   Sodium 135 - 145 mmol/L 144   Potassium 3.6 - 5.5 mmol/L 4.5   Chloride 96 - 112 mmol/L 110   Co2 20 - 33 mmol/L 24   Anion Gap 7.0 - 16.0  10.0   Glucose 65 - 99 mg/dL 109 (H)   Bun 8 - 22 mg/dL 14   Creatinine 0.50 - 1.40 mg/dL 0.73   GFR (CKD-EPI) >60 mL/min/1.73 m 2 106   Calcium 8.5 - 10.5 mg/dL 8.9   Correct Calcium 8.5 - 10.5 mg/dL 9.2   AST(SGOT) 12 - 45 U/L 16   ALT(SGPT)  2 - 50 U/L 20   Alkaline Phosphatase 30 - 99 U/L 58   Total Bilirubin 0.1 - 1.5 mg/dL 0.3   Albumin 3.2 - 4.9 g/dL 3.6   Total Protein 6.0 - 8.2 g/dL 6.1   Globulin 1.9 - 3.5 g/dL 2.5   A-G Ratio g/dL 1.4            Assessment & Plan       1. Malignant neoplasm of upper-outer quadrant of right breast in female, estrogen receptor negative (HCC)        2. Chemotherapy-induced peripheral neuropathy (HCC)        3. Diarrhea due to drug        4. Encounter for long-term (current) use of high-risk medication                Impression:  1.  Invasive ductal carcinoma of the right breast, grade 3, clinically stage IIa (cT2, cN0) ER negative TX negative, HER2 3+ positive, Ki-67 20 to 30%.  Poor tolerance.  A full dose TCHP.  Will proceed with cycle 2 at reduced dosing.  Patient with continued decrease in breast mass.  2.  Chronic nausea after gastric sleeve and cholecystectomy severe after chemotherapy now minimal  3.  History of DVT/PE off anticoagulation for several years  4.  Severe myalgias likely from Udenyca - patient okay to take Tylenol and Ibuprofen - will provide patient with low dose Norco for severe pain to have daily Zarxio days 2 through 5 of the cycle. With cycle 2 patient with severe myalgias for up to 2 weeks despite change to Zarxio. Will return to Udenyca.   5.  Chemo nausea and vomiting -she will use Aloxi and olanzapine - patient to take Olanzepine x10 days  6.  Diarrhea d/t Perjeta - Imodium not effective - will start Lomotil. Still severe with cycle 2 - patient ok to take Lomotil with Imodium if needed. Discussed taking Lomotil daily up to 4 times per day.    7.  Thrush - mild and will start Nystatin QID - recommend baking soda and salt water rinses as well.  Resolved  8. Hematuria - presented for first 3-4 days after both cycles 1 and 2.  Cystoscopy completed by urology and was negative.  Questioning whether this may be related to carboplatin, and will plan to change treatment with discontinuation of  carboplatin and add Cytoxan. Resolved with cycle 3.   9.  Hot flashes - tried oxybutynin, however patient stated that she believed it made it worse.  She stopped the oxybutynin and has noticed her hot flashes are not as severe.  10.  Peripheral neuropathy noted in her toes.  Will continue to monitor closely.  Docetaxel already dose reduced with cycle 2.  She was started on gabapentin at 300 mg p.o. nightly at this time.  Neuropathy is worse.  I have asked that she go to the milligrams p.o. twice daily for the next 5 days and can increase up to 3 times daily if needed.  11.  Bilateral flank pain noted -will order UA = negative.  Resolved      Plan:  Due to worsening peripheral neuropathy which is quite severe will DC docetaxel altogether for cycle 5.  With that, will okay to DC of Udenyca as well.  I did review patient's CBC and CMP and labs are appropriate to proceed with treatment as planned.    Plan for repeat MRI breast to evaluate response to treatment which is scheduled prior to next visit with Dr. Thomas.      Patient to return to clinic in 3 weeks, or sooner if needed.      Please note that this dictation was created using voice recognition software. I have made every reasonable attempt to correct obvious errors, but I expect that there are errors of grammar and possibly content that I did not discover before finalizing the note.

## 2024-04-18 NOTE — NON-PROVIDER
Pt arrived for Pre-Chemo lab draw.  Pt decided to no have her port flushed since she will be down in infusion tomorrow and they will do it.  Andriy labs from left A/C for CBC and CMP.  Pt tolerated well.  Labs taken to Infusion.  Pt went to lobby to wait to have visit with Gillian ROY.

## 2024-04-19 ENCOUNTER — OUTPATIENT INFUSION SERVICES (OUTPATIENT)
Dept: ONCOLOGY | Facility: MEDICAL CENTER | Age: 39
End: 2024-04-19
Attending: INTERNAL MEDICINE
Payer: MEDICAID

## 2024-04-19 VITALS
HEART RATE: 63 BPM | RESPIRATION RATE: 16 BRPM | HEIGHT: 66 IN | WEIGHT: 227.07 LBS | SYSTOLIC BLOOD PRESSURE: 101 MMHG | DIASTOLIC BLOOD PRESSURE: 56 MMHG | BODY MASS INDEX: 36.49 KG/M2 | TEMPERATURE: 97.9 F | OXYGEN SATURATION: 95 %

## 2024-04-19 DIAGNOSIS — Z17.1 MALIGNANT NEOPLASM OF UPPER-OUTER QUADRANT OF RIGHT BREAST IN FEMALE, ESTROGEN RECEPTOR NEGATIVE (HCC): ICD-10-CM

## 2024-04-19 DIAGNOSIS — C50.411 MALIGNANT NEOPLASM OF UPPER-OUTER QUADRANT OF RIGHT BREAST IN FEMALE, ESTROGEN RECEPTOR NEGATIVE (HCC): ICD-10-CM

## 2024-04-19 PROCEDURE — 96417 CHEMO IV INFUS EACH ADDL SEQ: CPT

## 2024-04-19 PROCEDURE — 96375 TX/PRO/DX INJ NEW DRUG ADDON: CPT

## 2024-04-19 PROCEDURE — 96367 TX/PROPH/DG ADDL SEQ IV INF: CPT

## 2024-04-19 PROCEDURE — A4212 NON CORING NEEDLE OR STYLET: HCPCS

## 2024-04-19 PROCEDURE — 96413 CHEMO IV INFUSION 1 HR: CPT

## 2024-04-19 PROCEDURE — 700111 HCHG RX REV CODE 636 W/ 250 OVERRIDE (IP): Mod: JZ,UD | Performed by: NURSE PRACTITIONER

## 2024-04-19 PROCEDURE — 700105 HCHG RX REV CODE 258: Mod: UD | Performed by: NURSE PRACTITIONER

## 2024-04-19 RX ORDER — PALONOSETRON 0.05 MG/ML
0.25 INJECTION, SOLUTION INTRAVENOUS ONCE
Status: COMPLETED | OUTPATIENT
Start: 2024-04-19 | End: 2024-04-19

## 2024-04-19 RX ORDER — DIPHENHYDRAMINE HYDROCHLORIDE 50 MG/ML
50 INJECTION INTRAMUSCULAR; INTRAVENOUS PRN
Status: DISCONTINUED | OUTPATIENT
Start: 2024-04-19 | End: 2024-04-19 | Stop reason: HOSPADM

## 2024-04-19 RX ADMIN — PALONOSETRON 0.25 MG: 0.05 INJECTION, SOLUTION INTRAVENOUS at 07:30

## 2024-04-19 RX ADMIN — CYCLOPHOSPHAMIDE 1300 MG: 200 INJECTION, SOLUTION INTRAVENOUS at 10:22

## 2024-04-19 RX ADMIN — TRASTUZUMAB-ANNS 592 MG: 420 INJECTION, POWDER, LYOPHILIZED, FOR SOLUTION INTRAVENOUS at 09:36

## 2024-04-19 RX ADMIN — FOSAPREPITANT 150 MG: 150 INJECTION, POWDER, LYOPHILIZED, FOR SOLUTION INTRAVENOUS at 07:45

## 2024-04-19 RX ADMIN — HEPARIN 500 UNITS: 100 SYRINGE at 11:47

## 2024-04-19 RX ADMIN — DEXAMETHASONE SODIUM PHOSPHATE 12 MG: 4 INJECTION, SOLUTION INTRA-ARTICULAR; INTRALESIONAL; INTRAMUSCULAR; INTRAVENOUS; SOFT TISSUE at 07:31

## 2024-04-19 RX ADMIN — PERTUZUMAB 420 MG: 30 INJECTION, SOLUTION, CONCENTRATE INTRAVENOUS at 08:24

## 2024-04-19 ASSESSMENT — FIBROSIS 4 INDEX: FIB4 SCORE: 0.79

## 2024-04-19 NOTE — PROGRESS NOTES
"Pharmacy Chemotherapy Verification    Dx: Breast cancer, ER-, MA-, HER2+  Cycle 5  Previous treatment = C4 3/29/24    Protocol: TCHP  Pertuzumab 840 mg IV over 60 minutes on Day 1 of Cycle 1 followed by  Pertuzumab 420 mg IV over 30 minutes on Day 1 of Cycles 2-6  Trastuzumab 8 mg/kg IV over 90 minutes on Day 1 of Cycle 1 followed by  Trastuzumab 6 mg/kg IV over 30 minutes on Day 1 of Cycles 2-6 followed by   2/16/24 - Dose reduced to 60 mg/m2 for tolerance starting with C2   4/19/24 - Removed from treatment plan starting with C5 due to worsening peripheral neuropathy   2/16/24 - Dose reduced to AUC 4.5 for tolerance starting with C2   Removed from treatment plan starting Cycle 3  Cyclophosphamide 600 mg/m2 IV over 30 minutes on Day 1   3/29/24 - Added starting C4 to replace carboplatin due to intolerance  21-day cycle for 6 cycles  ~Followed by~  Pertuzumab 420 mg IV over 30 minutes on Day 1 beginning with Week 19  Trastuzumab 6 mg/kg IV over 30 minutes on Day 1 beginning with Week 19  21-day cycle to complete 52 weeks total of trastuzumab and pertuzumab therapy  NCCN Guidelines for Breast Cancer V.2.2022.  Josef LEON, et al. Gay Oncol. 2013;24(9):2278-84.    Allergies:  Bee venom, Morphine sulfate, Tape, and Pineapple     /56   Pulse 63   Temp 36.6 °C (97.9 °F) (Temporal)   Resp 16   Ht 1.676 m (5' 5.98\")   Wt 103 kg (227 lb 1.2 oz)   LMP 02/10/2012 (Approximate) Comment: Age of first period:10, No HRT  SpO2 95%   BMI 36.67 kg/m²  Body surface area is 2.19 meters squared.    Labs 4/18/24  ANC~ 3970 Plt = 185k   Hgb = 12.3     SCr = 0.73 mg/dL CrCl ~ >125 mL/min   LFT's = WNLs  TBili = 0.3     ECHO  12/27/23 LVEF 65% (Okay to proceed with treatment today. Pt to schedule ECHO after 6 cycles of TCHP then every 4 months thereafter per Dr. Thomas)    Pertuzumab 420 mg fixed dose              Fixed dose, OK to treat with final dose = 420 mg IV     Trastuzumab-anns 6 mg/kg x 103 kg = 618 mg         "      <10% difference, OK to treat with final dose = 592 mg IV    Cyclophosphamide 600 mg/m2 x 2.19 m2 = 1314 mg   <10% difference, okay to treat with final dose = 1300 mg IV    Casimiro Peres, PharmD

## 2024-04-19 NOTE — PROGRESS NOTES
Fela presented [facility] for Day 1/ Cycle 5 of Perjeta, Kanjinti, and Cytoxan for malignant neoplasm of upper-outer quadrant of right breast in female, estrogen receptor negative. Pt denied having any new or acute complaints today. EMLA applied over left chest port site. Port accessed in a sterile manner, had positive blood return and flushed briskly. Labs were drawn 04/18/2024 and reviewed. Pt meets parameters for treatment today. Premeds given as ordered. Pt given Perjeta over 30 minutes and observed for 30 minutes. Pt tolerated well. Kanjinti infused over 30 minutes without adverse reaction. Cytoxan infused as prescribed, tolerated well, denied having any complaints during or after infusion. Port had positive blood return after, flushed per [facility] policy, de-accessed, needle intact, insertion site covered with sterile gauze and paper tape. Fela has future appointments.. Pt discharged to home in good condition.

## 2024-04-19 NOTE — PROGRESS NOTES
Chemotherapy Verification - SECONDARY RN   C5 D1     Height = 167.6 cm  Weight = 103 kg  BSA = 2.19 m^2       Medication: pertuzumab (PERJETA)  Dose: 420 mg set dose  Calculated Dose: 420 mg set dose                             (In mg/m2, AUC, mg/kg)     Medication: trastuzumab-anns (KANJINTI)  Dose: 6 mg/kg  Calculated Dose: 618 mg                             (In mg/m2, AUC, mg/kg)    Medication: cyclophosphamide (CYTOXAN)  Dose: 600 mg/m2  Calculated Dose: 1314 mg                              (In mg/m2, AUC, mg/kg)      I confirm that this process was performed independently.

## 2024-04-19 NOTE — ADDENDUM NOTE
Encounter addended by: Judith Woods, Med Ass't on: 4/18/2024 5:47 PM   Actions taken: Charge Capture section accepted

## 2024-04-19 NOTE — PROGRESS NOTES
Chemotherapy Verification - PRIMARY RN      Height = 167.6 cm  Weight = 103 kg  BSA = 2.19 m^2       Medication: pertuzumab (Perjeta)  Dose: 420 mg set dose  Calculated Dose: 420 mg set dose                             (In mg/m2, AUC, mg/kg)     Medication: trastuzumab-anns (Kanjinti)  Dose: 6 mg/kg  Calculated Dose: 618 mg                             (In mg/m2, AUC, mg/kg)    Medication: cyclophosphamide (Cytoxan)  Dose: 600 mg/m^2  Calculated Dose: 1314 mg                             (In mg/m2, AUC, mg/kg)      I confirm this process was performed independently with the BSA and all final chemotherapy dosing calculations congruent.  Any discrepancies of 10% or greater have been addressed with the chemotherapy pharmacist. The resolution of the discrepancy has been documented in the EPIC progress notes.

## 2024-04-20 ENCOUNTER — APPOINTMENT (OUTPATIENT)
Dept: ONCOLOGY | Facility: MEDICAL CENTER | Age: 39
End: 2024-04-20
Attending: INTERNAL MEDICINE
Payer: MEDICAID

## 2024-04-24 ENCOUNTER — TELEPHONE (OUTPATIENT)
Dept: HEMATOLOGY ONCOLOGY | Facility: MEDICAL CENTER | Age: 39
End: 2024-04-24
Payer: MEDICAID

## 2024-04-25 NOTE — TELEPHONE ENCOUNTER
TELEPHONE ENCOUNTER    Reached out to patient to see how she was doing with regards to her peripheral neuropathy.  She did increase her gabapentin to 300 mg p.o. twice daily.  She stated that she has had significant improvement.  We talked about going up to 300 mg 3 times daily but she stated she does not want increase that just yet.    We will continue to monitor and she is aware that she can increase up to 3 times per day if needed.

## 2024-04-26 DIAGNOSIS — R23.2 HOT FLASHES: ICD-10-CM

## 2024-04-26 DIAGNOSIS — Z17.1 MALIGNANT NEOPLASM OF UPPER-OUTER QUADRANT OF RIGHT BREAST IN FEMALE, ESTROGEN RECEPTOR NEGATIVE (HCC): ICD-10-CM

## 2024-04-26 DIAGNOSIS — C50.411 MALIGNANT NEOPLASM OF UPPER-OUTER QUADRANT OF RIGHT BREAST IN FEMALE, ESTROGEN RECEPTOR NEGATIVE (HCC): ICD-10-CM

## 2024-04-26 RX ORDER — OXYBUTYNIN CHLORIDE 5 MG/1
5 TABLET ORAL 2 TIMES DAILY
Qty: 60 TABLET | Refills: 0 | Status: SHIPPED | OUTPATIENT
Start: 2024-04-26

## 2024-05-04 NOTE — PROGRESS NOTES
"Pharmacy Chemotherapy Calculation:    Dx: Invasive ductal carcinoma of the right breast SD/ER (-) HER2 (+) stage IIa (cT2, cN0)        Protocol: TCHP (DOCEtaxel/CARBOplatin + Pertuzumab + Trastuzumab)    *Dosing Reference*   Pertuzumab 840 mg IV over 60 minutes on Day 1 of Cycle 1   followed by 420 mg IV over 30 minutes on Day 1 of Cycles 2 - 6   Trastuzumab 8 mg/kg IV over 90 minutes on Day 1 of Cycle 1   followed by 6 mg/kg IV over 30 minutes on Day 1 of Cycles 2 - 6 followed by   *dose reduced to 60 mg/m2 starting C2 per Dr. Thomas due to tolerance  *removed from plan starting C5 per NIYA Cordova due to worsening neutropenia     *dose reduced to AUC 4.5 starting C2 per Dr. Thomas due to tolerance  *removed from plan starting C4 per Dr. Thomas due to tolerance  *substituted with Cyclophosphamide 600 mg/m2 IV over 30 minutes on Day 1 per chemo regimen TC on NCCN Guidelines for Breast Cancer V.2.2024  21-day cycle for 6 cycles (completed after 5 cycles and pt transitioned to maintenance)  ~Followed by~    Pertuzumab 420 mg IV over 30 minutes on Day 1 beginning with Week 19   Trastuzumab 6 mg/kg IV over 30 minutes on Day 1 beginning with Week 19  21-day cycle to complete 52 weeks total of trastuzumab and pertuzumab therapy  NCCN Guidelines® for Breast Cancer V.4.2023.   Josef LEON, et al. Gay Oncol. 2013;24(9):2278-84.a    Allergies:  Bee venom, Morphine sulfate, Tape, and Pineapple     /77   Pulse (!) 114   Temp 36.9 °C (98.5 °F) (Temporal)   Resp 18   Ht 1.676 m (5' 5.98\")   Wt 101 kg (223 lb 12.3 oz)   LMP 02/10/2012 (Approximate) Comment: Age of first period:10, No HRT  SpO2 93%   BMI 36.13 kg/m²  Body surface area is 2.17 meters squared.    ECHO (every 4 months per Dr. Mccurdy): okay to proceed w/o new echo per APN  12/27/23: LVEF~65%    No hold parameters per treatment plan.      Drug Order   (Drug name, dose, route, IV Fluid & volume, frequency, number of doses) " Cycle 6  Previous treatment: C5 on 4/19/24     Medication = Pertuzumab (Perjeta)  Base Dose = 420 mg  Fixed dose; no calc required  Final Dose = 420 mg  Route = IV  Fluid & Volume =  mL  Admin Duration = Over 30 minutes          Fixed dose, OK to treat with final dose   Medication = Trastuzumab-anns (Kanjinti)  Base Dose = 6 mg/kg  Calc Dose: Base Dose x 101 kg = 606 mg  Final Dose = 592 mg  Route = IV  Fluid & Volume =  mL  Admin Duration = Over 30 minutes          <10% difference, OK to treat with final dose     By my signature below, I confirm this process was performed independently with the BSA and all final chemotherapy dosing calculations congruent. I have reviewed the above chemotherapy order and that my calculation of the final dose and BSA (when applicable) corroborate those calculations of the  pharmacist. Discrepancies of 10% or greater in the written dose have been addressed and documented within the EPIC Progress notes.    Elsi Schaffer, PharmD

## 2024-05-08 ENCOUNTER — HOSPITAL ENCOUNTER (OUTPATIENT)
Dept: RADIOLOGY | Facility: MEDICAL CENTER | Age: 39
End: 2024-05-08
Attending: NURSE PRACTITIONER
Payer: MEDICAID

## 2024-05-08 DIAGNOSIS — Z17.1 MALIGNANT NEOPLASM OF UPPER-OUTER QUADRANT OF RIGHT BREAST IN FEMALE, ESTROGEN RECEPTOR NEGATIVE (HCC): ICD-10-CM

## 2024-05-08 DIAGNOSIS — C50.411 MALIGNANT NEOPLASM OF UPPER-OUTER QUADRANT OF RIGHT BREAST IN FEMALE, ESTROGEN RECEPTOR NEGATIVE (HCC): ICD-10-CM

## 2024-05-08 RX ORDER — 0.9 % SODIUM CHLORIDE 0.9 %
VIAL (ML) INJECTION PRN
Status: CANCELLED | OUTPATIENT
Start: 2024-05-10

## 2024-05-08 RX ORDER — EPINEPHRINE 1 MG/ML(1)
0.5 AMPUL (ML) INJECTION PRN
Status: CANCELLED | OUTPATIENT
Start: 2024-05-10

## 2024-05-08 RX ORDER — 0.9 % SODIUM CHLORIDE 0.9 %
VIAL (ML) INJECTION PRN
Status: CANCELLED | OUTPATIENT
Start: 2024-05-09

## 2024-05-08 RX ORDER — 0.9 % SODIUM CHLORIDE 0.9 %
10 VIAL (ML) INJECTION PRN
Status: CANCELLED | OUTPATIENT
Start: 2024-05-09

## 2024-05-08 RX ORDER — 0.9 % SODIUM CHLORIDE 0.9 %
10 VIAL (ML) INJECTION PRN
Status: CANCELLED | OUTPATIENT
Start: 2024-05-10

## 2024-05-08 RX ORDER — DIPHENHYDRAMINE HYDROCHLORIDE 50 MG/ML
50 INJECTION INTRAMUSCULAR; INTRAVENOUS PRN
Status: CANCELLED | OUTPATIENT
Start: 2024-05-10

## 2024-05-08 RX ORDER — 0.9 % SODIUM CHLORIDE 0.9 %
3 VIAL (ML) INJECTION PRN
Status: CANCELLED | OUTPATIENT
Start: 2024-05-10

## 2024-05-08 RX ORDER — 0.9 % SODIUM CHLORIDE 0.9 %
3 VIAL (ML) INJECTION PRN
Status: CANCELLED | OUTPATIENT
Start: 2024-05-09

## 2024-05-08 RX ORDER — ONDANSETRON 8 MG/1
8 TABLET, ORALLY DISINTEGRATING ORAL PRN
Status: CANCELLED | OUTPATIENT
Start: 2024-05-10

## 2024-05-08 RX ORDER — SODIUM CHLORIDE 9 MG/ML
INJECTION, SOLUTION INTRAVENOUS CONTINUOUS
Status: CANCELLED | OUTPATIENT
Start: 2024-05-10

## 2024-05-08 RX ORDER — ONDANSETRON 2 MG/ML
4 INJECTION INTRAMUSCULAR; INTRAVENOUS PRN
Status: CANCELLED | OUTPATIENT
Start: 2024-05-10

## 2024-05-08 RX ORDER — PROCHLORPERAZINE MALEATE 10 MG
10 TABLET ORAL EVERY 6 HOURS PRN
Status: CANCELLED | OUTPATIENT
Start: 2024-05-10

## 2024-05-08 RX ORDER — METHYLPREDNISOLONE SODIUM SUCCINATE 125 MG/2ML
125 INJECTION, POWDER, LYOPHILIZED, FOR SOLUTION INTRAMUSCULAR; INTRAVENOUS PRN
Status: CANCELLED | OUTPATIENT
Start: 2024-05-10

## 2024-05-08 RX ORDER — PALONOSETRON 0.05 MG/ML
0.25 INJECTION, SOLUTION INTRAVENOUS ONCE
Status: CANCELLED
Start: 2024-05-10 | End: 2024-05-10

## 2024-05-08 RX ADMIN — GADOTERIDOL 20 ML: 279.3 INJECTION, SOLUTION INTRAVENOUS at 18:34

## 2024-05-09 ENCOUNTER — HOSPITAL ENCOUNTER (OUTPATIENT)
Dept: HEMATOLOGY ONCOLOGY | Facility: MEDICAL CENTER | Age: 39
End: 2024-05-09
Attending: INTERNAL MEDICINE
Payer: MEDICAID

## 2024-05-09 ENCOUNTER — OUTPATIENT INFUSION SERVICES (OUTPATIENT)
Dept: ONCOLOGY | Facility: MEDICAL CENTER | Age: 39
End: 2024-05-09
Attending: INTERNAL MEDICINE
Payer: MEDICAID

## 2024-05-09 VITALS
OXYGEN SATURATION: 99 % | WEIGHT: 223.99 LBS | HEART RATE: 72 BPM | RESPIRATION RATE: 16 BRPM | HEIGHT: 66 IN | DIASTOLIC BLOOD PRESSURE: 58 MMHG | SYSTOLIC BLOOD PRESSURE: 98 MMHG | TEMPERATURE: 99.1 F | BODY MASS INDEX: 36 KG/M2

## 2024-05-09 VITALS
OXYGEN SATURATION: 97 % | RESPIRATION RATE: 18 BRPM | HEART RATE: 73 BPM | DIASTOLIC BLOOD PRESSURE: 66 MMHG | SYSTOLIC BLOOD PRESSURE: 106 MMHG | TEMPERATURE: 96.8 F

## 2024-05-09 DIAGNOSIS — C50.411 MALIGNANT NEOPLASM OF UPPER-OUTER QUADRANT OF RIGHT BREAST IN FEMALE, ESTROGEN RECEPTOR NEGATIVE (HCC): ICD-10-CM

## 2024-05-09 DIAGNOSIS — Z17.1 MALIGNANT NEOPLASM OF UPPER-OUTER QUADRANT OF RIGHT BREAST IN FEMALE, ESTROGEN RECEPTOR NEGATIVE (HCC): ICD-10-CM

## 2024-05-09 DIAGNOSIS — Z00.00 ENCOUNTER FOR ANNUAL PHYSICAL EXAM: ICD-10-CM

## 2024-05-09 LAB
ALBUMIN SERPL BCP-MCNC: 3.6 G/DL (ref 3.2–4.9)
ALBUMIN/GLOB SERPL: 1.6 G/DL
ALP SERPL-CCNC: 66 U/L (ref 30–99)
ALT SERPL-CCNC: 14 U/L (ref 2–50)
ANION GAP SERPL CALC-SCNC: 9 MMOL/L (ref 7–16)
AST SERPL-CCNC: 13 U/L (ref 12–45)
BASOPHILS # BLD AUTO: 0.4 % (ref 0–1.8)
BASOPHILS # BLD: 0.03 K/UL (ref 0–0.12)
BILIRUB SERPL-MCNC: 0.4 MG/DL (ref 0.1–1.5)
BUN SERPL-MCNC: 11 MG/DL (ref 8–22)
CALCIUM ALBUM COR SERPL-MCNC: 8.9 MG/DL (ref 8.5–10.5)
CALCIUM SERPL-MCNC: 8.6 MG/DL (ref 8.5–10.5)
CHLORIDE SERPL-SCNC: 108 MMOL/L (ref 96–112)
CHOLEST SERPL-MCNC: 154 MG/DL (ref 100–199)
CO2 SERPL-SCNC: 24 MMOL/L (ref 20–33)
CREAT SERPL-MCNC: 0.72 MG/DL (ref 0.5–1.4)
EOSINOPHIL # BLD AUTO: 0.14 K/UL (ref 0–0.51)
EOSINOPHIL NFR BLD: 1.8 % (ref 0–6.9)
ERYTHROCYTE [DISTWIDTH] IN BLOOD BY AUTOMATED COUNT: 46.8 FL (ref 35.9–50)
GFR SERPLBLD CREATININE-BSD FMLA CKD-EPI: 108 ML/MIN/1.73 M 2
GLOBULIN SER CALC-MCNC: 2.2 G/DL (ref 1.9–3.5)
GLUCOSE SERPL-MCNC: 99 MG/DL (ref 65–99)
HCT VFR BLD AUTO: 38 % (ref 37–47)
HDLC SERPL-MCNC: 47 MG/DL
HGB BLD-MCNC: 13 G/DL (ref 12–16)
IMM GRANULOCYTES # BLD AUTO: 0.01 K/UL (ref 0–0.11)
IMM GRANULOCYTES NFR BLD AUTO: 0.1 % (ref 0–0.9)
LDLC SERPL CALC-MCNC: 88 MG/DL
LYMPHOCYTES # BLD AUTO: 1.41 K/UL (ref 1–4.8)
LYMPHOCYTES NFR BLD: 18.4 % (ref 22–41)
MCH RBC QN AUTO: 33.1 PG (ref 27–33)
MCHC RBC AUTO-ENTMCNC: 34.2 G/DL (ref 32.2–35.5)
MCV RBC AUTO: 96.7 FL (ref 81.4–97.8)
MONOCYTES # BLD AUTO: 0.55 K/UL (ref 0–0.85)
MONOCYTES NFR BLD AUTO: 7.2 % (ref 0–13.4)
NEUTROPHILS # BLD AUTO: 5.53 K/UL (ref 1.82–7.42)
NEUTROPHILS NFR BLD: 72.1 % (ref 44–72)
NRBC # BLD AUTO: 0 K/UL
NRBC BLD-RTO: 0 /100 WBC (ref 0–0.2)
OUTPT INFUS CBC COMMENT OICOM: ABNORMAL
PLATELET # BLD AUTO: 127 K/UL (ref 164–446)
PMV BLD AUTO: 10.1 FL (ref 9–12.9)
POTASSIUM SERPL-SCNC: 3.8 MMOL/L (ref 3.6–5.5)
PROT SERPL-MCNC: 5.8 G/DL (ref 6–8.2)
RBC # BLD AUTO: 3.93 M/UL (ref 4.2–5.4)
SODIUM SERPL-SCNC: 141 MMOL/L (ref 135–145)
TRIGL SERPL-MCNC: 97 MG/DL (ref 0–149)
WBC # BLD AUTO: 7.7 K/UL (ref 4.8–10.8)

## 2024-05-09 PROCEDURE — 99215 OFFICE O/P EST HI 40 MIN: CPT | Performed by: INTERNAL MEDICINE

## 2024-05-09 ASSESSMENT — ENCOUNTER SYMPTOMS
CHILLS: 0
EYES NEGATIVE: 1
PALPITATIONS: 0
MYALGIAS: 1
COUGH: 0
WEIGHT LOSS: 0
HEADACHES: 0
ABDOMINAL PAIN: 0
GASTROINTESTINAL NEGATIVE: 1
RESPIRATORY NEGATIVE: 1
PSYCHIATRIC NEGATIVE: 1
DIZZINESS: 1
CARDIOVASCULAR NEGATIVE: 1
CONSTIPATION: 0
TINGLING: 1
FEVER: 0

## 2024-05-09 ASSESSMENT — PAIN SCALES - GENERAL: PAINLEVEL: NO PAIN

## 2024-05-09 ASSESSMENT — FIBROSIS 4 INDEX: FIB4 SCORE: 1.12

## 2024-05-09 NOTE — ADDENDUM NOTE
Encounter addended by: Judith Woods, Med Ass't on: 5/9/2024 9:46 AM   Actions taken: Charge Capture section accepted

## 2024-05-09 NOTE — PROGRESS NOTES
Pt presents to [facility] for pre-chemo labs. Butterfly needle used in LAC; brisk blood return observed and pt tolerated well. Labs drawn per orders. Next appointment confirmed and education provided. Pt discharged to self care with all personal belongings and in NAD.

## 2024-05-09 NOTE — PROGRESS NOTES
Subjective   Medical oncology follow-up visit: 2024  Flea Tiwari is a 39 y.o. female who presents with Breast Cancer ([place]wartz[place]/ pre chemo )          HPI    Referring Physician: Rochelle Gaviria MD   Primary Care:  Francisco Maier M.D.      Diagnosis: Invasive ductal carcinoma of the right breast, grade 3, clinically stage IIa (cT2, cN0) ER negative ID negative, HER2 3+ positive, Ki-67 20 to 30%      Chief Complaint: Patient seen today for evaluation of her ER negative HER2 positive breast cancer, for continued monitoring of symptoms and side effects of cancer treatments, employing TCHP.     Oncology history of presenting illness:  Fela Tiwari is a 38 y.o. likely premenopausal white female who self detected a mass in her lateral right breast in 2023.  She had a mammogram on 2023 which showed the palpable mass is likely malignant.  Ultrasound showed it was 2.6 cm irregular hypoechoic spiculated mass.  Left breast was normal.  She underwent a ultrasound-guided biopsy of the right breast on 2023: This revealed an invasive ductal carcinoma, grade 3, ER negative, ID negative, HER2 3+ by IHC, Ki-67 20 to 30%.  An MRI was done yesterday and results are pending.  She had a hysterectomy in  but her ovaries are in place.  She is  3 para 2 and has not taken any hormone replacement therapy.  No family history of breast or ovarian cancer.  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  She was on anticoagulation for a while but has been off for several years.  She has also had gastric sleeve laparoscopic and cholecystectomy and does note chronic nausea.      Interval histories:  Interval history 24 (CAlsop, APRN):  Patient had a difficult time with her first cycle of chemotherapy.  Day after she received the Udenyca shot she presented to the emergency department with severe pain.  Pain was excruciating  throughout her entire body.  She was given IV Dilaudid in the hospital and discharged with Tylenol.  She has been trying to take 1000 g of Tylenol with no relief.  Patient tearful today due to the severity of pain.  She also had experienced blood when urinating.  She did have a positive occult blood on stool and urine during hospitalization.  She was asked to follow-up with urology in the outpatient setting.  She stated since being discharged from the hospital she has not had any blood whatsoever in the urine or will.  She has been experiencing diarrhea anywhere from 5-10 episodes per day likely related to Perjeta.  She has been taking Imodium, approximately 2-3 pills per day with minimal relief.  She does have nausea and vomiting but she does state that Zofran does help.  She does also note thrush which was appreciated on physical examination today.     Interval history 2/15/2024: She had a terrible time with her first cycle of TCHP as noted above.  Her diarrhea has slowed dramatically but she still is using Lomotil occasionally.  She also had severe eye tearing likely due to the Taxotere which persists although it is better.  She has no neuropathy but did have a rash which responded to corticosteroids.  She also had persistent nausea which has finally resolved.  Her thrush is resolved as well.  She has not had any further vaginal bleeding but this has not been evaluated adequately except for a bladder scan which as expected was negative.  She feels like her tumor shrunk some.      Interval history 03/07/24 (Basim, APRN):  Patient still had difficult time with cycle 2.  She did end up in the ER the day after receiving treatment with bleeding again.  She did have bleeding with her first cycle and did end up being seen by urology and underwent a cystoscopy which was negative.  She stated that the bleeding lasted approximately 3-4 days.  She did experience significant nausea and vomiting for the first 2 weeks.  She  also had severe diarrhea up to 10 times per day for the first 2 weeks.  She did take Lomotil with no improvement.  She has noticed some bleeding from the rectum which is from her hemorrhoids with the diarrhea.  She did state that she was given the olanzapine to take for the first 5 days after treatment which did help immensely with her nausea and vomiting as well as she believes to help with her diarrhea as well as sleep.  When she stopped that medication the symptoms returned.  She is noticing her vision feels a little differently.  Her eyes are very dry and watery.  We attempted to give patient days of Zarzio for neutropenia support as patient had significant pain with the Udenyca shot.  She stated that she did not notice any improvement or difference as she did still have the severe myalgias that lasted approximately 2 weeks with the Zarzio injection.  She did state this last week she has felt back to normal.  She is anxious to receive her next cycle of chemo.     Interval history 03/28/24 (CAlsop, APRN):  Patient tolerated her last cycle well as cycle 3 we held carboplatin and awaited approval for Cytoxan.  We also held Udenyca.  She does still have diarrhea noted but that is controlled with Lomotil.  She is noticing some bilateral flank pain but no other urinary symptoms.  With her last cycle she did not experience any blood in the urine/vaginal/rectal area.  She does have some fatigue and has noticed increase in hot flashes.  Hot flashes are quite bothersome to her at this time.  She also noticed peripheral neuropathy in her toes, worse at night.  She is unable to let the covers to touch her feet.    Interval history 04/18/24 (CAlsop, APRN):  Patient biggest complaint at this time as peripheral neuropathy in her feet.  She has the pain, burning and numbness.  She was taking gabapentin 300 mg at night but did take 2 tablets last night because her feet were severe which did help.  She has noticed some nausea and  vomiting worse with this past cycle likely with the addition of Cytoxan.  She does note she has consistently loose bowel movements, but no significant diarrhea.  She does have some hemorrhoids and does have pain with bowel movements at times.  She still has a hot flashes and did take the oxybutynin but she stated that she thought it made him worse.  She stopped taking the oxybutynin and noticed that her hot flashes are better.  She may be getting some relief from the gabapentin.  She also noticed some blisters in her mouth but the mouth rinses did help.  No mouth sores at this time.  She does complain of significant myalgias and arthralgias from the Udenyca shot that last about 3 days.  However she does continue to have the myalgias and arthralgias up till today still present.    Interval history 5/9/2024: Her peripheral neuropathy is better which she attributes in part to reduce chemo and provide to compression stockings that she is using.  GI symptoms were much better with the last cycle.  She has been in a clinical complete response and we are awaiting MRI results from yesterday to determine whether she needs more chemotherapy or simply HP for cycle 6.  She is planning to do bilateral mastectomies with nipple sparing reconstructions.     Treatment history:  01/23/24: C1D1 TCHP with Udenyca  02/16/24: C2D2 TCHP with 20-25% reduction in dosing of the chemotherapy and 5 days of Zarxio instead of Udenyca   03/08/24: C3D1 THP (20% dose reduction of Docetaxel) - will hold on Xarzio or Udenyca (d/c Carboplatin d/t tolerability)  03/29/24: C4D1 THP with addition of Cytoxan (continue with dose reduction of Docetaxel by 20%) with Udenyca support   04/19/24: C5D1 HP plus Cytoxan (d/c Docetaxel d/t worsening peripheral neuropathy) - no need for Udenyca      Allergies   Allergen Reactions    Bee Venom Anaphylaxis    Morphine Sulfate Anaphylaxis and Itching    Skin Adhesives     Tape Rash     Paper tape OK, do not use tegaderm     Pineapple Hives     Current Outpatient Medications on File Prior to Encounter   Medication Sig Dispense Refill    oxybutynin (DITROPAN) 5 MG Tab TAKE 1 TABLET BY MOUTH TWICE DAILY 60 Tablet 0    gabapentin (NEURONTIN) 100 MG Cap Take 1 Capsule by mouth every day. Take 100mg capsule tonight, take 200mg the second night then follow with 300mg at bedtime until next physician appointment. 3 Capsule 0    acyclovir (ZOVIRAX) 400 MG tablet Take 1 Tablet by mouth 2 times a day. (Patient not taking: Reported on 4/18/2024) 140 Tablet 0    diphenhydrAMINE (BENADRYL) 25 MG capsule Take 1 Capsule by mouth every 6 hours as needed for Itching. (Patient not taking: Reported on 4/18/2024)      omeprazole (PRILOSEC) 20 MG delayed-release capsule Take 1 Capsule by mouth every day. 30 Capsule 0    ALPRAZolam (XANAX) 0.5 MG Tab Take 0.5 mg by mouth at bedtime as needed for Anxiety or Sleep. (Patient not taking: Reported on 4/18/2024)      calcium carbonate (TUMS CHEWY BITES) 750 MG chewable tablet Chew 2 Tablets 4 times a day.      OLANZapine (ZYPREXA) 5 MG Tab Take 1 Tablet by mouth every evening. For 5 days starting the day of chemotherapy (Patient not taking: Reported on 4/18/2024) 30 Tablet 1    loratadine (CLARITIN) 10 MG Tab Take 10 mg by mouth every day. (Patient not taking: Reported on 4/18/2024)      ondansetron (ZOFRAN) 4 MG Tab tablet Take 1 Tablet by mouth every four hours as needed for Nausea/Vomiting (for nausea, vomiting). 30 Tablet 6    prochlorperazine (COMPAZINE) 10 MG Tab Take 1 Tablet by mouth every 6 hours as needed (for nausea, vomiting). 30 Tablet 6    dexamethasone (DECADRON) 4 MG Tab Take 2 Tablets by mouth 2 times a day. Take 8 mg two times a day for 3 days, starting 24 hours prior to docetaxel. 12 Tablet 6    lidocaine-prilocaine (EMLA) 2.5-2.5 % Cream Apply to port 1 hour prior to access of port and cover with plastic wrap. (Patient taking differently: Apply 1 Application topically as needed. Apply to port  1 hour prior to access of port and cover with plastic wrap.) 30 g 3    EPINEPHrine (EPIPEN) 0.3 MG/0.3ML Solution Auto-injector solution for injection Inject 0.3 mL into the thigh one time for 1 dose  Indications: Patient advised to present to the ED even after epinephrine administration for further observation and management. 1 Each 0     No current facility-administered medications on file prior to encounter.         Review of Systems   Constitutional:  Positive for malaise/fatigue. Negative for chills, fever and weight loss.   HENT: Negative.     Eyes: Negative.    Respiratory: Negative.  Negative for cough.    Cardiovascular: Negative.  Negative for chest pain and palpitations.   Gastrointestinal: Negative.  Negative for abdominal pain and constipation.   Genitourinary: Negative.  Negative for dysuria.   Musculoskeletal:  Positive for myalgias.   Skin: Negative.  Negative for itching and rash.   Neurological:  Positive for dizziness and tingling. Negative for headaches.   Endo/Heme/Allergies: Negative.    Psychiatric/Behavioral: Negative.                Objective     LMP 02/10/2012 (Approximate) Comment: Age of first period:10, No HRT     Physical Exam  Vitals reviewed.   Constitutional:       General: She is not in acute distress.     Appearance: Normal appearance. She is not diaphoretic.   HENT:      Head: Normocephalic and atraumatic.   Cardiovascular:      Rate and Rhythm: Normal rate and regular rhythm.      Heart sounds: Normal heart sounds. No murmur heard.     No friction rub. No gallop.   Pulmonary:      Effort: Pulmonary effort is normal. No respiratory distress.      Breath sounds: Normal breath sounds. No wheezing.   Chest:      Comments: 5/9/2024: No palpable mass in the right breast or axillary 04/18/24: Mass at 1030 o'clock 5 cm from the right nipple, unable to palpate.   03/28/24: Mass at 1030 o'clock 5 cm from the right nipple, very difficult to palpate. Measuring approx 1.0 cm horizontally x  0.5 cm vertically. No right axillary adenopathy  03/07/24: Mass at 1030 o'clock 5 cm from the right nipple measures 1.5 x 1.5 cm and is irregular.  No right axillary adenopathy  2/15/2024: Mass at 1030 o'clock 5 cm from the right nipple measures 1.5 x 2 cm and is irregular.  No right axillary adenopathy  12/22/2023: Mass at 1030 o'clock 5 cm from the right nipple measuring 3.5 x 3.5 cm.  No right axillary adenopathy is noted.  The left breast is normal.    Abdominal:      General: Bowel sounds are normal. There is no distension.      Palpations: Abdomen is soft.      Tenderness: There is no abdominal tenderness.   Musculoskeletal:         General: No swelling or tenderness. Normal range of motion.   Skin:     General: Skin is warm and dry.   Neurological:      Mental Status: She is alert and oriented to person, place, and time.   Psychiatric:         Mood and Affect: Mood normal.         Behavior: Behavior normal.              Latest Reference Range & Units 04/18/24 13:10   WBC 4.8 - 10.8 K/uL 5.8   RBC 4.20 - 5.40 M/uL 3.72 (L)   Hemoglobin 12.0 - 16.0 g/dL 12.3   Hematocrit 37.0 - 47.0 % 36.9 (L)   MCV 81.4 - 97.8 fL 99.2 (H)   MCH 27.0 - 33.0 pg 33.1 (H)   MCHC 32.2 - 35.5 g/dL 33.3   RDW 35.9 - 50.0 fL 53.9 (H)   Platelet Count 164 - 446 K/uL 185   MPV 9.0 - 12.9 fL 10.7   Neutrophils-Polys 44.00 - 72.00 % 68.30   Neutrophils (Absolute) 1.82 - 7.42 K/uL 3.97   Lymphocytes 22.00 - 41.00 % 21.70 (L)   Lymphs (Absolute) 1.00 - 4.80 K/uL 1.26   Monocytes 0.00 - 13.40 % 9.00   Monos (Absolute) 0.00 - 0.85 K/uL 0.52   Eosinophils 0.00 - 6.90 % 0.20   Eos (Absolute) 0.00 - 0.51 K/uL 0.01   Basophils 0.00 - 1.80 % 0.50   Baso (Absolute) 0.00 - 0.12 K/uL 0.03   Immature Granulocytes 0.00 - 0.90 % 0.30   Immature Granulocytes (abs) 0.00 - 0.11 K/uL 0.02   Nucleated RBC 0.00 - 0.20 /100 WBC 0.00   NRBC (Absolute) K/uL 0.00   Sodium 135 - 145 mmol/L 144   Potassium 3.6 - 5.5 mmol/L 4.5   Chloride 96 - 112 mmol/L 110   Co2  20 - 33 mmol/L 24   Anion Gap 7.0 - 16.0  10.0   Glucose 65 - 99 mg/dL 109 (H)   Bun 8 - 22 mg/dL 14   Creatinine 0.50 - 1.40 mg/dL 0.73   GFR (CKD-EPI) >60 mL/min/1.73 m 2 106   Calcium 8.5 - 10.5 mg/dL 8.9   Correct Calcium 8.5 - 10.5 mg/dL 9.2   AST(SGOT) 12 - 45 U/L 16   ALT(SGPT) 2 - 50 U/L 20   Alkaline Phosphatase 30 - 99 U/L 58   Total Bilirubin 0.1 - 1.5 mg/dL 0.3   Albumin 3.2 - 4.9 g/dL 3.6   Total Protein 6.0 - 8.2 g/dL 6.1   Globulin 1.9 - 3.5 g/dL 2.5   A-G Ratio g/dL 1.4            Assessment & Plan      Impression:  1.  Invasive ductal carcinoma of the right breast, grade 3, clinically stage IIa (cT2, cN0) ER negative IN negative, HER2 3+ positive, Ki-67 20 to 30%.  Status post TCHP with poor tolerance of cycle 1 necessitating dose reductions, changed to Cytoxan and ultimately discontinuation of Taxotere.  Clinical complete response to therapy documented after cycle 4.  2.  Chronic nausea after gastric sleeve and cholecystectomy severe after chemotherapy now minimal  3.  History of DVT/PE off anticoagulation for several years  4.  Severe myalgias likely from Udenyca -resolved 5.  Chemo nausea and vomiting -she will use Aloxi and olanzapine - patient to take Olanzepine x10 days  6.  Diarrhea d/t Perjeta -improved on Imodium and Lomotil  7.  Thrush appointment 3 weeks please resolved  8. Hematuria - presented for first 3-4 days after both cycles 1 and 2.  Cystoscopy completed by urology and was negative.  Questioning whether this may be related to carboplatin, and will plan to change treatment with discontinuation of carboplatin and add Cytoxan. Resolved with cycle 3.   9.  Hot flashes -improved  10.  Peripheral neuropathy noted in her toes.  Improved with reduction in chemotherapy and gabapentin        Plan:  MRI of the breast was done yesterday and if clinically consistent with remission we will move forward with surgical evaluation for bilateral mastectomy.  She will receive HP only tomorrow.   Patient to return to clinic in 3 weeks, or sooner if needed.      Please note that this dictation was created using voice recognition software. I have made every reasonable attempt to correct obvious errors, but I expect that there are errors of grammar and possibly content that I did not discover before finalizing the note.

## 2024-05-10 ENCOUNTER — OUTPATIENT INFUSION SERVICES (OUTPATIENT)
Dept: ONCOLOGY | Facility: MEDICAL CENTER | Age: 39
End: 2024-05-10
Attending: INTERNAL MEDICINE
Payer: MEDICAID

## 2024-05-10 VITALS
RESPIRATION RATE: 18 BRPM | HEIGHT: 66 IN | HEART RATE: 114 BPM | SYSTOLIC BLOOD PRESSURE: 114 MMHG | WEIGHT: 223.77 LBS | DIASTOLIC BLOOD PRESSURE: 77 MMHG | TEMPERATURE: 98.5 F | BODY MASS INDEX: 35.96 KG/M2 | OXYGEN SATURATION: 93 %

## 2024-05-10 DIAGNOSIS — Z17.1 MALIGNANT NEOPLASM OF UPPER-OUTER QUADRANT OF RIGHT BREAST IN FEMALE, ESTROGEN RECEPTOR NEGATIVE (HCC): ICD-10-CM

## 2024-05-10 DIAGNOSIS — C50.411 MALIGNANT NEOPLASM OF UPPER-OUTER QUADRANT OF RIGHT BREAST IN FEMALE, ESTROGEN RECEPTOR NEGATIVE (HCC): ICD-10-CM

## 2024-05-10 RX ADMIN — PERTUZUMAB 420 MG: 30 INJECTION, SOLUTION, CONCENTRATE INTRAVENOUS at 08:15

## 2024-05-10 RX ADMIN — HEPARIN 500 UNITS: 100 SYRINGE at 10:08

## 2024-05-10 RX ADMIN — LIDOCAINE HYDROCHLORIDE 0.5 ML: 10 INJECTION, SOLUTION EPIDURAL; INFILTRATION; INTRACAUDAL at 07:19

## 2024-05-10 RX ADMIN — TRASTUZUMAB-ANNS 592 MG: 420 INJECTION, POWDER, LYOPHILIZED, FOR SOLUTION INTRAVENOUS at 09:26

## 2024-05-10 ASSESSMENT — FIBROSIS 4 INDEX: FIB4 SCORE: 1.12

## 2024-05-10 NOTE — PROGRESS NOTES
Fela presents to infusion for cycle 6/ day 1 of trastuzumab and pertuzumab for breast cancer. Pt denies having any new or acute complaints today, reports tolerating past treatments well. Port accessed using sterile technique, flushed with NS with positive blood return and labs drawn off of port.     Labs reviewed by RN, within parameters for treatment today. Last echo was December of 2023; APRN contacted, okay to give medication today and provider will place new order for echo.    pertuzumab given over 30 minutes.  Observed patient for 30 minutes post infusion with no adverse reactions noted.   trastuzumab given over 30 minutes.  Patient tolerated all well with no adverse effects.     Port flushed post infusion with positive blood return, heparinized and removed with tip intact, site covered with band aid. Patient left in stable condition, knows when to return for next appointment.

## 2024-05-10 NOTE — PROGRESS NOTES
Chemotherapy Verification - PRIMARY RN      Height = 1.676m  Weight = 101kg  BSA = 2.18m2       Medication: pertuzumab  Dose: 420mg set dose  Calculated Dose: 420mg set dose                             (In mg/m2, AUC, mg/kg)     Medication: trastuzumab-anns  Dose: 6mg/kg  Calculated Dose: 606mg                             (In mg/m2, AUC, mg/kg)      I confirm this process was performed independently with the BSA and all final chemotherapy dosing calculations congruent.  Any discrepancies of 10% or greater have been addressed with the chemotherapy pharmacist. The resolution of the discrepancy has been documented in the EPIC progress notes.

## 2024-05-10 NOTE — PROGRESS NOTES
Chemotherapy Verification - SECONDARY RN       Height = 167cm  Weight = 101kg  BSA = 2.18   Last echo 12/27/23, luz maria to proceed per Gillian    Medication: Pertuzumab  Dose: 420mg  Calculated Dose: 420mg standard dose                             (In mg/m2, AUC, mg/kg)     Medication: Kanjanti  Dose: 6mg/kg  Calculated Dose: 606mg                             (In mg/m2, AUC, mg/kg)    I confirm that this process was performed independently.

## 2024-05-10 NOTE — PROGRESS NOTES
Chemotherapy Verification - PRIMARY RN      Height = 1.676m  Weight = 101kg  BSA = 2.18m2       Medication: pertuzumab  Dose: 420 mg set dose  Calculated Dose: 420 mg                             (In mg/m2, AUC, mg/kg)     Medication: trastuzumab-anns  Dose: 6mg/kg  Calculated Dose: 606 mg                             (In mg/m2, AUC, mg/kg)          I confirm this process was performed independently with the BSA and all final chemotherapy dosing calculations congruent.  Any discrepancies of 10% or greater have been addressed with the chemotherapy pharmacist. The resolution of the discrepancy has been documented in the EPIC progress notes.

## 2024-05-10 NOTE — PROGRESS NOTES
"Pharmacy Chemotherapy Verification    Dx: Breast cancer, ER-, FL-, HER2+  Cycle 6  Previous treatment = C5 4/19/24    Protocol: TCHP  Pertuzumab 840 mg IV over 60 minutes on Day 1 of Cycle 1 followed by  Pertuzumab 420 mg IV over 30 minutes on Day 1 of Cycles 2-6  Trastuzumab 8 mg/kg IV over 90 minutes on Day 1 of Cycle 1 followed by  Trastuzumab 6 mg/kg IV over 30 minutes on Day 1 of Cycles 2-6 followed by   2/16/24 - Dose reduced to 60 mg/m2 for tolerance starting with C2   4/19/24 - Removed from treatment plan starting with C5 due to worsening peripheral neuropathy   2/16/24 - Dose reduced to AUC 4.5 for tolerance starting with C2   Removed from treatment plan starting Cycle 3  Cyclophosphamide 600 mg/m2 IV over 30 minutes on Day 1   3/29/24 - Added starting C4 to replace carboplatin due to intolerance  21-day cycle for 6 cycles (completed 5/10/24 after 5 cycles for tolerance)  ~Followed by~  Pertuzumab 420 mg IV over 30 minutes on Day 1 beginning with Week 19  Trastuzumab 6 mg/kg IV over 30 minutes on Day 1 beginning with Week 19  21-day cycle to complete 52 weeks total of trastuzumab and pertuzumab therapy  NCCN Guidelines for Breast Cancer V.2.2022.  Josef A, et al. Gay Oncol. 2013;24(9):2278-84.    Allergies:  Bee venom, Morphine sulfate, Tape, and Pineapple     /77   Pulse (!) 114   Temp 36.9 °C (98.5 °F) (Temporal)   Resp 18   Ht 1.676 m (5' 5.98\")   Wt 101 kg (223 lb 12.3 oz)   LMP 02/10/2012 (Approximate) Comment: Age of first period:10, No HRT  SpO2 93%   BMI 36.13 kg/m²  Body surface area is 2.17 meters squared.    ECHO  12/27/23 LVEF 65% (Okay to proceed with treatment today. Pt to schedule ECHO after 6 cycles of TCHP then every 4 months thereafter per Dr. Thomas)    Pertuzumab 420 mg fixed dose              Fixed dose, OK to treat with final dose = 420 mg IV     Trastuzumab-anns 6 mg/kg x 101 kg = 606 mg              <10% difference, OK to treat with final dose = 592 mg " IV    Casimiro Peres, PharmD

## 2024-05-11 ENCOUNTER — APPOINTMENT (OUTPATIENT)
Dept: ONCOLOGY | Facility: MEDICAL CENTER | Age: 39
End: 2024-05-11
Payer: MEDICAID

## 2024-05-12 DIAGNOSIS — Z17.1 MALIGNANT NEOPLASM OF UPPER-OUTER QUADRANT OF RIGHT BREAST IN FEMALE, ESTROGEN RECEPTOR NEGATIVE (HCC): ICD-10-CM

## 2024-05-12 DIAGNOSIS — C50.411 MALIGNANT NEOPLASM OF UPPER-OUTER QUADRANT OF RIGHT BREAST IN FEMALE, ESTROGEN RECEPTOR NEGATIVE (HCC): ICD-10-CM

## 2024-05-13 ENCOUNTER — HOSPITAL ENCOUNTER (OUTPATIENT)
Dept: CARDIOLOGY | Facility: MEDICAL CENTER | Age: 39
End: 2024-05-13
Attending: NURSE PRACTITIONER
Payer: MEDICAID

## 2024-05-13 ENCOUNTER — OFFICE VISIT (OUTPATIENT)
Dept: URGENT CARE | Facility: CLINIC | Age: 39
End: 2024-05-13
Payer: MEDICAID

## 2024-05-13 VITALS
DIASTOLIC BLOOD PRESSURE: 70 MMHG | WEIGHT: 220 LBS | BODY MASS INDEX: 35.36 KG/M2 | HEIGHT: 66 IN | HEART RATE: 67 BPM | RESPIRATION RATE: 16 BRPM | OXYGEN SATURATION: 98 % | SYSTOLIC BLOOD PRESSURE: 108 MMHG | TEMPERATURE: 96 F

## 2024-05-13 DIAGNOSIS — R05.1 ACUTE COUGH: ICD-10-CM

## 2024-05-13 DIAGNOSIS — H66.003 NON-RECURRENT ACUTE SUPPURATIVE OTITIS MEDIA OF BOTH EARS WITHOUT SPONTANEOUS RUPTURE OF TYMPANIC MEMBRANES: ICD-10-CM

## 2024-05-13 DIAGNOSIS — C50.411 MALIGNANT NEOPLASM OF UPPER-OUTER QUADRANT OF RIGHT BREAST IN FEMALE, ESTROGEN RECEPTOR NEGATIVE (HCC): ICD-10-CM

## 2024-05-13 DIAGNOSIS — J02.9 SORE THROAT: ICD-10-CM

## 2024-05-13 DIAGNOSIS — J34.89 RHINORRHEA: ICD-10-CM

## 2024-05-13 DIAGNOSIS — Z17.1 MALIGNANT NEOPLASM OF UPPER-OUTER QUADRANT OF RIGHT BREAST IN FEMALE, ESTROGEN RECEPTOR NEGATIVE (HCC): ICD-10-CM

## 2024-05-13 LAB
FLUAV RNA SPEC QL NAA+PROBE: NEGATIVE
FLUBV RNA SPEC QL NAA+PROBE: NEGATIVE
LV EJECT FRACT  99904: 69
LV EJECT FRACT MOD 2C 99903: 73.37
LV EJECT FRACT MOD 4C 99902: 66.02
LV EJECT FRACT MOD BP 99901: 69.19
RSV RNA SPEC QL NAA+PROBE: NEGATIVE
S PYO DNA SPEC NAA+PROBE: NOT DETECTED
SARS-COV-2 RNA RESP QL NAA+PROBE: NEGATIVE

## 2024-05-13 PROCEDURE — 87651 STREP A DNA AMP PROBE: CPT | Performed by: PHYSICIAN ASSISTANT

## 2024-05-13 PROCEDURE — 3074F SYST BP LT 130 MM HG: CPT | Performed by: PHYSICIAN ASSISTANT

## 2024-05-13 PROCEDURE — 93306 TTE W/DOPPLER COMPLETE: CPT | Mod: 26 | Performed by: INTERNAL MEDICINE

## 2024-05-13 PROCEDURE — 99213 OFFICE O/P EST LOW 20 MIN: CPT | Performed by: PHYSICIAN ASSISTANT

## 2024-05-13 PROCEDURE — 87637 SARSCOV2&INF A&B&RSV AMP PRB: CPT | Mod: QW | Performed by: PHYSICIAN ASSISTANT

## 2024-05-13 PROCEDURE — 3078F DIAST BP <80 MM HG: CPT | Performed by: PHYSICIAN ASSISTANT

## 2024-05-13 RX ORDER — AZELASTINE 1 MG/ML
1 SPRAY, METERED NASAL 2 TIMES DAILY
Qty: 30 ML | Refills: 0 | Status: SHIPPED | OUTPATIENT
Start: 2024-05-13

## 2024-05-13 RX ORDER — AMOXICILLIN AND CLAVULANATE POTASSIUM 875; 125 MG/1; MG/1
1 TABLET, FILM COATED ORAL 2 TIMES DAILY
Qty: 20 TABLET | Refills: 0 | Status: SHIPPED | OUTPATIENT
Start: 2024-05-13 | End: 2024-05-23

## 2024-05-13 ASSESSMENT — FIBROSIS 4 INDEX: FIB4 SCORE: 1.12

## 2024-05-13 NOTE — PROGRESS NOTES
Subjective:   Fela Tiwari is a 39 y.o. female who presents for Pharyngitis (X 3 days, sore throat, ear pain, cough, congestion, headache)     Rhinorrhea since Friday assoc. With ST  Coughing up bright green mucus  Severe right ear pain, had similar on left  Fevers and chills  Just completed chemo   No history of underlying respiratory illnesses      Medications:  acyclovir  ALPRAZolam Tabs  dexamethasone Tabs  diphenhydrAMINE  EPINEPHrine Soaj  gabapentin Caps  lidocaine-prilocaine Crea  loratadine Tabs  OLANZapine Tabs  omeprazole  ondansetron Tabs  oxybutynin Tabs  prochlorperazine Tabs  Tums Chewy Bites Chew    Allergies:             Bee venom, Morphine sulfate, Skin adhesives, Tape, and Pineapple    Surgical History:         Past Surgical History:   Procedure Laterality Date    DIEGO BY LAPAROSCOPY  2015    Performed by Dre Malone M.D. at SURGERY [facility] ORS    GASTRIC SLEEVE LAPAROSCOPY  2014    Performed by Dre Malone M.D. at SURGERY [facility] ORS    LIVER BIOPSY LAPAROSCOPIC  2014    Performed by Dre Malone M.D. at SURGERY [facility]    HYSTERECTOMY ROBOTIC  2013    LIPOSUCTION  2010    Performed by RAMON NUNN at SURGERY [facility]    LIPOSUCTION  2009    Performed by RAMON NUNN at SURGERY [facility]    TONSILLECTOMY  1997    OTHER       x2       Past Social Hx:  Fela Tiwari  reports that she quit smoking about 6 years ago. Her smoking use included cigarettes and electronic cigarettes. She started smoking about 12 years ago. She has a 3 pack-year smoking history. She has never used smokeless tobacco. She reports current alcohol use of about 8.4 oz of alcohol per week. She reports current drug use. Drug: Inhaled.     Past Family Hx:   Fela Tiwari family history includes Cancer in an other family member; Diabetes in an other family member; Hypertension in an other family member.  "      Problem list, medications, and allergies reviewed by myself today in Epic.     Objective:     /70 (BP Location: Right arm, Patient Position: Sitting, BP Cuff Size: Large adult)   Pulse 67   Temp (!) 35.6 °C (96 °F) (Temporal)   Resp 16   Ht 1.676 m (5' 6\")   Wt 99.8 kg (220 lb)   LMP 02/10/2012 (Approximate) Comment: Age of first period:10, No HRT  SpO2 98%   BMI 35.51 kg/m²     Physical Exam  Vitals and nursing note reviewed.   Constitutional:       General: She is not in acute distress.     Appearance: Normal appearance. She is well-developed. She is not ill-appearing or toxic-appearing.   HENT:      Head: Normocephalic.      Right Ear: External ear normal. No tenderness. A middle ear effusion is present. No mastoid tenderness. Tympanic membrane is injected. Tympanic membrane is not perforated or bulging. Tympanic membrane has decreased mobility.      Left Ear: External ear normal. No tenderness. A middle ear effusion is present. No mastoid tenderness. Tympanic membrane is injected. Tympanic membrane is not perforated or bulging. Tympanic membrane has decreased mobility.      Nose: Mucosal edema, congestion and rhinorrhea present.      Right Nostril: No foreign body.      Left Nostril: No foreign body.      Right Turbinates: Swollen.      Left Turbinates: Swollen.      Right Sinus: Maxillary sinus tenderness and frontal sinus tenderness present.      Left Sinus: Maxillary sinus tenderness and frontal sinus tenderness present.      Mouth/Throat:      Mouth: Mucous membranes are moist.      Pharynx: Uvula midline. Posterior oropharyngeal erythema present. No pharyngeal swelling, oropharyngeal exudate or uvula swelling.      Tonsils: No tonsillar exudate or tonsillar abscesses.      Comments: Mild pharyngeal edema.  No tonsillar exudate.  Eyes:      Extraocular Movements: Extraocular movements intact.      Pupils: Pupils are equal, round, and reactive to light.   Cardiovascular:      Rate and " Rhythm: Normal rate and regular rhythm.      Pulses: Normal pulses.      Heart sounds: Normal heart sounds. No murmur heard.  Pulmonary:      Effort: Pulmonary effort is normal. No tachypnea or respiratory distress.      Breath sounds: Normal breath sounds and air entry. No stridor or decreased air movement. No decreased breath sounds, wheezing, rhonchi or rales.      Comments: Lungs are clear to auscultation bilaterally, no rhonchi rales or wheezes  Chest:      Chest wall: No tenderness.   Musculoskeletal:      Cervical back: Normal range of motion. No rigidity.   Lymphadenopathy:      Cervical: No cervical adenopathy.   Neurological:      Mental Status: She is alert.   Psychiatric:         Behavior: Behavior is cooperative.       Results for orders placed or performed in visit on 05/13/24   POCT CEPHEID COV-2, FLU A/B, RSV - PCR   Result Value Ref Range    SARS-CoV-2 by PCR Negative Negative, Invalid    Influenza virus A RNA Negative Negative, Invalid    Influenza virus B, PCR Negative Negative, Invalid    RSV, PCR Negative Negative, Invalid   POCT CEPHEID GROUP A STREP - PCR   Result Value Ref Range    POC Group A Strep, PCR Not Detected Not Detected, Invalid       Assessment/Plan:     Diagnosis and Associated Orders:     1. Sore throat  - POCT CEPHEID COV-2, FLU A/B, RSV - PCR  - POCT CEPHEID GROUP A STREP - PCR    2. Acute cough  - POCT CEPHEID COV-2, FLU A/B, RSV - PCR    3. Rhinorrhea  - azelastine (ASTELIN) 137 MCG/SPRAY nasal spray; Administer 1 Spray into affected nostril(S) 2 times a day.  Dispense: 30 mL; Refill: 0    4. Non-recurrent acute suppurative otitis media of both ears without spontaneous rupture of tympanic membranes  - amoxicillin-clavulanate (AUGMENTIN) 875-125 MG Tab; Take 1 Tablet by mouth 2 times a day for 10 days.  Dispense: 20 Tablet; Refill: 0        Comments/MDM:  The patient's presenting symptoms and exam findings most likely are due to a viral etiology.  However given her  presentation and history of recent completion of chemotherapy contingent antibiotic prescription provided for bacterial sinusitis.  Vital signs stable and reassuring.  Lungs are clear to auscultation bilaterally.  Patient is not hypoxic.  They are afebrile.  They are nontachypneic.      Symptomatic and supportive care:   Plenty of oral hydration and rest   Over the counter cough suppressant as directed.  Tylenol or ibuprofen for pain and fever as directed.   Warm salt water gargles for sore throat, soft foods, cool liquids.   Saline nasal spray, Flonase, and/or otc sudafed (if no history of hypertension) as a decongestant.   Infection control measures at home. Stay away from people, Hand washing, covering sneeze/cough, disinfect surfaces.   Remain home from work, school, and other populated environments while ill.  Overall, the patient is well-appearing. They are not hypoxic, afebrile, and a normal pulmonary exam.      Patient should to proceed to ED for development of symptoms including but not limited to shortness of breath breath, respiratory distress, increased fever, persistent symptoms, or worsening symptoms not manageable at home.    I personally reviewed prior external notes and test results pertinent to today's visit. Supportive care, natural history, differential diagnoses, and indications for immediate follow-up discussed. Return to clinic or go to ED if symptoms worsen or persist.  Red flag symptoms discussed.  Patient/Parent/Guardian voices understanding. Follow-up with your primary care provider in 3-5 days.  All side effects of medication discussed including allergic response, GI upset, tendon injury, rash, sedation etc    Please note that this dictation was created using voice recognition software. I have made a reasonable attempt to correct obvious errors, but I expect that there are errors of grammar and possibly content that I did not discover before finalizing the note.    This note was  electronically signed by Leatha Saavedra PA-C

## 2024-05-15 ENCOUNTER — OFFICE VISIT (OUTPATIENT)
Dept: SURGERY | Facility: MEDICAL CENTER | Age: 39
End: 2024-05-15
Payer: MEDICAID

## 2024-05-15 VITALS
OXYGEN SATURATION: 93 % | HEIGHT: 66 IN | WEIGHT: 216 LBS | HEART RATE: 73 BPM | TEMPERATURE: 97.3 F | SYSTOLIC BLOOD PRESSURE: 106 MMHG | BODY MASS INDEX: 34.72 KG/M2 | DIASTOLIC BLOOD PRESSURE: 72 MMHG

## 2024-05-15 DIAGNOSIS — Z17.1 MALIGNANT NEOPLASM OF UPPER-OUTER QUADRANT OF RIGHT BREAST IN FEMALE, ESTROGEN RECEPTOR NEGATIVE (HCC): ICD-10-CM

## 2024-05-15 DIAGNOSIS — C50.411 MALIGNANT NEOPLASM OF UPPER-OUTER QUADRANT OF RIGHT BREAST IN FEMALE, ESTROGEN RECEPTOR NEGATIVE (HCC): ICD-10-CM

## 2024-05-15 PROCEDURE — 3074F SYST BP LT 130 MM HG: CPT | Performed by: SURGERY

## 2024-05-15 PROCEDURE — 99215 OFFICE O/P EST HI 40 MIN: CPT | Performed by: SURGERY

## 2024-05-15 PROCEDURE — 3078F DIAST BP <80 MM HG: CPT | Performed by: SURGERY

## 2024-05-15 ASSESSMENT — FIBROSIS 4 INDEX: FIB4 SCORE: 1.12

## 2024-05-15 NOTE — PROGRESS NOTES
"SUBJECTIVE:   Fela Tiwari is a delightful 39 y.o. female who presents in follow up for known right breast cancer status post neoadjuvant chemotherapy.  She is status post 5 cycles of cytotoxic chemotherapy + pertuzumab + trastuzumab for high grade IDC, ER-MN-HER2+, Ki67 30%, [id number] (anatomic/prognostic stage IIA).  Currently, she reports No new breast symptoms.      Interval Imaging:  - MRI 05/08/2024: No residual mass in right upper outer quadrant at Stefany marker. No residual mass at biopsy marker in right lower outer quadrant. No abnormal adenopathy. Left breast negative. BIRADS 6.    OBJECTIVE:  /72 (BP Location: Left arm, Patient Position: Sitting, BP Cuff Size: Large adult)   Pulse 73   Temp 36.3 °C (97.3 °F) (Temporal)   Ht 1.676 m (5' 6\")   Wt 98 kg (216 lb)   LMP 02/10/2012 (Approximate) Comment: Age of first period:10, No HRT  SpO2 93%   BMI 34.86 kg/m²    General: Alert, oriented, pleasant, no acute distress.  HEENT: Extraocular movements intact, no scleral icterus or conjunctival injection.  CV: RRR, S1/S2, no murmur  Pulmonary: Clear to auscultation bilaterally, no wheezes or crackles  Abdomen: Soft, nondistended.  Extremities: Warm, well-perfused.  Breasts: Bilateral breasts examined in the upright and supine positions.  No suspicious skin findings on either side (erythema, peau d'orange).  Bilateral breasts are heterogeneously dense without dominant masses or nodules.  Bilateral nipples everted without expressible discharge.  No unexpected contour abnormalities.  No palpable cervical, supraclavicular, or axillary adenopathy.  The prior palpable mass is no longer palpable.    ASSESSMENT AND PLAN:  Delightful 39F with right breast multicentric invasive ductal carcinoma, grade 3, ER-MN-HER2+, Ki67 30%, [id number] (anatomic/prognostic stage IIA), now status post 5 cycles of cytotoxic chemotherapy (last dose appears to be 04/19/2024), with an apparent excellent response on breast " MRI.    We again discussed surgical options, including mastectomy with or without reconstruction and segmental mastectomy (lumpectomy).  We discussed that there is no difference in survival between mastectomy and lumpectomy, but that there is an increased risk of local recurrence with lumpectomy for which we give radiation after surgery.  We discussed that radiation may confer a small survival benefit according to new data.  We discussed lymphatic sampling and the indications for sentinel lymph node biopsy, axillary dissection, and the scenarios in which lymphatic sampling may be omitted.  In her particular case, I recommend proceeding with right total mastectomy (with or without reconstruction).  She expressed understanding and agrees with this; due to the significant difficulty she had with chemotherapy, she would like a left risk-reducing mastectomy at the same time.  This is very reasonable.  We discussed nipple-sparing vs skin-sparing approaches; due to her degree of ptosis I do not recommend a nipple-sparing approach for her.  She is interested in 3-dimensional nipple tattooing once she has healed from surgery.  All questions answered in detail.  A thorough discussion was held with the patient of the indications, alternatives, risks (including lymphedema, positive margins, bleeding, infection, anesthetic complications, and the potential need for more than one operation), as well as the expected outcomes of bilateral skin-sparing mastectomy, right axillary sentinel lymph node biopsy, possible right axillary dissection.  She would like to proceed with surgery scheduling.  We will target 4-6 weeks after her last dose of cytotoxic chemotherapy.     Before proceeding with surgery, she will need plastic surgery consultation (STAT referral placed) and preanesthesia testing.    Problem   Malignant Neoplasm of Upper-Outer Quadrant of Right Breast in Female, Estrogen Receptor Negative (Hcc)    Right breast 10:00 4cmFN  IDC, G3, ER-TX-HER2+, Ki67 30%, [id number] (suyapa/prog IIA)  - US-guided CNBx 12/07/2023  - Neoadjuvant TCHP x5 cycles ([place], poorly tolerated, last cytotoxic dose 04/19/2024)  - Apparent complete response on MRI 05/08/2024  - Genetics negative for actionable mutation     - Deleterious NTHL1 mutation: [id number]* (single allele, autosomal recessive, assoc colon cancer)  - Planned bilateral skin-sparing mastectomy, right axillary sentinel node biopsy, possible axillary dissection ([place])     - Referral placed to Dr Tee 05/15/2024          Cancer Staging   Malignant neoplasm of upper-outer quadrant of right breast in female, estrogen receptor negative (HCC)  Staging form: Breast, AJCC 8th Edition  - Clinical stage from 12/14/2023: Stage IIA (cT2, cN0, cM0, G3, ER-, [place]-, HER2+) - Signed by Rochelle Sullivan M.D. on 12/14/2023    A total of 45 minutes was spent on and with this patient today, including review of records, review of imaging, physical exam, counseling, and coordination of care.

## 2024-05-20 ENCOUNTER — APPOINTMENT (OUTPATIENT)
Dept: ADMISSIONS | Facility: MEDICAL CENTER | Age: 39
End: 2024-05-20
Attending: SURGERY
Payer: MEDICAID

## 2024-05-24 ENCOUNTER — PRE-ADMISSION TESTING (OUTPATIENT)
Dept: ADMISSIONS | Facility: MEDICAL CENTER | Age: 39
End: 2024-05-24
Attending: SURGERY
Payer: MEDICAID

## 2024-05-28 RX ORDER — PROCHLORPERAZINE MALEATE 10 MG
10 TABLET ORAL EVERY 6 HOURS PRN
Status: CANCELLED | OUTPATIENT
Start: 2024-05-31

## 2024-05-28 RX ORDER — 0.9 % SODIUM CHLORIDE 0.9 %
VIAL (ML) INJECTION PRN
Status: CANCELLED | OUTPATIENT
Start: 2024-05-31

## 2024-05-28 RX ORDER — ONDANSETRON 2 MG/ML
4 INJECTION INTRAMUSCULAR; INTRAVENOUS PRN
Status: CANCELLED | OUTPATIENT
Start: 2024-05-31

## 2024-05-28 RX ORDER — DIPHENHYDRAMINE HYDROCHLORIDE 50 MG/ML
50 INJECTION INTRAMUSCULAR; INTRAVENOUS PRN
Status: CANCELLED | OUTPATIENT
Start: 2024-05-31

## 2024-05-28 RX ORDER — METHYLPREDNISOLONE SODIUM SUCCINATE 125 MG/2ML
125 INJECTION, POWDER, LYOPHILIZED, FOR SOLUTION INTRAMUSCULAR; INTRAVENOUS PRN
Status: CANCELLED | OUTPATIENT
Start: 2024-05-31

## 2024-05-28 RX ORDER — SODIUM CHLORIDE 9 MG/ML
INJECTION, SOLUTION INTRAVENOUS CONTINUOUS
Status: CANCELLED | OUTPATIENT
Start: 2024-05-31

## 2024-05-28 RX ORDER — 0.9 % SODIUM CHLORIDE 0.9 %
10 VIAL (ML) INJECTION PRN
Status: CANCELLED | OUTPATIENT
Start: 2024-05-31

## 2024-05-28 RX ORDER — EPINEPHRINE 1 MG/ML(1)
0.5 AMPUL (ML) INJECTION PRN
Status: CANCELLED | OUTPATIENT
Start: 2024-05-31

## 2024-05-28 RX ORDER — ONDANSETRON 8 MG/1
8 TABLET, ORALLY DISINTEGRATING ORAL PRN
Status: CANCELLED | OUTPATIENT
Start: 2024-05-31

## 2024-05-28 RX ORDER — 0.9 % SODIUM CHLORIDE 0.9 %
3 VIAL (ML) INJECTION PRN
Status: CANCELLED | OUTPATIENT
Start: 2024-05-31

## 2024-05-30 ENCOUNTER — HOSPITAL ENCOUNTER (OUTPATIENT)
Dept: HEMATOLOGY ONCOLOGY | Facility: MEDICAL CENTER | Age: 39
End: 2024-05-30
Attending: NURSE PRACTITIONER
Payer: MEDICAID

## 2024-05-30 ENCOUNTER — APPOINTMENT (OUTPATIENT)
Dept: ONCOLOGY | Facility: MEDICAL CENTER | Age: 39
End: 2024-05-30
Attending: INTERNAL MEDICINE
Payer: MEDICAID

## 2024-05-30 VITALS
RESPIRATION RATE: 16 BRPM | HEIGHT: 66 IN | SYSTOLIC BLOOD PRESSURE: 102 MMHG | WEIGHT: 213.41 LBS | BODY MASS INDEX: 34.3 KG/M2 | HEART RATE: 75 BPM | TEMPERATURE: 97.6 F | OXYGEN SATURATION: 96 % | DIASTOLIC BLOOD PRESSURE: 62 MMHG

## 2024-05-30 DIAGNOSIS — C50.411 MALIGNANT NEOPLASM OF UPPER-OUTER QUADRANT OF RIGHT BREAST IN FEMALE, ESTROGEN RECEPTOR NEGATIVE (HCC): ICD-10-CM

## 2024-05-30 DIAGNOSIS — Z79.899 ENCOUNTER FOR LONG-TERM (CURRENT) USE OF HIGH-RISK MEDICATION: ICD-10-CM

## 2024-05-30 DIAGNOSIS — Z17.1 MALIGNANT NEOPLASM OF UPPER-OUTER QUADRANT OF RIGHT BREAST IN FEMALE, ESTROGEN RECEPTOR NEGATIVE (HCC): ICD-10-CM

## 2024-05-30 ASSESSMENT — ENCOUNTER SYMPTOMS
HEADACHES: 0
DIARRHEA: 0
MYALGIAS: 0
WEIGHT LOSS: 1
PALPITATIONS: 0
SORE THROAT: 0
SHORTNESS OF BREATH: 0
NAUSEA: 0
FEVER: 0
CONSTIPATION: 0
VOMITING: 0
COUGH: 1
DIZZINESS: 0
DIAPHORESIS: 0
TINGLING: 1
CHILLS: 0

## 2024-05-30 ASSESSMENT — PAIN SCALES - GENERAL: PAINLEVEL: NO PAIN

## 2024-05-30 ASSESSMENT — FIBROSIS 4 INDEX: FIB4 SCORE: 1.12

## 2024-05-30 NOTE — ADDENDUM NOTE
Encounter addended by: Judith Woods, Med Ass't on: 5/30/2024 12:18 PM   Actions taken: Charge Capture section accepted

## 2024-05-30 NOTE — PROGRESS NOTES
Subjective     Fela Tiwari is a 39 y.o. female who presents with Breast Cancer ([place]/ pre chemo )          HPI    Referring Physician: Rochelle Gaviria MD   Primary Care:  Francisco Maier M.D.      Diagnosis: Invasive ductal carcinoma of the right breast, grade 3, clinically stage IIa (cT2, cN0) ER negative RI negative, HER2 3+ positive, Ki-67 20 to 30%      Chief Complaint: Patient seen today for evaluation of her ER negative HER2 positive breast cancer, for continued monitoring of symptoms and side effects of cancer treatments, employing Herceptin and Perjeta.     Oncology history of presenting illness:  Fela Tiwari is a 38 y.o. likely premenopausal white female who self detected a mass in her lateral right breast in 2023.  She had a mammogram on 2023 which showed the palpable mass is likely malignant.  Ultrasound showed it was 2.6 cm irregular hypoechoic spiculated mass.  Left breast was normal.  She underwent a ultrasound-guided biopsy of the right breast on 2023: This revealed an invasive ductal carcinoma, grade 3, ER negative, RI negative, HER2 3+ by IHC, Ki-67 20 to 30%.  An MRI was done yesterday and results are pending.  She had a hysterectomy in  but her ovaries are in place.  She is  3 para 2 and has not taken any hormone replacement therapy.  No family history of breast or ovarian cancer.  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  She was on anticoagulation for a while but has been off for several years.  She has also had gastric sleeve laparoscopic and cholecystectomy and does note chronic nausea.      Interval histories:  Interval history 24 (CAlsop, APRN):  Patient had a difficult time with her first cycle of chemotherapy.  Day after she received the Udenyca shot she presented to the emergency department with severe pain.  Pain was excruciating throughout her entire body.   She was given IV Dilaudid in the hospital and discharged with Tylenol.  She has been trying to take 1000 g of Tylenol with no relief.  Patient tearful today due to the severity of pain.  She also had experienced blood when urinating.  She did have a positive occult blood on stool and urine during hospitalization.  She was asked to follow-up with urology in the outpatient setting.  She stated since being discharged from the hospital she has not had any blood whatsoever in the urine or will.  She has been experiencing diarrhea anywhere from 5-10 episodes per day likely related to Perjeta.  She has been taking Imodium, approximately 2-3 pills per day with minimal relief.  She does have nausea and vomiting but she does state that Zofran does help.  She does also note thrush which was appreciated on physical examination today.     Interval history 2/15/2024: She had a terrible time with her first cycle of TCHP as noted above.  Her diarrhea has slowed dramatically but she still is using Lomotil occasionally.  She also had severe eye tearing likely due to the Taxotere which persists although it is better.  She has no neuropathy but did have a rash which responded to corticosteroids.  She also had persistent nausea which has finally resolved.  Her thrush is resolved as well.  She has not had any further vaginal bleeding but this has not been evaluated adequately except for a bladder scan which as expected was negative.  She feels like her tumor shrunk some.      Interval history 03/07/24 (CAlsop, APRN):  Patient still had difficult time with cycle 2.  She did end up in the ER the day after receiving treatment with bleeding again.  She did have bleeding with her first cycle and did end up being seen by urology and underwent a cystoscopy which was negative.  She stated that the bleeding lasted approximately 3-4 days.  She did experience significant nausea and vomiting for the first 2 weeks.  She also had severe diarrhea up to  10 times per day for the first 2 weeks.  She did take Lomotil with no improvement.  She has noticed some bleeding from the rectum which is from her hemorrhoids with the diarrhea.  She did state that she was given the olanzapine to take for the first 5 days after treatment which did help immensely with her nausea and vomiting as well as she believes to help with her diarrhea as well as sleep.  When she stopped that medication the symptoms returned.  She is noticing her vision feels a little differently.  Her eyes are very dry and watery.  We attempted to give patient days of Zarzio for neutropenia support as patient had significant pain with the Udenyca shot.  She stated that she did not notice any improvement or difference as she did still have the severe myalgias that lasted approximately 2 weeks with the Zarzio injection.  She did state this last week she has felt back to normal.  She is anxious to receive her next cycle of chemo.     Interval history 03/28/24 (CAlsop, APRN):  Patient tolerated her last cycle well as cycle 3 we held carboplatin and awaited approval for Cytoxan.  We also held Udenyca.  She does still have diarrhea noted but that is controlled with Lomotil.  She is noticing some bilateral flank pain but no other urinary symptoms.  With her last cycle she did not experience any blood in the urine/vaginal/rectal area.  She does have some fatigue and has noticed increase in hot flashes.  Hot flashes are quite bothersome to her at this time.  She also noticed peripheral neuropathy in her toes, worse at night.  She is unable to let the covers to touch her feet.     Interval history 04/18/24 (CAlsop, APRN):  Patient biggest complaint at this time as peripheral neuropathy in her feet.  She has the pain, burning and numbness.  She was taking gabapentin 300 mg at night but did take 2 tablets last night because her feet were severe which did help.  She has noticed some nausea and vomiting worse with this past  cycle likely with the addition of Cytoxan.  She does note she has consistently loose bowel movements, but no significant diarrhea.  She does have some hemorrhoids and does have pain with bowel movements at times.  She still has a hot flashes and did take the oxybutynin but she stated that she thought it made him worse.  She stopped taking the oxybutynin and noticed that her hot flashes are better.  She may be getting some relief from the gabapentin.  She also noticed some blisters in her mouth but the mouth rinses did help.  No mouth sores at this time.  She does complain of significant myalgias and arthralgias from the Udenyca shot that last about 3 days.  However she does continue to have the myalgias and arthralgias up till today still present.    Interval history 5/9/2024: Her peripheral neuropathy is better which she attributes in part to reduce chemo and provide to compression stockings that she is using. GI symptoms were much better with the last cycle. She has been in a clinical complete response and we are awaiting MRI results from yesterday to determine whether she needs more chemotherapy or simply HP for cycle 6. She is planning to do bilateral mastectomies with nipple sparing reconstructions.     Interval history 05/30/24 (CAlsop, APRN):  Is doing well.  She tolerated the last cycle well with Herceptin and Perjeta only.  She does still have diarrhea but states is not bothersome.  She was on gabapentin for hot flashes as well as peripheral neuropathy, but she felt like the gabapentin was making her hot flashes worse.  She stopped the gabapentin altogether and stated that her hot flashes have completely resolved.  She does still have some numbness in her toes but nothing like it has been before.  She wears compression socks at night with positive results.  She did get a sinus infection few weeks ago and took 10-day course of Augmentin.  She does still have some ear clogging but clinical examination does  not show any abnormal findings.     Treatment history:  01/23/24: C1D1 TCHP with Udenyca  02/16/24: C2D2 TCHP with 20-25% reduction in dosing of the chemotherapy and 5 days of Zarxio instead of Udenyca   03/08/24: C3D1 THP (20% dose reduction of Docetaxel) - will hold on Xarzio or Udenyca (d/c Carboplatin d/t tolerability)  03/29/24: C4D1 THP with addition of Cytoxan (continue with dose reduction of Docetaxel by 20%) with Udenyca support   04/19/24: C5D1 HP plus Cytoxan (d/c Docetaxel d/t worsening peripheral neuropathy) - no need for Udenyca  05/10/24: C6 Herceptin and Perjeta  05/31/24: C7 Herceptin and Perjeta    Allergies   Allergen Reactions    Bee Venom Anaphylaxis    Morphine Sulfate Anaphylaxis and Itching    Tape Rash     Paper tape OK, do not use tegaderm    Pineapple Hives     Current Outpatient Medications on File Prior to Visit   Medication Sig Dispense Refill    azelastine (ASTELIN) 137 MCG/SPRAY nasal spray Administer 1 Spray into affected nostril(S) 2 times a day. (Patient not taking: Reported on 5/24/2024) 30 mL 0    oxybutynin (DITROPAN) 5 MG Tab TAKE 1 TABLET BY MOUTH TWICE DAILY (Patient not taking: Reported on 5/9/2024) 60 Tablet 0    gabapentin (NEURONTIN) 100 MG Cap Take 1 Capsule by mouth every day. Take 100mg capsule tonight, take 200mg the second night then follow with 300mg at bedtime until next physician appointment. (Patient not taking: Reported on 5/15/2024) 3 Capsule 0    acyclovir (ZOVIRAX) 400 MG tablet Take 1 Tablet by mouth 2 times a day. (Patient not taking: Reported on 5/24/2024) 140 Tablet 0    diphenhydrAMINE (BENADRYL) 25 MG capsule Take 1 Capsule by mouth every 6 hours as needed for Itching. (Patient not taking: Reported on 5/24/2024)      omeprazole (PRILOSEC) 20 MG delayed-release capsule Take 1 Capsule by mouth every day. (Patient not taking: Reported on 5/24/2024) 30 Capsule 0    ALPRAZolam (XANAX) 0.5 MG Tab Take 0.5 mg by mouth at bedtime as needed for Anxiety or Sleep.  (Patient not taking: Reported on 5/24/2024)      calcium carbonate (TUMS CHEWY BITES) 750 MG chewable tablet Chew 2 Tablets 4 times a day. (Patient not taking: Reported on 5/24/2024)      OLANZapine (ZYPREXA) 5 MG Tab Take 1 Tablet by mouth every evening. For 5 days starting the day of chemotherapy (Patient not taking: Reported on 4/18/2024) 30 Tablet 1    loratadine (CLARITIN) 10 MG Tab Take 10 mg by mouth every day.      ondansetron (ZOFRAN) 4 MG Tab tablet Take 1 Tablet by mouth every four hours as needed for Nausea/Vomiting (for nausea, vomiting). (Patient not taking: Reported on 5/15/2024) 30 Tablet 6    prochlorperazine (COMPAZINE) 10 MG Tab Take 1 Tablet by mouth every 6 hours as needed (for nausea, vomiting). (Patient not taking: Reported on 5/15/2024) 30 Tablet 6    dexamethasone (DECADRON) 4 MG Tab Take 2 Tablets by mouth 2 times a day. Take 8 mg two times a day for 3 days, starting 24 hours prior to docetaxel. (Patient not taking: Reported on 5/15/2024) 12 Tablet 6    lidocaine-prilocaine (EMLA) 2.5-2.5 % Cream Apply to port 1 hour prior to access of port and cover with plastic wrap. (Patient not taking: Reported on 5/15/2024) 30 g 3    EPINEPHrine (EPIPEN) 0.3 MG/0.3ML Solution Auto-injector solution for injection Inject 0.3 mL into the thigh one time for 1 dose  Indications: Patient advised to present to the ED even after epinephrine administration for further observation and management. 1 Each 0     No current facility-administered medications on file prior to visit.         Review of Systems   Constitutional:  Positive for weight loss. Negative for chills, diaphoresis, fever and malaise/fatigue.   HENT:  Positive for hearing loss. Negative for ear pain and sore throat.    Respiratory:  Positive for cough. Negative for shortness of breath (only when ambulating up stairs).    Cardiovascular:  Negative for chest pain and palpitations.   Gastrointestinal:  Negative for constipation, diarrhea, nausea and  "vomiting.   Genitourinary:  Negative for dysuria.   Musculoskeletal:  Negative for myalgias.   Neurological:  Positive for tingling. Negative for dizziness and headaches.              Objective     /62 (BP Location: Right arm, Patient Position: Sitting, BP Cuff Size: Adult)   Pulse 75   Temp 36.4 °C (97.6 °F) (Temporal)   Resp 16   Ht 1.676 m (5' 5.98\")   Wt 96.8 kg (213 lb 6.5 oz)   LMP 02/10/2012 (Approximate) Comment: Age of first period:10, No HRT  SpO2 96%   BMI 34.46 kg/m²      Physical Exam  Vitals reviewed.   Constitutional:       General: She is not in acute distress.     Appearance: Normal appearance. She is not diaphoretic.   HENT:      Head: Normocephalic and atraumatic.   Cardiovascular:      Rate and Rhythm: Normal rate and regular rhythm.      Heart sounds: Normal heart sounds. No murmur heard.     No friction rub. No gallop.   Pulmonary:      Effort: Pulmonary effort is normal.      Breath sounds: Normal breath sounds.   Abdominal:      General: Bowel sounds are normal. There is no distension.      Palpations: Abdomen is soft.      Tenderness: There is no abdominal tenderness.   Musculoskeletal:         General: No swelling or tenderness. Normal range of motion.   Skin:     General: Skin is warm and dry.   Neurological:      Mental Status: She is alert and oriented to person, place, and time.   Psychiatric:         Mood and Affect: Mood normal.         Behavior: Behavior normal.             MR-BREAST-BILATERAL-WITH & W/O    Result Date: 5/9/2024 5/8/2024 6:10 PM HISTORY/REASON FOR EXAM:  Follow up after neoadjuvant chemo for right breast cancer - prior to surgery; TECHNIQUE/EXAM DESCRIPTION: MRI of the breast, bilateral without and with dynamic IV gadolinium enhancement. MR imaging of the bilateral breasts was performed on a Fortino 3.0 Zena scanner using a dedicated breast coil with the patient in the prone position, with axial T1, axial fat-suppressed T2, and bolus dynamic " intravenous gadolinium enhanced fat-suppressed 3D GRE sequences at precontrast, 30 second, 2 minute, 3 minute and 5 minute delays. Post processing subtraction images were obtained. Delayed postcontrast sagittal images also obtained. 20 mL ProHance contrast was administered intravenously. COMPARISON:  January 12, 2024, January 10, 2024, January 4, 2024, December 21, 2023, December 7, 2023, December 5, 2023 FINDINGS: Background parenchymal enhancement is minimal and symmetric. Right breast: There is no residual mass identified surrounding FANNY clip in the upper outer quadrant of the right breast consistent with treatment response. There is no evidence of enhancing mass surrounding biopsy clip in the inferior lateral right breast corresponding with MR guided biopsy proven cancer. The biopsy clip corresponding with the benign area of enhancement in the 12:00 position is located in the superficial lateral superior breast. There is no evidence of enhancing mass or suspicious area of enhancement.  There is a small intramammary lymph node in the superficial lateral right breast on image 117 measuring 3 mm in size and lymph node in the right axillary tail measuring 4 mm in size. No axillary lymphadenopathy identified. Securemark clip identified in previously biopsied right axillary lymph node on image 159. Left breast: There is no evidence of enhancing mass or suspicious area of enhancement. No axillary lymphadenopathy identified. A Port-A-Cath is identified in the superior medial breast. Upper abdomen: Unremarkable. Bony structures: Unremarkable.     1.  No residual enhancing masses associated with biopsy clips denoting biopsy-proven cancers in the upper outer quadrant of the right breast and slightly more inferior lateral aspect of the right breast consistent with treatment response. 2.  No MRI evidence of malignancy in the left breast. R6 - CATEGORY 6: KNOWN BIOPSY-PROVEN MALIGNANCY - APPROPRIATE ACTION SHOULD BE TAKEN            Assessment & Plan       1. Malignant neoplasm of upper-outer quadrant of right breast in female, estrogen receptor negative (HCC)        2. Encounter for long-term (current) use of high-risk medication                Impression:  1.  Invasive ductal carcinoma of the right breast, grade 3, clinically stage IIa (cT2, cN0) ER negative KS negative, HER2 3+ positive, Ki-67 20 to 30%.  Status post TCHP with poor tolerance of cycle 1 necessitating dose reductions, changed to Cytoxan and ultimately discontinuation of Taxotere.  Clinical complete response to therapy documented after cycle 4. MRI showed no residual masses. Surgery scheduled for 6/12/24.  2.  Chronic nausea after gastric sleeve and cholecystectomy severe after chemotherapy now minimal  3.  History of DVT/PE off anticoagulation for several years  4.  Severe myalgias likely from Udenyca -resolved 5.  Chemo nausea and vomiting -she will use Aloxi and olanzapine - patient to take Olanzepine x10 days  6.  Diarrhea d/t Perjeta -improved on Imodium and Lomotil  7.  Thrush appointment 3 weeks please resolved  8. Hematuria - presented for first 3-4 days after both cycles 1 and 2.  Cystoscopy completed by urology and was negative.  Questioning whether this may be related to carboplatin, and will plan to change treatment with discontinuation of carboplatin and add Cytoxan. Resolved with cycle 3.   9.  Hot flashes -improved  10. Peripheral neuropathy noted in her toes.  Improved with reduction in chemotherapy.  Patient did not like gabapentin and did discontinue it.  Neuropathy in her toes have slightly resolved once chemotherapy completed.        Plan:  Continue Herceptin and Perjeta as planned.     Surgery scheduled for 6/12/24. - bilateral mastectomy wth reconstruction .  She did meet with Dr. Tee, plastic surgeon    Patient to return to the clinic in 3 weeks, or sooner if needed.      Please note that this dictation was created using voice recognition  software. I have made every reasonable attempt to correct obvious errors, but I expect that there are errors of grammar and possibly content that I did not discover before finalizing the note.

## 2024-05-31 ENCOUNTER — OUTPATIENT INFUSION SERVICES (OUTPATIENT)
Dept: ONCOLOGY | Facility: MEDICAL CENTER | Age: 39
End: 2024-05-31
Attending: INTERNAL MEDICINE
Payer: MEDICAID

## 2024-05-31 VITALS
OXYGEN SATURATION: 98 % | SYSTOLIC BLOOD PRESSURE: 94 MMHG | HEART RATE: 71 BPM | DIASTOLIC BLOOD PRESSURE: 62 MMHG | RESPIRATION RATE: 18 BRPM | HEIGHT: 66 IN | BODY MASS INDEX: 34.44 KG/M2 | TEMPERATURE: 96.8 F | WEIGHT: 214.29 LBS

## 2024-05-31 DIAGNOSIS — Z17.1 MALIGNANT NEOPLASM OF UPPER-OUTER QUADRANT OF RIGHT BREAST IN FEMALE, ESTROGEN RECEPTOR NEGATIVE (HCC): ICD-10-CM

## 2024-05-31 DIAGNOSIS — C50.411 MALIGNANT NEOPLASM OF UPPER-OUTER QUADRANT OF RIGHT BREAST IN FEMALE, ESTROGEN RECEPTOR NEGATIVE (HCC): ICD-10-CM

## 2024-05-31 RX ORDER — ONDANSETRON 8 MG/1
8 TABLET, ORALLY DISINTEGRATING ORAL PRN
Status: COMPLETED | OUTPATIENT
Start: 2024-05-31 | End: 2024-05-31

## 2024-05-31 RX ADMIN — PERTUZUMAB 420 MG: 30 INJECTION, SOLUTION, CONCENTRATE INTRAVENOUS at 07:25

## 2024-05-31 RX ADMIN — HEPARIN 500 UNITS: 100 SYRINGE at 09:35

## 2024-05-31 RX ADMIN — ONDANSETRON 8 MG: 8 TABLET, ORALLY DISINTEGRATING ORAL at 07:41

## 2024-05-31 RX ADMIN — TRASTUZUMAB-ANNS 592 MG: 420 INJECTION, POWDER, LYOPHILIZED, FOR SOLUTION INTRAVENOUS at 08:49

## 2024-05-31 ASSESSMENT — FIBROSIS 4 INDEX: FIB4 SCORE: 1.12

## 2024-05-31 NOTE — PROGRESS NOTES
Pt arrived to [facility], ambulatory, for Perjeta/Kanjinti. Pt voices no complaints. Port accessed in sterile manner, positive blood return noted. Perjeta started with no complications. Approximately 10 minutes into infusion, pt c/o nausea. Infusion stopped, zofran administered. Pt reported complete resolution of symptoms. Remainder of Perjeta infused with no s/sx of adverse reaction.. 30 minute monitoring completed with no complications. Kanjinti infused with no s/sx of adverse reaction. Port flushed and heparin locked per policy, port de-accessed. Pt left [facility] with no s/sx of distress. Follow up appointment confirmed.

## 2024-05-31 NOTE — PROGRESS NOTES
Chemotherapy Verification - PRIMARY RN      Height = 168 cm  Weight = 97.2 kg  BSA = 2.13 m2       Medication: Perjeta  Dose: 420 mg (set dose)  Calculated Dose: 420 mg                             (In mg/m2, AUC, mg/kg)     Medication: Kanjinti  Dose: 6 mg/kg  Calculated Dose: 583.2 mg                             (In mg/m2, AUC, mg/kg)      I confirm this process was performed independently with the BSA and all final chemotherapy dosing calculations congruent.  Any discrepancies of 10% or greater have been addressed with the chemotherapy pharmacist. The resolution of the discrepancy has been documented in the EPIC progress notes.

## 2024-05-31 NOTE — PROGRESS NOTES
"Pharmacy Chemotherapy Verification Note:    Patient Name: Fela Tiwari      Dx: Breast CA, Stage IIA, ER-, KS-, HER2+       Protocol: [facility]->       *Dosing Reference*  Pertuzumab 840 mg IV over 60 minutes on Day 1 of Cycle 1 followed by  Pertuzumab 420 mg IV over 30 minutes on Day 1 of Cycles 2-6  Trastuzumab 8 mg/kg IV over 90 minutes on Day 1 of Cycle 1 followed by  Trastuzumab 6 mg/kg IV over 30 minutes on Day 1 of Cycles 2-6 followed by              2/16/24 - Dose reduced to 60 mg/m2 for tolerance starting with C2              4/19/24 - Removed from treatment plan starting with C5 due to worsening peripheral neuropathy              2/16/24 - Dose reduced to AUC 4.5 for tolerance starting with C2              Removed from treatment plan starting Cycle 3  Cyclophosphamide 600 mg/m2 IV over 30 minutes on Day 1              3/29/24 - Added starting C4 to replace carboplatin due to intolerance  21-day cycle for 6 cycles (completed 5/10/24 after 5 cycles for tolerance)  ~Followed by~  Pertuzumab 420 mg IV over 30 minutes on Day 1 beginning with Week 19  Trastuzumab 6 mg/kg IV over 30 minutes on Day 1 beginning with Week 19  21-day cycle to complete 52 weeks total of trastuzumab and pertuzumab therapy    NCCN Guidelines for Breast Cancer V.2.2022.  Josef A, et al. Gay Oncol. 2013;24(9):2278-84.    Allergies:  Bee venom, Morphine sulfate, Tape, and Pineapple     BP 94/62   Pulse 71   Temp 36 °C (96.8 °F) (Temporal)   Resp 18   Ht 1.68 m (5' 6.14\")   Wt 97.2 kg (214 lb 4.6 oz)   LMP 02/10/2012 (Approximate) Comment: Age of first period:10, No HRT  SpO2 98%   BMI 34.44 kg/m²  Body surface area is 2.13 meters squared.  No labs required  ECHO 5/13/24: LVEF 69%     Drug Order   (Drug name, dose, route, IV Fluid & volume, frequency, number of doses) Cycle: 7 (cycle 2 of HP)      Previous treatment: C6 = 5/10/24     Medication = trastuzumab-anns (Kanjinti)  Base Dose = 6 mg/kg  Calc Dose: Base Dose x " 97.2 kg = 583 mg  Final Dose = 592 mg  Route = IV  Fluid & Volume =  mL  Admin Duration = Over 30 minutes          <10% difference, ok to treat with final written dose]   Medication = pertuzumab (Perjeta)  Base Dose = 420 mg fixed dose  Calc Dose: fixed dose = no calculations  Final Dose = 420 mg fixed dose  Route = IV  Fluid & Volume =  mL  Admin Duration = Over 30 minutes          <10% difference, ok to treat with final written dose     By my signature below, I confirm this process was performed independently with the BSA and all final chemotherapy dosing calculations congruent. I have reviewed the above chemotherapy order and that my calculation of the final dose and BSA (when applicable) corroborate those calculations of the  pharmacist.     Fab Guadalupe, PharmD, BCPS

## 2024-05-31 NOTE — PROGRESS NOTES
"Pharmacy Chemotherapy Verification    Dx: Breast cancer, ER-, NM-, HER2+  Cycle 7  Previous treatment = C6 5/10/24    Protocol: TCHP  Pertuzumab 840 mg IV over 60 minutes on Day 1 of Cycle 1 followed by  Pertuzumab 420 mg IV over 30 minutes on Day 1 of Cycles 2-6  Trastuzumab 8 mg/kg IV over 90 minutes on Day 1 of Cycle 1 followed by  Trastuzumab 6 mg/kg IV over 30 minutes on Day 1 of Cycles 2-6 followed by   2/16/24 - Dose reduced to 60 mg/m2 for tolerance starting with C2   4/19/24 - Removed from treatment plan starting with C5 due to worsening peripheral neuropathy   2/16/24 - Dose reduced to AUC 4.5 for tolerance starting with C2   Removed from treatment plan starting Cycle 3  Cyclophosphamide 600 mg/m2 IV over 30 minutes on Day 1   3/29/24 - Added starting C4 to replace carboplatin due to intolerance  21-day cycle for 6 cycles (completed 5/10/24 after 5 cycles for tolerance)  ~Followed by~  Pertuzumab 420 mg IV over 30 minutes on Day 1 beginning with Week 19  Trastuzumab 6 mg/kg IV over 30 minutes on Day 1 beginning with Week 19  21-day cycle to complete 52 weeks total of trastuzumab and pertuzumab therapy  NCCN Guidelines for Breast Cancer V.2.2022.  Josef A, et al. Gay Oncol. 2013;24(9):2278-84.    Allergies:  Bee venom, Morphine sulfate, Tape, and Pineapple     BP 94/62   Pulse 71   Temp 36 °C (96.8 °F) (Temporal)   Resp 18   Ht 1.68 m (5' 6.14\")   Wt 97.2 kg (214 lb 4.6 oz)   LMP 02/10/2012 (Approximate) Comment: Age of first period:10, No HRT  SpO2 98%   BMI 34.44 kg/m²  Body surface area is 2.13 meters squared.    ECHO  5/13/24 LVEF 69%  12/27/23 LVEF 65% (Okay to proceed with treatment today. Pt to schedule ECHO after 6 cycles of TCHP then every 4 months thereafter per Dr. Thomas)    Pertuzumab 420 mg fixed dose              Fixed dose, OK to treat with final dose = 420 mg IV     Trastuzumab-anns 6 mg/kg x 97.2 kg = 583.2 mg              <10% difference, OK to treat with final dose = " 592 mg IV    Casimiro Peres, PharmD

## 2024-05-31 NOTE — PROGRESS NOTES
Chemotherapy Verification - SECONDARY RN       Height = 168 cm  Weight = 97.2 kg  BSA = 2.13 m^2       Medication: pertuzumab (PERJETA)  Dose: 420 mg set dose  Calculated Dose: 420 mg set dose                             (In mg/m2, AUC, mg/kg)     Medication: trastuzumab-anns (KANJINTI)  Dose: 6 mg/kg  Calculated Dose: 583.2 mg                             (In mg/m2, AUC, mg/kg)    I confirm that this process was performed independently.

## 2024-06-03 ENCOUNTER — DOCUMENTATION (OUTPATIENT)
Dept: SURGERY | Facility: MEDICAL CENTER | Age: 39
End: 2024-06-03
Payer: MEDICAID

## 2024-06-03 DIAGNOSIS — Z17.1 MALIGNANT NEOPLASM OF UPPER-OUTER QUADRANT OF RIGHT BREAST IN FEMALE, ESTROGEN RECEPTOR NEGATIVE (HCC): ICD-10-CM

## 2024-06-03 DIAGNOSIS — F41.1 ANXIETY ASSOCIATED WITH CANCER DIAGNOSIS (HCC): ICD-10-CM

## 2024-06-03 DIAGNOSIS — C80.1 ANXIETY ASSOCIATED WITH CANCER DIAGNOSIS (HCC): ICD-10-CM

## 2024-06-03 DIAGNOSIS — C50.411 MALIGNANT NEOPLASM OF UPPER-OUTER QUADRANT OF RIGHT BREAST IN FEMALE, ESTROGEN RECEPTOR NEGATIVE (HCC): ICD-10-CM

## 2024-06-03 DIAGNOSIS — Z87.892 HISTORY OF ANAPHYLAXIS: ICD-10-CM

## 2024-06-03 RX ORDER — ALPRAZOLAM 0.5 MG/1
0.5 TABLET ORAL NIGHTLY PRN
Qty: 14 TABLET | Refills: 0 | Status: SHIPPED | OUTPATIENT
Start: 2024-06-03 | End: 2024-06-17

## 2024-06-03 NOTE — PROGRESS NOTES
Patient has her upcoming breast surgery next week.  She is experiencing increased anxiety.  She has been given Xanax in the past by Dr. Thomas, and therefore I will go ahead and refill of 14-day supply for her prior to her surgery.

## 2024-06-03 NOTE — TELEPHONE ENCOUNTER
Received request via: Pharmacy    Was the patient seen in the last year in this department? Yes    Does the patient have an active prescription (recently filled or refills available) for medication(s) requested? No    Pharmacy Name: [facility]    Does the patient have care home Plus and need 100 day supply (blood pressure, diabetes and cholesterol meds only)? Patient does not have SCP

## 2024-06-04 RX ORDER — EPINEPHRINE 0.3 MG/.3ML
INJECTION SUBCUTANEOUS
Qty: 1 EACH | Refills: 0 | Status: SHIPPED | OUTPATIENT
Start: 2024-06-04

## 2024-06-05 ENCOUNTER — PRE-ADMISSION TESTING (OUTPATIENT)
Dept: ADMISSIONS | Facility: MEDICAL CENTER | Age: 39
End: 2024-06-05
Attending: SURGERY
Payer: MEDICAID

## 2024-06-05 DIAGNOSIS — Z01.812 PRE-OPERATIVE LABORATORY EXAMINATION: ICD-10-CM

## 2024-06-05 LAB
ANION GAP SERPL CALC-SCNC: 14 MMOL/L (ref 7–16)
BASOPHILS # BLD AUTO: 0.5 % (ref 0–1.8)
BASOPHILS # BLD: 0.04 K/UL (ref 0–0.12)
BUN SERPL-MCNC: 10 MG/DL (ref 8–22)
CALCIUM SERPL-MCNC: 8.9 MG/DL (ref 8.5–10.5)
CHLORIDE SERPL-SCNC: 107 MMOL/L (ref 96–112)
CO2 SERPL-SCNC: 20 MMOL/L (ref 20–33)
CREAT SERPL-MCNC: 0.8 MG/DL (ref 0.5–1.4)
EOSINOPHIL # BLD AUTO: 0.13 K/UL (ref 0–0.51)
EOSINOPHIL NFR BLD: 1.7 % (ref 0–6.9)
ERYTHROCYTE [DISTWIDTH] IN BLOOD BY AUTOMATED COUNT: 45.6 FL (ref 35.9–50)
GFR SERPLBLD CREATININE-BSD FMLA CKD-EPI: 95 ML/MIN/1.73 M 2
GLUCOSE SERPL-MCNC: 108 MG/DL (ref 65–99)
HCT VFR BLD AUTO: 42.6 % (ref 37–47)
HGB BLD-MCNC: 14.5 G/DL (ref 12–16)
IMM GRANULOCYTES # BLD AUTO: 0.01 K/UL (ref 0–0.11)
IMM GRANULOCYTES NFR BLD AUTO: 0.1 % (ref 0–0.9)
LYMPHOCYTES # BLD AUTO: 1.74 K/UL (ref 1–4.8)
LYMPHOCYTES NFR BLD: 22.2 % (ref 22–41)
MCH RBC QN AUTO: 32 PG (ref 27–33)
MCHC RBC AUTO-ENTMCNC: 34 G/DL (ref 32.2–35.5)
MCV RBC AUTO: 94 FL (ref 81.4–97.8)
MONOCYTES # BLD AUTO: 0.43 K/UL (ref 0–0.85)
MONOCYTES NFR BLD AUTO: 5.5 % (ref 0–13.4)
NEUTROPHILS # BLD AUTO: 5.49 K/UL (ref 1.82–7.42)
NEUTROPHILS NFR BLD: 70 % (ref 44–72)
NRBC # BLD AUTO: 0 K/UL
NRBC BLD-RTO: 0 /100 WBC (ref 0–0.2)
PLATELET # BLD AUTO: 149 K/UL (ref 164–446)
PMV BLD AUTO: 11.2 FL (ref 9–12.9)
POTASSIUM SERPL-SCNC: 3.8 MMOL/L (ref 3.6–5.5)
RBC # BLD AUTO: 4.53 M/UL (ref 4.2–5.4)
SODIUM SERPL-SCNC: 141 MMOL/L (ref 135–145)
WBC # BLD AUTO: 7.8 K/UL (ref 4.8–10.8)

## 2024-06-05 PROCEDURE — 80048 BASIC METABOLIC PNL TOTAL CA: CPT

## 2024-06-05 PROCEDURE — 85025 COMPLETE CBC W/AUTO DIFF WBC: CPT

## 2024-06-05 PROCEDURE — 36415 COLL VENOUS BLD VENIPUNCTURE: CPT

## 2024-06-10 ENCOUNTER — HOSPITAL ENCOUNTER (EMERGENCY)
Facility: MEDICAL CENTER | Age: 39
End: 2024-06-10
Attending: STUDENT IN AN ORGANIZED HEALTH CARE EDUCATION/TRAINING PROGRAM
Payer: MEDICAID

## 2024-06-10 ENCOUNTER — APPOINTMENT (OUTPATIENT)
Dept: RADIOLOGY | Facility: MEDICAL CENTER | Age: 39
End: 2024-06-10
Attending: STUDENT IN AN ORGANIZED HEALTH CARE EDUCATION/TRAINING PROGRAM
Payer: MEDICAID

## 2024-06-10 VITALS
TEMPERATURE: 97.4 F | HEART RATE: 63 BPM | DIASTOLIC BLOOD PRESSURE: 62 MMHG | SYSTOLIC BLOOD PRESSURE: 96 MMHG | WEIGHT: 212.74 LBS | RESPIRATION RATE: 16 BRPM | BODY MASS INDEX: 34.19 KG/M2 | HEIGHT: 66 IN | OXYGEN SATURATION: 97 %

## 2024-06-10 DIAGNOSIS — S80.02XA CONTUSION OF LEFT KNEE, INITIAL ENCOUNTER: ICD-10-CM

## 2024-06-10 PROCEDURE — 73562 X-RAY EXAM OF KNEE 3: CPT | Mod: LT

## 2024-06-10 PROCEDURE — 99284 EMERGENCY DEPT VISIT MOD MDM: CPT

## 2024-06-10 PROCEDURE — A9270 NON-COVERED ITEM OR SERVICE: HCPCS | Mod: UD | Performed by: STUDENT IN AN ORGANIZED HEALTH CARE EDUCATION/TRAINING PROGRAM

## 2024-06-10 PROCEDURE — 700102 HCHG RX REV CODE 250 W/ 637 OVERRIDE(OP): Mod: UD | Performed by: STUDENT IN AN ORGANIZED HEALTH CARE EDUCATION/TRAINING PROGRAM

## 2024-06-10 RX ORDER — ACETAMINOPHEN 325 MG/1
650 TABLET ORAL ONCE
Status: COMPLETED | OUTPATIENT
Start: 2024-06-10 | End: 2024-06-10

## 2024-06-10 RX ORDER — HYDROCODONE BITARTRATE AND ACETAMINOPHEN 5; 325 MG/1; MG/1
1 TABLET ORAL EVERY 4 HOURS PRN
Qty: 12 TABLET | Refills: 0 | Status: SHIPPED | OUTPATIENT
Start: 2024-06-10 | End: 2024-06-13

## 2024-06-10 RX ORDER — HYDROCODONE BITARTRATE AND ACETAMINOPHEN 5; 325 MG/1; MG/1
1 TABLET ORAL ONCE
Status: COMPLETED | OUTPATIENT
Start: 2024-06-10 | End: 2024-06-10

## 2024-06-10 RX ADMIN — ACETAMINOPHEN 650 MG: 325 TABLET, FILM COATED ORAL at 17:57

## 2024-06-10 RX ADMIN — HYDROCODONE BITARTRATE AND ACETAMINOPHEN 1 TABLET: 5; 325 TABLET ORAL at 17:57

## 2024-06-10 ASSESSMENT — COGNITIVE AND FUNCTIONAL STATUS - GENERAL
SUGGESTED CMS G CODE MODIFIER MOBILITY: CH
SUGGESTED CMS G CODE MODIFIER DAILY ACTIVITY: CH
DAILY ACTIVITIY SCORE: 24
MOBILITY SCORE: 24

## 2024-06-10 ASSESSMENT — LIFESTYLE VARIABLES
CONSUMPTION TOTAL: INCOMPLETE
EVER HAD A DRINK FIRST THING IN THE MORNING TO STEADY YOUR NERVES TO GET RID OF A HANGOVER: NO
HAVE YOU EVER FELT YOU SHOULD CUT DOWN ON YOUR DRINKING: NO
TOTAL SCORE: 0
HAVE PEOPLE ANNOYED YOU BY CRITICIZING YOUR DRINKING: NO
EVER FELT BAD OR GUILTY ABOUT YOUR DRINKING: NO
DO YOU DRINK ALCOHOL: NO
TOTAL SCORE: 0
TOTAL SCORE: 0

## 2024-06-10 ASSESSMENT — FIBROSIS 4 INDEX: FIB4 SCORE: 0.96

## 2024-06-11 NOTE — ED NOTES
Pt provided crutches and discharge instructions and follow-up information. Pt verbalized understanding and all questions answered. Pt assisted from ED in WC.

## 2024-06-11 NOTE — ED TRIAGE NOTES
Chief Complaint   Patient presents with    Knee Injury     Left knee swelling and pain since fall yesterday.

## 2024-06-11 NOTE — ED PROVIDER NOTES
ED Provider Note    CHIEF COMPLAINT  Chief Complaint   Patient presents with    Knee Injury     Left knee swelling and pain since fall yesterday.        EXTERNAL RECORDS REVIEWED      HPI/ROS  LIMITATION TO HISTORY   Select: : None  OUTSIDE HISTORIAN(S):      Fela Tiwari is a 39 y.o. female who presents with severe left knee pain after ground-level fall that occurred today morning.  The patient was attempting to carry a new smoker she had built out of her house when she tripped off of the last patio step falling directly onto the left knee onto a concrete surface.  She denies any injury elsewhere.  She has been able to ambulate since the event however with significant difficulty.  She reports the pain is getting worse and has had been having difficulty controlling it with ibuprofen and Tylenol prompting her to come in today.    PAST MEDICAL HISTORY   has a past medical history of Anesthesia (12/28/2023), Asthma (12/28/2023), Breath shortness, Bronchitis (06/01/2014), Cancer (HCC) (12/28/2023), Cholesterol blood decreased, Heart burn (05/24/2024), Indigestion (Due to sleeve), Pain (12/28/2023), Personal history of venous thrombosis and embolism (05/24/2024), and Psychiatric problem (05/24/2024).    SURGICAL HISTORY   has a past surgical history that includes tonsillectomy (01/01/1997); liposuction (12/21/2009); liposuction (03/04/2010); other; hysterectomy robotic (09/01/2013); gastric sleeve laparoscopy (12/31/2014); liver biopsy laparoscopic (12/31/2014); and jordon by laparoscopy (04/29/2015).    FAMILY HISTORY  Family History   Problem Relation Age of Onset    Diabetes Other     Hypertension Other     Cancer Other        SOCIAL HISTORY  Social History     Tobacco Use    Smoking status: Former     Current packs/day: 0.00     Average packs/day: 0.5 packs/day for 6.0 years (3.0 ttl pk-yrs)     Types: Cigarettes, Electronic Cigarettes     Start date: 6/1/2011     Quit date: 6/1/2017     Years since  "quittin.0    Smokeless tobacco: Never   Vaping Use    Vaping status: Former    Substances: Nicotine, THC   Substance and Sexual Activity    Alcohol use: Yes     Alcohol/week: 1.2 oz     Types: 2 Shots of liquor per week     Comment: 2 shots weekly    Drug use: Yes     Types: Inhaled     Comment: thc, daily    Sexual activity: Yes     Partners: Male     Birth control/protection: Female Sterilization     Comment: Hysterectomy       CURRENT MEDICATIONS  Home Medications       Reviewed by Frieda Zuñiga R.N. (Registered Nurse) on 06/10/24 at 1705  Med List Status: Partial     Medication Last Dose Status   acyclovir (ZOVIRAX) 400 MG tablet  Active   ALPRAZolam (XANAX) 0.5 MG Tab  Active   azelastine (ASTELIN) 137 MCG/SPRAY nasal spray  Active   calcium carbonate (TUMS CHEWY BITES) 750 MG chewable tablet  Active   dexamethasone (DECADRON) 4 MG Tab  Active   diphenhydrAMINE (BENADRYL) 25 MG capsule  Active   EPINEPHrine (EPIPEN) 0.3 MG/0.3ML Solution Auto-injector solution for injection  Active   gabapentin (NEURONTIN) 100 MG Cap  Active   lidocaine-prilocaine (EMLA) 2.5-2.5 % Cream  Active   loratadine (CLARITIN) 10 MG Tab  Active   OLANZapine (ZYPREXA) 5 MG Tab  Active   omeprazole (PRILOSEC) 20 MG delayed-release capsule  Active   ondansetron (ZOFRAN) 4 MG Tab tablet  Active   oxybutynin (DITROPAN) 5 MG Tab  Active   prochlorperazine (COMPAZINE) 10 MG Tab  Active                    ALLERGIES  Allergies   Allergen Reactions    Bee Venom Anaphylaxis    Morphine Sulfate Anaphylaxis and Itching    Tape Rash     Paper tape OK, do not use tegaderm    Pineapple Hives       PHYSICAL EXAM  VITAL SIGNS: BP 96/62   Pulse 63   Temp 36.3 °C (97.4 °F) (Temporal)   Resp 16   Ht 1.676 m (5' 6\")   Wt 96.5 kg (212 lb 11.9 oz)   LMP 02/10/2012 (Approximate) Comment: Age of first period:10, No HRT  SpO2 97%   BMI 34.34 kg/m²    Physical Exam  Vitals and nursing note reviewed.   Constitutional:       Appearance: She is " well-developed.   HENT:      Head: Normocephalic.   Eyes:      Extraocular Movements: Extraocular movements intact.      Pupils: Pupils are equal, round, and reactive to light.   Cardiovascular:      Rate and Rhythm: Normal rate and regular rhythm.   Pulmonary:      Effort: Pulmonary effort is normal.      Breath sounds: Normal breath sounds.   Abdominal:      Palpations: Abdomen is soft.      Tenderness: There is no abdominal tenderness.   Musculoskeletal:      Cervical back: Normal range of motion.      Comments: Soft tissue swelling overlying the anterior aspect of the left knee there are also overlying abrasions.  There is severe tenderness to palpation of the patella diffusely in the anteromedial and lateral joint lines   Neurological:      Mental Status: She is alert and oriented to person, place, and time.         RADIOLOGY/PROCEDURES   I have independently interpreted the diagnostic imaging associated with this visit and am waiting the final reading from the radiologist.   My preliminary interpretation is as follows: X-ray of left knee no acute process    Radiologist interpretation:  DX-KNEE 3 VIEWS LEFT   Final Result      No acute osseous abnormality.          COURSE & MEDICAL DECISION MAKING    ASSESSMENT, COURSE AND PLAN  Care Narrative: 39-year-old female presenting with severe left acute knee pain after a fall onto the knee yesterday there is severe tenderness and abrasions overlying the knee with some soft tissue swelling x-rays were obtained which were negative for joint effusion, fracture, dislocation.  I suspect there is high-grade contusion to the patella in addition to potentially the articular surface of the knee as well making it quite difficult for the patient to ambulate.  She was given crutches here and advised to follow-up with orthopedic surgery on a outpatient basis otherwise further supportive care measures were verbalized to the patient and return precautions were also given.  Narcotics  Script:In prescribing controlled substances to this patient, I certify that I have obtained and reviewed the medical history of Fela Tiwari. I have also made a good rainer effort to obtain applicable records from other providers who have treated the patient and records did not demonstrate any increased risk of substance abuse that would prevent me from prescribing controlled substances.     I have conducted a physical exam and documented it. I have reviewed Ms. Tiwari’s prescription history as maintained by the [place] Prescription Monitoring Program.     I have assessed the patient’s risk for abuse, dependency, and addiction using the validated Opioid Risk Tool available at https://www.mdcalc.com/sdtvlb-vhlq-isdm-ort-narcotic-abuse.     Given the above, I believe the benefits of controlled substance therapy outweigh the risks. The reasons for prescribing controlled substances include non-narcotic, oral analgesic alternatives have been inadequate for pain control. Accordingly, I have discussed the risk and benefits, treatment plan, and alternative therapies with the patient.           ADDITIONAL PROBLEMS MANAGED      DISPOSITION AND DISCUSSIONS  I have discussed management of the patient with the following physicians and YAEL's:      Discussion of management with other QHP or appropriate source(s): None     Escalation of care considered, and ultimately not performed:    Barriers to care at this time, including but not limited to: .     Decision tools and prescription drugs considered including, but not limited to: Pain Medications Tylenol Norco .    FINAL DIAGNOSIS  1. Contusion of left knee, initial encounter           Electronically signed by: Figueroa Nielsen M.D., 6/10/2024 5:58 PM

## 2024-06-12 ENCOUNTER — APPOINTMENT (OUTPATIENT)
Dept: RADIOLOGY | Facility: MEDICAL CENTER | Age: 39
End: 2024-06-12
Attending: SURGERY
Payer: MEDICAID

## 2024-06-12 ENCOUNTER — ANESTHESIA (OUTPATIENT)
Dept: SURGERY | Facility: MEDICAL CENTER | Age: 39
End: 2024-06-12
Payer: MEDICAID

## 2024-06-12 ENCOUNTER — HOSPITAL ENCOUNTER (OUTPATIENT)
Facility: MEDICAL CENTER | Age: 39
End: 2024-06-12
Attending: SURGERY | Admitting: SURGERY
Payer: MEDICAID

## 2024-06-12 ENCOUNTER — ANESTHESIA EVENT (OUTPATIENT)
Dept: SURGERY | Facility: MEDICAL CENTER | Age: 39
End: 2024-06-12
Payer: MEDICAID

## 2024-06-12 VITALS
RESPIRATION RATE: 16 BRPM | HEART RATE: 82 BPM | DIASTOLIC BLOOD PRESSURE: 58 MMHG | SYSTOLIC BLOOD PRESSURE: 113 MMHG | TEMPERATURE: 97.6 F | WEIGHT: 214.29 LBS | BODY MASS INDEX: 34.44 KG/M2 | HEIGHT: 66 IN | OXYGEN SATURATION: 92 %

## 2024-06-12 DIAGNOSIS — C50.811 MALIGNANT NEOPLASM OF OVERLAPPING SITES OF RIGHT BREAST IN FEMALE, ESTROGEN RECEPTOR POSITIVE (HCC): ICD-10-CM

## 2024-06-12 DIAGNOSIS — Z17.0 MALIGNANT NEOPLASM OF OVERLAPPING SITES OF RIGHT BREAST IN FEMALE, ESTROGEN RECEPTOR POSITIVE (HCC): ICD-10-CM

## 2024-06-12 DIAGNOSIS — C50.811 MALIGNANT NEOPLASM OF OVERLAPPING SITES OF RIGHT FEMALE BREAST, UNSPECIFIED ESTROGEN RECEPTOR STATUS (HCC): ICD-10-CM

## 2024-06-12 PROCEDURE — 160036 HCHG PACU - EA ADDL 30 MINS PHASE I: Performed by: SURGERY

## 2024-06-12 PROCEDURE — 700111 HCHG RX REV CODE 636 W/ 250 OVERRIDE (IP): Mod: UD | Performed by: SURGERY

## 2024-06-12 PROCEDURE — 700111 HCHG RX REV CODE 636 W/ 250 OVERRIDE (IP): Mod: UD | Performed by: STUDENT IN AN ORGANIZED HEALTH CARE EDUCATION/TRAINING PROGRAM

## 2024-06-12 PROCEDURE — 700111 HCHG RX REV CODE 636 W/ 250 OVERRIDE (IP): Mod: JZ,UD | Performed by: STUDENT IN AN ORGANIZED HEALTH CARE EDUCATION/TRAINING PROGRAM

## 2024-06-12 PROCEDURE — C1762 CONN TISS, HUMAN(INC FASCIA): HCPCS | Performed by: SURGERY

## 2024-06-12 PROCEDURE — 76098 X-RAY EXAM SURGICAL SPECIMEN: CPT | Mod: RT

## 2024-06-12 PROCEDURE — 160025 RECOVERY II MINUTES (STATS): Performed by: SURGERY

## 2024-06-12 PROCEDURE — 160002 HCHG RECOVERY MINUTES (STAT): Performed by: SURGERY

## 2024-06-12 PROCEDURE — 38792 RA TRACER ID OF SENTINL NODE: CPT | Mod: RT

## 2024-06-12 PROCEDURE — 38525 BIOPSY/REMOVAL LYMPH NODES: CPT | Mod: 51,RT | Performed by: SURGERY

## 2024-06-12 PROCEDURE — 76098 X-RAY EXAM SURGICAL SPECIMEN: CPT | Performed by: SURGERY

## 2024-06-12 PROCEDURE — 38900 IO MAP OF SENT LYMPH NODE: CPT | Mod: RT | Performed by: SURGERY

## 2024-06-12 PROCEDURE — 700101 HCHG RX REV CODE 250: Mod: UD | Performed by: SURGERY

## 2024-06-12 PROCEDURE — 160035 HCHG PACU - 1ST 60 MINS PHASE I: Performed by: SURGERY

## 2024-06-12 PROCEDURE — A9270 NON-COVERED ITEM OR SERVICE: HCPCS | Mod: UD | Performed by: STUDENT IN AN ORGANIZED HEALTH CARE EDUCATION/TRAINING PROGRAM

## 2024-06-12 PROCEDURE — 160029 HCHG SURGERY MINUTES - 1ST 30 MINS LEVEL 4: Performed by: SURGERY

## 2024-06-12 PROCEDURE — C1789 PROSTHESIS, BREAST, IMP: HCPCS | Performed by: SURGERY

## 2024-06-12 PROCEDURE — 160041 HCHG SURGERY MINUTES - EA ADDL 1 MIN LEVEL 4: Performed by: SURGERY

## 2024-06-12 PROCEDURE — 160048 HCHG OR STATISTICAL LEVEL 1-5: Performed by: SURGERY

## 2024-06-12 PROCEDURE — 700102 HCHG RX REV CODE 250 W/ 637 OVERRIDE(OP): Mod: UD | Performed by: STUDENT IN AN ORGANIZED HEALTH CARE EDUCATION/TRAINING PROGRAM

## 2024-06-12 PROCEDURE — 160046 HCHG PACU - 1ST 60 MINS PHASE II: Performed by: SURGERY

## 2024-06-12 PROCEDURE — 700105 HCHG RX REV CODE 258: Mod: UD | Performed by: SURGERY

## 2024-06-12 PROCEDURE — 700101 HCHG RX REV CODE 250: Mod: UD | Performed by: STUDENT IN AN ORGANIZED HEALTH CARE EDUCATION/TRAINING PROGRAM

## 2024-06-12 PROCEDURE — 502000 HCHG MISC OR IMPLANTS RC 0278: Performed by: SURGERY

## 2024-06-12 PROCEDURE — 110371 HCHG SHELL REV 272: Performed by: SURGERY

## 2024-06-12 PROCEDURE — 160009 HCHG ANES TIME/MIN: Performed by: SURGERY

## 2024-06-12 PROCEDURE — 88307 TISSUE EXAM BY PATHOLOGIST: CPT | Mod: 59

## 2024-06-12 PROCEDURE — 19303 MAST SIMPLE COMPLETE: CPT | Mod: 50 | Performed by: SURGERY

## 2024-06-12 DEVICE — IMPLANTABLE DEVICE: Type: IMPLANTABLE DEVICE | Site: BREAST | Status: FUNCTIONAL

## 2024-06-12 DEVICE — GRAFT MESH ADM SHAPED ALLOMEND 10CM X 18CM 1.0-2.0MM (1EA): Type: IMPLANTABLE DEVICE | Site: BREAST | Status: FUNCTIONAL

## 2024-06-12 RX ORDER — EPHEDRINE SULFATE 50 MG/ML
5 INJECTION, SOLUTION INTRAVENOUS
Status: DISCONTINUED | OUTPATIENT
Start: 2024-06-12 | End: 2024-06-12 | Stop reason: HOSPADM

## 2024-06-12 RX ORDER — OXYCODONE HCL 5 MG/5 ML
5 SOLUTION, ORAL ORAL
Status: COMPLETED | OUTPATIENT
Start: 2024-06-12 | End: 2024-06-12

## 2024-06-12 RX ORDER — HYDROMORPHONE HYDROCHLORIDE 1 MG/ML
0.5 INJECTION, SOLUTION INTRAMUSCULAR; INTRAVENOUS; SUBCUTANEOUS
Status: DISCONTINUED | OUTPATIENT
Start: 2024-06-12 | End: 2024-06-12 | Stop reason: HOSPADM

## 2024-06-12 RX ORDER — ISOSULFAN BLUE 50 MG/5ML
INJECTION, SOLUTION SUBCUTANEOUS
Status: DISCONTINUED
Start: 2024-06-12 | End: 2024-06-12 | Stop reason: HOSPADM

## 2024-06-12 RX ORDER — HALOPERIDOL 5 MG/ML
INJECTION INTRAMUSCULAR PRN
Status: DISCONTINUED | OUTPATIENT
Start: 2024-06-12 | End: 2024-06-12 | Stop reason: SURG

## 2024-06-12 RX ORDER — OXYCODONE HCL 5 MG/5 ML
10 SOLUTION, ORAL ORAL
Status: COMPLETED | OUTPATIENT
Start: 2024-06-12 | End: 2024-06-12

## 2024-06-12 RX ORDER — CEFAZOLIN SODIUM 1 G/3ML
INJECTION, POWDER, FOR SOLUTION INTRAMUSCULAR; INTRAVENOUS PRN
Status: DISCONTINUED | OUTPATIENT
Start: 2024-06-12 | End: 2024-06-12 | Stop reason: SURG

## 2024-06-12 RX ORDER — HALOPERIDOL 5 MG/ML
1 INJECTION INTRAMUSCULAR
Status: DISCONTINUED | OUTPATIENT
Start: 2024-06-12 | End: 2024-06-12 | Stop reason: HOSPADM

## 2024-06-12 RX ORDER — MIDAZOLAM HYDROCHLORIDE 1 MG/ML
INJECTION INTRAMUSCULAR; INTRAVENOUS PRN
Status: DISCONTINUED | OUTPATIENT
Start: 2024-06-12 | End: 2024-06-12 | Stop reason: SURG

## 2024-06-12 RX ORDER — ALPRAZOLAM 0.25 MG/1
.25-.5 TABLET ORAL
Status: DISCONTINUED | OUTPATIENT
Start: 2024-06-12 | End: 2024-06-12 | Stop reason: HOSPADM

## 2024-06-12 RX ORDER — DIPHENHYDRAMINE HYDROCHLORIDE 50 MG/ML
12.5 INJECTION INTRAMUSCULAR; INTRAVENOUS
Status: DISCONTINUED | OUTPATIENT
Start: 2024-06-12 | End: 2024-06-12 | Stop reason: HOSPADM

## 2024-06-12 RX ORDER — LIDOCAINE HYDROCHLORIDE 20 MG/ML
INJECTION, SOLUTION EPIDURAL; INFILTRATION; INTRACAUDAL; PERINEURAL PRN
Status: DISCONTINUED | OUTPATIENT
Start: 2024-06-12 | End: 2024-06-12 | Stop reason: SURG

## 2024-06-12 RX ORDER — ONDANSETRON 2 MG/ML
4 INJECTION INTRAMUSCULAR; INTRAVENOUS
Status: DISCONTINUED | OUTPATIENT
Start: 2024-06-12 | End: 2024-06-12 | Stop reason: HOSPADM

## 2024-06-12 RX ORDER — ROCURONIUM BROMIDE 10 MG/ML
INJECTION, SOLUTION INTRAVENOUS PRN
Status: DISCONTINUED | OUTPATIENT
Start: 2024-06-12 | End: 2024-06-12 | Stop reason: SURG

## 2024-06-12 RX ORDER — SODIUM CHLORIDE, SODIUM LACTATE, POTASSIUM CHLORIDE, CALCIUM CHLORIDE 600; 310; 30; 20 MG/100ML; MG/100ML; MG/100ML; MG/100ML
INJECTION, SOLUTION INTRAVENOUS CONTINUOUS
Status: DISCONTINUED | OUTPATIENT
Start: 2024-06-12 | End: 2024-06-12 | Stop reason: HOSPADM

## 2024-06-12 RX ORDER — HYDROMORPHONE HYDROCHLORIDE 1 MG/ML
0.2 INJECTION, SOLUTION INTRAMUSCULAR; INTRAVENOUS; SUBCUTANEOUS
Status: DISCONTINUED | OUTPATIENT
Start: 2024-06-12 | End: 2024-06-12 | Stop reason: HOSPADM

## 2024-06-12 RX ORDER — ISOSULFAN BLUE 50 MG/5ML
INJECTION, SOLUTION SUBCUTANEOUS
Status: DISCONTINUED | OUTPATIENT
Start: 2024-06-12 | End: 2024-06-12 | Stop reason: HOSPADM

## 2024-06-12 RX ORDER — ACETAMINOPHEN 500 MG
1000 TABLET ORAL ONCE
Status: COMPLETED | OUTPATIENT
Start: 2024-06-12 | End: 2024-06-12

## 2024-06-12 RX ORDER — HYDRALAZINE HYDROCHLORIDE 20 MG/ML
5 INJECTION INTRAMUSCULAR; INTRAVENOUS
Status: DISCONTINUED | OUTPATIENT
Start: 2024-06-12 | End: 2024-06-12 | Stop reason: HOSPADM

## 2024-06-12 RX ORDER — MEPERIDINE HYDROCHLORIDE 25 MG/ML
6.25 INJECTION INTRAMUSCULAR; INTRAVENOUS; SUBCUTANEOUS
Status: DISCONTINUED | OUTPATIENT
Start: 2024-06-12 | End: 2024-06-12 | Stop reason: HOSPADM

## 2024-06-12 RX ORDER — HYDROMORPHONE HYDROCHLORIDE 2 MG/ML
INJECTION, SOLUTION INTRAMUSCULAR; INTRAVENOUS; SUBCUTANEOUS PRN
Status: DISCONTINUED | OUTPATIENT
Start: 2024-06-12 | End: 2024-06-12 | Stop reason: SURG

## 2024-06-12 RX ORDER — HYDROMORPHONE HYDROCHLORIDE 1 MG/ML
0.1 INJECTION, SOLUTION INTRAMUSCULAR; INTRAVENOUS; SUBCUTANEOUS
Status: DISCONTINUED | OUTPATIENT
Start: 2024-06-12 | End: 2024-06-12 | Stop reason: HOSPADM

## 2024-06-12 RX ORDER — KETOROLAC TROMETHAMINE 15 MG/ML
INJECTION, SOLUTION INTRAMUSCULAR; INTRAVENOUS PRN
Status: DISCONTINUED | OUTPATIENT
Start: 2024-06-12 | End: 2024-06-12 | Stop reason: SURG

## 2024-06-12 RX ORDER — SODIUM CHLORIDE, SODIUM LACTATE, POTASSIUM CHLORIDE, CALCIUM CHLORIDE 600; 310; 30; 20 MG/100ML; MG/100ML; MG/100ML; MG/100ML
INJECTION, SOLUTION INTRAVENOUS CONTINUOUS
Status: ACTIVE | OUTPATIENT
Start: 2024-06-12 | End: 2024-06-12

## 2024-06-12 RX ORDER — LIDOCAINE AND PRILOCAINE 25; 25 MG/G; MG/G
CREAM TOPICAL ONCE
Status: COMPLETED | OUTPATIENT
Start: 2024-06-12 | End: 2024-06-12

## 2024-06-12 RX ORDER — DEXAMETHASONE SODIUM PHOSPHATE 4 MG/ML
INJECTION, SOLUTION INTRA-ARTICULAR; INTRALESIONAL; INTRAMUSCULAR; INTRAVENOUS; SOFT TISSUE PRN
Status: DISCONTINUED | OUTPATIENT
Start: 2024-06-12 | End: 2024-06-12 | Stop reason: SURG

## 2024-06-12 RX ORDER — SCOLOPAMINE TRANSDERMAL SYSTEM 1 MG/1
1 PATCH, EXTENDED RELEASE TRANSDERMAL
Status: DISCONTINUED | OUTPATIENT
Start: 2024-06-12 | End: 2024-06-12 | Stop reason: HOSPADM

## 2024-06-12 RX ADMIN — SODIUM CHLORIDE, POTASSIUM CHLORIDE, SODIUM LACTATE AND CALCIUM CHLORIDE: 600; 310; 30; 20 INJECTION, SOLUTION INTRAVENOUS at 10:18

## 2024-06-12 RX ADMIN — HYDROMORPHONE HYDROCHLORIDE 1 MG: 2 INJECTION INTRAMUSCULAR; INTRAVENOUS; SUBCUTANEOUS at 14:42

## 2024-06-12 RX ADMIN — HYDROMORPHONE HYDROCHLORIDE 0.5 MG: 1 INJECTION, SOLUTION INTRAMUSCULAR; INTRAVENOUS; SUBCUTANEOUS at 18:00

## 2024-06-12 RX ADMIN — KETOROLAC TROMETHAMINE 15 MG: 15 INJECTION, SOLUTION INTRAMUSCULAR; INTRAVENOUS at 17:40

## 2024-06-12 RX ADMIN — ROCURONIUM BROMIDE 30 MG: 50 INJECTION, SOLUTION INTRAVENOUS at 14:48

## 2024-06-12 RX ADMIN — PROPOFOL 150 MG: 10 INJECTION, EMULSION INTRAVENOUS at 14:48

## 2024-06-12 RX ADMIN — HYDROMORPHONE HYDROCHLORIDE 0.5 MG: 1 INJECTION, SOLUTION INTRAMUSCULAR; INTRAVENOUS; SUBCUTANEOUS at 19:22

## 2024-06-12 RX ADMIN — HYDROMORPHONE HYDROCHLORIDE 0.2 MG: 1 INJECTION, SOLUTION INTRAMUSCULAR; INTRAVENOUS; SUBCUTANEOUS at 19:10

## 2024-06-12 RX ADMIN — MIDAZOLAM HYDROCHLORIDE 2 MG: 2 INJECTION, SOLUTION INTRAMUSCULAR; INTRAVENOUS at 14:42

## 2024-06-12 RX ADMIN — OXYCODONE HYDROCHLORIDE 10 MG: 5 SOLUTION ORAL at 18:07

## 2024-06-12 RX ADMIN — DIPHENHYDRAMINE HYDROCHLORIDE 12.5 MG: 50 INJECTION, SOLUTION INTRAMUSCULAR; INTRAVENOUS at 19:25

## 2024-06-12 RX ADMIN — HYDROMORPHONE HYDROCHLORIDE 0.5 MG: 1 INJECTION, SOLUTION INTRAMUSCULAR; INTRAVENOUS; SUBCUTANEOUS at 18:07

## 2024-06-12 RX ADMIN — DEXAMETHASONE SODIUM PHOSPHATE 8 MG: 4 INJECTION INTRA-ARTICULAR; INTRALESIONAL; INTRAMUSCULAR; INTRAVENOUS; SOFT TISSUE at 14:55

## 2024-06-12 RX ADMIN — HYDROMORPHONE HYDROCHLORIDE 1 MG: 2 INJECTION INTRAMUSCULAR; INTRAVENOUS; SUBCUTANEOUS at 15:41

## 2024-06-12 RX ADMIN — CEFAZOLIN 2 G: 1 INJECTION, POWDER, FOR SOLUTION INTRAMUSCULAR; INTRAVENOUS at 14:48

## 2024-06-12 RX ADMIN — ACETAMINOPHEN 1000 MG: 500 TABLET, FILM COATED ORAL at 10:10

## 2024-06-12 RX ADMIN — HYDROMORPHONE HYDROCHLORIDE 1 MG: 2 INJECTION INTRAMUSCULAR; INTRAVENOUS; SUBCUTANEOUS at 15:14

## 2024-06-12 RX ADMIN — HALOPERIDOL LACTATE 2 MG: 5 INJECTION, SOLUTION INTRAMUSCULAR at 17:42

## 2024-06-12 RX ADMIN — LIDOCAINE HYDROCHLORIDE 100 MG: 20 INJECTION, SOLUTION EPIDURAL; INFILTRATION; INTRACAUDAL at 14:48

## 2024-06-12 RX ADMIN — Medication 50 MG: at 15:21

## 2024-06-12 RX ADMIN — LIDOCAINE AND PRILOCAINE 1 APPLICATORFUL: 25; 25 CREAM TOPICAL at 10:12

## 2024-06-12 ASSESSMENT — PAIN DESCRIPTION - PAIN TYPE
TYPE: SURGICAL PAIN

## 2024-06-12 ASSESSMENT — FIBROSIS 4 INDEX
FIB4 SCORE: 0.96
FIB4 SCORE: 0.96

## 2024-06-12 NOTE — ANESTHESIA PREPROCEDURE EVALUATION
Case: [id number] Date/Time: 06/12/24 1345    Procedures:       BILATERAL SKIN-SPARING MASTECTOMY, RIGHT SENTINEL LYMPH NODE INJECTION AND BIOPSY, POSSIBLE AXILLARY DISSECTION      BIOPSY, LYMPH NODE, SENTINEL      BILATERAL BREAST RECONSTRUCTION WITH SOFT TISSUE EXPANDERS    Pre-op diagnosis: RIGHT BREAST CANCER    Location: [facility] ROOM 28 / SURGERY SAME DAY [facility]    Surgeons: Rochelle Sullivan M.D.; Darrel Tee M.D.            Relevant Problems   CARDIAC   (positive) Hot flashes         (positive) Prerenal acute renal failure (HCC)       Physical Exam    Airway   Mallampati: II  TM distance: >3 FB  Neck ROM: full       Cardiovascular - normal exam  Rhythm: regular  Rate: normal  (-) murmur     Dental - normal exam           Pulmonary - normal exam  Breath sounds clear to auscultation     Abdominal    Neurological - normal exam                   Anesthesia Plan    ASA 2       Plan - general       Airway plan will be ETT          Induction: intravenous    Postoperative Plan: Postoperative administration of opioids is intended.    Pertinent diagnostic labs and testing reviewed    Informed Consent:    Anesthetic plan and risks discussed with patient.    Use of blood products discussed with: patient whom consented to blood products.

## 2024-06-12 NOTE — H&P
Surgery Plastic History & Physical Note    Date  2024    Primary Care Physician  Francisco Maier M.D.    CC  Scheduled bilateral mastectomy    HPI  This is a 39 y.o. female who presented with the desire for breast reconstruction after mastectomy.  She has selected an expander based reconstructive effort.    Past Medical History:   Diagnosis Date    Anesthesia 2023    wakes up disoriented and panics    Asthma 2023    exercise or illness induced, inhalers prn    Breath shortness     Only with pulmonary embolism    Bronchitis 2014    Cancer (HCC) 2023    right outer breast    Cholesterol blood decreased     Heart burn 2024    history of    Indigestion Due to sleeve    Pain 2023    right breast very tender    Personal history of venous thrombosis and embolism 2024    PE bilaterally    Psychiatric problem 2024    anxiety, panic attacks with procedures       Past Surgical History:   Procedure Laterality Date    DIEGO BY LAPAROSCOPY  2015    Performed by Dre Malone M.D. at SURGERY [facility]    GASTRIC SLEEVE LAPAROSCOPY  2014    Performed by Dre Malnoe M.D. at SURGERY [facility]    LIVER BIOPSY LAPAROSCOPIC  2014    Performed by Dre Malone M.D. at SURGERY [facility]    HYSTERECTOMY ROBOTIC  2013    LIPOSUCTION  2010    Performed by RAMON NUNN at SURGERY [facility]    LIPOSUCTION  2009    Performed by RAMON NUNN at SURGERY [facility]    TONSILLECTOMY  1997    OTHER       x2       Current Facility-Administered Medications   Medication Dose Route Frequency Provider Last Rate Last Admin    scopolamine (Transderm-Scop) patch 1 Patch  1 Patch Transdermal Q72HRS Darrel Tee M.D.        ALPRAZolam (Xanax) tablet 0.25-0.5 mg  0.25-0.5 mg Oral Pre-Op Once Rochelle Sullivan M.D.        lidocaine (Xylocaine) 1 % injection 0.5 mL  0.5 mL Intradermal Once PRN Rochelle ROQUE  ZAYNAB Sullivan        lactated ringers infusion   Intravenous Continuous Rochelle Sullivan M.D. 10 mL/hr at 24 1018 New Bag at 24 1018    ISOSULFAN BLUE 1 % SC SOLN                Social History     Socioeconomic History    Marital status: Single     Spouse name: Not on file    Number of children: Not on file    Years of education: Not on file    Highest education level: Associate degree: academic program   Occupational History    Not on file   Tobacco Use    Smoking status: Former     Current packs/day: 0.00     Average packs/day: 0.5 packs/day for 6.0 years (3.0 ttl pk-yrs)     Types: Cigarettes, Electronic Cigarettes     Start date: 2011     Quit date: 2017     Years since quittin.0    Smokeless tobacco: Never   Vaping Use    Vaping status: Former    Substances: Nicotine, THC   Substance and Sexual Activity    Alcohol use: Yes     Alcohol/week: 1.2 oz     Types: 2 Shots of liquor per week     Comment: 2 shots weekly    Drug use: Yes     Types: Inhaled     Comment: thc, daily    Sexual activity: Yes     Partners: Male     Birth control/protection: Female Sterilization     Comment: Hysterectomy   Other Topics Concern    Not on file   Social History Narrative    Not on file     Social Determinants of Health     Financial Resource Strain: Low Risk  (2023)    Overall Financial Resource Strain (CARDIA)     Difficulty of Paying Living Expenses: Not very hard   Food Insecurity: No Food Insecurity (2023)    Hunger Vital Sign     Worried About Running Out of Food in the Last Year: Never true     Ran Out of Food in the Last Year: Never true   Transportation Needs: No Transportation Needs (2023)    PRAPARE - Transportation     Lack of Transportation (Medical): No     Lack of Transportation (Non-Medical): No   Physical Activity: Insufficiently Active (2023)    Exercise Vital Sign     Days of Exercise per Week: 3 days     Minutes of Exercise per Session: 30 min    Stress: Stress Concern Present (5/31/2023)    [employer] [place] [employer] - Occupational Stress Questionnaire     Feeling of Stress : To some extent   Social Connections: Socially Isolated (5/31/2023)    Social Connection and Isolation Panel [NHANES]     Frequency of Communication with Friends and Family: More than three times a week     Frequency of Social Gatherings with Friends and Family: More than three times a week     Attends Bahai Services: Never     Active Member of Clubs or Organizations: No     Attends Club or Organization Meetings: Never     Marital Status:    Intimate Partner Violence: Not on file   Housing Stability: Unknown (5/31/2023)    Housing Stability Vital Sign     Unable to Pay for Housing in the Last Year: No     Number of Places Lived in the Last Year: Not on file     Unstable Housing in the Last Year: No       Family History   Problem Relation Age of Onset    Diabetes Other     Hypertension Other     Cancer Other        Allergies  Bee venom, Morphine sulfate, Tape, and Pineapple    Review of Systems  Negative    Physical Exam    Vital Signs  Blood Pressure: 113/75   Temperature: 36.4 °C (97.6 °F)   Pulse: (!) 58   Respiration: 18   Pulse Oximetry: 97 %     PERRL, EOMI  Neck is supple  Lungs are clear  Breasts are asymmetric  Abd is soft  Extremities are unremarkable, small lesion    Labs:                    Radiology:  NM-INJ TRACER ID SENTINAL NODE RIGHT   Final Result      Successful injection of radionuclide at four locations surrounding the RIGHT breast areola.      MA-SURGICAL SPECIMEN (BREAST) RIGHT    (Results Pending)   MA-SURGICAL SPECIMEN (BREAST) RIGHT    (Results Pending)         Assessment/Plan:  Acquired absence of bilateral breast and  nipple for bilateral breast reconstruction with soft tissue expanders  Procedure(s):  BILATERAL SKIN-SPARING MASTECTOMY, RIGHT SENTINEL LYMPH NODE INJECTION AND BIOPSY, POSSIBLE AXILLARY DISSECTION  BIOPSY, LYMPH NODE,  SENTINEL  BILATERAL BREAST RECONSTRUCTION WITH SOFT TISSUE EXPANDERS

## 2024-06-12 NOTE — ANESTHESIA PROCEDURE NOTES
Airway    Date/Time: 6/12/2024 2:50 PM    Performed by: Raffi Retana M.D.  Authorized by: Raffi Retana M.D.    Location:  OR  Urgency:  Elective  Indications for Airway Management:  Anesthesia      Spontaneous Ventilation: absent    Sedation Level:  Deep  Preoxygenated: Yes    Patient Position:  Sniffing  Mask Difficulty Assessment:  1 - vent by mask  Final Airway Type:  Endotracheal airway  Final Endotracheal Airway:  ETT  Cuffed: Yes    Technique Used for Successful ETT Placement:  Direct laryngoscopy    Insertion Site:  Oral  Blade Type:  Steven  Laryngoscope Blade/Videolaryngoscope Blade Size:  3  ETT Size (mm):  7.0  Measured from:  Teeth  ETT to Teeth (cm):  20  Placement Verified by: auscultation and capnometry    Cormack-Lehane Classification:  Grade IIa - partial view of glottis  Number of Attempts at Approach:  1

## 2024-06-13 ENCOUNTER — TELEPHONE (OUTPATIENT)
Dept: SURGERY | Facility: MEDICAL CENTER | Age: 39
End: 2024-06-13
Payer: MEDICAID

## 2024-06-13 LAB — PATHOLOGY CONSULT NOTE: NORMAL

## 2024-06-13 ASSESSMENT — PAIN SCALES - GENERAL: PAIN_LEVEL: 4

## 2024-06-13 NOTE — DISCHARGE INSTRUCTIONS
Light activity with upper body/arms as tolerated  Empty PAULINE drains and record output  OK to remove compressive garment in 48 hours to shower but leave tegaderm on incisions and over drain tubes until seen by Dr. Tee.  Replace compressive garment after shower.  OK to loosen or adjust compressive garment for comfort  Call Dr. Tee's office with any questions or concerns [phone]     If any questions arise, call your provider.  If your provider is not available, please feel free to call the Surgical Center at [phone].    MEDICATIONS: Resume taking daily medication.  Take prescribed pain medication with food.  If no medication is prescribed, you may take non-aspirin pain medication if needed.  PAIN MEDICATION CAN BE VERY CONSTIPATING.  Take a stool softener or laxative such as senokot, pericolace, or milk of magnesia if needed.    Last pain medication given: 6:07 pm (Oxycodone 10 mg). Use this time to dose your next pain medication based on the frequency as prescribed.     What to Expect Post Anesthesia    Rest and take it easy for the first 24 hours.  A responsible adult is recommended to remain with you during that time.  It is normal to feel sleepy.  We encourage you to not do anything that requires balance, judgment or coordination.    FOR 24 HOURS DO NOT:  Drive, operate machinery or run household appliances.  Drink beer or alcoholic beverages.  Make important decisions or sign legal documents.    To avoid nausea, slowly advance diet as tolerated, avoiding spicy or greasy foods for the first day.  Add more substantial food to your diet according to your provider's instructions.  Babies can be fed formula or breast milk as soon as they are hungry.  INCREASE FLUIDS AND FIBER TO AVOID CONSTIPATION.    MILD FLU-LIKE SYMPTOMS ARE NORMAL.  YOU MAY EXPERIENCE GENERALIZED MUSCLE ACHES, THROAT IRRITATION, HEADACHE AND/OR SOME NAUSEA.

## 2024-06-13 NOTE — ANESTHESIA POSTPROCEDURE EVALUATION
Patient: Fela Tristaniott    Procedure Summary       Date: 06/12/24 Room / Location: [facility] ROOM 28 / SURGERY SAME DAY [facility]    Anesthesia Start: 1442 Anesthesia Stop: 1755    Procedures:       BILATERAL SKIN-SPARING MASTECTOMY, RIGHT SENTINEL LYMPH NODE INJECTION AND BIOPSY (Bilateral: Breast)      BIOPSY, LYMPH NODE, SENTINEL (Right: Axilla)      BILATERAL BREAST RECONSTRUCTION WITH SOFT TISSUE EXPANDERS (Bilateral: Breast) Diagnosis: (RIGHT BREAST CANCER; ACQUIRED ABSENCE OF BILATERAL BREASTS AND NIPPLES)    Surgeons: Rochelle Sullivan M.D.; Darrel Tee M.D. Responsible Provider: Raffi Retana M.D.    Anesthesia Type: general ASA Status: 2            Final Anesthesia Type: general  Last vitals  BP   Blood Pressure: 113/58    Temp   36.4 °C (97.6 °F)    Pulse   82   Resp   16    SpO2   92 %      Anesthesia Post Evaluation    Patient location during evaluation: PACU  Patient participation: complete - patient participated  Level of consciousness: awake and alert  Pain score: 4    Airway patency: patent  Anesthetic complications: no  Cardiovascular status: hemodynamically stable  Respiratory status: acceptable  Hydration status: euvolemic    PONV: none          No notable events documented.     Nurse Pain Score: 4 (NPRS)

## 2024-06-13 NOTE — TELEPHONE ENCOUNTER
Patient is doing well with minimal pain, relieved with oxycodone. No fevers, chills, sweats, N/V. I advised her to continue wearing compression to reduce swelling and fluid buildup. I advised her that she can ice the area, but to be mindful that she is numb in that area, and to cover the ice pack with a towel to prevent frostbite. Furthermore, I advised her to only ice for 10-15 minute intervals and give her bday a break in between. I reminded her of her postop appointment scheduled for 06/18/2024 at 11:45am. All patient questions answered. She was appreciative of the call.

## 2024-06-13 NOTE — OP REPORT
DATE OF SERVICE:  06/12/2024     PREOPERATIVE DIAGNOSIS:  Acquired absence of bilateral breasts and nipples.     POSTOPERATIVE DIAGNOSIS:  Acquired absence of bilateral breasts and nipples.     PROCEDURE PERFORMED:  Bilateral breast reconstruction utilizing a 650 mL   medium height soft tissue expanders.     SURGEON:  Darrel Tee MD.     ASSISTANT:  None.     ANESTHESIOLOGIST:  Raffi Retana MD.     OPERATIVE INDICATIONS:  A 39-year-old female.  Scheduled for bilateral   mastectomies.  We had discussion in my office about the risks, benefits and   alternatives of breast reconstruction.  She selected an implant-based   reconstructive option.  This was confirmed with the patient in the   preoperative holding area.  The patient was marked in the preoperative holding   area.  The patient consented to proceed with surgery.     OPERATIVE DESCRIPTION:  After induction of general endotracheal anesthesia,   the patient's chest was prepped and draped sterilely.  Dr. Gaviria went first   and her portion is dictated separately.  At the conclusion of Dr. Gaviria's   procedure, skin flaps were uniform and healthy and there was no   contraindication to proceeding with breast reconstruction and therefore we did   proceed.  The pectoralis major muscle was divided inferiorly and   inferomedially under direct vision using electrocautery.  Two symmetric   subpectoral pockets were made under direct vision using electrocautery.  The   pockets were made hemostatic.  The pockets were irrigated with antibiotic   saline solution.  Two 650 mL medium height expanders were selected.  They were   evacuated of air.  They were bathed in antibiotic saline solution.  The   antibiotic saline solution was used in the pocket as well.  The soft tissue   expanders were placed using a no-touch technique.  They were oriented to be   symmetric.  They were held in the proper orientation using the suture tabs   securing them to the chest wall.   Further antibiotic irrigation was performed.    Two pieces of precut perforated AlloMend were selected.  They were bathed in   antibiotic saline solution.  They were cut to more precisely fit the defect.    Further antibiotic irrigation was performed.  The AlloMend was inset using   running 2-0 Vicryl suture.  Further antibiotic irrigation was performed.  Two   10 flat Rikki drains were brought out through separate stab incisions.  Wounds   were closed in layers.  At the conclusion of the closure, skin flaps were   healthy.  Two 100 mL of saline was administered through a closed fill system   to both sides.  Sterile dressings and a compressive garment were applied.  The   patient was extubated and taken to recovery room in good condition.        ______________________________  MD ADALI MORALES/ALIRIO    DD:  06/12/2024 18:00  DT:  06/12/2024 18:11    Job#:  [id number]

## 2024-06-13 NOTE — OP REPORT
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  4 sentinel nodes identified and excised, with no gross abnormalities in the axilla.  Closure, tissue expanders, and drains as per Dr Tee.    Indications: eFla Tiwari is a 39 y.o. female who was diagnosed with right breast cancer on 12/07/2023.  She underwent neoadjuvant chemotherapy and wished to undergo bilateral mastectomy with immediate reconstruction.  A thorough discussion of the indications, alternatives, risks, and benefits to bilateral skin sparing mastectomy, right axillary sentinel lymph node biopsy, possible right axillary dissection was held with the patient.  All questions were answered in detail.  Informed consent was signed and placed in the chart.    Description of Operation:  The patient was brought into the operating room and placed supine on the operating table.  Preoperative antibiotics were administered and sequential compression devices were placed for venous thromboembolic prophylaxis.  Smooth  general endotracheal anesthesia was induced.  A preoperative timeout was performed confirming the patient's identity and the nature and location of the operation to be performed.  The gamma probe was used to interrogate the right axilla; the area of greatest radioactivity uptake was marked on the skin.  The right breast was then prepped with alcohol.  4 mL of isosulfan blue dye were injected into the right subareolar plexus.  The breast was then massaged for 5 minutes to facilitate lymphatic uptake.    The bilateral breasts and right axilla were then prepped and draped in the usual sterile fashion using chlorhexidine.  The left breast was approached first.  The planned breast incision was in a standard skin-sparing elliptical fashion including removal of the nipple-areolar complex.  The incision was made sharply and continued into the subcutaneous tissue with electrocautery.  The superior mastectomy flap was raised first.  This was performed with electrocautery, taking care to preserve the vascular supply to the overlying dermis.  This was continued to the level of the clavicle superiorly, the lateral border of pectoralis major laterally, and the lateral border of the sternum medially.  The inferior flap was then raised in the same fashion, again taking care to preserve the vascular supply to the dermis, to just below the inframammary fold.  The breast was then dissected free from the underlying pectoralis major, taking the pectoralis fascia as the posterior margin.  The specimen was oriented with Correct Clips and was submitted to pathology.  The wound was then inspected for hemostasis, which was achieved with electrocautery.  Dr Tee of plastic surgery then entered the case to begin immediate tissue expander reconstruction on the left.    Attention was then turned to the right breast.  The planned breast incision was in a standard skin-sparing elliptical fashion including removal of the nipple-areolar complex.   The incision was made sharply and continued into the subcutaneous tissue with electrocautery.  The superior mastectomy flap was raised first.  This was performed with electrocautery, taking care to preserve the vascular supply to the overlying dermis.  This was continued to the level of the clavicle superiorly, the lateral border of pectoralis major laterally, and the lateral border of the sternum medially.  The inferior flap was then raised in the same fashion, again taking care to preserve the vascular supply to the dermis, to just below the inframammary fold.  The breast was then dissected free from the underlying pectoralis major, taking the pectoralis fascia as the posterior margin.  The specimen was oriented with Correct Clips and was submitted for specimen radiograph.  This demonstrated capture of the two biopsy markers associated with the known malignancy (Stefany and seth-shaped markers) and adequate apparent margins in all orientations.  The specimen was then submitted to pathology.  The wound was then inspected for hemostasis, which was achieved with electrocautery.  The wound was then irrigated thoroughly with warm sterile saline.  The effluent was clear.      Attention was then turned to the right axilla, which was accessible through the mastectomy incision.  The clavipectoral fascia was identified at the lateral border of the pectoralis major and was divided with electrocautery.  The gamma probe was used to identify areas of radioactivity and the axilla was manually explored for blue dye.  A total of 4 sentinel nodes were identified and dissected free from the surrounding axillary tissue using the Harmonic scalpel, as follows:  [place] node #1: hot (246 counts per second of ex vivo radioactivity), blue, level 1 of the axilla.  [place] node #2: hot (285 counts per second of ex vivo radioactivity), blue, level 1 of the axilla.  [place] node #3: hot (57 counts per second of ex vivo radioactivity), level 2  of the axilla.  [place] node #4: hot (88 counts per second of ex vivo radioactivity), blue, level 1 of the axilla.  The background radioactivity count of the axilla was 0 counts per second.  There were no other areas of blue dye staining.  The incision was then inspected for hemostasis, which was achieved with electrocautery.  Care of the patient was then turned over to Dr Tee for immediate reconstruction on the right side.    At the time of this operative report the patient remains under general anesthesia and the care of Dr Tee.  It is anticipated that she will be able to be extubated and be taken to the postanesthesia care unit in stable condition.  Estimated blood loss was 100 mL for my portion of the operation.  The patient tolerated the procedure well with no immediate complications.  It is anticipated that the patient will be able to discharge to home when she meets PACU discharge criteria after pain control is assured and drain teaching has been completed.     [place] Node Biopsy for Breast Cancer - Right  Operation performed with curative intent. Yes   Tracer(s) used to identify sentinel nodes in the upfront surgery (non-neoadjuvant) setting (select all that apply). N/A   Tracer(s) used to identify sentinel nodes in the neoadjuvant setting (select all that apply). Dye and Radioactive tracer   All nodes (colored or non-colored) present at the end of a dye-filled lymphatic channel were removed. Yes   All significantly radioactive nodes were removed. Yes   All palpably suspicious nodes were removed. N/A   Biopsy-proven positive nodes marked with clips prior to chemotherapy were identified and removed. N/A

## 2024-06-13 NOTE — ANESTHESIA TIME REPORT
Anesthesia Start and Stop Event Times       Date Time Event    6/12/2024 1430 Ready for Procedure     1442 Anesthesia Start     1755 Anesthesia Stop          Responsible Staff  06/12/24      Name Role Begin End    Raffi Retana M.D. Anesth 1442 1750          Overtime Reason:  no overtime (within assigned shift)    Comments:

## 2024-06-13 NOTE — OR NURSING
1753: Pt arrived from OR to PACU 1. Connected to monitor. Report received from anesthesia & RN. VSS. Oxygen at 6L via mask with oral airway in place. Breaths calm, even, and unlabored.     Telfa & transparent dressings to bilateral breasts with ace wrap and bilateral PAULINE drains; clean, dry and intact.     1800 oral airway dc'd without difficulty. Patient tearful, restless, and guarding chest. Medicated for pain, see MAR.     1847: Attempted to update s.o. via phone call- will attempt again in 10 minutes.     1903 left voicemail with callback instructions for s.o. lorena.     1910 Lorena given status update and instructions to come to lobby; eta 20 min.     1914 Patient medicated additionally for pain.     1920 sig other brought to bedside.     1927 Discharge instructions reviewed with patient and sig other at bedside by Samantha WOMACK RN, including drain education with return demonstration. Pt provided with drain chart record and specimen cups. All questions answered, verbalized understanding.     2011 Patient agreeable to discharge and meets all criteria. Pt assisted by RN and Lorena to change into own clothing. Mastectomy camisole and shower lanyard given to patient.     2028 IV and ID bands removed. Pt escorted to car via wheelchair with discharge instructions and all personal belongings, accompanied by RN and family.

## 2024-06-17 RX ORDER — 0.9 % SODIUM CHLORIDE 0.9 %
10 VIAL (ML) INJECTION PRN
Status: CANCELLED | OUTPATIENT
Start: 2024-06-21

## 2024-06-17 RX ORDER — DIPHENHYDRAMINE HYDROCHLORIDE 50 MG/ML
50 INJECTION INTRAMUSCULAR; INTRAVENOUS PRN
Status: CANCELLED | OUTPATIENT
Start: 2024-06-21

## 2024-06-17 RX ORDER — ONDANSETRON 8 MG/1
8 TABLET, ORALLY DISINTEGRATING ORAL PRN
Status: CANCELLED | OUTPATIENT
Start: 2024-06-21

## 2024-06-17 RX ORDER — 0.9 % SODIUM CHLORIDE 0.9 %
3 VIAL (ML) INJECTION PRN
Status: CANCELLED | OUTPATIENT
Start: 2024-06-21

## 2024-06-17 RX ORDER — SODIUM CHLORIDE 9 MG/ML
INJECTION, SOLUTION INTRAVENOUS CONTINUOUS
Status: CANCELLED | OUTPATIENT
Start: 2024-06-21

## 2024-06-17 RX ORDER — PROCHLORPERAZINE MALEATE 10 MG
10 TABLET ORAL EVERY 6 HOURS PRN
Status: CANCELLED | OUTPATIENT
Start: 2024-06-21

## 2024-06-17 RX ORDER — METHYLPREDNISOLONE SODIUM SUCCINATE 125 MG/2ML
125 INJECTION, POWDER, LYOPHILIZED, FOR SOLUTION INTRAMUSCULAR; INTRAVENOUS PRN
Status: CANCELLED | OUTPATIENT
Start: 2024-06-21

## 2024-06-17 RX ORDER — 0.9 % SODIUM CHLORIDE 0.9 %
VIAL (ML) INJECTION PRN
Status: CANCELLED | OUTPATIENT
Start: 2024-06-21

## 2024-06-17 RX ORDER — EPINEPHRINE 1 MG/ML(1)
0.5 AMPUL (ML) INJECTION PRN
Status: CANCELLED | OUTPATIENT
Start: 2024-06-21

## 2024-06-17 RX ORDER — ONDANSETRON 2 MG/ML
4 INJECTION INTRAMUSCULAR; INTRAVENOUS PRN
Status: CANCELLED | OUTPATIENT
Start: 2024-06-21

## 2024-06-17 NOTE — PROGRESS NOTES
"    Subjective    Delightful 39F s/p bilateral SSM/R axillary SLNBx 06/12/2024, here for postop check and review of pathology.  Since surgery she reports no acute issues, but some pain.  Drain output ~130mL/day on each side.    Pathology:  Complete pathologic response, no positive lymph nodes.  The patient was given a copy of her pathology report.    Objective    /76 (BP Location: Left arm, Patient Position: Sitting, BP Cuff Size: Large adult)   Pulse 69   Temp 36.2 °C (97.2 °F) (Temporal)   Ht 1.676 m (5' 6\")   Wt 97.5 kg (215 lb)   LMP 02/10/2012 (Approximate) Comment: Age of first period:10, No HRT  SpO2 96%   BMI 34.70 kg/m²   Gen: Alert, oriented, pleasant, no acute distress  Chest: Respirations unlabored on room air with symmetric expansion  Abd: Soft, nondistended  Ext: Warm, well-perfused. Resolving blistering on the left thigh consistent with reaction to the grounding pad adhesive.  Breasts: Bilateral transverse skin-sparing mastectomy incisions clean, dry, intact with dressings in place.  No erythema.  No exudate.  No palpable masses.  No palpable fluctuance.  No ecchymosis.  Expected contour.  Drains in place with small volume serous output.  Two very small areas of skin necrosis on the right (far medial and axillary tail) that looks like they will probably heal well with secondary intention.    Assessment & Plan    Delightful 39F s/p B SSM/R SLNBx for IDC, overall doing well postoperatively.    - Referrals: No new referrals needed. Does not require postmastectomy radiation.   - Return to clinic: 1 month for wound check.   - Next clinical breast exam: 12/2024, 06/2025   - Next breast imaging: No routine breast imaging needed s/p bilateral mastectomy    Problem   Malignant Neoplasm of Upper-Outer Quadrant of Right Breast in Female, Estrogen Receptor Negative (Hcc)    Right breast 10:00 4cmFN IDC, G3, ER-[place]-HER2+, Ki67 30%, [id number] (suyapa/prog IIA) --> ypT0N0 (pCR)  - US-guided CNBx " 12/07/2023  - Neoadjuvant TCHP x5 cycles ([place], poorly tolerated, last cytotoxic dose 04/19/2024)  - Apparent complete response on MRI 05/08/2024  - Genetics negative for actionable mutation     - Deleterious NTHL1 mutation: p.Q90* (single allele, autosomal recessive, assoc colon cancer)  - Bilateral skin-sparing mastectomy, right axillary sentinel node biopsy 06/12/2024 ([place])     - Immediate TE reconstruction ([place])          Cancer Staging   Malignant neoplasm of upper-outer quadrant of right breast in female, estrogen receptor negative (HCC)  Staging form: Breast, AJCC 8th Edition  - Clinical stage from 12/14/2023: Stage IIA (cT2, cN0, cM0, G3, ER-, [place]-, HER2+) - Signed by Rochelle Sullivan M.D. on 12/14/2023  - Pathologic stage from 6/17/2024: ypT0, pN0(sn), cM0, G3, ER-, IN-, HER2+ - Signed by Rochelle Sullivan M.D. on 6/17/2024

## 2024-06-18 ENCOUNTER — OFFICE VISIT (OUTPATIENT)
Dept: SURGERY | Facility: MEDICAL CENTER | Age: 39
End: 2024-06-18
Payer: MEDICAID

## 2024-06-18 VITALS
SYSTOLIC BLOOD PRESSURE: 110 MMHG | HEART RATE: 69 BPM | TEMPERATURE: 97.2 F | OXYGEN SATURATION: 96 % | WEIGHT: 215 LBS | DIASTOLIC BLOOD PRESSURE: 76 MMHG | BODY MASS INDEX: 34.55 KG/M2 | HEIGHT: 66 IN

## 2024-06-18 DIAGNOSIS — Z17.1 MALIGNANT NEOPLASM OF UPPER-OUTER QUADRANT OF RIGHT BREAST IN FEMALE, ESTROGEN RECEPTOR NEGATIVE (HCC): ICD-10-CM

## 2024-06-18 DIAGNOSIS — C50.411 MALIGNANT NEOPLASM OF UPPER-OUTER QUADRANT OF RIGHT BREAST IN FEMALE, ESTROGEN RECEPTOR NEGATIVE (HCC): ICD-10-CM

## 2024-06-18 PROCEDURE — 99024 POSTOP FOLLOW-UP VISIT: CPT | Performed by: SURGERY

## 2024-06-18 RX ORDER — OXYCODONE AND ACETAMINOPHEN 7.5; 325 MG/1; MG/1
1 TABLET ORAL EVERY 6 HOURS PRN
COMMUNITY
Start: 2024-06-14

## 2024-06-18 ASSESSMENT — FIBROSIS 4 INDEX: FIB4 SCORE: 0.96

## 2024-06-20 ENCOUNTER — HOSPITAL ENCOUNTER (OUTPATIENT)
Dept: HEMATOLOGY ONCOLOGY | Facility: MEDICAL CENTER | Age: 39
End: 2024-06-20
Attending: INTERNAL MEDICINE
Payer: MEDICAID

## 2024-06-20 VITALS
BODY MASS INDEX: 33.94 KG/M2 | WEIGHT: 211.2 LBS | HEART RATE: 59 BPM | HEIGHT: 66 IN | DIASTOLIC BLOOD PRESSURE: 62 MMHG | OXYGEN SATURATION: 97 % | TEMPERATURE: 97.9 F | SYSTOLIC BLOOD PRESSURE: 94 MMHG

## 2024-06-20 DIAGNOSIS — C50.411 MALIGNANT NEOPLASM OF UPPER-OUTER QUADRANT OF RIGHT BREAST IN FEMALE, ESTROGEN RECEPTOR NEGATIVE (HCC): ICD-10-CM

## 2024-06-20 DIAGNOSIS — Z17.1 MALIGNANT NEOPLASM OF UPPER-OUTER QUADRANT OF RIGHT BREAST IN FEMALE, ESTROGEN RECEPTOR NEGATIVE (HCC): ICD-10-CM

## 2024-06-20 PROCEDURE — 99212 OFFICE O/P EST SF 10 MIN: CPT | Performed by: INTERNAL MEDICINE

## 2024-06-20 PROCEDURE — 99214 OFFICE O/P EST MOD 30 MIN: CPT | Performed by: INTERNAL MEDICINE

## 2024-06-20 ASSESSMENT — ENCOUNTER SYMPTOMS
DIARRHEA: 0
COUGH: 1
DIAPHORESIS: 0
FEVER: 0
CHILLS: 0
VOMITING: 0
TINGLING: 1
WEIGHT LOSS: 1
CONSTIPATION: 0
DIZZINESS: 0
SORE THROAT: 0
HEADACHES: 0
PALPITATIONS: 0
MYALGIAS: 0
NAUSEA: 0
SHORTNESS OF BREATH: 0

## 2024-06-20 ASSESSMENT — FIBROSIS 4 INDEX: FIB4 SCORE: 0.96

## 2024-06-20 ASSESSMENT — PAIN SCALES - GENERAL: PAINLEVEL: 9=SEVERE PAIN

## 2024-06-20 NOTE — PROGRESS NOTES
"Pharmacy Chemotherapy Verification Note:    Patient Name: Fela Tiwari      Dx: Breast CA, Stage IIA, ER-, MA-, HER2+       Protocol: [facility]->       *Dosing Reference*  Pertuzumab 840 mg IV over 60 minutes on Day 1 of Cycle 1 followed by  Pertuzumab 420 mg IV over 30 minutes on Day 1 of Cycles 2-6  Trastuzumab 8 mg/kg IV over 90 minutes on Day 1 of Cycle 1 followed by  Trastuzumab 6 mg/kg IV over 30 minutes on Day 1 of Cycles 2-6 followed by              2/16/24 - Dose reduced to 60 mg/m2 for tolerance starting with C2              4/19/24 - Removed from treatment plan starting with C5 due to worsening peripheral neuropathy              2/16/24 - Dose reduced to AUC 4.5 for tolerance starting with C2              Removed from treatment plan starting Cycle 3  Cyclophosphamide 600 mg/m2 IV over 30 minutes on Day 1              3/29/24 - Added starting C4 to replace carboplatin due to intolerance  21-day cycle for 6 cycles (completed 5/10/24 after 5 cycles for evleyn ance)  ~Followed by~  Pertuzumab 420 mg IV over 30 minutes on Day 1 beginning with Week 19  Trastuzumab 6 mg/kg IV over 30 minutes on Day 1 beginning with Week 19  21-day cycle to complete 52 weeks total of trastuzumab and pertuzumab therapy    NCCN Guidelines for Breast Cancer V.2.2022.  Josef A, et al. Gay Oncol. 2013;24(9):2278-84.    Allergies:  Bee venom, Morphine sulfate, Tape, and Pineapple     BP 99/68   Pulse 67   Temp 36.7 °C (98 °F) (Temporal)   Resp 18   Ht 1.68 m (5' 6.14\")   Wt 94.3 kg (207 lb 14.3 oz)   LMP 02/10/2012 (Approximate) Comment: Age of first period:10, No HRT  SpO2 95%   BMI 33.41 kg/m²  Body surface area is 2.1 meters squared.    No labs required  ECHO 5/13/24: LVEF 69%     Drug Order   (Drug name, dose, route, IV Fluid & volume, frequency, number of doses) Cycle: 8 (cycle 3 of HP)      Previous treatment: C7 = 5/31/24     Medication = trastuzumab-anns (Kanjinti)  Base Dose = 6 mg/kg  Calc Dose: Base Dose " x 94.3 kg = 565.8 mg  Final Dose = 592 mg  Route = IV  Fluid & Volume =  mL  Admin Duration = Over 30-90 minutes          <10% difference, ok to treat with final written dose]   Medication = pertuzumab (Perjeta)  Base Dose = 420 mg fixed dose  Calc Dose: fixed dose = no calculations  Final Dose = 420 mg fixed dose  Route = IV  Fluid & Volume =  mL  Admin Duration = Over 30 minutes          <10% difference, ok to treat with final written dose     By my signature below, I confirm this process was performed independently with the BSA and all final chemotherapy dosing calculations congruent. I have reviewed the above chemotherapy order and that my calculation of the final dose and BSA (when applicable) corroborate those calculations of the  pharmacist.     Griselda Blandon, PharmD, BCOP

## 2024-06-20 NOTE — PROGRESS NOTES
Subjective     Fela Tiwari is a 39 y.o. female who presents with Breast Cancer          HPI    Referring Physician: Rochelle Gaviria MD   Primary Care:  Francisco Maier M.D.      Diagnosis: Invasive ductal carcinoma of the right breast, grade 3, clinically stage IIa (cT2, cN0) ER negative MA negative, HER2 3+ positive, Ki-67 20 to 30%      Chief Complaint: Patient seen today for evaluation of her ER negative HER2 positive breast cancer, for continued monitoring of symptoms and side effects of cancer treatments, employing Herceptin and Perjeta.     Oncology history of presenting illness:  Fela Tiwari is a 38 y.o. likely premenopausal white female who self detected a mass in her lateral right breast in 2023.  She had a mammogram on 2023 which showed the palpable mass is likely malignant.  Ultrasound showed it was 2.6 cm irregular hypoechoic spiculated mass.  Left breast was normal.  She underwent a ultrasound-guided biopsy of the right breast on 2023: This revealed an invasive ductal carcinoma, grade 3, ER negative, MA negative, HER2 3+ by IHC, Ki-67 20 to 30%.  An MRI was done yesterday and results are pending.  She had a hysterectomy in  but her ovaries are in place.  She is  3 para 2 and has not taken any hormone replacement therapy.  No family history of breast or ovarian cancer.  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  She was on anticoagulation for a while but has been off for several years.  She has also had gastric sleeve laparoscopic and cholecystectomy and does note chronic nausea.      Interval histories:  Interval history 24 (CAlsop, APRN):  Patient had a difficult time with her first cycle of chemotherapy.  Day after she received the Udenyca shot she presented to the emergency department with severe pain.  Pain was excruciating throughout her entire body.  She was given IV Dilaudid in  the hospital and discharged with Tylenol.  She has been trying to take 1000 g of Tylenol with no relief.  Patient tearful today due to the severity of pain.  She also had experienced blood when urinating.  She did have a positive occult blood on stool and urine during hospitalization.  She was asked to follow-up with urology in the outpatient setting.  She stated since being discharged from the hospital she has not had any blood whatsoever in the urine or will.  She has been experiencing diarrhea anywhere from 5-10 episodes per day likely related to Perjeta.  She has been taking Imodium, approximately 2-3 pills per day with minimal relief.  She does have nausea and vomiting but she does state that Zofran does help.  She does also note thrush which was appreciated on physical examination today.     Interval history 2/15/2024: She had a terrible time with her first cycle of TCHP as noted above.  Her diarrhea has slowed dramatically but she still is using Lomotil occasionally.  She also had severe eye tearing likely due to the Taxotere which persists although it is better.  She has no neuropathy but did have a rash which responded to corticosteroids.  She also had persistent nausea which has finally resolved.  Her thrush is resolved as well.  She has not had any further vaginal bleeding but this has not been evaluated adequately except for a bladder scan which as expected was negative.  She feels like her tumor shrunk some.      Interval history 03/07/24 (Basim, APRN):  Patient still had difficult time with cycle 2.  She did end up in the ER the day after receiving treatment with bleeding again.  She did have bleeding with her first cycle and did end up being seen by urology and underwent a cystoscopy which was negative.  She stated that the bleeding lasted approximately 3-4 days.  She did experience significant nausea and vomiting for the first 2 weeks.  She also had severe diarrhea up to 10 times per day for the  first 2 weeks.  She did take Lomotil with no improvement.  She has noticed some bleeding from the rectum which is from her hemorrhoids with the diarrhea.  She did state that she was given the olanzapine to take for the first 5 days after treatment which did help immensely with her nausea and vomiting as well as she believes to help with her diarrhea as well as sleep.  When she stopped that medication the symptoms returned.  She is noticing her vision feels a little differently.  Her eyes are very dry and watery.  We attempted to give patient days of Zarzio for neutropenia support as patient had significant pain with the Udenyca shot.  She stated that she did not notice any improvement or difference as she did still have the severe myalgias that lasted approximately 2 weeks with the Zarzio injection.  She did state this last week she has felt back to normal.  She is anxious to receive her next cycle of chemo.     Interval history 03/28/24 (CAlsop, APRN):  Patient tolerated her last cycle well as cycle 3 we held carboplatin and awaited approval for Cytoxan.  We also held Udenyca.  She does still have diarrhea noted but that is controlled with Lomotil.  She is noticing some bilateral flank pain but no other urinary symptoms.  With her last cycle she did not experience any blood in the urine/vaginal/rectal area.  She does have some fatigue and has noticed increase in hot flashes.  Hot flashes are quite bothersome to her at this time.  She also noticed peripheral neuropathy in her toes, worse at night.  She is unable to let the covers to touch her feet.     Interval history 04/18/24 (CAlsop, APRN):  Patient biggest complaint at this time as peripheral neuropathy in her feet.  She has the pain, burning and numbness.  She was taking gabapentin 300 mg at night but did take 2 tablets last night because her feet were severe which did help.  She has noticed some nausea and vomiting worse with this past cycle likely with the  addition of Cytoxan.  She does note she has consistently loose bowel movements, but no significant diarrhea.  She does have some hemorrhoids and does have pain with bowel movements at times.  She still has a hot flashes and did take the oxybutynin but she stated that she thought it made him worse.  She stopped taking the oxybutynin and noticed that her hot flashes are better.  She may be getting some relief from the gabapentin.  She also noticed some blisters in her mouth but the mouth rinses did help.  No mouth sores at this time.  She does complain of significant myalgias and arthralgias from the Udenyca shot that last about 3 days.  However she does continue to have the myalgias and arthralgias up till today still present.    Interval history 5/9/2024: Her peripheral neuropathy is better which she attributes in part to reduce chemo and provide to compression stockings that she is using. GI symptoms were much better with the last cycle. She has been in a clinical complete response and we are awaiting MRI results from yesterday to determine whether she needs more chemotherapy or simply HP for cycle 6. She is planning to do bilateral mastectomies with nipple sparing reconstructions.     Interval history 05/30/24 (CAlsop, APRN):  Is doing well.  She tolerated the last cycle well with Herceptin and Perjeta only.  She does still have diarrhea but states is not bothersome.  She was on gabapentin for hot flashes as well as peripheral neuropathy, but she felt like the gabapentin was making her hot flashes worse.  She stopped the gabapentin altogether and stated that her hot flashes have completely resolved.  She does still have some numbness in her toes but nothing like it has been before.  She wears compression socks at night with positive results.  She did get a sinus infection few weeks ago and took 10-day course of Augmentin.  She does still have some ear clogging but clinical examination does not show any abnormal  findings.    Interval history 6/20/2024: She underwent bilateral mastectomies on 6/12/2024.  The right breast showed no residual invasive or in situ carcinoma.  15 sentinel lymph nodes were negative in 4 clusters removed.  Postoperative course has been somewhat difficult for pain and she is emotionally overwhelmed today.  Expanders were placed and she will start fills in several weeks.  Her hair is growing back nicely.  Her peripheral neuropathy is almost resolved.  She does have numbness under the right arm secondary to the axillary surgery.     Treatment history:  01/23/24: C1D1 TCHP with Udenyca  02/16/24: C2D2 TCHP with 20-25% reduction in dosing of the chemotherapy and 5 days of Zarxio instead of Udenyca   03/08/24: C3D1 THP (20% dose reduction of Docetaxel) - will hold on Xarzio or Udenyca (d/c Carboplatin d/t tolerability)  03/29/24: C4D1 THP with addition of Cytoxan (continue with dose reduction of Docetaxel by 20%) with Udenyca support   04/19/24: C5D1 HP plus Cytoxan (d/c Docetaxel d/t worsening peripheral neuropathy) - no need for Udenyca  05/10/24: C6 Herceptin and Perjeta  05/31/24: C7 Herceptin and Perjeta    Allergies   Allergen Reactions    Bee Venom Anaphylaxis    Morphine Sulfate Anaphylaxis and Itching    Tape Rash     Paper tape OK, do not use tegaderm    Pineapple Hives     Current Outpatient Medications on File Prior to Encounter   Medication Sig Dispense Refill    oxyCODONE-acetaminophen (PERCOCET) 7.5-325 MG per tablet Take 1 Tablet by mouth every 6 hours as needed.  FOR PAIN      EPINEPHrine (EPIPEN) 0.3 MG/0.3ML Solution Auto-injector solution for injection Inject 0.3 mL into the thigh one time for 1 dose  Indications: Patient advised to present to the ED even after epinephrine administration for further observation and management. 1 Each 0    lidocaine-prilocaine (EMLA) 2.5-2.5 % Cream Apply to port 1 hour prior to access of port and cover with plastic wrap. 30 g 3     No current  "facility-administered medications on file prior to encounter.         Review of Systems   Constitutional:  Positive for weight loss. Negative for chills, diaphoresis, fever and malaise/fatigue.   HENT:  Positive for hearing loss. Negative for ear pain and sore throat.    Respiratory:  Positive for cough. Negative for shortness of breath (only when ambulating up stairs).    Cardiovascular:  Negative for chest pain and palpitations.   Gastrointestinal:  Negative for constipation, diarrhea, nausea and vomiting.   Genitourinary:  Negative for dysuria.   Musculoskeletal:  Negative for myalgias.   Neurological:  Positive for tingling. Negative for dizziness and headaches.              Objective     BP 94/62 (BP Location: Left arm, Patient Position: Sitting)   Pulse (!) 59   Temp 36.6 °C (97.9 °F) (Temporal)   Ht 1.676 m (5' 6\")   Wt 95.8 kg (211 lb 3.2 oz)   LMP 02/10/2012 (Approximate) Comment: Age of first period:10, No HRT  SpO2 97%   BMI 34.09 kg/m²      Physical Exam  Vitals reviewed.   Constitutional:       General: She is not in acute distress.     Appearance: Normal appearance. She is not diaphoretic.   HENT:      Head: Normocephalic and atraumatic.   Cardiovascular:      Rate and Rhythm: Normal rate and regular rhythm.      Heart sounds: Normal heart sounds. No murmur heard.     No friction rub. No gallop.   Pulmonary:      Effort: Pulmonary effort is normal.      Breath sounds: Normal breath sounds.   Chest:      Comments: Status post bilateral mastectomies with tissue expander reconstructions  Abdominal:      General: Bowel sounds are normal. There is no distension.      Palpations: Abdomen is soft.      Tenderness: There is no abdominal tenderness.   Musculoskeletal:         General: No swelling or tenderness. Normal range of motion.   Skin:     General: Skin is warm and dry.   Neurological:      Mental Status: She is alert and oriented to person, place, and time.   Psychiatric:         Mood and Affect: " Mood normal.         Behavior: Behavior normal.               MR-BREAST-BILATERAL-WITH & W/O    Result Date: 5/9/2024 5/8/2024 6:10 PM HISTORY/REASON FOR EXAM:  Follow up after neoadjuvant chemo for right breast cancer - prior to surgery; TECHNIQUE/EXAM DESCRIPTION: MRI of the breast, bilateral without and with dynamic IV gadolinium enhancement. MR imaging of the bilateral breasts was performed on a Fortino 3.0 Zena scanner using a dedicated breast coil with the patient in the prone position, with axial T1, axial fat-suppressed T2, and bolus dynamic intravenous gadolinium enhanced fat-suppressed 3D GRE sequences at precontrast, 30 second, 2 minute, 3 minute and 5 minute delays. Post processing subtraction images were obtained. Delayed postcontrast sagittal images also obtained. 20 mL ProHance contrast was administered intravenously. COMPARISON:  January 12, 2024, January 10, 2024, January 4, 2024, December 21, 2023, December 7, 2023, December 5, 2023 FINDINGS: Background parenchymal enhancement is minimal and symmetric. Right breast: There is no residual mass identified surrounding FANNY clip in the upper outer quadrant of the right breast consistent with treatment response. There is no evidence of enhancing mass surrounding biopsy clip in the inferior lateral right breast corresponding with MR guided biopsy proven cancer. The biopsy clip corresponding with the benign area of enhancement in the 12:00 position is located in the superficial lateral superior breast. There is no evidence of enhancing mass or suspicious area of enhancement.  There is a small intramammary lymph node in the superficial lateral right breast on image 117 measuring 3 mm in size and lymph node in the right axillary tail measuring 4 mm in size. No axillary lymphadenopathy identified. Securemark clip identified in previously biopsied right axillary lymph node on image 159. Left breast: There is no evidence of enhancing mass or suspicious area  of enhancement. No axillary lymphadenopathy identified. A Port-A-Cath is identified in the superior medial breast. Upper abdomen: Unremarkable. Bony structures: Unremarkable.     1.  No residual enhancing masses associated with biopsy clips denoting biopsy-proven cancers in the upper outer quadrant of the right breast and slightly more inferior lateral aspect of the right breast consistent with treatment response. 2.  No MRI evidence of malignancy in the left breast. R6 - CATEGORY 6: KNOWN BIOPSY-PROVEN MALIGNANCY - APPROPRIATE ACTION SHOULD BE TAKEN           Assessment & Plan       1. Malignant neoplasm of upper-outer quadrant of right breast in female, estrogen receptor negative (HCC)                Impression:  1.  Invasive ductal carcinoma of the right breast, grade 3, clinically stage IIa (cT2, cN0) ER negative OH negative, HER2 3+ positive, Ki-67 20 to 30%.  Status post TCHP with poor tolerance of cycle 1 necessitating dose reductions, changed to Cytoxan and ultimately discontinuation of Taxotere.  Clinical complete response to therapy documented after cycle 4. MRI showed no residual masses.  Bilateral mastectomy showed a pathologic complete response in the right (ypT0, ypN0).  Left breast normal.  2.  Chronic nausea after gastric sleeve and cholecystectomy severe after chemotherapy now resolved  3.  History of DVT/PE off anticoagulation for several years  4.  Severe myalgias likely from Udenyca -resolved 5.  Chemo nausea and vomiting -she will use Aloxi and olanzapine - patient to take Olanzepine x10 days  6.  Diarrhea d/t Perjeta -resolved  7.  Thrush a resolved  8. Hematuria - presented for first 3-4 days after both cycles 1 and 2.  Cystoscopy completed by urology and was negative.  Questioning whether this may be related to carboplatin, and will plan to change treatment with discontinuation of carboplatin and add Cytoxan. Resolved with cycle 3.   9.  Hot flashes -improved  10. Peripheral neuropathy noted  in her toes.  I residual trace neuropathy    Plan:  Continue Herceptin and Perjeta every 3 weeks to complete 1 year of therapy.  She will continue with reconstruction.  We will see her back in 6 weeks for routine follow-up.    Please note that this dictation was created using voice recognition software. I have made every reasonable attempt to correct obvious errors, but I expect that there are errors of grammar and possibly content that I did not discover before finalizing the note.

## 2024-06-20 NOTE — ADDENDUM NOTE
Encounter addended by: Franky Mitchell, Med Ass't on: 6/20/2024 11:41 AM   Actions taken: Charge Capture section accepted

## 2024-06-21 ENCOUNTER — OUTPATIENT INFUSION SERVICES (OUTPATIENT)
Dept: ONCOLOGY | Facility: MEDICAL CENTER | Age: 39
End: 2024-06-21
Attending: INTERNAL MEDICINE
Payer: MEDICAID

## 2024-06-21 VITALS
TEMPERATURE: 98 F | HEART RATE: 67 BPM | SYSTOLIC BLOOD PRESSURE: 99 MMHG | WEIGHT: 207.89 LBS | DIASTOLIC BLOOD PRESSURE: 68 MMHG | BODY MASS INDEX: 33.41 KG/M2 | HEIGHT: 66 IN | OXYGEN SATURATION: 95 % | RESPIRATION RATE: 18 BRPM

## 2024-06-21 DIAGNOSIS — C50.411 MALIGNANT NEOPLASM OF UPPER-OUTER QUADRANT OF RIGHT BREAST IN FEMALE, ESTROGEN RECEPTOR NEGATIVE (HCC): ICD-10-CM

## 2024-06-21 DIAGNOSIS — Z17.1 MALIGNANT NEOPLASM OF UPPER-OUTER QUADRANT OF RIGHT BREAST IN FEMALE, ESTROGEN RECEPTOR NEGATIVE (HCC): ICD-10-CM

## 2024-06-21 PROCEDURE — 96413 CHEMO IV INFUSION 1 HR: CPT

## 2024-06-21 PROCEDURE — 700111 HCHG RX REV CODE 636 W/ 250 OVERRIDE (IP): Mod: UD | Performed by: NURSE PRACTITIONER

## 2024-06-21 PROCEDURE — 700105 HCHG RX REV CODE 258: Mod: UD | Performed by: NURSE PRACTITIONER

## 2024-06-21 PROCEDURE — 96417 CHEMO IV INFUS EACH ADDL SEQ: CPT

## 2024-06-21 PROCEDURE — A4212 NON CORING NEEDLE OR STYLET: HCPCS

## 2024-06-21 RX ADMIN — TRASTUZUMAB-ANNS 592 MG: 420 INJECTION, POWDER, LYOPHILIZED, FOR SOLUTION INTRAVENOUS at 10:52

## 2024-06-21 RX ADMIN — HEPARIN 500 UNITS: 100 SYRINGE at 11:32

## 2024-06-21 RX ADMIN — PERTUZUMAB 420 MG: 30 INJECTION, SOLUTION, CONCENTRATE INTRAVENOUS at 09:47

## 2024-06-21 ASSESSMENT — FIBROSIS 4 INDEX: FIB4 SCORE: 0.96

## 2024-06-21 NOTE — PROGRESS NOTES
Pt arrived to [facility], ambulatory, for Perjeta/Kanjinti. Pt had recent bilateral mastectomy, drains in place. Port accessed in sterile manner, positive blood return noted. Perjeta infused with no s/sx of adverse reaction.. 30 minute monitoring completed with no complications. Kanjinti infused with no s/sx of adverse reaction. Port flushed and heparin locked per policy, port de-accessed. Pt left IS with no s/sx of distress. Follow up appointment confirmed.

## 2024-06-21 NOTE — PROGRESS NOTES
Chemotherapy Verification - SECONDARY RN       Height = 168 cm  Weight = 94.3 kg  BSA = 2.1 m^2       Medication: pertuzumab (Perjeta)  Dose: set dose 420 mg  Calculated Dose: 420 mg set dose                             (In mg/m2, AUC, mg/kg)     Medication: trastuzumab-anns (Kanjinti)  Dose: 6 mg/kg  Calculated Dose: 565.8 mg                             (In mg/m2, AUC, mg/kg)    I confirm that this process was performed independently.

## 2024-06-21 NOTE — PROGRESS NOTES
"Pharmacy Chemotherapy Verification    Dx: Breast cancer, ER-, MN-, HER2+  Cycle 8  Previous treatment = C7 5/31/24    Protocol: TCHP  Pertuzumab 840 mg IV over 60 minutes on Day 1 of Cycle 1 followed by  Pertuzumab 420 mg IV over 30 minutes on Day 1 of Cycles 2-6  Trastuzumab 8 mg/kg IV over 90 minutes on Day 1 of Cycle 1 followed by  Trastuzumab 6 mg/kg IV over 30 minutes on Day 1 of Cycles 2-6 followed by   2/16/24 - Dose reduced to 60 mg/m2 for tolerance starting with C2   4/19/24 - Removed from treatment plan starting with C5 due to worsening peripheral neuropathy   2/16/24 - Dose reduced to AUC 4.5 for tolerance starting with C2   Removed from treatment plan starting Cycle 3  Cyclophosphamide 600 mg/m2 IV over 30 minutes on Day 1   3/29/24 - Added starting C4 to replace carboplatin due to intolerance  21-day cycle for 6 cycles (completed 5/10/24 after 5 cycles for tolerance)  ~Followed by~  Pertuzumab 420 mg IV over 30 minutes on Day 1 beginning with Week 19  Trastuzumab 6 mg/kg IV over 30 minutes on Day 1 beginning with Week 19  21-day cycle to complete 52 weeks total of trastuzumab and pertuzumab therapy  NCCN Guidelines for Breast Cancer V.2.2022.  Josef A, et al. Gay Oncol. 2013;24(9):2278-84.    Allergies:  Bee venom, Morphine sulfate, Tape, and Pineapple     BP 99/68   Pulse 67   Temp 36.7 °C (98 °F) (Temporal)   Resp 18   Ht 1.68 m (5' 6.14\")   Wt 94.3 kg (207 lb 14.3 oz)   LMP 02/10/2012 (Approximate) Comment: Age of first period:10, No HRT  SpO2 95%   BMI 33.41 kg/m²  Body surface area is 2.1 meters squared.    ECHO  5/13/24 LVEF 69%  12/27/23 LVEF 65% (Okay to proceed with treatment today. Pt to schedule ECHO after 6 cycles of TCHP then every 4 months thereafter per Dr. Thomas)    Pertuzumab 420 mg fixed dose              Fixed dose, OK to treat with final dose = 420 mg IV     Trastuzumab-anns 6 mg/kg x 94.3 kg = 565.8 mg              <10% difference, OK to treat with final dose = " 592 mg IV    Casimiro Peres, PharmD

## 2024-06-21 NOTE — PROGRESS NOTES
Chemotherapy Verification - PRIMARY RN      Height = 168 cm  Weight = 94.3 kg  BSA = 2.1 m2       Medication: Perjeta  Dose: 420 mg (set dose)  Calculated Dose: 420 mg                             (In mg/m2, AUC, mg/kg)     Medication: Kanjinti  Dose: 6 mg/kg  Calculated Dose: 565.8 mg                             (In mg/m2, AUC, mg/kg)      I confirm this process was performed independently with the BSA and all final chemotherapy dosing calculations congruent.  Any discrepancies of 10% or greater have been addressed with the chemotherapy pharmacist. The resolution of the discrepancy has been documented in the EPIC progress notes.

## 2024-06-26 ENCOUNTER — APPOINTMENT (OUTPATIENT)
Dept: ADMISSIONS | Facility: MEDICAL CENTER | Age: 39
End: 2024-06-26
Attending: PLASTIC SURGERY
Payer: MEDICAID

## 2024-06-26 RX ORDER — ACYCLOVIR 50 MG/G
400 OINTMENT TOPICAL
COMMUNITY
End: 2024-06-26

## 2024-06-26 RX ORDER — ACYCLOVIR 200 MG/1
400 CAPSULE ORAL 2 TIMES DAILY
Status: ON HOLD | COMMUNITY
End: 2024-07-08

## 2024-06-27 ENCOUNTER — ANESTHESIA EVENT (OUTPATIENT)
Dept: SURGERY | Facility: MEDICAL CENTER | Age: 39
End: 2024-06-27
Payer: MEDICAID

## 2024-06-27 ENCOUNTER — HOSPITAL ENCOUNTER (OUTPATIENT)
Facility: MEDICAL CENTER | Age: 39
End: 2024-06-27
Attending: PLASTIC SURGERY | Admitting: PLASTIC SURGERY
Payer: MEDICAID

## 2024-06-27 ENCOUNTER — ANESTHESIA (OUTPATIENT)
Dept: SURGERY | Facility: MEDICAL CENTER | Age: 39
End: 2024-06-27
Payer: MEDICAID

## 2024-06-27 VITALS
RESPIRATION RATE: 23 BRPM | HEART RATE: 85 BPM | TEMPERATURE: 97 F | SYSTOLIC BLOOD PRESSURE: 108 MMHG | BODY MASS INDEX: 32.6 KG/M2 | OXYGEN SATURATION: 93 % | WEIGHT: 202.82 LBS | DIASTOLIC BLOOD PRESSURE: 65 MMHG | HEIGHT: 66 IN

## 2024-06-27 LAB
GRAM STN SPEC: NORMAL
SIGNIFICANT IND 70042: NORMAL
SITE SITE: NORMAL
SOURCE SOURCE: NORMAL

## 2024-06-27 PROCEDURE — 87077 CULTURE AEROBIC IDENTIFY: CPT | Mod: 91

## 2024-06-27 PROCEDURE — 700111 HCHG RX REV CODE 636 W/ 250 OVERRIDE (IP): Mod: JZ,UD | Performed by: ANESTHESIOLOGY

## 2024-06-27 PROCEDURE — 700101 HCHG RX REV CODE 250: Performed by: PLASTIC SURGERY

## 2024-06-27 PROCEDURE — 160048 HCHG OR STATISTICAL LEVEL 1-5: Performed by: PLASTIC SURGERY

## 2024-06-27 PROCEDURE — A9270 NON-COVERED ITEM OR SERVICE: HCPCS | Mod: UD | Performed by: ANESTHESIOLOGY

## 2024-06-27 PROCEDURE — 700111 HCHG RX REV CODE 636 W/ 250 OVERRIDE (IP): Mod: UD | Performed by: ANESTHESIOLOGY

## 2024-06-27 PROCEDURE — 87070 CULTURE OTHR SPECIMN AEROBIC: CPT

## 2024-06-27 PROCEDURE — 87205 SMEAR GRAM STAIN: CPT

## 2024-06-27 PROCEDURE — 700102 HCHG RX REV CODE 250 W/ 637 OVERRIDE(OP): Mod: UD | Performed by: PLASTIC SURGERY

## 2024-06-27 PROCEDURE — 87076 CULTURE ANAEROBE IDENT EACH: CPT

## 2024-06-27 PROCEDURE — 160009 HCHG ANES TIME/MIN: Performed by: PLASTIC SURGERY

## 2024-06-27 PROCEDURE — 700111 HCHG RX REV CODE 636 W/ 250 OVERRIDE (IP): Mod: UD | Performed by: PLASTIC SURGERY

## 2024-06-27 PROCEDURE — 160035 HCHG PACU - 1ST 60 MINS PHASE I: Performed by: PLASTIC SURGERY

## 2024-06-27 PROCEDURE — 160028 HCHG SURGERY MINUTES - 1ST 30 MINS LEVEL 3: Performed by: PLASTIC SURGERY

## 2024-06-27 PROCEDURE — 160047 HCHG PACU  - EA ADDL 30 MINS PHASE II: Performed by: PLASTIC SURGERY

## 2024-06-27 PROCEDURE — 160025 RECOVERY II MINUTES (STATS): Performed by: PLASTIC SURGERY

## 2024-06-27 PROCEDURE — 700101 HCHG RX REV CODE 250: Mod: UD | Performed by: ANESTHESIOLOGY

## 2024-06-27 PROCEDURE — 87075 CULTR BACTERIA EXCEPT BLOOD: CPT

## 2024-06-27 PROCEDURE — 160039 HCHG SURGERY MINUTES - EA ADDL 1 MIN LEVEL 3: Performed by: PLASTIC SURGERY

## 2024-06-27 PROCEDURE — 700105 HCHG RX REV CODE 258: Mod: UD | Performed by: PLASTIC SURGERY

## 2024-06-27 PROCEDURE — A9270 NON-COVERED ITEM OR SERVICE: HCPCS | Mod: UD | Performed by: PLASTIC SURGERY

## 2024-06-27 PROCEDURE — 160002 HCHG RECOVERY MINUTES (STAT): Performed by: PLASTIC SURGERY

## 2024-06-27 PROCEDURE — 160046 HCHG PACU - 1ST 60 MINS PHASE II: Performed by: PLASTIC SURGERY

## 2024-06-27 PROCEDURE — 700102 HCHG RX REV CODE 250 W/ 637 OVERRIDE(OP): Mod: UD | Performed by: ANESTHESIOLOGY

## 2024-06-27 PROCEDURE — 87186 SC STD MICRODIL/AGAR DIL: CPT

## 2024-06-27 RX ORDER — SCOLOPAMINE TRANSDERMAL SYSTEM 1 MG/1
1 PATCH, EXTENDED RELEASE TRANSDERMAL
Status: DISCONTINUED | OUTPATIENT
Start: 2024-06-27 | End: 2024-06-27 | Stop reason: HOSPADM

## 2024-06-27 RX ORDER — MIDAZOLAM HYDROCHLORIDE 1 MG/ML
INJECTION INTRAMUSCULAR; INTRAVENOUS PRN
Status: DISCONTINUED | OUTPATIENT
Start: 2024-06-27 | End: 2024-06-27 | Stop reason: SURG

## 2024-06-27 RX ORDER — ONDANSETRON 2 MG/ML
4 INJECTION INTRAMUSCULAR; INTRAVENOUS
Status: DISCONTINUED | OUTPATIENT
Start: 2024-06-27 | End: 2024-06-27 | Stop reason: HOSPADM

## 2024-06-27 RX ORDER — MEPERIDINE HYDROCHLORIDE 25 MG/ML
6.25 INJECTION INTRAMUSCULAR; INTRAVENOUS; SUBCUTANEOUS
Status: DISCONTINUED | OUTPATIENT
Start: 2024-06-27 | End: 2024-06-27 | Stop reason: HOSPADM

## 2024-06-27 RX ORDER — EPHEDRINE SULFATE 50 MG/ML
5 INJECTION, SOLUTION INTRAVENOUS
Status: DISCONTINUED | OUTPATIENT
Start: 2024-06-27 | End: 2024-06-27 | Stop reason: HOSPADM

## 2024-06-27 RX ORDER — METOPROLOL TARTRATE 1 MG/ML
1 INJECTION, SOLUTION INTRAVENOUS
Status: DISCONTINUED | OUTPATIENT
Start: 2024-06-27 | End: 2024-06-27 | Stop reason: HOSPADM

## 2024-06-27 RX ORDER — DEXAMETHASONE SODIUM PHOSPHATE 4 MG/ML
INJECTION, SOLUTION INTRA-ARTICULAR; INTRALESIONAL; INTRAMUSCULAR; INTRAVENOUS; SOFT TISSUE PRN
Status: DISCONTINUED | OUTPATIENT
Start: 2024-06-27 | End: 2024-06-27 | Stop reason: SURG

## 2024-06-27 RX ORDER — BUPIVACAINE HYDROCHLORIDE 5 MG/ML
INJECTION, SOLUTION EPIDURAL; INTRACAUDAL
Status: DISCONTINUED
Start: 2024-06-27 | End: 2024-06-27 | Stop reason: HOSPADM

## 2024-06-27 RX ORDER — SODIUM CHLORIDE, SODIUM LACTATE, POTASSIUM CHLORIDE, CALCIUM CHLORIDE 600; 310; 30; 20 MG/100ML; MG/100ML; MG/100ML; MG/100ML
INJECTION, SOLUTION INTRAVENOUS CONTINUOUS
Status: DISCONTINUED | OUTPATIENT
Start: 2024-06-27 | End: 2024-06-27 | Stop reason: HOSPADM

## 2024-06-27 RX ORDER — DIPHENHYDRAMINE HYDROCHLORIDE 50 MG/ML
12.5 INJECTION INTRAMUSCULAR; INTRAVENOUS
Status: DISCONTINUED | OUTPATIENT
Start: 2024-06-27 | End: 2024-06-27 | Stop reason: HOSPADM

## 2024-06-27 RX ORDER — OXYCODONE HCL 5 MG/5 ML
5 SOLUTION, ORAL ORAL
Status: COMPLETED | OUTPATIENT
Start: 2024-06-27 | End: 2024-06-27

## 2024-06-27 RX ORDER — BUPIVACAINE HYDROCHLORIDE AND EPINEPHRINE 5; 5 MG/ML; UG/ML
INJECTION, SOLUTION EPIDURAL; INTRACAUDAL; PERINEURAL
Status: DISCONTINUED | OUTPATIENT
Start: 2024-06-27 | End: 2024-06-27 | Stop reason: HOSPADM

## 2024-06-27 RX ORDER — HYDROMORPHONE HYDROCHLORIDE 1 MG/ML
0.2 INJECTION, SOLUTION INTRAMUSCULAR; INTRAVENOUS; SUBCUTANEOUS
Status: DISCONTINUED | OUTPATIENT
Start: 2024-06-27 | End: 2024-06-27 | Stop reason: HOSPADM

## 2024-06-27 RX ORDER — HYDROMORPHONE HYDROCHLORIDE 1 MG/ML
0.1 INJECTION, SOLUTION INTRAMUSCULAR; INTRAVENOUS; SUBCUTANEOUS
Status: DISCONTINUED | OUTPATIENT
Start: 2024-06-27 | End: 2024-06-27 | Stop reason: HOSPADM

## 2024-06-27 RX ORDER — OXYCODONE HCL 5 MG/5 ML
10 SOLUTION, ORAL ORAL
Status: COMPLETED | OUTPATIENT
Start: 2024-06-27 | End: 2024-06-27

## 2024-06-27 RX ORDER — GENTAMICIN 40 MG/ML
INJECTION, SOLUTION INTRAMUSCULAR; INTRAVENOUS
Status: DISCONTINUED | OUTPATIENT
Start: 2024-06-27 | End: 2024-06-27 | Stop reason: HOSPADM

## 2024-06-27 RX ORDER — CEFAZOLIN SODIUM 1 G/3ML
INJECTION, POWDER, FOR SOLUTION INTRAMUSCULAR; INTRAVENOUS PRN
Status: DISCONTINUED | OUTPATIENT
Start: 2024-06-27 | End: 2024-06-27 | Stop reason: SURG

## 2024-06-27 RX ORDER — HYDROMORPHONE HYDROCHLORIDE 1 MG/ML
0.4 INJECTION, SOLUTION INTRAMUSCULAR; INTRAVENOUS; SUBCUTANEOUS
Status: DISCONTINUED | OUTPATIENT
Start: 2024-06-27 | End: 2024-06-27 | Stop reason: HOSPADM

## 2024-06-27 RX ORDER — HALOPERIDOL 5 MG/ML
1 INJECTION INTRAMUSCULAR
Status: DISCONTINUED | OUTPATIENT
Start: 2024-06-27 | End: 2024-06-27 | Stop reason: HOSPADM

## 2024-06-27 RX ORDER — GENTAMICIN 40 MG/ML
INJECTION, SOLUTION INTRAMUSCULAR; INTRAVENOUS
Status: DISCONTINUED
Start: 2024-06-27 | End: 2024-06-27 | Stop reason: HOSPADM

## 2024-06-27 RX ORDER — HYDRALAZINE HYDROCHLORIDE 20 MG/ML
5 INJECTION INTRAMUSCULAR; INTRAVENOUS
Status: DISCONTINUED | OUTPATIENT
Start: 2024-06-27 | End: 2024-06-27 | Stop reason: HOSPADM

## 2024-06-27 RX ORDER — LIDOCAINE HYDROCHLORIDE 20 MG/ML
INJECTION, SOLUTION EPIDURAL; INFILTRATION; INTRACAUDAL; PERINEURAL PRN
Status: DISCONTINUED | OUTPATIENT
Start: 2024-06-27 | End: 2024-06-27 | Stop reason: SURG

## 2024-06-27 RX ADMIN — DEXAMETHASONE SODIUM PHOSPHATE 8 MG: 4 INJECTION INTRA-ARTICULAR; INTRALESIONAL; INTRAMUSCULAR; INTRAVENOUS; SOFT TISSUE at 14:31

## 2024-06-27 RX ADMIN — CEFAZOLIN 2 G: 1 INJECTION, POWDER, FOR SOLUTION INTRAMUSCULAR; INTRAVENOUS at 14:14

## 2024-06-27 RX ADMIN — LIDOCAINE HYDROCHLORIDE 50 MG: 20 INJECTION, SOLUTION EPIDURAL; INFILTRATION; INTRACAUDAL at 14:09

## 2024-06-27 RX ADMIN — OXYCODONE HYDROCHLORIDE 5 MG: 5 SOLUTION ORAL at 16:04

## 2024-06-27 RX ADMIN — FENTANYL CITRATE 25 MCG: 50 INJECTION, SOLUTION INTRAMUSCULAR; INTRAVENOUS at 14:11

## 2024-06-27 RX ADMIN — FENTANYL CITRATE 25 MCG: 50 INJECTION, SOLUTION INTRAMUSCULAR; INTRAVENOUS at 16:05

## 2024-06-27 RX ADMIN — FENTANYL CITRATE 25 MCG: 50 INJECTION, SOLUTION INTRAMUSCULAR; INTRAVENOUS at 14:19

## 2024-06-27 RX ADMIN — PROPOFOL 38 MCG/KG/MIN: 10 INJECTION, EMULSION INTRAVENOUS at 14:15

## 2024-06-27 RX ADMIN — SCOPOLAMINE 1 PATCH: 1.5 PATCH, EXTENDED RELEASE TRANSDERMAL at 13:35

## 2024-06-27 RX ADMIN — SODIUM CHLORIDE, POTASSIUM CHLORIDE, SODIUM LACTATE AND CALCIUM CHLORIDE: 600; 310; 30; 20 INJECTION, SOLUTION INTRAVENOUS at 13:34

## 2024-06-27 RX ADMIN — LIDOCAINE HYDROCHLORIDE 50 MG: 20 INJECTION, SOLUTION EPIDURAL; INFILTRATION; INTRACAUDAL at 14:44

## 2024-06-27 RX ADMIN — MIDAZOLAM HYDROCHLORIDE 2 MG: 2 INJECTION, SOLUTION INTRAMUSCULAR; INTRAVENOUS at 14:07

## 2024-06-27 RX ADMIN — PROPOFOL 180 MG: 10 INJECTION, EMULSION INTRAVENOUS at 14:09

## 2024-06-27 RX ADMIN — FENTANYL CITRATE 25 MCG: 50 INJECTION, SOLUTION INTRAMUSCULAR; INTRAVENOUS at 14:30

## 2024-06-27 ASSESSMENT — FIBROSIS 4 INDEX: FIB4 SCORE: 0.96

## 2024-06-27 ASSESSMENT — PAIN DESCRIPTION - PAIN TYPE
TYPE: SURGICAL PAIN

## 2024-06-27 NOTE — OR SURGEON
Immediate Post OP Note    PreOp Diagnosis: necrotic breast wound after mastectomy and breast reconstruction      PostOp Diagnosis: same      Procedure(s):  EXCISE 10CM LEFT BREAST WOUND - Wound Class: Clean    Surgeon(s):  Darrel Tee M.D.    Anesthesiologist/Type of Anesthesia:  Anesthesiologist: Darrel Muller M.D./General    Surgical Staff:  Circulator: Ashely Quispe R.N.  Scrub Person: Rogerio Pierson    Specimens removed if any:  ID Type Source Tests Collected by Time Destination   1 : left breast Wound Breast AEROBIC/ANAEROBIC CULTURE (SURGERY) Darrel Tee M.D. 6/27/2024  2:27 PM        Estimated Blood Loss: none    Findings: full thickness necrosis, fluid was clean but was cultured    Complications: none        6/27/2024 2:47 PM Darrel Tee M.D.

## 2024-06-27 NOTE — OR NURSING
Introduced self to patient. Discussed plan of care. Surgical consent signed. IV inserted without difficulty. Checked oxygen tank on gurney, 375 psi. Pre op complete.

## 2024-06-27 NOTE — ANESTHESIA PREPROCEDURE EVALUATION
Case: [id number] Date/Time: 06/27/24 1415    Procedure: EXCISE 10CM LEFT BREAST WOUND (Left: Breast)    Anesthesia type: General    Pre-op diagnosis: BREAST CANCER, BREAST WOUND    Location: [facility] ROOM 25 / SURGERY SAME DAY [facility]    Surgeons: Darrel Tee M.D.          Breast ca- had sx 2 weeks ago  Occ YASMEEN sxs, none lately  Asthma only with exercise  Bmi 32  Relevant Problems   CARDIAC   (positive) Hot flashes         (positive) Prerenal acute renal failure (HCC)       Physical Exam    Airway   Mallampati: II  TM distance: >3 FB  Neck ROM: full       Cardiovascular - normal exam  Rhythm: regular  Rate: normal  (-) murmur     Dental - normal exam           Pulmonary - normal exam  Breath sounds clear to auscultation     Abdominal    Neurological - normal exam                   Anesthesia Plan    ASA 2       Plan - general       Airway plan will be LMA          Induction: intravenous    Postoperative Plan: Postoperative administration of opioids is intended.    Pertinent diagnostic labs and testing reviewed    Informed Consent:    Anesthetic plan and risks discussed with patient.    Use of blood products discussed with: patient whom consented to blood products.

## 2024-06-27 NOTE — OR NURSING
1448- Pt to PACU 5 from OR. Bedside report from anesthesiologist and RN.  Attached to monitoring, VSS, breathing is calm and unlabored, Patient is asleep currently. Pt has a dressing on left breast and CDI. Remains on 4 L oxygen via mask.    1500- Patient on RA. Denies any pain at incision site however states she has a very sore throat from ETT.     1519- RN called patient mother with updates. Patient resting comfortably at this time.     1600- Patient having 4/10 pain in left breast, medicated per MAR. Tolerating sips of water.     1648- Patient stating she feels ready to go home. Family on the way to the hospital. Patient dressed without assistance.     1655- Discharge paperwork reviewed and signed with patient. All questions answered.     1705- PIV removed with tip intact. Patient discharged home with all belongings.

## 2024-06-27 NOTE — H&P
Surgery Plastic History & Physical Note    Date  6/27/2024    Primary Care Physician  Francisco Maier M.D.    CC  Necrotic skin flap after mastectomy with reconstruction    HPI  This is a 39 y.o. female who presented with s/p breast reconstruction, the left breast has had progressive necrosis of her left breast skin flap, it now appears full thickness.    Past Medical History:   Diagnosis Date    Anesthesia 12/28/2023    wakes up disoriented and panics    Asthma 12/28/2023    exercise or illness induced, inhalers prn    Breath shortness     Only with pulmonary embolism    Bronchitis 06/01/2014    Cancer (HCC) 12/28/2023    right outer breast    Cholesterol blood decreased     Heart burn 05/24/2024    history of    Indigestion Due to sleeve    Pain 12/28/2023    right breast very tender    Personal history of venous thrombosis and embolism 05/24/2024    PE bilaterally    Psychiatric problem 05/24/2024    anxiety, panic attacks with procedures       Past Surgical History:   Procedure Laterality Date    MASTECTOMY Bilateral 6/12/2024    Procedure: BILATERAL SKIN-SPARING MASTECTOMY, RIGHT SENTINEL LYMPH NODE INJECTION AND BIOPSY;  Surgeon: Rochelle Sullivan M.D.;  [facility];  Service: General    NODE BIOPSY SENTINEL Right 6/12/2024    Procedure: BIOPSY, LYMPH NODE, SENTINEL;  Surgeon: Rochelle Sullivan M.D.;  Location: [facility];  Service: General    BREAST RECONSTRUCTION Bilateral 6/12/2024    Procedure: BILATERAL BREAST RECONSTRUCTION WITH SOFT TISSUE EXPANDERS;  Surgeon: Darrel Tee M.D.;  [facility]: [facility];  Service: Plastics    DIEGO BY LAPAROSCOPY  04/29/2015    Performed by Dre Malone M.D. at SURGERY [facility]    GASTRIC SLEEVE LAPAROSCOPY  12/31/2014    Performed by Dre Malone M.D. at SURGERY [facility]    LIVER BIOPSY LAPAROSCOPIC  12/31/2014    Performed by Dre Malone M.D. at SURGERY [facility]     HYSTERECTOMY ROBOTIC  2013    LIPOSUCTION  2010    Performed by RAMON NUNN at SURGERY [facility]    LIPOSUCTION  2009    Performed by RAMON NUNN at SURGERY [facility]    TONSILLECTOMY  1997    OTHER       x2       Current Facility-Administered Medications   Medication Dose Route Frequency Provider Last Rate Last Admin    scopolamine (Transderm-Scop) patch 1 Patch  1 Patch Transdermal Q72HRS Darrel Tee M.D.   1 Patch at 24 1335    lidocaine (Xylocaine) 1 % injection 0.5 mL  0.5 mL Intradermal Once PRN Darrel Tee M.D.        lactated ringers infusion   Intravenous Continuous Darrel Tee M.D. 10 mL/hr at 24 1334 New Bag at 24 1334    BUPIVACAINE HCL (PF) 0.5 % INJ SOLN                Social History     Socioeconomic History    Marital status: Single     Spouse name: Not on file    Number of children: Not on file    Years of education: Not on file    Highest education level: Associate degree: academic program   Occupational History    Not on file   Tobacco Use    Smoking status: Former     Current packs/day: 0.00     Average packs/day: 0.5 packs/day for 6.0 years (3.0 ttl pk-yrs)     Types: Cigarettes, Electronic Cigarettes     Start date: 2011     Quit date: 2017     Years since quittin.0    Smokeless tobacco: Never   Vaping Use    Vaping status: Former    Substances: Nicotine, THC   Substance and Sexual Activity    Alcohol use: Yes     Alcohol/week: 1.2 oz     Types: 2 Shots of liquor per week     Comment: 2 shots weekly    Drug use: Yes     Types: Inhaled     Comment: thc, daily    Sexual activity: Yes     Partners: Male     Birth control/protection: Female Sterilization     Comment: Hysterectomy   Other Topics Concern    Not on file   Social History Narrative    Not on file     Social Determinants of Health     Financial Resource Strain: Low Risk  (2023)    Overall Financial Resource Strain (CARDIA)     Difficulty of  Paying Living Expenses: Not very hard   Food Insecurity: No Food Insecurity (5/31/2023)    Hunger Vital Sign     Worried About Running Out of Food in the Last Year: Never true     Ran Out of Food in the Last Year: Never true   Transportation Needs: No Transportation Needs (5/31/2023)    PRAPARE - Transportation     Lack of Transportation (Medical): No     Lack of Transportation (Non-Medical): No   Physical Activity: Insufficiently Active (5/31/2023)    Exercise Vital Sign     Days of Exercise per Week: 3 days     Minutes of Exercise per Session: 30 min   Stress: Stress Concern Present (5/31/2023)    [employer] - Occupational Stress Questionnaire     Feeling of Stress : To some extent   Social Connections: Socially Isolated (5/31/2023)    Social Connection and Isolation Panel [NHANES]     Frequency of Communication with Friends and Family: More than three times a week     Frequency of Social Gatherings with Friends and Family: More than three times a week     Attends Restoration Services: Never     Active Member of Clubs or Organizations: No     Attends Club or Organization Meetings: Never     Marital Status:    Intimate Partner Violence: Not on file   Housing Stability: Unknown (5/31/2023)    Housing Stability Vital Sign     Unable to Pay for Housing in the Last Year: No     Number of Places Lived in the Last Year: Not on file     Unstable Housing in the Last Year: No       Family History   Problem Relation Age of Onset    Diabetes Other     Hypertension Other     Cancer Other        Allergies  Bee venom, Morphine sulfate, Tape, and Pineapple    Review of Systems  Negative    Physical Exam    Vital Signs  Blood Pressure: 103/67   Temperature: 36.2 °C (97.2 °F)   Pulse: 97   Respiration: 17   Pulse Oximetry: 96 %     Necrotic mastectomy skin flap, no signs of infection  PERRL, EOMI  Neck is supple  Lungs are clear  Heart is rr  Abd is soft  Extremities are unremarkable    Labs:                     Radiology:  No orders to display         Assessment/Plan:  Necrotic skin flap on left breast after mastectomy/reconstruction for excision of necrotic tissue of left breast.  Procedure(s):  EXCISE 10CM LEFT BREAST WOUND

## 2024-06-27 NOTE — ANESTHESIA TIME REPORT
Anesthesia Start and Stop Event Times       Date Time Event    6/27/2024 1357 Ready for Procedure     1404 Anesthesia Start     1451 Anesthesia Stop          Responsible Staff  06/27/24      Name Role Begin End    Darrel Muller M.D. Anesth 1404 1452          Overtime Reason:  no overtime (within assigned shift)    Comments:

## 2024-06-27 NOTE — DISCHARGE INSTRUCTIONS
If any questions arise, call your provider.  If your provider is not available, please feel free to call the Surgical Center at [phone].    MEDICATIONS: Resume taking daily medication.  Take prescribed pain medication with food.  If no medication is prescribed, you may take non-aspirin pain medication if needed.  PAIN MEDICATION CAN BE VERY CONSTIPATING.  Take a stool softener or laxative such as senokot, pericolace, or milk of magnesia if needed.    Last pain medication given at 4 pm. Next dose can be taken after 10 pm.   You have a scopolamine patch for nausea behind your left ear. This may stay in place for 72 hours post procedure. If you are dizzy, dry mouth or dry eyes you may remove the patch before hand. Wash your hands with soap and water after removing your patch and avoid touching your eyes as it can dilate your pupils.     What to Expect Post Anesthesia    Rest and take it easy for the first 24 hours.  A responsible adult is recommended to remain with you during that time.  It is normal to feel sleepy.  We encourage you to not do anything that requires balance, judgment or coordination.    FOR 24 HOURS DO NOT:  Drive, operate machinery or run household appliances.  Drink beer or alcoholic beverages.  Make important decisions or sign legal documents.    To avoid nausea, slowly advance diet as tolerated, avoiding spicy or greasy foods for the first day.  Add more substantial food to your diet according to your provider's instructions.  Babies can be fed formula or breast milk as soon as they are hungry.  INCREASE FLUIDS AND FIBER TO AVOID CONSTIPATION.    MILD FLU-LIKE SYMPTOMS ARE NORMAL.  YOU MAY EXPERIENCE GENERALIZED MUSCLE ACHES, THROAT IRRITATION, HEADACHE AND/OR SOME NAUSEA.    Discharge Instructions:   OK to shower in 48 hours  Do not remove dressing  Wear a gently compressive sports bra

## 2024-06-27 NOTE — ANESTHESIA PROCEDURE NOTES
Airway    Date/Time: 6/27/2024 2:10 PM    Performed by: Darrel Muller M.D.  Authorized by: Darrel Muller M.D.    Location:  OR  Urgency:  Elective  Indications for Airway Management:  Anesthesia      Spontaneous Ventilation: absent    Sedation Level:  Deep  Preoxygenated: Yes    Final Airway Type:  Supraglottic airway  Final Supraglottic Airway:  Standard LMA    SGA Size:  4  Number of Attempts at Approach:  1

## 2024-06-27 NOTE — OP REPORT
DATE OF SERVICE:  06/27/2024     PREOPERATIVE DIAGNOSIS:  Necrotic wound of the breast after breast   reconstruction and mastectomy.     POSTOPERATIVE DIAGNOSIS:  Necrotic wound of the breast after breast   reconstruction and mastectomy, 10 cm in length, full thickness.     PROCEDURE PERFORMED:  Excision of a 10 cm wound/benign lesion of the breast   with closure.     SURGEON:  Darrel Tee MD     ASSISTANT:  None.     ANESTHESIOLOGIST:  Darrel Muller MD     OPERATIVE INDICATIONS:  The patient is 39.  She had mastectomy and immediate   breast reconstruction.  Her left skin flap had had some skin changes and we   are watching this to see if it was full thickness or not.  She has never had   signs of infection.  Ultimately, it appeared that the area of necrosis was   stable but clearly appeared to be full thickness.  We discussed the risks,   benefits and alternatives of excising this under sterile conditions and the   patient wished to proceed.  In the preoperative holding area, the procedure to   be performed was confirmed and the patient was marked.  The patient consented   to proceed with surgery.     OPERATIVE DESCRIPTION:  After induction of general endotracheal anesthesia,   the patient's chest was prepped and draped sterilely.  The necrotic skin was   excised full thickness skin, subcutaneous tissue down to the level of the   pocket.  The dimensions of this were 10 cm in length by at the greatest   dimension 4 cm in height.  The wound was irrigated with copious amounts of   antibiotic saline solution.  Cultures of the clear fluid in the pocket were   taken.  Further antibiotic irrigation was performed.  The wounds were closed   in multiple layers.  At the conclusion of closure, the skin flaps appeared to   be healthy and was without signs for arterial or venous compromise.  Sterile   dressing was applied.  The patient was extubated and taken to recovery room in   stable  condition.        ______________________________  MD AILYN MORALES    DD:  06/27/2024 14:53  DT:  06/27/2024 15:03    Job#:  [id number]

## 2024-06-28 NOTE — ANESTHESIA POSTPROCEDURE EVALUATION
Patient: Fela Tristaniott    Procedure Summary       Date: 06/27/24 Room / Location: [facility] ROOM 25 / SURGERY SAME DAY [facility]    Anesthesia Start: 1404 Anesthesia Stop: 1451    Procedure: EXCISE 10CM LEFT BREAST WOUND (Left: Breast) Diagnosis: (BREAST CANCER, BREAST WOUND)    Surgeons: Darrel Tee M.D. Responsible Provider: Darrel Muller M.D.    Anesthesia Type: general ASA Status: 2            Final Anesthesia Type: general  Last vitals  BP   Blood Pressure: 108/65    Temp   36.1 °C (97 °F)    Pulse   85   Resp   (!) 23    SpO2   93 %      Anesthesia Post Evaluation    Patient location during evaluation: PACU  Patient participation: complete - patient participated  Level of consciousness: awake and alert    Airway patency: patent  Anesthetic complications: no  Cardiovascular status: hemodynamically stable  Respiratory status: acceptable  Hydration status: euvolemic    PONV: none          There were no known notable events for this encounter.     Nurse Pain Score: 2 (NPRS)

## 2024-06-29 LAB
BACTERIA WND AEROBE CULT: ABNORMAL
GRAM STN SPEC: ABNORMAL
SIGNIFICANT IND 70042: ABNORMAL
SITE SITE: ABNORMAL
SOURCE SOURCE: ABNORMAL

## 2024-06-30 LAB
BACTERIA SPEC ANAEROBE CULT: NORMAL
SIGNIFICANT IND 70042: NORMAL
SITE SITE: NORMAL
SOURCE SOURCE: NORMAL

## 2024-07-08 ENCOUNTER — HOSPITAL ENCOUNTER (INPATIENT)
Facility: MEDICAL CENTER | Age: 39
LOS: 4 days | DRG: 858 | End: 2024-07-12
Attending: STUDENT IN AN ORGANIZED HEALTH CARE EDUCATION/TRAINING PROGRAM | Admitting: INTERNAL MEDICINE
Payer: MEDICAID

## 2024-07-08 ENCOUNTER — HOSPITAL ENCOUNTER (OUTPATIENT)
Facility: MEDICAL CENTER | Age: 39
DRG: 858 | End: 2024-07-08
Attending: PLASTIC SURGERY
Payer: MEDICAID

## 2024-07-08 DIAGNOSIS — Z85.3 HISTORY OF BREAST CANCER: ICD-10-CM

## 2024-07-08 DIAGNOSIS — Z87.09 HISTORY OF ASTHMA: ICD-10-CM

## 2024-07-08 DIAGNOSIS — R52 INTRACTABLE PAIN: ICD-10-CM

## 2024-07-08 DIAGNOSIS — T81.49XA POSTOPERATIVE INFECTION OF BREAST INCISION: ICD-10-CM

## 2024-07-08 LAB
ALBUMIN SERPL BCP-MCNC: 3.8 G/DL (ref 3.2–4.9)
ALBUMIN/GLOB SERPL: 1.8 G/DL
ALP SERPL-CCNC: 73 U/L (ref 30–99)
ALT SERPL-CCNC: 18 U/L (ref 2–50)
ANION GAP SERPL CALC-SCNC: 12 MMOL/L (ref 7–16)
AST SERPL-CCNC: 15 U/L (ref 12–45)
BASOPHILS # BLD AUTO: 0.6 % (ref 0–1.8)
BASOPHILS # BLD: 0.04 K/UL (ref 0–0.12)
BILIRUB SERPL-MCNC: 0.2 MG/DL (ref 0.1–1.5)
BUN SERPL-MCNC: 14 MG/DL (ref 8–22)
CALCIUM ALBUM COR SERPL-MCNC: 9.2 MG/DL (ref 8.5–10.5)
CALCIUM SERPL-MCNC: 9 MG/DL (ref 8.5–10.5)
CHLORIDE SERPL-SCNC: 108 MMOL/L (ref 96–112)
CO2 SERPL-SCNC: 19 MMOL/L (ref 20–33)
CREAT SERPL-MCNC: 1.02 MG/DL (ref 0.5–1.4)
EOSINOPHIL # BLD AUTO: 0.27 K/UL (ref 0–0.51)
EOSINOPHIL NFR BLD: 3.8 % (ref 0–6.9)
ERYTHROCYTE [DISTWIDTH] IN BLOOD BY AUTOMATED COUNT: 45 FL (ref 35.9–50)
ERYTHROCYTE [SEDIMENTATION RATE] IN BLOOD BY WESTERGREN METHOD: 17 MM/HOUR (ref 0–25)
GFR SERPLBLD CREATININE-BSD FMLA CKD-EPI: 71 ML/MIN/1.73 M 2
GLOBULIN SER CALC-MCNC: 2.1 G/DL (ref 1.9–3.5)
GLUCOSE SERPL-MCNC: 102 MG/DL (ref 65–99)
HCT VFR BLD AUTO: 38.1 % (ref 37–47)
HGB BLD-MCNC: 13.1 G/DL (ref 12–16)
IMM GRANULOCYTES # BLD AUTO: 0.01 K/UL (ref 0–0.11)
IMM GRANULOCYTES NFR BLD AUTO: 0.1 % (ref 0–0.9)
INR PPP: 1.06 (ref 0.87–1.13)
LYMPHOCYTES # BLD AUTO: 1.85 K/UL (ref 1–4.8)
LYMPHOCYTES NFR BLD: 25.9 % (ref 22–41)
MCH RBC QN AUTO: 32.8 PG (ref 27–33)
MCHC RBC AUTO-ENTMCNC: 34.4 G/DL (ref 32.2–35.5)
MCV RBC AUTO: 95.3 FL (ref 81.4–97.8)
MONOCYTES # BLD AUTO: 0.44 K/UL (ref 0–0.85)
MONOCYTES NFR BLD AUTO: 6.2 % (ref 0–13.4)
NEUTROPHILS # BLD AUTO: 4.52 K/UL (ref 1.82–7.42)
NEUTROPHILS NFR BLD: 63.4 % (ref 44–72)
NRBC # BLD AUTO: 0 K/UL
NRBC BLD-RTO: 0 /100 WBC (ref 0–0.2)
PLATELET # BLD AUTO: 173 K/UL (ref 164–446)
PMV BLD AUTO: 10.4 FL (ref 9–12.9)
POTASSIUM SERPL-SCNC: 4.4 MMOL/L (ref 3.6–5.5)
PROT SERPL-MCNC: 5.9 G/DL (ref 6–8.2)
PROTHROMBIN TIME: 14 SEC (ref 12–14.6)
RBC # BLD AUTO: 4 M/UL (ref 4.2–5.4)
SODIUM SERPL-SCNC: 139 MMOL/L (ref 135–145)
WBC # BLD AUTO: 7.1 K/UL (ref 4.8–10.8)

## 2024-07-08 PROCEDURE — A9270 NON-COVERED ITEM OR SERVICE: HCPCS

## 2024-07-08 PROCEDURE — 770001 HCHG ROOM/CARE - MED/SURG/GYN PRIV*

## 2024-07-08 PROCEDURE — 87205 SMEAR GRAM STAIN: CPT

## 2024-07-08 PROCEDURE — 85652 RBC SED RATE AUTOMATED: CPT

## 2024-07-08 PROCEDURE — 99222 1ST HOSP IP/OBS MODERATE 55: CPT | Performed by: INTERNAL MEDICINE

## 2024-07-08 PROCEDURE — A9270 NON-COVERED ITEM OR SERVICE: HCPCS | Performed by: INTERNAL MEDICINE

## 2024-07-08 PROCEDURE — 85025 COMPLETE CBC W/AUTO DIFF WBC: CPT

## 2024-07-08 PROCEDURE — 85610 PROTHROMBIN TIME: CPT

## 2024-07-08 PROCEDURE — 80053 COMPREHEN METABOLIC PANEL: CPT

## 2024-07-08 PROCEDURE — 700101 HCHG RX REV CODE 250: Performed by: INTERNAL MEDICINE

## 2024-07-08 PROCEDURE — 700111 HCHG RX REV CODE 636 W/ 250 OVERRIDE (IP): Performed by: INTERNAL MEDICINE

## 2024-07-08 PROCEDURE — 87070 CULTURE OTHR SPECIMN AEROBIC: CPT

## 2024-07-08 PROCEDURE — 36415 COLL VENOUS BLD VENIPUNCTURE: CPT

## 2024-07-08 PROCEDURE — 87040 BLOOD CULTURE FOR BACTERIA: CPT

## 2024-07-08 PROCEDURE — 700102 HCHG RX REV CODE 250 W/ 637 OVERRIDE(OP)

## 2024-07-08 PROCEDURE — 700105 HCHG RX REV CODE 258: Performed by: INTERNAL MEDICINE

## 2024-07-08 PROCEDURE — 700102 HCHG RX REV CODE 250 W/ 637 OVERRIDE(OP): Performed by: INTERNAL MEDICINE

## 2024-07-08 RX ORDER — PROMETHAZINE HYDROCHLORIDE 25 MG/1
12.5-25 TABLET ORAL EVERY 4 HOURS PRN
Status: DISCONTINUED | OUTPATIENT
Start: 2024-07-08 | End: 2024-07-12 | Stop reason: HOSPADM

## 2024-07-08 RX ORDER — ACYCLOVIR 400 MG/1
400 TABLET ORAL
COMMUNITY

## 2024-07-08 RX ORDER — ACYCLOVIR 200 MG/1
400 CAPSULE ORAL 2 TIMES DAILY
Status: DISCONTINUED | OUTPATIENT
Start: 2024-07-08 | End: 2024-07-08

## 2024-07-08 RX ORDER — PROCHLORPERAZINE EDISYLATE 5 MG/ML
5-10 INJECTION INTRAMUSCULAR; INTRAVENOUS EVERY 4 HOURS PRN
Status: DISCONTINUED | OUTPATIENT
Start: 2024-07-08 | End: 2024-07-12 | Stop reason: HOSPADM

## 2024-07-08 RX ORDER — HYDROMORPHONE HYDROCHLORIDE 1 MG/ML
0.5 INJECTION, SOLUTION INTRAMUSCULAR; INTRAVENOUS; SUBCUTANEOUS
Status: DISCONTINUED | OUTPATIENT
Start: 2024-07-08 | End: 2024-07-09

## 2024-07-08 RX ORDER — ONDANSETRON 4 MG/1
4 TABLET, ORALLY DISINTEGRATING ORAL EVERY 4 HOURS PRN
Status: DISCONTINUED | OUTPATIENT
Start: 2024-07-08 | End: 2024-07-12 | Stop reason: HOSPADM

## 2024-07-08 RX ORDER — LABETALOL HYDROCHLORIDE 5 MG/ML
10 INJECTION, SOLUTION INTRAVENOUS EVERY 4 HOURS PRN
Status: DISCONTINUED | OUTPATIENT
Start: 2024-07-08 | End: 2024-07-12 | Stop reason: HOSPADM

## 2024-07-08 RX ORDER — DIPHENHYDRAMINE HCL 25 MG
25 TABLET ORAL EVERY 6 HOURS PRN
Status: DISCONTINUED | OUTPATIENT
Start: 2024-07-08 | End: 2024-07-10

## 2024-07-08 RX ORDER — OXYCODONE HYDROCHLORIDE 10 MG/1
10 TABLET ORAL
Status: DISCONTINUED | OUTPATIENT
Start: 2024-07-08 | End: 2024-07-09

## 2024-07-08 RX ORDER — KETOROLAC TROMETHAMINE 15 MG/ML
15 INJECTION, SOLUTION INTRAMUSCULAR; INTRAVENOUS EVERY 6 HOURS PRN
Status: ACTIVE | OUTPATIENT
Start: 2024-07-08 | End: 2024-07-10

## 2024-07-08 RX ORDER — AMOXICILLIN 250 MG
2 CAPSULE ORAL EVERY EVENING
Status: DISCONTINUED | OUTPATIENT
Start: 2024-07-08 | End: 2024-07-12 | Stop reason: HOSPADM

## 2024-07-08 RX ORDER — PROMETHAZINE HYDROCHLORIDE 25 MG/1
12.5-25 SUPPOSITORY RECTAL EVERY 4 HOURS PRN
Status: DISCONTINUED | OUTPATIENT
Start: 2024-07-08 | End: 2024-07-12 | Stop reason: HOSPADM

## 2024-07-08 RX ORDER — ONDANSETRON 2 MG/ML
4 INJECTION INTRAMUSCULAR; INTRAVENOUS EVERY 4 HOURS PRN
Status: DISCONTINUED | OUTPATIENT
Start: 2024-07-08 | End: 2024-07-12 | Stop reason: HOSPADM

## 2024-07-08 RX ORDER — OXYCODONE HYDROCHLORIDE 5 MG/1
5 TABLET ORAL
Status: DISCONTINUED | OUTPATIENT
Start: 2024-07-08 | End: 2024-07-09

## 2024-07-08 RX ORDER — ENOXAPARIN SODIUM 100 MG/ML
40 INJECTION SUBCUTANEOUS DAILY
Status: DISCONTINUED | OUTPATIENT
Start: 2024-07-09 | End: 2024-07-12 | Stop reason: HOSPADM

## 2024-07-08 RX ORDER — ACETAMINOPHEN 325 MG/1
650 TABLET ORAL EVERY 6 HOURS PRN
Status: DISCONTINUED | OUTPATIENT
Start: 2024-07-08 | End: 2024-07-12 | Stop reason: HOSPADM

## 2024-07-08 RX ORDER — POLYETHYLENE GLYCOL 3350 17 G/17G
1 POWDER, FOR SOLUTION ORAL
Status: DISCONTINUED | OUTPATIENT
Start: 2024-07-08 | End: 2024-07-12 | Stop reason: HOSPADM

## 2024-07-08 RX ADMIN — DIPHENHYDRAMINE HYDROCHLORIDE 25 MG: 25 TABLET ORAL at 22:40

## 2024-07-08 RX ADMIN — VANCOMYCIN HYDROCHLORIDE 2250 MG: 5 INJECTION, POWDER, LYOPHILIZED, FOR SOLUTION INTRAVENOUS at 22:11

## 2024-07-08 RX ADMIN — OXYCODONE HYDROCHLORIDE 10 MG: 10 TABLET ORAL at 18:31

## 2024-07-08 RX ADMIN — HYDROMORPHONE HYDROCHLORIDE 0.5 MG: 1 INJECTION, SOLUTION INTRAMUSCULAR; INTRAVENOUS; SUBCUTANEOUS at 20:52

## 2024-07-08 RX ADMIN — OXYCODONE HYDROCHLORIDE 10 MG: 10 TABLET ORAL at 22:10

## 2024-07-08 RX ADMIN — CEFTRIAXONE SODIUM 2000 MG: 10 INJECTION, POWDER, FOR SOLUTION INTRAVENOUS at 22:11

## 2024-07-08 ASSESSMENT — ENCOUNTER SYMPTOMS
HALLUCINATIONS: 0
DOUBLE VISION: 0
NERVOUS/ANXIOUS: 0
CHILLS: 1
SPEECH CHANGE: 0
SPUTUM PRODUCTION: 0
BLURRED VISION: 0
FEVER: 0
NAUSEA: 0
VOMITING: 0
BRUISES/BLEEDS EASILY: 0
HEADACHES: 0
COUGH: 0
ORTHOPNEA: 0
HEMOPTYSIS: 0
POLYDIPSIA: 0
PALPITATIONS: 0
HEARTBURN: 0
FOCAL WEAKNESS: 0
PHOTOPHOBIA: 0
NECK PAIN: 0
FLANK PAIN: 0
TREMORS: 0
WEIGHT LOSS: 0
BACK PAIN: 0

## 2024-07-08 ASSESSMENT — PAIN DESCRIPTION - PAIN TYPE
TYPE: ACUTE PAIN

## 2024-07-08 ASSESSMENT — FIBROSIS 4 INDEX: FIB4 SCORE: 0.96

## 2024-07-08 ASSESSMENT — PATIENT HEALTH QUESTIONNAIRE - PHQ9
1. LITTLE INTEREST OR PLEASURE IN DOING THINGS: NOT AT ALL
SUM OF ALL RESPONSES TO PHQ9 QUESTIONS 1 AND 2: 0
2. FEELING DOWN, DEPRESSED, IRRITABLE, OR HOPELESS: NOT AT ALL

## 2024-07-08 ASSESSMENT — LIFESTYLE VARIABLES: SUBSTANCE_ABUSE: 0

## 2024-07-09 LAB
ALBUMIN SERPL BCP-MCNC: 3.8 G/DL (ref 3.2–4.9)
ALBUMIN/GLOB SERPL: 2 G/DL
ALP SERPL-CCNC: 68 U/L (ref 30–99)
ALT SERPL-CCNC: 15 U/L (ref 2–50)
ANION GAP SERPL CALC-SCNC: 11 MMOL/L (ref 7–16)
AST SERPL-CCNC: 11 U/L (ref 12–45)
BASOPHILS # BLD AUTO: 0.1 % (ref 0–1.8)
BASOPHILS # BLD: 0.01 K/UL (ref 0–0.12)
BILIRUB SERPL-MCNC: 0.2 MG/DL (ref 0.1–1.5)
BUN SERPL-MCNC: 13 MG/DL (ref 8–22)
CALCIUM ALBUM COR SERPL-MCNC: 9.1 MG/DL (ref 8.5–10.5)
CALCIUM SERPL-MCNC: 8.9 MG/DL (ref 8.5–10.5)
CHLORIDE SERPL-SCNC: 104 MMOL/L (ref 96–112)
CO2 SERPL-SCNC: 20 MMOL/L (ref 20–33)
CREAT SERPL-MCNC: 0.8 MG/DL (ref 0.5–1.4)
EOSINOPHIL # BLD AUTO: 0 K/UL (ref 0–0.51)
EOSINOPHIL NFR BLD: 0 % (ref 0–6.9)
ERYTHROCYTE [DISTWIDTH] IN BLOOD BY AUTOMATED COUNT: 43.9 FL (ref 35.9–50)
GFR SERPLBLD CREATININE-BSD FMLA CKD-EPI: 95 ML/MIN/1.73 M 2
GLOBULIN SER CALC-MCNC: 1.9 G/DL (ref 1.9–3.5)
GLUCOSE SERPL-MCNC: 208 MG/DL (ref 65–99)
GRAM STN SPEC: NORMAL
HCT VFR BLD AUTO: 36.8 % (ref 37–47)
HGB BLD-MCNC: 12.3 G/DL (ref 12–16)
IMM GRANULOCYTES # BLD AUTO: 0.02 K/UL (ref 0–0.11)
IMM GRANULOCYTES NFR BLD AUTO: 0.3 % (ref 0–0.9)
LYMPHOCYTES # BLD AUTO: 0.66 K/UL (ref 1–4.8)
LYMPHOCYTES NFR BLD: 9.6 % (ref 22–41)
MCH RBC QN AUTO: 32 PG (ref 27–33)
MCHC RBC AUTO-ENTMCNC: 33.4 G/DL (ref 32.2–35.5)
MCV RBC AUTO: 95.8 FL (ref 81.4–97.8)
MONOCYTES # BLD AUTO: 0.07 K/UL (ref 0–0.85)
MONOCYTES NFR BLD AUTO: 1 % (ref 0–13.4)
NEUTROPHILS # BLD AUTO: 6.13 K/UL (ref 1.82–7.42)
NEUTROPHILS NFR BLD: 89 % (ref 44–72)
NRBC # BLD AUTO: 0 K/UL
NRBC BLD-RTO: 0 /100 WBC (ref 0–0.2)
PLATELET # BLD AUTO: 169 K/UL (ref 164–446)
PMV BLD AUTO: 11.1 FL (ref 9–12.9)
POTASSIUM SERPL-SCNC: 4.8 MMOL/L (ref 3.6–5.5)
PROT SERPL-MCNC: 5.7 G/DL (ref 6–8.2)
RBC # BLD AUTO: 3.84 M/UL (ref 4.2–5.4)
SIGNIFICANT IND 70042: NORMAL
SITE SITE: NORMAL
SODIUM SERPL-SCNC: 135 MMOL/L (ref 135–145)
SOURCE SOURCE: NORMAL
WBC # BLD AUTO: 6.9 K/UL (ref 4.8–10.8)

## 2024-07-09 PROCEDURE — 700101 HCHG RX REV CODE 250: Performed by: INTERNAL MEDICINE

## 2024-07-09 PROCEDURE — 80053 COMPREHEN METABOLIC PANEL: CPT

## 2024-07-09 PROCEDURE — 306581 SET INFUSION,POWERLOC 20G X 1: Performed by: INTERNAL MEDICINE

## 2024-07-09 PROCEDURE — A9270 NON-COVERED ITEM OR SERVICE: HCPCS | Performed by: INTERNAL MEDICINE

## 2024-07-09 PROCEDURE — 700111 HCHG RX REV CODE 636 W/ 250 OVERRIDE (IP)

## 2024-07-09 PROCEDURE — 700102 HCHG RX REV CODE 250 W/ 637 OVERRIDE(OP)

## 2024-07-09 PROCEDURE — 700102 HCHG RX REV CODE 250 W/ 637 OVERRIDE(OP): Performed by: INTERNAL MEDICINE

## 2024-07-09 PROCEDURE — 306580 SET INFUSION,POWERLOC 20G X.75: Performed by: INTERNAL MEDICINE

## 2024-07-09 PROCEDURE — 85025 COMPLETE CBC W/AUTO DIFF WBC: CPT

## 2024-07-09 PROCEDURE — 99233 SBSQ HOSP IP/OBS HIGH 50: CPT | Performed by: INTERNAL MEDICINE

## 2024-07-09 PROCEDURE — 700111 HCHG RX REV CODE 636 W/ 250 OVERRIDE (IP): Performed by: INTERNAL MEDICINE

## 2024-07-09 PROCEDURE — A9270 NON-COVERED ITEM OR SERVICE: HCPCS

## 2024-07-09 PROCEDURE — 770001 HCHG ROOM/CARE - MED/SURG/GYN PRIV*

## 2024-07-09 RX ORDER — TRAZODONE HYDROCHLORIDE 50 MG/1
25 TABLET ORAL
Status: DISCONTINUED | OUTPATIENT
Start: 2024-07-09 | End: 2024-07-12 | Stop reason: HOSPADM

## 2024-07-09 RX ORDER — METHYLPREDNISOLONE SODIUM SUCCINATE 125 MG/2ML
62.5 INJECTION, POWDER, LYOPHILIZED, FOR SOLUTION INTRAMUSCULAR; INTRAVENOUS ONCE
Status: COMPLETED | OUTPATIENT
Start: 2024-07-09 | End: 2024-07-09

## 2024-07-09 RX ORDER — LIDOCAINE HYDROCHLORIDE 20 MG/ML
JELLY TOPICAL
Status: COMPLETED | OUTPATIENT
Start: 2024-07-09 | End: 2024-07-09

## 2024-07-09 RX ORDER — BENZOCAINE/MENTHOL 6 MG-10 MG
LOZENGE MUCOUS MEMBRANE 2 TIMES DAILY
Status: DISCONTINUED | OUTPATIENT
Start: 2024-07-09 | End: 2024-07-12 | Stop reason: HOSPADM

## 2024-07-09 RX ORDER — HYDROMORPHONE HYDROCHLORIDE 1 MG/ML
0.5 INJECTION, SOLUTION INTRAMUSCULAR; INTRAVENOUS; SUBCUTANEOUS
Status: DISCONTINUED | OUTPATIENT
Start: 2024-07-09 | End: 2024-07-09

## 2024-07-09 RX ORDER — OXYCODONE HYDROCHLORIDE 15 MG/1
15 TABLET ORAL
Status: DISCONTINUED | OUTPATIENT
Start: 2024-07-09 | End: 2024-07-09

## 2024-07-09 RX ORDER — HYDROMORPHONE HYDROCHLORIDE 1 MG/ML
0.5 INJECTION, SOLUTION INTRAMUSCULAR; INTRAVENOUS; SUBCUTANEOUS
Status: DISCONTINUED | OUTPATIENT
Start: 2024-07-09 | End: 2024-07-12 | Stop reason: HOSPADM

## 2024-07-09 RX ORDER — OXYCODONE HYDROCHLORIDE 15 MG/1
15 TABLET ORAL
Status: DISCONTINUED | OUTPATIENT
Start: 2024-07-09 | End: 2024-07-12 | Stop reason: HOSPADM

## 2024-07-09 RX ORDER — LINEZOLID 2 MG/ML
600 INJECTION, SOLUTION INTRAVENOUS EVERY 12 HOURS
Status: DISCONTINUED | OUTPATIENT
Start: 2024-07-09 | End: 2024-07-09

## 2024-07-09 RX ORDER — OXYCODONE HYDROCHLORIDE 5 MG/1
5 TABLET ORAL
Status: DISCONTINUED | OUTPATIENT
Start: 2024-07-09 | End: 2024-07-09

## 2024-07-09 RX ORDER — OXYCODONE HYDROCHLORIDE 10 MG/1
10 TABLET ORAL
Status: DISCONTINUED | OUTPATIENT
Start: 2024-07-09 | End: 2024-07-12 | Stop reason: HOSPADM

## 2024-07-09 RX ORDER — LINEZOLID 600 MG/1
600 TABLET, FILM COATED ORAL EVERY 12 HOURS
Status: DISCONTINUED | OUTPATIENT
Start: 2024-07-09 | End: 2024-07-11

## 2024-07-09 RX ORDER — METHOCARBAMOL 500 MG/1
500 TABLET, FILM COATED ORAL 4 TIMES DAILY
Status: DISCONTINUED | OUTPATIENT
Start: 2024-07-09 | End: 2024-07-12 | Stop reason: HOSPADM

## 2024-07-09 RX ADMIN — Medication 5 MG: at 02:22

## 2024-07-09 RX ADMIN — LIDOCAINE HYDROCHLORIDE 6 ML: 20 JELLY TOPICAL at 12:15

## 2024-07-09 RX ADMIN — HYDROMORPHONE HYDROCHLORIDE 0.5 MG: 1 INJECTION, SOLUTION INTRAMUSCULAR; INTRAVENOUS; SUBCUTANEOUS at 00:59

## 2024-07-09 RX ADMIN — OXYCODONE HYDROCHLORIDE 10 MG: 10 TABLET ORAL at 10:48

## 2024-07-09 RX ADMIN — HYDROMORPHONE HYDROCHLORIDE 0.5 MG: 1 INJECTION, SOLUTION INTRAMUSCULAR; INTRAVENOUS; SUBCUTANEOUS at 20:56

## 2024-07-09 RX ADMIN — ONDANSETRON 4 MG: 2 INJECTION INTRAMUSCULAR; INTRAVENOUS at 00:16

## 2024-07-09 RX ADMIN — CEFTRIAXONE SODIUM 2000 MG: 10 INJECTION, POWDER, FOR SOLUTION INTRAVENOUS at 17:58

## 2024-07-09 RX ADMIN — METHOCARBAMOL 500 MG: 500 TABLET ORAL at 18:19

## 2024-07-09 RX ADMIN — OXYCODONE HYDROCHLORIDE 10 MG: 10 TABLET ORAL at 02:22

## 2024-07-09 RX ADMIN — METHYLPREDNISOLONE SODIUM SUCCINATE 62.5 MG: 125 INJECTION, POWDER, FOR SOLUTION INTRAMUSCULAR; INTRAVENOUS at 02:24

## 2024-07-09 RX ADMIN — HYDROMORPHONE HYDROCHLORIDE 0.5 MG: 1 INJECTION, SOLUTION INTRAMUSCULAR; INTRAVENOUS; SUBCUTANEOUS at 13:11

## 2024-07-09 RX ADMIN — OXYCODONE HYDROCHLORIDE 10 MG: 10 TABLET ORAL at 22:11

## 2024-07-09 RX ADMIN — OXYCODONE HYDROCHLORIDE 10 MG: 10 TABLET ORAL at 05:31

## 2024-07-09 RX ADMIN — LINEZOLID 600 MG: 600 INJECTION, SOLUTION INTRAVENOUS at 05:36

## 2024-07-09 RX ADMIN — ENOXAPARIN SODIUM 40 MG: 100 INJECTION SUBCUTANEOUS at 17:58

## 2024-07-09 RX ADMIN — LINEZOLID 600 MG: 600 TABLET, FILM COATED ORAL at 17:57

## 2024-07-09 RX ADMIN — ONDANSETRON 4 MG: 2 INJECTION INTRAMUSCULAR; INTRAVENOUS at 05:32

## 2024-07-09 RX ADMIN — HYDROMORPHONE HYDROCHLORIDE 0.5 MG: 1 INJECTION, SOLUTION INTRAMUSCULAR; INTRAVENOUS; SUBCUTANEOUS at 08:26

## 2024-07-09 RX ADMIN — PROMETHAZINE HYDROCHLORIDE 25 MG: 25 TABLET ORAL at 02:23

## 2024-07-09 RX ADMIN — OXYCODONE HYDROCHLORIDE 10 MG: 10 TABLET ORAL at 13:54

## 2024-07-09 RX ADMIN — OXYCODONE HYDROCHLORIDE 10 MG: 10 TABLET ORAL at 17:57

## 2024-07-09 RX ADMIN — TRAZODONE HYDROCHLORIDE 25 MG: 50 TABLET ORAL at 22:11

## 2024-07-09 RX ADMIN — METHOCARBAMOL 500 MG: 500 TABLET ORAL at 20:59

## 2024-07-09 ASSESSMENT — COGNITIVE AND FUNCTIONAL STATUS - GENERAL
DAILY ACTIVITIY SCORE: 24
SUGGESTED CMS G CODE MODIFIER DAILY ACTIVITY: CH
MOBILITY SCORE: 24
SUGGESTED CMS G CODE MODIFIER MOBILITY: CH

## 2024-07-09 ASSESSMENT — LIFESTYLE VARIABLES
HAVE YOU EVER FELT YOU SHOULD CUT DOWN ON YOUR DRINKING: NO
HAVE PEOPLE ANNOYED YOU BY CRITICIZING YOUR DRINKING: NO
TOTAL SCORE: 0
EVER HAD A DRINK FIRST THING IN THE MORNING TO STEADY YOUR NERVES TO GET RID OF A HANGOVER: NO
HOW MANY TIMES IN THE PAST YEAR HAVE YOU HAD 5 OR MORE DRINKS IN A DAY: 0
CONSUMPTION TOTAL: NEGATIVE
SUBSTANCE_ABUSE: 0
ALCOHOL_USE: YES
ON A TYPICAL DAY WHEN YOU DRINK ALCOHOL HOW MANY DRINKS DO YOU HAVE: 1
AVERAGE NUMBER OF DAYS PER WEEK YOU HAVE A DRINK CONTAINING ALCOHOL: 1
TOTAL SCORE: 0
TOTAL SCORE: 0
EVER FELT BAD OR GUILTY ABOUT YOUR DRINKING: NO
DOES PATIENT WANT TO STOP DRINKING: NO

## 2024-07-09 ASSESSMENT — PAIN DESCRIPTION - PAIN TYPE
TYPE: ACUTE PAIN

## 2024-07-09 ASSESSMENT — ENCOUNTER SYMPTOMS
VOMITING: 0
BACK PAIN: 0
CHILLS: 0
ABDOMINAL PAIN: 0
BLURRED VISION: 0
DOUBLE VISION: 0
FALLS: 0
SHORTNESS OF BREATH: 0
SEIZURES: 0
SORE THROAT: 0
HALLUCINATIONS: 0
PALPITATIONS: 0
NAUSEA: 0
FEVER: 0
HEADACHES: 0

## 2024-07-09 ASSESSMENT — SOCIAL DETERMINANTS OF HEALTH (SDOH)
WITHIN THE LAST YEAR, HAVE TO BEEN RAPED OR FORCED TO HAVE ANY KIND OF SEXUAL ACTIVITY BY YOUR PARTNER OR EX-PARTNER?: NO
WITHIN THE PAST 12 MONTHS, YOU WORRIED THAT YOUR FOOD WOULD RUN OUT BEFORE YOU GOT THE MONEY TO BUY MORE: NEVER TRUE
WITHIN THE LAST YEAR, HAVE YOU BEEN KICKED, HIT, SLAPPED, OR OTHERWISE PHYSICALLY HURT BY YOUR PARTNER OR EX-PARTNER?: NO
WITHIN THE LAST YEAR, HAVE YOU BEEN AFRAID OF YOUR PARTNER OR EX-PARTNER?: NO
WITHIN THE PAST 12 MONTHS, THE FOOD YOU BOUGHT JUST DIDN'T LAST AND YOU DIDN'T HAVE MONEY TO GET MORE: NEVER TRUE
WITHIN THE LAST YEAR, HAVE YOU BEEN HUMILIATED OR EMOTIONALLY ABUSED IN OTHER WAYS BY YOUR PARTNER OR EX-PARTNER?: NO
IN THE PAST 12 MONTHS, HAS THE ELECTRIC, GAS, OIL, OR WATER COMPANY THREATENED TO SHUT OFF SERVICE IN YOUR HOME?: NO

## 2024-07-10 ENCOUNTER — TELEPHONE (OUTPATIENT)
Dept: HEMATOLOGY ONCOLOGY | Facility: MEDICAL CENTER | Age: 39
End: 2024-07-10
Payer: MEDICAID

## 2024-07-10 LAB
ALBUMIN SERPL BCP-MCNC: 3.4 G/DL (ref 3.2–4.9)
ALBUMIN/GLOB SERPL: 1.5 G/DL
ALP SERPL-CCNC: 63 U/L (ref 30–99)
ALT SERPL-CCNC: 17 U/L (ref 2–50)
ANION GAP SERPL CALC-SCNC: 11 MMOL/L (ref 7–16)
AST SERPL-CCNC: 11 U/L (ref 12–45)
BASOPHILS # BLD AUTO: 0.3 % (ref 0–1.8)
BASOPHILS # BLD: 0.03 K/UL (ref 0–0.12)
BILIRUB SERPL-MCNC: <0.2 MG/DL (ref 0.1–1.5)
BUN SERPL-MCNC: 14 MG/DL (ref 8–22)
CALCIUM ALBUM COR SERPL-MCNC: 9.3 MG/DL (ref 8.5–10.5)
CALCIUM SERPL-MCNC: 8.8 MG/DL (ref 8.5–10.5)
CHLORIDE SERPL-SCNC: 105 MMOL/L (ref 96–112)
CO2 SERPL-SCNC: 24 MMOL/L (ref 20–33)
CREAT SERPL-MCNC: 0.94 MG/DL (ref 0.5–1.4)
EOSINOPHIL # BLD AUTO: 0.06 K/UL (ref 0–0.51)
EOSINOPHIL NFR BLD: 0.6 % (ref 0–6.9)
ERYTHROCYTE [DISTWIDTH] IN BLOOD BY AUTOMATED COUNT: 45.1 FL (ref 35.9–50)
GFR SERPLBLD CREATININE-BSD FMLA CKD-EPI: 78 ML/MIN/1.73 M 2
GLOBULIN SER CALC-MCNC: 2.3 G/DL (ref 1.9–3.5)
GLUCOSE SERPL-MCNC: 157 MG/DL (ref 65–99)
HCT VFR BLD AUTO: 35.7 % (ref 37–47)
HGB BLD-MCNC: 11.6 G/DL (ref 12–16)
IMM GRANULOCYTES # BLD AUTO: 0.03 K/UL (ref 0–0.11)
IMM GRANULOCYTES NFR BLD AUTO: 0.3 % (ref 0–0.9)
LYMPHOCYTES # BLD AUTO: 2.02 K/UL (ref 1–4.8)
LYMPHOCYTES NFR BLD: 18.8 % (ref 22–41)
MAGNESIUM SERPL-MCNC: 1.8 MG/DL (ref 1.5–2.5)
MCH RBC QN AUTO: 31.8 PG (ref 27–33)
MCHC RBC AUTO-ENTMCNC: 32.5 G/DL (ref 32.2–35.5)
MCV RBC AUTO: 97.8 FL (ref 81.4–97.8)
MONOCYTES # BLD AUTO: 0.59 K/UL (ref 0–0.85)
MONOCYTES NFR BLD AUTO: 5.5 % (ref 0–13.4)
NEUTROPHILS # BLD AUTO: 7.99 K/UL (ref 1.82–7.42)
NEUTROPHILS NFR BLD: 74.5 % (ref 44–72)
NRBC # BLD AUTO: 0 K/UL
NRBC BLD-RTO: 0 /100 WBC (ref 0–0.2)
PHOSPHATE SERPL-MCNC: 4 MG/DL (ref 2.5–4.5)
PLATELET # BLD AUTO: 172 K/UL (ref 164–446)
PMV BLD AUTO: 11.1 FL (ref 9–12.9)
POTASSIUM SERPL-SCNC: 4.6 MMOL/L (ref 3.6–5.5)
PROT SERPL-MCNC: 5.7 G/DL (ref 6–8.2)
RBC # BLD AUTO: 3.65 M/UL (ref 4.2–5.4)
SODIUM SERPL-SCNC: 140 MMOL/L (ref 135–145)
WBC # BLD AUTO: 10.7 K/UL (ref 4.8–10.8)

## 2024-07-10 PROCEDURE — 700105 HCHG RX REV CODE 258: Performed by: INTERNAL MEDICINE

## 2024-07-10 PROCEDURE — 36415 COLL VENOUS BLD VENIPUNCTURE: CPT

## 2024-07-10 PROCEDURE — 700111 HCHG RX REV CODE 636 W/ 250 OVERRIDE (IP): Mod: JZ | Performed by: INTERNAL MEDICINE

## 2024-07-10 PROCEDURE — 700111 HCHG RX REV CODE 636 W/ 250 OVERRIDE (IP): Performed by: INTERNAL MEDICINE

## 2024-07-10 PROCEDURE — 700102 HCHG RX REV CODE 250 W/ 637 OVERRIDE(OP)

## 2024-07-10 PROCEDURE — 84100 ASSAY OF PHOSPHORUS: CPT

## 2024-07-10 PROCEDURE — 770001 HCHG ROOM/CARE - MED/SURG/GYN PRIV*

## 2024-07-10 PROCEDURE — 99233 SBSQ HOSP IP/OBS HIGH 50: CPT | Performed by: INTERNAL MEDICINE

## 2024-07-10 PROCEDURE — A9270 NON-COVERED ITEM OR SERVICE: HCPCS

## 2024-07-10 PROCEDURE — A9270 NON-COVERED ITEM OR SERVICE: HCPCS | Performed by: INTERNAL MEDICINE

## 2024-07-10 PROCEDURE — 85025 COMPLETE CBC W/AUTO DIFF WBC: CPT

## 2024-07-10 PROCEDURE — 83735 ASSAY OF MAGNESIUM: CPT

## 2024-07-10 PROCEDURE — 700102 HCHG RX REV CODE 250 W/ 637 OVERRIDE(OP): Performed by: INTERNAL MEDICINE

## 2024-07-10 PROCEDURE — 80053 COMPREHEN METABOLIC PANEL: CPT

## 2024-07-10 RX ORDER — CALCIUM CARBONATE 500 MG/1
1000 TABLET, CHEWABLE ORAL 3 TIMES DAILY PRN
Status: DISCONTINUED | OUTPATIENT
Start: 2024-07-10 | End: 2024-07-12 | Stop reason: HOSPADM

## 2024-07-10 RX ORDER — OMEPRAZOLE 20 MG/1
20 CAPSULE, DELAYED RELEASE ORAL DAILY
Status: DISCONTINUED | OUTPATIENT
Start: 2024-07-10 | End: 2024-07-12 | Stop reason: HOSPADM

## 2024-07-10 RX ORDER — HYDROXYZINE HYDROCHLORIDE 10 MG/1
10 TABLET, FILM COATED ORAL 3 TIMES DAILY PRN
Status: DISCONTINUED | OUTPATIENT
Start: 2024-07-10 | End: 2024-07-12 | Stop reason: HOSPADM

## 2024-07-10 RX ADMIN — ONDANSETRON 4 MG: 2 INJECTION INTRAMUSCULAR; INTRAVENOUS at 12:15

## 2024-07-10 RX ADMIN — HYDROXYZINE HYDROCHLORIDE 10 MG: 10 TABLET ORAL at 22:01

## 2024-07-10 RX ADMIN — METHOCARBAMOL 500 MG: 500 TABLET ORAL at 08:41

## 2024-07-10 RX ADMIN — HYDROMORPHONE HYDROCHLORIDE 0.5 MG: 1 INJECTION, SOLUTION INTRAMUSCULAR; INTRAVENOUS; SUBCUTANEOUS at 08:41

## 2024-07-10 RX ADMIN — HYDROMORPHONE HYDROCHLORIDE 0.5 MG: 1 INJECTION, SOLUTION INTRAMUSCULAR; INTRAVENOUS; SUBCUTANEOUS at 14:47

## 2024-07-10 RX ADMIN — OXYCODONE HYDROCHLORIDE 15 MG: 15 TABLET ORAL at 20:44

## 2024-07-10 RX ADMIN — METHOCARBAMOL 500 MG: 500 TABLET ORAL at 16:14

## 2024-07-10 RX ADMIN — OXYCODONE HYDROCHLORIDE 15 MG: 15 TABLET ORAL at 16:14

## 2024-07-10 RX ADMIN — OMEPRAZOLE 20 MG: 20 CAPSULE, DELAYED RELEASE ORAL at 05:03

## 2024-07-10 RX ADMIN — ONDANSETRON 4 MG: 2 INJECTION INTRAMUSCULAR; INTRAVENOUS at 21:58

## 2024-07-10 RX ADMIN — ONDANSETRON 4 MG: 2 INJECTION INTRAMUSCULAR; INTRAVENOUS at 18:14

## 2024-07-10 RX ADMIN — ANTACID TABLETS 1000 MG: 500 TABLET, CHEWABLE ORAL at 05:03

## 2024-07-10 RX ADMIN — OXYCODONE HYDROCHLORIDE 15 MG: 15 TABLET ORAL at 23:35

## 2024-07-10 RX ADMIN — OXYCODONE HYDROCHLORIDE 15 MG: 15 TABLET ORAL at 02:08

## 2024-07-10 RX ADMIN — CEFAZOLIN 2 G: 2 INJECTION, POWDER, FOR SOLUTION INTRAMUSCULAR; INTRAVENOUS at 22:00

## 2024-07-10 RX ADMIN — OXYCODONE HYDROCHLORIDE 15 MG: 15 TABLET ORAL at 05:04

## 2024-07-10 RX ADMIN — METHOCARBAMOL 500 MG: 500 TABLET ORAL at 22:01

## 2024-07-10 RX ADMIN — CEFAZOLIN 2 G: 2 INJECTION, POWDER, FOR SOLUTION INTRAMUSCULAR; INTRAVENOUS at 13:53

## 2024-07-10 RX ADMIN — OXYCODONE HYDROCHLORIDE 15 MG: 15 TABLET ORAL at 12:16

## 2024-07-10 RX ADMIN — METHOCARBAMOL 500 MG: 500 TABLET ORAL at 12:16

## 2024-07-10 RX ADMIN — TRAZODONE HYDROCHLORIDE 25 MG: 50 TABLET ORAL at 22:00

## 2024-07-10 RX ADMIN — LINEZOLID 600 MG: 600 TABLET, FILM COATED ORAL at 16:14

## 2024-07-10 RX ADMIN — HYDROMORPHONE HYDROCHLORIDE 0.5 MG: 1 INJECTION, SOLUTION INTRAMUSCULAR; INTRAVENOUS; SUBCUTANEOUS at 03:54

## 2024-07-10 RX ADMIN — LINEZOLID 600 MG: 600 TABLET, FILM COATED ORAL at 05:03

## 2024-07-10 ASSESSMENT — ENCOUNTER SYMPTOMS
BACK PAIN: 0
NAUSEA: 0
BLURRED VISION: 0
PALPITATIONS: 0
CHILLS: 0
VOMITING: 0
SORE THROAT: 0
SEIZURES: 0
ABDOMINAL PAIN: 0
DOUBLE VISION: 0
SHORTNESS OF BREATH: 0
HEADACHES: 0
FALLS: 0
FEVER: 0
HALLUCINATIONS: 0

## 2024-07-10 ASSESSMENT — PAIN DESCRIPTION - PAIN TYPE
TYPE: ACUTE PAIN

## 2024-07-10 ASSESSMENT — LIFESTYLE VARIABLES: SUBSTANCE_ABUSE: 0

## 2024-07-11 ENCOUNTER — ANESTHESIA (OUTPATIENT)
Dept: SURGERY | Facility: MEDICAL CENTER | Age: 39
DRG: 858 | End: 2024-07-11
Payer: MEDICAID

## 2024-07-11 ENCOUNTER — ANESTHESIA EVENT (OUTPATIENT)
Dept: SURGERY | Facility: MEDICAL CENTER | Age: 39
DRG: 858 | End: 2024-07-11
Payer: MEDICAID

## 2024-07-11 ENCOUNTER — TELEPHONE (OUTPATIENT)
Dept: HEMATOLOGY ONCOLOGY | Facility: MEDICAL CENTER | Age: 39
End: 2024-07-11
Payer: MEDICAID

## 2024-07-11 LAB
ALBUMIN SERPL BCP-MCNC: 3.7 G/DL (ref 3.2–4.9)
BASOPHILS # BLD AUTO: 0.3 % (ref 0–1.8)
BASOPHILS # BLD: 0.02 K/UL (ref 0–0.12)
BUN SERPL-MCNC: 14 MG/DL (ref 8–22)
CALCIUM ALBUM COR SERPL-MCNC: 9.2 MG/DL (ref 8.5–10.5)
CALCIUM SERPL-MCNC: 9 MG/DL (ref 8.5–10.5)
CHLORIDE SERPL-SCNC: 104 MMOL/L (ref 96–112)
CO2 SERPL-SCNC: 21 MMOL/L (ref 20–33)
CREAT SERPL-MCNC: 1.08 MG/DL (ref 0.5–1.4)
EOSINOPHIL # BLD AUTO: 0 K/UL (ref 0–0.51)
EOSINOPHIL NFR BLD: 0 % (ref 0–6.9)
ERYTHROCYTE [DISTWIDTH] IN BLOOD BY AUTOMATED COUNT: 44.8 FL (ref 35.9–50)
GFR SERPLBLD CREATININE-BSD FMLA CKD-EPI: 66 ML/MIN/1.73 M 2
GLUCOSE SERPL-MCNC: 202 MG/DL (ref 65–99)
GRAM STN SPEC: NORMAL
HCT VFR BLD AUTO: 37.1 % (ref 37–47)
HGB BLD-MCNC: 12.2 G/DL (ref 12–16)
IMM GRANULOCYTES # BLD AUTO: 0.02 K/UL (ref 0–0.11)
IMM GRANULOCYTES NFR BLD AUTO: 0.3 % (ref 0–0.9)
INR PPP: 1.04 (ref 0.87–1.13)
LYMPHOCYTES # BLD AUTO: 0.44 K/UL (ref 1–4.8)
LYMPHOCYTES NFR BLD: 6.4 % (ref 22–41)
MAGNESIUM SERPL-MCNC: 1.6 MG/DL (ref 1.5–2.5)
MCH RBC QN AUTO: 32.2 PG (ref 27–33)
MCHC RBC AUTO-ENTMCNC: 32.9 G/DL (ref 32.2–35.5)
MCV RBC AUTO: 97.9 FL (ref 81.4–97.8)
MONOCYTES # BLD AUTO: 0.05 K/UL (ref 0–0.85)
MONOCYTES NFR BLD AUTO: 0.7 % (ref 0–13.4)
NEUTROPHILS # BLD AUTO: 6.34 K/UL (ref 1.82–7.42)
NEUTROPHILS NFR BLD: 92.3 % (ref 44–72)
NRBC # BLD AUTO: 0 K/UL
NRBC BLD-RTO: 0 /100 WBC (ref 0–0.2)
PHOSPHATE SERPL-MCNC: 2.9 MG/DL (ref 2.5–4.5)
PLATELET # BLD AUTO: 151 K/UL (ref 164–446)
PMV BLD AUTO: 10.9 FL (ref 9–12.9)
POTASSIUM SERPL-SCNC: 4.4 MMOL/L (ref 3.6–5.5)
PROTHROMBIN TIME: 13.7 SEC (ref 12–14.6)
RBC # BLD AUTO: 3.79 M/UL (ref 4.2–5.4)
SIGNIFICANT IND 70042: NORMAL
SITE SITE: NORMAL
SODIUM SERPL-SCNC: 140 MMOL/L (ref 135–145)
SOURCE SOURCE: NORMAL
WBC # BLD AUTO: 6.9 K/UL (ref 4.8–10.8)

## 2024-07-11 PROCEDURE — 85025 COMPLETE CBC W/AUTO DIFF WBC: CPT

## 2024-07-11 PROCEDURE — 700111 HCHG RX REV CODE 636 W/ 250 OVERRIDE (IP): Mod: JZ | Performed by: INTERNAL MEDICINE

## 2024-07-11 PROCEDURE — 99233 SBSQ HOSP IP/OBS HIGH 50: CPT | Performed by: INTERNAL MEDICINE

## 2024-07-11 PROCEDURE — 160048 HCHG OR STATISTICAL LEVEL 1-5: Performed by: PLASTIC SURGERY

## 2024-07-11 PROCEDURE — 110371 HCHG SHELL REV 272: Performed by: PLASTIC SURGERY

## 2024-07-11 PROCEDURE — 87186 SC STD MICRODIL/AGAR DIL: CPT

## 2024-07-11 PROCEDURE — 160002 HCHG RECOVERY MINUTES (STAT): Performed by: PLASTIC SURGERY

## 2024-07-11 PROCEDURE — 700111 HCHG RX REV CODE 636 W/ 250 OVERRIDE (IP): Performed by: PLASTIC SURGERY

## 2024-07-11 PROCEDURE — 700111 HCHG RX REV CODE 636 W/ 250 OVERRIDE (IP): Performed by: INTERNAL MEDICINE

## 2024-07-11 PROCEDURE — 160035 HCHG PACU - 1ST 60 MINS PHASE I: Performed by: PLASTIC SURGERY

## 2024-07-11 PROCEDURE — 700102 HCHG RX REV CODE 250 W/ 637 OVERRIDE(OP)

## 2024-07-11 PROCEDURE — 0H9U0ZZ DRAINAGE OF LEFT BREAST, OPEN APPROACH: ICD-10-PCS | Performed by: PLASTIC SURGERY

## 2024-07-11 PROCEDURE — 160009 HCHG ANES TIME/MIN: Performed by: PLASTIC SURGERY

## 2024-07-11 PROCEDURE — 700101 HCHG RX REV CODE 250: Performed by: ANESTHESIOLOGY

## 2024-07-11 PROCEDURE — 87075 CULTR BACTERIA EXCEPT BLOOD: CPT

## 2024-07-11 PROCEDURE — 80069 RENAL FUNCTION PANEL: CPT

## 2024-07-11 PROCEDURE — 160027 HCHG SURGERY MINUTES - 1ST 30 MINS LEVEL 2: Performed by: PLASTIC SURGERY

## 2024-07-11 PROCEDURE — 83735 ASSAY OF MAGNESIUM: CPT

## 2024-07-11 PROCEDURE — 160038 HCHG SURGERY MINUTES - EA ADDL 1 MIN LEVEL 2: Performed by: PLASTIC SURGERY

## 2024-07-11 PROCEDURE — 770001 HCHG ROOM/CARE - MED/SURG/GYN PRIV*

## 2024-07-11 PROCEDURE — 700111 HCHG RX REV CODE 636 W/ 250 OVERRIDE (IP): Performed by: ANESTHESIOLOGY

## 2024-07-11 PROCEDURE — A9270 NON-COVERED ITEM OR SERVICE: HCPCS

## 2024-07-11 PROCEDURE — A9270 NON-COVERED ITEM OR SERVICE: HCPCS | Performed by: INTERNAL MEDICINE

## 2024-07-11 PROCEDURE — 700105 HCHG RX REV CODE 258: Performed by: INTERNAL MEDICINE

## 2024-07-11 PROCEDURE — 87077 CULTURE AEROBIC IDENTIFY: CPT

## 2024-07-11 PROCEDURE — 87147 CULTURE TYPE IMMUNOLOGIC: CPT

## 2024-07-11 PROCEDURE — 85610 PROTHROMBIN TIME: CPT

## 2024-07-11 PROCEDURE — 87102 FUNGUS ISOLATION CULTURE: CPT

## 2024-07-11 PROCEDURE — 87205 SMEAR GRAM STAIN: CPT

## 2024-07-11 PROCEDURE — 87070 CULTURE OTHR SPECIMN AEROBIC: CPT

## 2024-07-11 PROCEDURE — 700102 HCHG RX REV CODE 250 W/ 637 OVERRIDE(OP): Performed by: INTERNAL MEDICINE

## 2024-07-11 RX ORDER — HYDROMORPHONE HYDROCHLORIDE 1 MG/ML
0.1 INJECTION, SOLUTION INTRAMUSCULAR; INTRAVENOUS; SUBCUTANEOUS
Status: DISCONTINUED | OUTPATIENT
Start: 2024-07-11 | End: 2024-07-11 | Stop reason: HOSPADM

## 2024-07-11 RX ORDER — OXYCODONE HCL 5 MG/5 ML
5 SOLUTION, ORAL ORAL
Status: DISCONTINUED | OUTPATIENT
Start: 2024-07-11 | End: 2024-07-11 | Stop reason: HOSPADM

## 2024-07-11 RX ORDER — OXYCODONE HCL 5 MG/5 ML
10 SOLUTION, ORAL ORAL
Status: DISCONTINUED | OUTPATIENT
Start: 2024-07-11 | End: 2024-07-11 | Stop reason: HOSPADM

## 2024-07-11 RX ORDER — HYDROMORPHONE HYDROCHLORIDE 1 MG/ML
0.4 INJECTION, SOLUTION INTRAMUSCULAR; INTRAVENOUS; SUBCUTANEOUS
Status: DISCONTINUED | OUTPATIENT
Start: 2024-07-11 | End: 2024-07-11 | Stop reason: HOSPADM

## 2024-07-11 RX ORDER — LABETALOL HYDROCHLORIDE 5 MG/ML
5 INJECTION, SOLUTION INTRAVENOUS
Status: DISCONTINUED | OUTPATIENT
Start: 2024-07-11 | End: 2024-07-11 | Stop reason: HOSPADM

## 2024-07-11 RX ORDER — SODIUM CHLORIDE, SODIUM LACTATE, POTASSIUM CHLORIDE, CALCIUM CHLORIDE 600; 310; 30; 20 MG/100ML; MG/100ML; MG/100ML; MG/100ML
INJECTION, SOLUTION INTRAVENOUS CONTINUOUS
Status: DISCONTINUED | OUTPATIENT
Start: 2024-07-11 | End: 2024-07-11 | Stop reason: HOSPADM

## 2024-07-11 RX ORDER — HALOPERIDOL 5 MG/ML
1 INJECTION INTRAMUSCULAR
Status: DISCONTINUED | OUTPATIENT
Start: 2024-07-11 | End: 2024-07-11 | Stop reason: HOSPADM

## 2024-07-11 RX ORDER — DIPHENHYDRAMINE HYDROCHLORIDE 50 MG/ML
INJECTION INTRAMUSCULAR; INTRAVENOUS PRN
Status: DISCONTINUED | OUTPATIENT
Start: 2024-07-11 | End: 2024-07-11 | Stop reason: SURG

## 2024-07-11 RX ORDER — BUPIVACAINE HYDROCHLORIDE 2.5 MG/ML
INJECTION, SOLUTION EPIDURAL; INFILTRATION; INTRACAUDAL
Status: COMPLETED
Start: 2024-07-11 | End: 2024-07-11

## 2024-07-11 RX ORDER — LIDOCAINE HYDROCHLORIDE 20 MG/ML
INJECTION, SOLUTION EPIDURAL; INFILTRATION; INTRACAUDAL; PERINEURAL PRN
Status: DISCONTINUED | OUTPATIENT
Start: 2024-07-11 | End: 2024-07-11 | Stop reason: SURG

## 2024-07-11 RX ORDER — MIDAZOLAM HYDROCHLORIDE 1 MG/ML
INJECTION INTRAMUSCULAR; INTRAVENOUS PRN
Status: DISCONTINUED | OUTPATIENT
Start: 2024-07-11 | End: 2024-07-11 | Stop reason: SURG

## 2024-07-11 RX ORDER — ONDANSETRON 2 MG/ML
4 INJECTION INTRAMUSCULAR; INTRAVENOUS
Status: DISCONTINUED | OUTPATIENT
Start: 2024-07-11 | End: 2024-07-11 | Stop reason: HOSPADM

## 2024-07-11 RX ORDER — HYDRALAZINE HYDROCHLORIDE 20 MG/ML
5 INJECTION INTRAMUSCULAR; INTRAVENOUS
Status: DISCONTINUED | OUTPATIENT
Start: 2024-07-11 | End: 2024-07-11 | Stop reason: HOSPADM

## 2024-07-11 RX ORDER — HYDROMORPHONE HYDROCHLORIDE 1 MG/ML
0.2 INJECTION, SOLUTION INTRAMUSCULAR; INTRAVENOUS; SUBCUTANEOUS
Status: DISCONTINUED | OUTPATIENT
Start: 2024-07-11 | End: 2024-07-11 | Stop reason: HOSPADM

## 2024-07-11 RX ORDER — ONDANSETRON 2 MG/ML
INJECTION INTRAMUSCULAR; INTRAVENOUS PRN
Status: DISCONTINUED | OUTPATIENT
Start: 2024-07-11 | End: 2024-07-11 | Stop reason: SURG

## 2024-07-11 RX ORDER — EPINEPHRINE 1 MG/ML(1)
AMPUL (ML) INJECTION
Status: COMPLETED
Start: 2024-07-11 | End: 2024-07-11

## 2024-07-11 RX ORDER — MIDAZOLAM HYDROCHLORIDE 1 MG/ML
1 INJECTION INTRAMUSCULAR; INTRAVENOUS
Status: DISCONTINUED | OUTPATIENT
Start: 2024-07-11 | End: 2024-07-11 | Stop reason: HOSPADM

## 2024-07-11 RX ORDER — CEFAZOLIN SODIUM 1 G/3ML
INJECTION, POWDER, FOR SOLUTION INTRAMUSCULAR; INTRAVENOUS PRN
Status: DISCONTINUED | OUTPATIENT
Start: 2024-07-11 | End: 2024-07-11 | Stop reason: SURG

## 2024-07-11 RX ORDER — IPRATROPIUM BROMIDE AND ALBUTEROL SULFATE 2.5; .5 MG/3ML; MG/3ML
3 SOLUTION RESPIRATORY (INHALATION)
Status: DISCONTINUED | OUTPATIENT
Start: 2024-07-11 | End: 2024-07-11 | Stop reason: HOSPADM

## 2024-07-11 RX ORDER — GENTAMICIN 40 MG/ML
INJECTION, SOLUTION INTRAMUSCULAR; INTRAVENOUS
Status: DISCONTINUED | OUTPATIENT
Start: 2024-07-11 | End: 2024-07-11 | Stop reason: HOSPADM

## 2024-07-11 RX ORDER — MEPERIDINE HYDROCHLORIDE 25 MG/ML
12.5 INJECTION INTRAMUSCULAR; INTRAVENOUS; SUBCUTANEOUS
Status: DISCONTINUED | OUTPATIENT
Start: 2024-07-11 | End: 2024-07-11 | Stop reason: HOSPADM

## 2024-07-11 RX ORDER — GENTAMICIN 40 MG/ML
INJECTION, SOLUTION INTRAMUSCULAR; INTRAVENOUS
Status: COMPLETED
Start: 2024-07-11 | End: 2024-07-11

## 2024-07-11 RX ORDER — DEXAMETHASONE SODIUM PHOSPHATE 4 MG/ML
INJECTION, SOLUTION INTRA-ARTICULAR; INTRALESIONAL; INTRAMUSCULAR; INTRAVENOUS; SOFT TISSUE PRN
Status: DISCONTINUED | OUTPATIENT
Start: 2024-07-11 | End: 2024-07-11 | Stop reason: SURG

## 2024-07-11 RX ADMIN — OXYCODONE HYDROCHLORIDE 15 MG: 15 TABLET ORAL at 17:16

## 2024-07-11 RX ADMIN — OXYCODONE HYDROCHLORIDE 15 MG: 15 TABLET ORAL at 20:05

## 2024-07-11 RX ADMIN — OXYCODONE HYDROCHLORIDE 15 MG: 15 TABLET ORAL at 13:18

## 2024-07-11 RX ADMIN — HYDROMORPHONE HYDROCHLORIDE 0.5 MG: 1 INJECTION, SOLUTION INTRAMUSCULAR; INTRAVENOUS; SUBCUTANEOUS at 01:17

## 2024-07-11 RX ADMIN — OXYCODONE HYDROCHLORIDE 15 MG: 15 TABLET ORAL at 02:28

## 2024-07-11 RX ADMIN — METHOCARBAMOL 500 MG: 500 TABLET ORAL at 13:17

## 2024-07-11 RX ADMIN — TRAZODONE HYDROCHLORIDE 25 MG: 50 TABLET ORAL at 21:11

## 2024-07-11 RX ADMIN — DIPHENHYDRAMINE HYDROCHLORIDE 25 MG: 50 INJECTION, SOLUTION INTRAMUSCULAR; INTRAVENOUS at 06:59

## 2024-07-11 RX ADMIN — ONDANSETRON 4 MG: 2 INJECTION INTRAMUSCULAR; INTRAVENOUS at 05:22

## 2024-07-11 RX ADMIN — CEFAZOLIN 2 G: 1 INJECTION, POWDER, FOR SOLUTION INTRAMUSCULAR; INTRAVENOUS at 07:08

## 2024-07-11 RX ADMIN — CEFAZOLIN 2 G: 2 INJECTION, POWDER, FOR SOLUTION INTRAMUSCULAR; INTRAVENOUS at 05:13

## 2024-07-11 RX ADMIN — MIDAZOLAM HYDROCHLORIDE 2 MG: 2 INJECTION, SOLUTION INTRAMUSCULAR; INTRAVENOUS at 06:59

## 2024-07-11 RX ADMIN — METHOCARBAMOL 500 MG: 500 TABLET ORAL at 17:15

## 2024-07-11 RX ADMIN — HYDROXYZINE HYDROCHLORIDE 10 MG: 10 TABLET ORAL at 21:10

## 2024-07-11 RX ADMIN — HYDROCORTISONE: 0.01 CREAM TOPICAL at 05:11

## 2024-07-11 RX ADMIN — HYDROMORPHONE HYDROCHLORIDE 0.5 MG: 1 INJECTION, SOLUTION INTRAMUSCULAR; INTRAVENOUS; SUBCUTANEOUS at 21:11

## 2024-07-11 RX ADMIN — DEXAMETHASONE SODIUM PHOSPHATE 8 MG: 4 INJECTION INTRA-ARTICULAR; INTRALESIONAL; INTRAMUSCULAR; INTRAVENOUS; SOFT TISSUE at 06:59

## 2024-07-11 RX ADMIN — METHOCARBAMOL 500 MG: 500 TABLET ORAL at 21:11

## 2024-07-11 RX ADMIN — ENOXAPARIN SODIUM 40 MG: 100 INJECTION SUBCUTANEOUS at 17:16

## 2024-07-11 RX ADMIN — FENTANYL CITRATE 100 MCG: 50 INJECTION, SOLUTION INTRAMUSCULAR; INTRAVENOUS at 07:02

## 2024-07-11 RX ADMIN — PROPOFOL 150 MG: 10 INJECTION, EMULSION INTRAVENOUS at 06:59

## 2024-07-11 RX ADMIN — ONDANSETRON 4 MG: 2 INJECTION INTRAMUSCULAR; INTRAVENOUS at 06:59

## 2024-07-11 RX ADMIN — OMEPRAZOLE 20 MG: 20 CAPSULE, DELAYED RELEASE ORAL at 05:10

## 2024-07-11 RX ADMIN — OXYCODONE HYDROCHLORIDE 15 MG: 15 TABLET ORAL at 05:11

## 2024-07-11 RX ADMIN — CEFAZOLIN 2 G: 2 INJECTION, POWDER, FOR SOLUTION INTRAMUSCULAR; INTRAVENOUS at 13:21

## 2024-07-11 RX ADMIN — OXYCODONE HYDROCHLORIDE 15 MG: 15 TABLET ORAL at 23:15

## 2024-07-11 RX ADMIN — PROPOFOL 30 MG: 10 INJECTION, EMULSION INTRAVENOUS at 07:32

## 2024-07-11 RX ADMIN — LIDOCAINE HYDROCHLORIDE 100 MG: 20 INJECTION, SOLUTION EPIDURAL; INFILTRATION; INTRACAUDAL at 06:59

## 2024-07-11 RX ADMIN — LINEZOLID 600 MG: 600 TABLET, FILM COATED ORAL at 05:10

## 2024-07-11 RX ADMIN — ONDANSETRON 4 MG: 2 INJECTION INTRAMUSCULAR; INTRAVENOUS at 13:28

## 2024-07-11 ASSESSMENT — PAIN DESCRIPTION - PAIN TYPE
TYPE: ACUTE PAIN
TYPE: ACUTE PAIN;SURGICAL PAIN
TYPE: ACUTE PAIN
TYPE: ACUTE PAIN;SURGICAL PAIN

## 2024-07-11 ASSESSMENT — ENCOUNTER SYMPTOMS
SEIZURES: 0
FALLS: 0
CHILLS: 0
HALLUCINATIONS: 0
BLURRED VISION: 0
VOMITING: 0
SHORTNESS OF BREATH: 0
FEVER: 0
NAUSEA: 0
HEADACHES: 0
DOUBLE VISION: 0
SORE THROAT: 0
ABDOMINAL PAIN: 0
BACK PAIN: 0
PALPITATIONS: 0

## 2024-07-11 ASSESSMENT — LIFESTYLE VARIABLES: SUBSTANCE_ABUSE: 0

## 2024-07-11 ASSESSMENT — PAIN SCALES - GENERAL: PAIN_LEVEL: 0

## 2024-07-12 ENCOUNTER — PHARMACY VISIT (OUTPATIENT)
Dept: PHARMACY | Facility: MEDICAL CENTER | Age: 39
End: 2024-07-12
Payer: COMMERCIAL

## 2024-07-12 ENCOUNTER — APPOINTMENT (OUTPATIENT)
Dept: ONCOLOGY | Facility: MEDICAL CENTER | Age: 39
End: 2024-07-12
Attending: INTERNAL MEDICINE
Payer: MEDICAID

## 2024-07-12 ENCOUNTER — APPOINTMENT (OUTPATIENT)
Dept: RADIOLOGY | Facility: MEDICAL CENTER | Age: 39
DRG: 858 | End: 2024-07-12
Attending: INTERNAL MEDICINE
Payer: MEDICAID

## 2024-07-12 VITALS
OXYGEN SATURATION: 97 % | BODY MASS INDEX: 33.02 KG/M2 | SYSTOLIC BLOOD PRESSURE: 100 MMHG | HEIGHT: 66 IN | DIASTOLIC BLOOD PRESSURE: 54 MMHG | HEART RATE: 50 BPM | WEIGHT: 205.47 LBS | RESPIRATION RATE: 16 BRPM | TEMPERATURE: 97.3 F

## 2024-07-12 PROBLEM — N64.89 SEROMA OF BREAST: Status: ACTIVE | Noted: 2024-07-08

## 2024-07-12 LAB
ALBUMIN SERPL BCP-MCNC: 3.6 G/DL (ref 3.2–4.9)
BACTERIA WND AEROBE CULT: NORMAL
BASOPHILS # BLD AUTO: 0.2 % (ref 0–1.8)
BASOPHILS # BLD: 0.02 K/UL (ref 0–0.12)
BUN SERPL-MCNC: 12 MG/DL (ref 8–22)
CALCIUM ALBUM COR SERPL-MCNC: 9.3 MG/DL (ref 8.5–10.5)
CALCIUM SERPL-MCNC: 9 MG/DL (ref 8.5–10.5)
CHLORIDE SERPL-SCNC: 105 MMOL/L (ref 96–112)
CO2 SERPL-SCNC: 24 MMOL/L (ref 20–33)
CREAT SERPL-MCNC: 0.84 MG/DL (ref 0.5–1.4)
EOSINOPHIL # BLD AUTO: 0 K/UL (ref 0–0.51)
EOSINOPHIL NFR BLD: 0 % (ref 0–6.9)
ERYTHROCYTE [DISTWIDTH] IN BLOOD BY AUTOMATED COUNT: 43.1 FL (ref 35.9–50)
FUNGUS SPEC FUNGUS STN: NORMAL
GFR SERPLBLD CREATININE-BSD FMLA CKD-EPI: 89 ML/MIN/1.73 M 2
GLUCOSE SERPL-MCNC: 133 MG/DL (ref 65–99)
GRAM STN SPEC: NORMAL
HCT VFR BLD AUTO: 34.9 % (ref 37–47)
HGB BLD-MCNC: 11.9 G/DL (ref 12–16)
IMM GRANULOCYTES # BLD AUTO: 0.03 K/UL (ref 0–0.11)
IMM GRANULOCYTES NFR BLD AUTO: 0.3 % (ref 0–0.9)
LYMPHOCYTES # BLD AUTO: 1.61 K/UL (ref 1–4.8)
LYMPHOCYTES NFR BLD: 14.3 % (ref 22–41)
MAGNESIUM SERPL-MCNC: 1.8 MG/DL (ref 1.5–2.5)
MCH RBC QN AUTO: 32.8 PG (ref 27–33)
MCHC RBC AUTO-ENTMCNC: 34.1 G/DL (ref 32.2–35.5)
MCV RBC AUTO: 96.1 FL (ref 81.4–97.8)
MONOCYTES # BLD AUTO: 0.66 K/UL (ref 0–0.85)
MONOCYTES NFR BLD AUTO: 5.9 % (ref 0–13.4)
NEUTROPHILS # BLD AUTO: 8.95 K/UL (ref 1.82–7.42)
NEUTROPHILS NFR BLD: 79.3 % (ref 44–72)
NRBC # BLD AUTO: 0 K/UL
NRBC BLD-RTO: 0 /100 WBC (ref 0–0.2)
PHOSPHATE SERPL-MCNC: 3.8 MG/DL (ref 2.5–4.5)
PLATELET # BLD AUTO: 186 K/UL (ref 164–446)
PMV BLD AUTO: 10.8 FL (ref 9–12.9)
POTASSIUM SERPL-SCNC: 4.5 MMOL/L (ref 3.6–5.5)
RBC # BLD AUTO: 3.63 M/UL (ref 4.2–5.4)
SIGNIFICANT IND 70042: NORMAL
SIGNIFICANT IND 70042: NORMAL
SITE SITE: NORMAL
SITE SITE: NORMAL
SODIUM SERPL-SCNC: 141 MMOL/L (ref 135–145)
SOURCE SOURCE: NORMAL
SOURCE SOURCE: NORMAL
WBC # BLD AUTO: 11.3 K/UL (ref 4.8–10.8)

## 2024-07-12 PROCEDURE — 83735 ASSAY OF MAGNESIUM: CPT

## 2024-07-12 PROCEDURE — RXMED WILLOW AMBULATORY MEDICATION CHARGE: Performed by: INTERNAL MEDICINE

## 2024-07-12 PROCEDURE — 700102 HCHG RX REV CODE 250 W/ 637 OVERRIDE(OP): Performed by: INTERNAL MEDICINE

## 2024-07-12 PROCEDURE — 700111 HCHG RX REV CODE 636 W/ 250 OVERRIDE (IP): Performed by: INTERNAL MEDICINE

## 2024-07-12 PROCEDURE — 80069 RENAL FUNCTION PANEL: CPT

## 2024-07-12 PROCEDURE — 71045 X-RAY EXAM CHEST 1 VIEW: CPT

## 2024-07-12 PROCEDURE — A9270 NON-COVERED ITEM OR SERVICE: HCPCS | Performed by: INTERNAL MEDICINE

## 2024-07-12 PROCEDURE — 700102 HCHG RX REV CODE 250 W/ 637 OVERRIDE(OP)

## 2024-07-12 PROCEDURE — A9270 NON-COVERED ITEM OR SERVICE: HCPCS

## 2024-07-12 PROCEDURE — 85025 COMPLETE CBC W/AUTO DIFF WBC: CPT

## 2024-07-12 PROCEDURE — 99239 HOSP IP/OBS DSCHRG MGMT >30: CPT | Performed by: INTERNAL MEDICINE

## 2024-07-12 RX ORDER — OXYCODONE HYDROCHLORIDE 10 MG/1
10 TABLET ORAL EVERY 6 HOURS PRN
Qty: 12 TABLET | Refills: 0 | Status: SHIPPED | OUTPATIENT
Start: 2024-07-12 | End: 2024-07-15

## 2024-07-12 RX ORDER — ALBUTEROL SULFATE 90 UG/1
2 AEROSOL, METERED RESPIRATORY (INHALATION) EVERY 4 HOURS
Status: DISCONTINUED | OUTPATIENT
Start: 2024-07-12 | End: 2024-07-12 | Stop reason: HOSPADM

## 2024-07-12 RX ORDER — GUAIFENESIN 600 MG/1
600 TABLET, EXTENDED RELEASE ORAL EVERY 12 HOURS
Status: DISCONTINUED | OUTPATIENT
Start: 2024-07-12 | End: 2024-07-12 | Stop reason: HOSPADM

## 2024-07-12 RX ORDER — ONDANSETRON 4 MG/1
4 TABLET, ORALLY DISINTEGRATING ORAL EVERY 4 HOURS PRN
Qty: 10 TABLET | Refills: 0 | Status: SHIPPED | OUTPATIENT
Start: 2024-07-12

## 2024-07-12 RX ORDER — GUAIFENESIN 600 MG/1
600 TABLET, EXTENDED RELEASE ORAL EVERY 12 HOURS
Qty: 10 TABLET | Refills: 0 | Status: SHIPPED | OUTPATIENT
Start: 2024-07-12 | End: 2024-07-17

## 2024-07-12 RX ORDER — ALBUTEROL SULFATE 90 UG/1
2 AEROSOL, METERED RESPIRATORY (INHALATION) EVERY 4 HOURS
Qty: 8.5 G | Refills: 1 | Status: SHIPPED | OUTPATIENT
Start: 2024-07-12

## 2024-07-12 RX ADMIN — OXYCODONE HYDROCHLORIDE 15 MG: 15 TABLET ORAL at 05:03

## 2024-07-12 RX ADMIN — METHOCARBAMOL 500 MG: 500 TABLET ORAL at 08:27

## 2024-07-12 RX ADMIN — HYDROMORPHONE HYDROCHLORIDE 0.5 MG: 1 INJECTION, SOLUTION INTRAMUSCULAR; INTRAVENOUS; SUBCUTANEOUS at 00:28

## 2024-07-12 RX ADMIN — OXYCODONE HYDROCHLORIDE 15 MG: 15 TABLET ORAL at 02:09

## 2024-07-12 RX ADMIN — ALBUTEROL SULFATE 2 PUFF: 90 AEROSOL, METERED RESPIRATORY (INHALATION) at 11:23

## 2024-07-12 RX ADMIN — METHOCARBAMOL 500 MG: 500 TABLET ORAL at 13:30

## 2024-07-12 RX ADMIN — HYDROMORPHONE HYDROCHLORIDE 0.5 MG: 1 INJECTION, SOLUTION INTRAMUSCULAR; INTRAVENOUS; SUBCUTANEOUS at 03:57

## 2024-07-12 RX ADMIN — GUAIFENESIN 600 MG: 600 TABLET, EXTENDED RELEASE ORAL at 11:23

## 2024-07-12 RX ADMIN — ANTACID TABLETS 1000 MG: 500 TABLET, CHEWABLE ORAL at 03:57

## 2024-07-12 RX ADMIN — OXYCODONE HYDROCHLORIDE 15 MG: 15 TABLET ORAL at 08:27

## 2024-07-12 RX ADMIN — HEPARIN 300 UNITS: 100 SYRINGE at 13:28

## 2024-07-12 RX ADMIN — OMEPRAZOLE 20 MG: 20 CAPSULE, DELAYED RELEASE ORAL at 05:03

## 2024-07-12 RX ADMIN — OXYCODONE HYDROCHLORIDE 15 MG: 15 TABLET ORAL at 13:30

## 2024-07-12 ASSESSMENT — PAIN DESCRIPTION - PAIN TYPE
TYPE: ACUTE PAIN
TYPE: ACUTE PAIN
TYPE: ACUTE PAIN;SURGICAL PAIN

## 2024-07-13 LAB
BACTERIA BLD CULT: NORMAL
BACTERIA BLD CULT: NORMAL
SIGNIFICANT IND 70042: NORMAL
SIGNIFICANT IND 70042: NORMAL
SITE SITE: NORMAL
SITE SITE: NORMAL
SOURCE SOURCE: NORMAL
SOURCE SOURCE: NORMAL

## 2024-07-15 LAB
BACTERIA SPEC ANAEROBE CULT: NORMAL
BACTERIA WND AEROBE CULT: ABNORMAL
BACTERIA WND AEROBE CULT: ABNORMAL
GRAM STN SPEC: ABNORMAL
SIGNIFICANT IND 70042: ABNORMAL
SIGNIFICANT IND 70042: NORMAL
SITE SITE: ABNORMAL
SITE SITE: NORMAL
SOURCE SOURCE: ABNORMAL
SOURCE SOURCE: NORMAL

## 2024-07-16 LAB
FUNGUS SPEC CULT: NORMAL
FUNGUS SPEC FUNGUS STN: NORMAL
SIGNIFICANT IND 70042: NORMAL
SITE SITE: NORMAL
SOURCE SOURCE: NORMAL

## 2024-07-22 ENCOUNTER — OFFICE VISIT (OUTPATIENT)
Dept: SURGERY | Facility: MEDICAL CENTER | Age: 39
End: 2024-07-22
Payer: MEDICAID

## 2024-07-22 VITALS
WEIGHT: 204 LBS | BODY MASS INDEX: 32.78 KG/M2 | DIASTOLIC BLOOD PRESSURE: 85 MMHG | TEMPERATURE: 97.3 F | HEIGHT: 66 IN | OXYGEN SATURATION: 97 % | HEART RATE: 77 BPM | SYSTOLIC BLOOD PRESSURE: 124 MMHG

## 2024-07-22 DIAGNOSIS — C50.411 MALIGNANT NEOPLASM OF UPPER-OUTER QUADRANT OF RIGHT BREAST IN FEMALE, ESTROGEN RECEPTOR NEGATIVE (HCC): ICD-10-CM

## 2024-07-22 DIAGNOSIS — Z17.1 MALIGNANT NEOPLASM OF UPPER-OUTER QUADRANT OF RIGHT BREAST IN FEMALE, ESTROGEN RECEPTOR NEGATIVE (HCC): ICD-10-CM

## 2024-07-22 PROCEDURE — 99024 POSTOP FOLLOW-UP VISIT: CPT | Performed by: SURGERY

## 2024-07-22 RX ORDER — OXYCODONE HYDROCHLORIDE AND ACETAMINOPHEN 5; 325 MG/1; MG/1
1 TABLET ORAL EVERY 6 HOURS PRN
COMMUNITY
Start: 2024-06-26

## 2024-07-22 RX ORDER — SULFAMETHOXAZOLE AND TRIMETHOPRIM 800; 160 MG/1; MG/1
TABLET ORAL
COMMUNITY
Start: 2024-07-15 | End: 2024-07-29 | Stop reason: SDUPTHER

## 2024-07-22 RX ORDER — CLINDAMYCIN HYDROCHLORIDE 300 MG/1
300 CAPSULE ORAL 4 TIMES DAILY
COMMUNITY
Start: 2024-07-02 | End: 2024-07-29

## 2024-07-22 ASSESSMENT — FIBROSIS 4 INDEX: FIB4 SCORE: 0.59

## 2024-07-25 ENCOUNTER — TELEPHONE (OUTPATIENT)
Dept: INFECTIOUS DISEASES | Facility: MEDICAL CENTER | Age: 39
End: 2024-07-25
Payer: MEDICAID

## 2024-07-25 ENCOUNTER — TELEPHONE (OUTPATIENT)
Dept: INTERNAL MEDICINE | Facility: OTHER | Age: 39
End: 2024-07-25
Payer: MEDICAID

## 2024-07-26 ENCOUNTER — HOSPITAL ENCOUNTER (OUTPATIENT)
Dept: HEMATOLOGY ONCOLOGY | Facility: MEDICAL CENTER | Age: 39
End: 2024-07-26
Attending: INTERNAL MEDICINE
Payer: MEDICAID

## 2024-07-26 VITALS
TEMPERATURE: 97.9 F | HEIGHT: 66 IN | OXYGEN SATURATION: 96 % | HEART RATE: 72 BPM | BODY MASS INDEX: 32.77 KG/M2 | SYSTOLIC BLOOD PRESSURE: 80 MMHG | DIASTOLIC BLOOD PRESSURE: 68 MMHG | WEIGHT: 203.93 LBS | RESPIRATION RATE: 14 BRPM

## 2024-07-26 DIAGNOSIS — Z17.1 MALIGNANT NEOPLASM OF UPPER-OUTER QUADRANT OF RIGHT BREAST IN FEMALE, ESTROGEN RECEPTOR NEGATIVE (HCC): ICD-10-CM

## 2024-07-26 DIAGNOSIS — G47.01 INSOMNIA DUE TO MEDICAL CONDITION: ICD-10-CM

## 2024-07-26 DIAGNOSIS — C50.411 MALIGNANT NEOPLASM OF UPPER-OUTER QUADRANT OF RIGHT BREAST IN FEMALE, ESTROGEN RECEPTOR NEGATIVE (HCC): ICD-10-CM

## 2024-07-26 PROBLEM — G47.00 INSOMNIA: Status: ACTIVE | Noted: 2024-07-26

## 2024-07-26 PROCEDURE — 99212 OFFICE O/P EST SF 10 MIN: CPT | Performed by: INTERNAL MEDICINE

## 2024-07-26 PROCEDURE — 99214 OFFICE O/P EST MOD 30 MIN: CPT | Performed by: INTERNAL MEDICINE

## 2024-07-26 RX ORDER — ALPRAZOLAM 0.5 MG/1
0.5 TABLET ORAL NIGHTLY PRN
Qty: 30 TABLET | Refills: 2 | Status: SHIPPED | OUTPATIENT
Start: 2024-07-26 | End: 2024-10-24

## 2024-07-26 ASSESSMENT — ENCOUNTER SYMPTOMS
VOMITING: 0
DIARRHEA: 0
FEVER: 0
CONSTIPATION: 0
DIAPHORESIS: 0
HEADACHES: 0
CHILLS: 0
DIZZINESS: 0
SORE THROAT: 0
TINGLING: 1
SHORTNESS OF BREATH: 0
MYALGIAS: 0
PALPITATIONS: 0
NAUSEA: 0

## 2024-07-26 ASSESSMENT — FIBROSIS 4 INDEX: FIB4 SCORE: 0.59

## 2024-07-29 ENCOUNTER — OFFICE VISIT (OUTPATIENT)
Dept: INTERNAL MEDICINE | Facility: OTHER | Age: 39
End: 2024-07-29
Payer: MEDICAID

## 2024-07-29 VITALS
OXYGEN SATURATION: 95 % | HEART RATE: 81 BPM | BODY MASS INDEX: 33.01 KG/M2 | HEIGHT: 66 IN | SYSTOLIC BLOOD PRESSURE: 108 MMHG | WEIGHT: 205.4 LBS | DIASTOLIC BLOOD PRESSURE: 72 MMHG | TEMPERATURE: 97.2 F

## 2024-07-29 DIAGNOSIS — A49.02 MRSA (METHICILLIN RESISTANT STAPHYLOCOCCUS AUREUS) INFECTION: ICD-10-CM

## 2024-07-29 DIAGNOSIS — N61.1 ABSCESS OF BREAST: Primary | ICD-10-CM

## 2024-07-29 RX ORDER — SULFAMETHOXAZOLE AND TRIMETHOPRIM 800; 160 MG/1; MG/1
1 TABLET ORAL 2 TIMES DAILY
Qty: 42 TABLET | Refills: 0 | Status: SHIPPED | OUTPATIENT
Start: 2024-07-29 | End: 2024-08-19

## 2024-07-29 ASSESSMENT — FIBROSIS 4 INDEX: FIB4 SCORE: 0.59

## 2024-07-30 ENCOUNTER — TELEPHONE (OUTPATIENT)
Dept: INFECTIOUS DISEASES | Facility: MEDICAL CENTER | Age: 39
End: 2024-07-30
Payer: MEDICAID

## 2024-08-12 ENCOUNTER — APPOINTMENT (OUTPATIENT)
Dept: INTERNAL MEDICINE | Facility: OTHER | Age: 39
End: 2024-08-12
Payer: MEDICAID

## 2024-08-12 ENCOUNTER — OFFICE VISIT (OUTPATIENT)
Dept: INTERNAL MEDICINE | Facility: OTHER | Age: 39
End: 2024-08-12
Payer: MEDICAID

## 2024-08-12 VITALS
SYSTOLIC BLOOD PRESSURE: 85 MMHG | WEIGHT: 203 LBS | BODY MASS INDEX: 32.62 KG/M2 | HEART RATE: 77 BPM | OXYGEN SATURATION: 99 % | HEIGHT: 66 IN | DIASTOLIC BLOOD PRESSURE: 57 MMHG | TEMPERATURE: 98.1 F

## 2024-08-12 DIAGNOSIS — N64.89 SEROMA OF BREAST: ICD-10-CM

## 2024-08-12 PROCEDURE — 3078F DIAST BP <80 MM HG: CPT | Performed by: INTERNAL MEDICINE

## 2024-08-12 PROCEDURE — 99212 OFFICE O/P EST SF 10 MIN: CPT | Performed by: INTERNAL MEDICINE

## 2024-08-12 PROCEDURE — 3074F SYST BP LT 130 MM HG: CPT | Performed by: INTERNAL MEDICINE

## 2024-08-12 ASSESSMENT — FIBROSIS 4 INDEX: FIB4 SCORE: 0.59

## 2024-08-12 NOTE — PROGRESS NOTES
"      Established Patient    Fela presents today with the following:    CC: f/u breast expander infection     HPI: CC: referred by Dr. Darrel Tee for infected breast reconstruction surgery  HPI:   Fela noted a mass in her right breast that was subsequently diagnosed as receptor negative intra-ductal breast cancer. Treated with chemotherapy through a implanted port-cath left chest. Underwent bilateral mastectomies fortunately with negative lymph nodes. She elected to have intra-op placement of bilateral breast soft tissue expanders. This was done on June 12 as part of there mastectomy surgery. Complicated post -op by necrosis the left mastectomy flap, Underwent 2 debridements and in mid-July was admitted for washout and a short course of vancomycin after light growth of MRSA and E. Coli. Then switched to Bactrim DS and clindamycin. Presently on Bactrim DS bid and has a drain in her left surgical site. States feeling better, less pain, still some drainage of about 30 to 50 cc of discolored fluid daily .    Update 8/12/24- - when seen 2 weeks ago, this was my exam:  \"Breasts - right with dry surgical incision, palpable expander.  Left breast partially covered in dark tatoo,   Drain present with cloudy fluid, the breast is more swollen than on right , not tender, no redness noted \"  I continued her on oral BactrimDS bid , based on susceptibility report on the E. Coli and MRSA. Did not continue clindamycin \"  Seen in f/u. Has continued on BID Bactrim  DS. Tolerating well but her left breast has had continued purulent drainage and now the expander has continued to swell and become more painful, had a fever last night. Dr. Tee has scheduled her for removal of the expander today as an  outpatient surgery.        Social History     Socioeconomic History    Marital status: Single    Highest education level: Associate degree: academic program   Tobacco Use    Smoking status: Former     Current packs/day: 0.00     " Average packs/day: 0.5 packs/day for 6.0 years (3.0 ttl pk-yrs)     Types: Cigarettes, Electronic Cigarettes     Start date: 2011     Quit date: 2017     Years since quittin.2    Smokeless tobacco: Never   Vaping Use    Vaping status: Some Days    Substances: Nicotine, THC   Substance and Sexual Activity    Alcohol use: Not Currently     Alcohol/week: 1.2 oz     Types: 2 Shots of liquor per week     Comment: 2 shots weekly, sometimes    Drug use: Yes     Types: Inhaled     Comment: thc, daily    Sexual activity: Yes     Partners: Male     Birth control/protection: Female Sterilization     Comment: Hysterectomy     Social Determinants of Health     Financial Resource Strain: Low Risk  (2023)    Overall Financial Resource Strain (CARDIA)     Difficulty of Paying Living Expenses: Not very hard   Food Insecurity: No Food Insecurity (2024)    Hunger Vital Sign     Worried About Running Out of Food in the Last Year: Never true     Ran Out of Food in the Last Year: Never true   Transportation Needs: No Transportation Needs (2024)    PRAPARE - Transportation     Lack of Transportation (Medical): No     Lack of Transportation (Non-Medical): No   Physical Activity: Insufficiently Active (2023)    Exercise Vital Sign     Days of Exercise per Week: 3 days     Minutes of Exercise per Session: 30 min   Stress: Stress Concern Present (2023)    [employer] - Occupational Stress Questionnaire     Feeling of Stress : To some extent   Social Connections: Socially Isolated (2023)    Social Connection and Isolation Panel [NHANES]     Frequency of Communication with Friends and Family: More than three times a week     Frequency of Social Gatherings with Friends and Family: More than three times a week     Attends [employer] Services: Never     Active Member of Clubs or Organizations: No     Attends Club or Organization Meetings: Never     Marital Status:     Intimate Partner Violence: Not At Risk (7/9/2024)    Humiliation, Afraid, Rape, and Kick questionnaire     Fear of Current or Ex-Partner: No     Emotionally Abused: No     Physically Abused: No     Sexually Abused: No   Housing Stability: Low Risk  (7/9/2024)    Housing Stability Vital Sign     Unable to Pay for Housing in the Last Year: No     Number of Places Lived in the Last Year: 1     Unstable Housing in the Last Year: No        Patient Active Problem List    Diagnosis Date Noted    Insomnia 07/26/2024    Seroma of breast 07/08/2024    History of asthma 07/08/2024    History of breast cancer 07/08/2024    Hot flashes 03/27/2024    Intractable pain 02/19/2024    Sore throat 01/28/2024    Bone pain 01/28/2024    Hypokalemia 01/27/2024    Lumbar back pain 01/26/2024    Hematuria of unknown etiology 01/26/2024    Prerenal acute renal failure (HCC) 01/26/2024    Hypocalcemia 01/26/2024    Colon polyps 01/26/2024    Nausea 01/26/2024    Anxiety associated with cancer diagnosis (HCC) 01/03/2024    Malignant neoplasm of upper-outer quadrant of right breast in female, estrogen receptor negative (HCC) 12/14/2023    History of anaphylaxis 05/31/2023    Other specified disorder of gallbladder 04/29/2015    Severe obesity ([facility]) 12/31/2014       Current Outpatient Medications   Medication Sig Dispense Refill    sulfamethoxazole-trimethoprim (BACTRIM DS) 800-160 MG tablet Take 1 Tablet by mouth 2 times a day for 21 days. 42 Tablet 0    ALPRAZolam (XANAX) 0.5 MG Tab Take 1 Tablet by mouth at bedtime as needed for Sleep for up to 90 days. 30 Tablet 2    oxyCODONE-acetaminophen (PERCOCET) 5-325 MG Tab Take 1 Tablet by mouth every 6 hours as needed.  FOR PAIN      albuterol 108 (90 Base) MCG/ACT Aero Soln inhalation aerosol Inhale 2 Puffs every 4 hours. 8.5 g 1    ondansetron (ZOFRAN ODT) 4 MG TABLET DISPERSIBLE Dissolve 1 Tablet by mouth every four hours as needed for Nausea/Vomiting (give PO if no IV route available). 10  Tablet 0    acyclovir (ZOVIRAX) 400 MG tablet Take 400 mg by mouth 1 time a day as needed.       No current facility-administered medications for this visit.       ROS: As per HPI. Additional pertinent systems as noted below.  Constitutional: fever. Last night had fever   Gastrointestinal: Negative for abdominal pain, constipation, diarrhea, heartburn, nausea and vomiting.   Genitourinary: Negative for dysuria, flank pain and hematuria.   Musculoskeletal: Negative for falls and myalgias.   Skin: as above     Physical Exam   Skin: left breast with increased induration, warmth, mild erythema, with drain with purulent discharge Other:     Note: I have reviewed all pertinent labs and diagnostic tests associated with this visit with specific comments listed under the assessment and plan below    Assessment and Plan    There are no diagnoses linked to this encounter.  1. Seroma of breast         Despite the antibiotic, the infection appears worse with increased drainage, warmth and induration. Doubt antibiotic resistance, needs source control and so will be having the expander removed and washed out today. I had phone conversation with Dr. Tee, he will also culture any drainage and keep her on the bactrim DS, see me one week for f/u on the infection and culture results.  Followup: one week       Signed by: Manuelito Arnold M.D.

## 2024-08-15 ENCOUNTER — APPOINTMENT (OUTPATIENT)
Dept: HEMATOLOGY ONCOLOGY | Facility: MEDICAL CENTER | Age: 39
End: 2024-08-15
Payer: MEDICAID

## 2024-08-16 ENCOUNTER — APPOINTMENT (OUTPATIENT)
Dept: ONCOLOGY | Facility: MEDICAL CENTER | Age: 39
End: 2024-08-16
Payer: MEDICAID

## 2024-08-19 ENCOUNTER — OFFICE VISIT (OUTPATIENT)
Dept: INTERNAL MEDICINE | Facility: OTHER | Age: 39
End: 2024-08-19
Payer: MEDICAID

## 2024-08-19 VITALS
HEIGHT: 66 IN | HEART RATE: 69 BPM | TEMPERATURE: 97.6 F | BODY MASS INDEX: 33.04 KG/M2 | WEIGHT: 205.6 LBS | DIASTOLIC BLOOD PRESSURE: 67 MMHG | OXYGEN SATURATION: 98 % | SYSTOLIC BLOOD PRESSURE: 104 MMHG

## 2024-08-19 DIAGNOSIS — A49.02 MRSA (METHICILLIN RESISTANT STAPHYLOCOCCUS AUREUS) INFECTION: ICD-10-CM

## 2024-08-19 DIAGNOSIS — N61.1 ABSCESS OF BREAST: ICD-10-CM

## 2024-08-19 PROCEDURE — 99213 OFFICE O/P EST LOW 20 MIN: CPT | Performed by: INTERNAL MEDICINE

## 2024-08-19 PROCEDURE — 3078F DIAST BP <80 MM HG: CPT | Performed by: INTERNAL MEDICINE

## 2024-08-19 PROCEDURE — 3074F SYST BP LT 130 MM HG: CPT | Performed by: INTERNAL MEDICINE

## 2024-08-19 ASSESSMENT — FIBROSIS 4 INDEX: FIB4 SCORE: 0.59

## 2024-08-20 ENCOUNTER — OFFICE VISIT (OUTPATIENT)
Dept: SURGERY | Facility: MEDICAL CENTER | Age: 39
End: 2024-08-20
Payer: MEDICAID

## 2024-08-20 VITALS
HEIGHT: 66 IN | OXYGEN SATURATION: 98 % | WEIGHT: 206 LBS | HEART RATE: 84 BPM | BODY MASS INDEX: 33.11 KG/M2 | SYSTOLIC BLOOD PRESSURE: 118 MMHG | DIASTOLIC BLOOD PRESSURE: 66 MMHG | TEMPERATURE: 96.9 F

## 2024-08-20 DIAGNOSIS — Z17.1 MALIGNANT NEOPLASM OF UPPER-OUTER QUADRANT OF RIGHT BREAST IN FEMALE, ESTROGEN RECEPTOR NEGATIVE (HCC): ICD-10-CM

## 2024-08-20 DIAGNOSIS — C50.411 MALIGNANT NEOPLASM OF UPPER-OUTER QUADRANT OF RIGHT BREAST IN FEMALE, ESTROGEN RECEPTOR NEGATIVE (HCC): ICD-10-CM

## 2024-08-20 PROCEDURE — 99024 POSTOP FOLLOW-UP VISIT: CPT | Performed by: SURGERY

## 2024-08-20 RX ORDER — SULFAMETHOXAZOLE AND TRIMETHOPRIM 400; 80 MG/1; MG/1
1 TABLET ORAL 2 TIMES DAILY
COMMUNITY

## 2024-08-20 ASSESSMENT — FIBROSIS 4 INDEX: FIB4 SCORE: 0.59

## 2024-08-20 NOTE — PROGRESS NOTES
Subjective    Delightful 39F s/p B SSM, R SLNBx 06/12/2024, here for postop check.  She underwent left tissue expander removal last week, with improvement in her symptoms.  She is seeing Dr Tee again today for drain removal.      Objective    LMP 02/10/2012 (Approximate) Comment: Age of first period:10, No HRT  Gen: Alert, oriented, pleasant, no acute distress  Chest: Respirations unlabored on room air with symmetric expansion  Abd: Soft, nondistended  Ext: Warm, well-perfused  Breasts: Bilateral transverse mastectomy incisions clean, dry, intact; sutures present on the left side.  No erythema.  No exudate.  No palpable masses.  No palpable fluctuance.  No ecchymosis.  Expected contour; some peau d'orange of the left inferior skin flap consistent with edema. Left drain in place with small volume serous output.    Assessment & Plan    Delightful 39F s/p B SSM/R SLNBx for right breast IDC, overall doing reasonably well.  All questions were answered to the patient's satisfaction.  We discussed that she has unfortunately had a complicated postoperative case but that things are progressing and I do expect her to end up with a good cosmetic result down the road.   - Referrals: No new referrals needed   - Return to clinic: 12/2024 ([facility])   - Next clinical breast exam: 12/2024, 06/2025   - Next breast imaging: No routine breast imaging s/p bilateral mastectomy    Problem   Malignant Neoplasm of Upper-Outer Quadrant of Right Breast in Female, Estrogen Receptor Negative (Hcc)    Right breast 10:00 4cmFN IDC, G3, ER-OH-HER2+, Ki67 30%, [id number] (suyapa/prog IIA) --> ypT0N0 (pCR)  - US-guided CNBx 12/07/2023  - Neoadjuvant TCHP x5 cycles ([place]artha, poorly tolerated, last cytotoxic dose 04/19/2024)  - Radiographic complete response on MRI 05/08/2024  - Genetics negative for actionable mutation     - Deleterious NTHL1 mutation: p.Q90* (single allele, autosomal recessive, assoc colon cancer)  - Bilateral skin-sparing  mastectomy, right axillary sentinel node biopsy 06/12/2024 ([place])     - Immediate TE reconstruction ([place])     - Left recurrent seroma, MRSA on culture     - Expander removed 08/13/2024, waiting 3+mo for replacement         Cancer Staging   Malignant neoplasm of upper-outer quadrant of right breast in female, estrogen receptor negative (HCC)  Staging form: Breast, AJCC 8th Edition  - Clinical stage from 12/14/2023: Stage IIA (cT2, cN0, cM0, G3, ER-, CA-, HER2+) - Signed by Rochelle Sullivan M.D. on 12/14/2023  - Pathologic stage from 6/17/2024: ypT0, pN0(sn), cM0, G3, ER-, CA-, HER2+ - Signed by Rochelle Sullivan M.D. on 6/17/2024

## 2024-08-20 NOTE — PROGRESS NOTES
"      Established Patient    Fela presents today with the following:    CC: Breast abscess follow-up visit     HPI:   CC: referred by Dr. Darrel Tee for infected breast reconstruction surgery  HPI:   Fela noted a mass in her right breast that was subsequently diagnosed as receptor negative intra-ductal breast cancer. Treated with chemotherapy through a implanted port-cath left chest. Underwent bilateral mastectomies fortunately with negative lymph nodes. She elected to have intra-op placement of bilateral breast soft tissue expanders. This was done on June 12 as part of there mastectomy surgery. Complicated post -op by necrosis the left mastectomy flap, Underwent 2 debridements and in mid-July was admitted for washout and a short course of vancomycin after light growth of MRSA and E. Coli. Then switched to Bactrim DS and clindamycin. Presently on Bactrim DS bid and has a drain in her left surgical site. States feeling better, less pain, still some drainage of about 30 to 50 cc of discolored fluid daily .     Update 8/12/24- - when seen 2 weeks ago, this was my exam:  \"Breasts - right with dry surgical incision, palpable expander.  Left breast partially covered in dark tatoo,   Drain present with cloudy fluid, the breast is more swollen than on right , not tender, no redness noted \"  I continued her on oral BactrimDS bid , based on susceptibility report on the E. Coli and MRSA. Did not continue clindamycin \"  Seen in f/u. Has continued on BID Bactrim  DS. Tolerating well but her left breast has had continued purulent drainage and now the expander has continued to swell and become more painful, had a fever last night. Dr. Tee has scheduled her for removal of the expander today as an  outpatient surgery.     Update 8/19/24   Here for follow-up.Last Monday had left breast expander removed. Drain placed.  called me- no gross purulence. Will defer replacement for 3 months. Has now completed 2 weeks of the " Bactrim DS.  States compliant. Feeling better, almost no output from the drain. Will have it removed tomorrow.          Social History     Socioeconomic History    Marital status: Single    Highest education level: Associate degree: academic program   Tobacco Use    Smoking status: Former     Current packs/day: 0.00     Average packs/day: 0.5 packs/day for 6.0 years (3.0 ttl pk-yrs)     Types: Cigarettes, Electronic Cigarettes     Start date: 2011     Quit date: 2017     Years since quittin.2    Smokeless tobacco: Never   Vaping Use    Vaping status: Some Days    Substances: Nicotine, THC   Substance and Sexual Activity    Alcohol use: Not Currently     Alcohol/week: 1.2 oz     Types: 2 Shots of liquor per week     Comment: 2 shots weekly, sometimes    Drug use: Yes     Types: Inhaled     Comment: thc, daily    Sexual activity: Yes     Partners: Male     Birth control/protection: Female Sterilization     Comment: Hysterectomy     Social Determinants of Health     Financial Resource Strain: Low Risk  (2023)    Overall Financial Resource Strain (CARDIA)     Difficulty of Paying Living Expenses: Not very hard   Food Insecurity: No Food Insecurity (2024)    Hunger Vital Sign     Worried About Running Out of Food in the Last Year: Never true     Ran Out of Food in the Last Year: Never true   Transportation Needs: No Transportation Needs (2024)    PRAPARE - Transportation     Lack of Transportation (Medical): No     Lack of Transportation (Non-Medical): No   Physical Activity: Insufficiently Active (2023)    Exercise Vital Sign     Days of Exercise per Week: 3 days     Minutes of Exercise per Session: 30 min   Stress: Stress Concern Present (2023)    [employer] [place] [employer] - Occupational Stress Questionnaire     Feeling of Stress : To some extent   Social Connections: Socially Isolated (2023)    Social Connection and Isolation Panel [NHANES]     Frequency of  Communication with Friends and Family: More than three times a week     Frequency of Social Gatherings with Friends and Family: More than three times a week     Attends Confucianism Services: Never     Active Member of Clubs or Organizations: No     Attends Club or Organization Meetings: Never     Marital Status:    Intimate Partner Violence: Not At Risk (7/9/2024)    Humiliation, Afraid, Rape, and Kick questionnaire     Fear of Current or Ex-Partner: No     Emotionally Abused: No     Physically Abused: No     Sexually Abused: No   Housing Stability: Low Risk  (7/9/2024)    Housing Stability Vital Sign     Unable to Pay for Housing in the Last Year: No     Number of Places Lived in the Last Year: 1     Unstable Housing in the Last Year: No        Patient Active Problem List    Diagnosis Date Noted    Insomnia 07/26/2024    Seroma of breast 07/08/2024    History of asthma 07/08/2024    History of breast cancer 07/08/2024    Hot flashes 03/27/2024    Intractable pain 02/19/2024    Sore throat 01/28/2024    Bone pain 01/28/2024    Hypokalemia 01/27/2024    Lumbar back pain 01/26/2024    Hematuria of unknown etiology 01/26/2024    Prerenal acute renal failure (HCC) 01/26/2024    Hypocalcemia 01/26/2024    Colon polyps 01/26/2024    Nausea 01/26/2024    Anxiety associated with cancer diagnosis (HCC) 01/03/2024    Malignant neoplasm of upper-outer quadrant of right breast in female, estrogen receptor negative (HCC) 12/14/2023    History of anaphylaxis 05/31/2023    Other specified disorder of gallbladder 04/29/2015    Severe obesity (HCC) 12/31/2014       Current Outpatient Medications   Medication Sig Dispense Refill    sulfamethoxazole-trimethoprim (BACTRIM DS) 800-160 MG tablet Take 1 Tablet by mouth 2 times a day for 21 days. 42 Tablet 0    ALPRAZolam (XANAX) 0.5 MG Tab Take 1 Tablet by mouth at bedtime as needed for Sleep for up to 90 days. 30 Tablet 2    oxyCODONE-acetaminophen (PERCOCET) 5-325 MG Tab Take 1  "Tablet by mouth every 6 hours as needed.  FOR PAIN      albuterol 108 (90 Base) MCG/ACT Aero Soln inhalation aerosol Inhale 2 Puffs every 4 hours. 8.5 g 1    ondansetron (ZOFRAN ODT) 4 MG TABLET DISPERSIBLE Dissolve 1 Tablet by mouth every four hours as needed for Nausea/Vomiting (give PO if no IV route available). 10 Tablet 0     No current facility-administered medications for this visit.       ROS: As per HPI. Additional pertinent systems as noted below.  Constitutional: Negative for chills and fever.   Gastrointestinal: Negative for abdominal pain, constipation, diarrhea, heartburn, nausea and vomiting.   Genitourinary: Negative for dysuria, flank pain and hematuria.   Musculoskeletal: Negative for falls and myalgias.     /67 (BP Location: Left arm, Patient Position: Sitting, BP Cuff Size: Adult)   Pulse 69   Temp 36.4 °C (97.6 °F) (Temporal)   Ht 1.676 m (5' 6\")   Wt 93.3 kg (205 lb 9.6 oz)   LMP 02/10/2012 (Approximate) Comment: Age of first period:10, No HRT  SpO2 98%   BMI 33.18 kg/m²     Physical Exam   Accompanied by P.A, student Gudelia Castellano    Left Breast- no axillary nodes, the swelling is gone completely, no redness, no tenderness, no induration. Drain with sero-sanguinous outpu    Note: I have reviewed all pertinent labs and diagnostic tests associated with this visit with specific comments listed under the assessment and plan below    Assessment and Plan    1. Abscess of breast  Looks excellent with pump out. Exam with no swelling, etc. No pain,   Culture done on 8/12 sows scant growth of the same MRSA as seen 2 weeks ago. Remains fully susceptible to Bactrim .( Relayed to me by NP at Dr. Tee's office). No E. Coli on this culture . Will have her take 7 more days of the Bactrim, will get the drain out tomorrow. Will then have full source control     2. MRSA (methicillin resistant Staphylococcus aureus) infection  Treated by the Bactrim and should fully resolve with an additional week. " The pump will have been out for the last 2 weeks of therapy    F/u prn       Followup: No follow-ups on file.      Signed by: Manuelito Arnold M.D.

## 2024-09-03 RX ORDER — ONDANSETRON 2 MG/ML
4 INJECTION INTRAMUSCULAR; INTRAVENOUS PRN
Status: CANCELLED | OUTPATIENT
Start: 2024-09-05

## 2024-09-03 RX ORDER — 0.9 % SODIUM CHLORIDE 0.9 %
10 VIAL (ML) INJECTION PRN
Status: CANCELLED | OUTPATIENT
Start: 2024-09-05

## 2024-09-03 RX ORDER — METHYLPREDNISOLONE SODIUM SUCCINATE 125 MG/2ML
125 INJECTION, POWDER, LYOPHILIZED, FOR SOLUTION INTRAMUSCULAR; INTRAVENOUS PRN
Status: CANCELLED | OUTPATIENT
Start: 2024-09-05

## 2024-09-03 RX ORDER — EPINEPHRINE 1 MG/ML(1)
0.5 AMPUL (ML) INJECTION PRN
Status: CANCELLED | OUTPATIENT
Start: 2024-09-05

## 2024-09-03 RX ORDER — PROCHLORPERAZINE MALEATE 10 MG
10 TABLET ORAL EVERY 6 HOURS PRN
Status: CANCELLED | OUTPATIENT
Start: 2024-09-05

## 2024-09-03 RX ORDER — DIPHENHYDRAMINE HYDROCHLORIDE 50 MG/ML
50 INJECTION INTRAMUSCULAR; INTRAVENOUS PRN
Status: CANCELLED | OUTPATIENT
Start: 2024-09-05

## 2024-09-03 RX ORDER — 0.9 % SODIUM CHLORIDE 0.9 %
3 VIAL (ML) INJECTION PRN
Status: CANCELLED | OUTPATIENT
Start: 2024-09-05

## 2024-09-03 RX ORDER — ONDANSETRON 8 MG/1
8 TABLET, ORALLY DISINTEGRATING ORAL PRN
Status: CANCELLED | OUTPATIENT
Start: 2024-09-05

## 2024-09-03 RX ORDER — 0.9 % SODIUM CHLORIDE 0.9 %
VIAL (ML) INJECTION PRN
Status: CANCELLED | OUTPATIENT
Start: 2024-09-05

## 2024-09-03 RX ORDER — SODIUM CHLORIDE 9 MG/ML
INJECTION, SOLUTION INTRAVENOUS CONTINUOUS
Status: CANCELLED | OUTPATIENT
Start: 2024-09-05

## 2024-09-04 ENCOUNTER — HOSPITAL ENCOUNTER (OUTPATIENT)
Dept: HEMATOLOGY ONCOLOGY | Facility: MEDICAL CENTER | Age: 39
End: 2024-09-04
Attending: INTERNAL MEDICINE
Payer: MEDICAID

## 2024-09-04 VITALS
WEIGHT: 203.81 LBS | DIASTOLIC BLOOD PRESSURE: 64 MMHG | TEMPERATURE: 99 F | HEIGHT: 66 IN | BODY MASS INDEX: 32.76 KG/M2 | SYSTOLIC BLOOD PRESSURE: 130 MMHG | RESPIRATION RATE: 10 BRPM | OXYGEN SATURATION: 97 % | HEART RATE: 80 BPM

## 2024-09-04 DIAGNOSIS — Z17.1 MALIGNANT NEOPLASM OF UPPER-OUTER QUADRANT OF RIGHT BREAST IN FEMALE, ESTROGEN RECEPTOR NEGATIVE (HCC): ICD-10-CM

## 2024-09-04 DIAGNOSIS — C50.411 MALIGNANT NEOPLASM OF UPPER-OUTER QUADRANT OF RIGHT BREAST IN FEMALE, ESTROGEN RECEPTOR NEGATIVE (HCC): ICD-10-CM

## 2024-09-04 PROCEDURE — 99214 OFFICE O/P EST MOD 30 MIN: CPT | Performed by: INTERNAL MEDICINE

## 2024-09-04 PROCEDURE — 99212 OFFICE O/P EST SF 10 MIN: CPT | Performed by: INTERNAL MEDICINE

## 2024-09-04 ASSESSMENT — ENCOUNTER SYMPTOMS
NAUSEA: 0
PALPITATIONS: 0
DIARRHEA: 0
FEVER: 0
VOMITING: 0
MYALGIAS: 0
CHILLS: 0
HEADACHES: 0
TINGLING: 1
DIAPHORESIS: 0
DIZZINESS: 0
CONSTIPATION: 0
SORE THROAT: 0
SHORTNESS OF BREATH: 0

## 2024-09-04 ASSESSMENT — FIBROSIS 4 INDEX: FIB4 SCORE: 0.59

## 2024-09-04 ASSESSMENT — PAIN SCALES - GENERAL: PAINLEVEL: NO PAIN

## 2024-09-04 NOTE — PROGRESS NOTES
Subjective   Medical oncology follow-up visit: 2024  Fela Tiwari is a 39 y.o. female who presents with Breast Cancer          HPI    Referring Physician: Rochelle Gaviria MD   Primary Care:  Francisco Maier M.D.      Diagnosis: Invasive ductal carcinoma of the right breast, grade 3, clinically stage IIa (cT2, cN0) ER negative IN negative, HER2 3+ positive, Ki-67 20 to 30%      Chief Complaint: Patient seen today for evaluation of her ER negative HER2 positive breast cancer, for continued monitoring of symptoms and side effects of cancer treatments, employing Herceptin and Perjeta.     Oncology history of presenting illness:  Fela Tiwari is a 38 y.o. likely premenopausal white female who self detected a mass in her lateral right breast in 2023.  She had a mammogram on 2023 which showed the palpable mass is likely malignant.  Ultrasound showed it was 2.6 cm irregular hypoechoic spiculated mass.  Left breast was normal.  She underwent a ultrasound-guided biopsy of the right breast on 2023: This revealed an invasive ductal carcinoma, grade 3, ER negative, IN negative, HER2 3+ by IHC, Ki-67 20 to 30%.  An MRI was done yesterday and results are pending.  She had a hysterectomy in  but her ovaries are in place.  She is  3 para 2 and has not taken any hormone replacement therapy.  No family history of breast or ovarian cancer.  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  She was on anticoagulation for a while but has been off for several years.  She has also had gastric sleeve laparoscopic and cholecystectomy and does note chronic nausea.      Interval histories:  Interval history 24 (CAlsop, APRN):  Patient had a difficult time with her first cycle of chemotherapy.  Day after she received the Udenyca shot she presented to the emergency department with severe pain.  Pain was excruciating throughout her  entire body.  She was given IV Dilaudid in the hospital and discharged with Tylenol.  She has been trying to take 1000 g of Tylenol with no relief.  Patient tearful today due to the severity of pain.  She also had experienced blood when urinating.  She did have a positive occult blood on stool and urine during hospitalization.  She was asked to follow-up with urology in the outpatient setting.  She stated since being discharged from the hospital she has not had any blood whatsoever in the urine or will.  She has been experiencing diarrhea anywhere from 5-10 episodes per day likely related to Perjeta.  She has been taking Imodium, approximately 2-3 pills per day with minimal relief.  She does have nausea and vomiting but she does state that Zofran does help.  She does also note thrush which was appreciated on physical examination today.     Interval history 2/15/2024: She had a terrible time with her first cycle of TCHP as noted above.  Her diarrhea has slowed dramatically but she still is using Lomotil occasionally.  She also had severe eye tearing likely due to the Taxotere which persists although it is better.  She has no neuropathy but did have a rash which responded to corticosteroids.  She also had persistent nausea which has finally resolved.  Her thrush is resolved as well.  She has not had any further vaginal bleeding but this has not been evaluated adequately except for a bladder scan which as expected was negative.  She feels like her tumor shrunk some.      Interval history 03/07/24 (Basim, APRN):  Patient still had difficult time with cycle 2.  She did end up in the ER the day after receiving treatment with bleeding again.  She did have bleeding with her first cycle and did end up being seen by urology and underwent a cystoscopy which was negative.  She stated that the bleeding lasted approximately 3-4 days.  She did experience significant nausea and vomiting for the first 2 weeks.  She also had severe  diarrhea up to 10 times per day for the first 2 weeks.  She did take Lomotil with no improvement.  She has noticed some bleeding from the rectum which is from her hemorrhoids with the diarrhea.  She did state that she was given the olanzapine to take for the first 5 days after treatment which did help immensely with her nausea and vomiting as well as she believes to help with her diarrhea as well as sleep.  When she stopped that medication the symptoms returned.  She is noticing her vision feels a little differently.  Her eyes are very dry and watery.  We attempted to give patient days of Zarzio for neutropenia support as patient had significant pain with the Udenyca shot.  She stated that she did not notice any improvement or difference as she did still have the severe myalgias that lasted approximately 2 weeks with the Zarzio injection.  She did state this last week she has felt back to normal.  She is anxious to receive her next cycle of chemo.     Interval history 03/28/24 (CAlsop, APRN):  Patient tolerated her last cycle well as cycle 3 we held carboplatin and awaited approval for Cytoxan.  We also held Udenyca.  She does still have diarrhea noted but that is controlled with Lomotil.  She is noticing some bilateral flank pain but no other urinary symptoms.  With her last cycle she did not experience any blood in the urine/vaginal/rectal area.  She does have some fatigue and has noticed increase in hot flashes.  Hot flashes are quite bothersome to her at this time.  She also noticed peripheral neuropathy in her toes, worse at night.  She is unable to let the covers to touch her feet.     Interval history 04/18/24 (CAlsop, APRN):  Patient biggest complaint at this time as peripheral neuropathy in her feet.  She has the pain, burning and numbness.  She was taking gabapentin 300 mg at night but did take 2 tablets last night because her feet were severe which did help.  She has noticed some nausea and vomiting worse  with this past cycle likely with the addition of Cytoxan.  She does note she has consistently loose bowel movements, but no significant diarrhea.  She does have some hemorrhoids and does have pain with bowel movements at times.  She still has a hot flashes and did take the oxybutynin but she stated that she thought it made him worse.  She stopped taking the oxybutynin and noticed that her hot flashes are better.  She may be getting some relief from the gabapentin.  She also noticed some blisters in her mouth but the mouth rinses did help.  No mouth sores at this time.  She does complain of significant myalgias and arthralgias from the Udenyca shot that last about 3 days.  However she does continue to have the myalgias and arthralgias up till today still present.    Interval history 5/9/2024: Her peripheral neuropathy is better which she attributes in part to reduce chemo and provide to compression stockings that she is using. GI symptoms were much better with the last cycle. She has been in a clinical complete response and we are awaiting MRI results from yesterday to determine whether she needs more chemotherapy or simply HP for cycle 6. She is planning to do bilateral mastectomies with nipple sparing reconstructions.     Interval history 05/30/24 (CAlsop, APRN):  Is doing well.  She tolerated the last cycle well with Herceptin and Perjeta only.  She does still have diarrhea but states is not bothersome.  She was on gabapentin for hot flashes as well as peripheral neuropathy, but she felt like the gabapentin was making her hot flashes worse.  She stopped the gabapentin altogether and stated that her hot flashes have completely resolved.  She does still have some numbness in her toes but nothing like it has been before.  She wears compression socks at night with positive results.  She did get a sinus infection few weeks ago and took 10-day course of Augmentin.  She does still have some ear clogging but clinical  examination does not show any abnormal findings.    Interval history 6/20/2024: She underwent bilateral mastectomies on 6/12/2024.  The right breast showed no residual invasive or in situ carcinoma.  15 sentinel lymph nodes were negative in 4 clusters removed.  Postoperative course has been somewhat difficult for pain and she is emotionally overwhelmed today.  Expanders were placed and she will start fills in several weeks.  Her hair is growing back nicely.  Her peripheral neuropathy is almost resolved.  She does have numbness under the right arm secondary to the axillary surgery.    Interval history 7/26/2024: She has had a complicated course since we last saw her.  She developed left breast skin flap necrosis which was I&D.  Subsequently she had 170 cc of serous fluid drained from her left breast and she was admitted for IV antibiotics.  She was found to be allergic to vancomycin and ultimately switched to Bactrim and cephalexin.  Seroma cultures were negative .  Repeat seroma removal was negative.  She apparently also has an MRSA culture that was positive and will be seeing ID next week.  Drain was placed in the left reconstruction/seroma and is still draining 50 cc/day.    Interval history 9/4/2024: She finally completed antibiotics and had healing of her left chest wall after expander was removed.  She has a poor cosmetic result and would like to have this redone when possible.  She is ready to reinitiate HP therapy.     Treatment history:  01/23/24: C1D1 TCHP with Udenyca  02/16/24: C2D2 TCHP with 20-25% reduction in dosing of the chemotherapy and 5 days of Zarxio instead of Udenyca   03/08/24: C3D1 THP (20% dose reduction of Docetaxel) - will hold on Xarzio or Udenyca (d/c Carboplatin d/t tolerability)  03/29/24: C4D1 THP with addition of Cytoxan (continue with dose reduction of Docetaxel by 20%) with Udenyca support   04/19/24: C5D1 HP plus Cytoxan (d/c Docetaxel d/t worsening peripheral neuropathy) - no  "need for Udenyca  05/10/24: C6 Herceptin and Perjeta  05/31/24: C7 Herceptin and Perjeta  6/21/2024: C8 Herceptin and Perjeta    Allergies   Allergen Reactions    Bee Venom Anaphylaxis    Morphine Sulfate Anaphylaxis and Itching    Tape Rash     Paper tape OK, do not use tegaderm    Vancomycin Itching    Pineapple Hives     Current Outpatient Medications on File Prior to Encounter   Medication Sig Dispense Refill    ELDERBERRY PO Take  by mouth.      APPLE CIDER VINEGAR PO Take  by mouth.      ALPRAZolam (XANAX) 0.5 MG Tab Take 1 Tablet by mouth at bedtime as needed for Sleep for up to 90 days. 30 Tablet 2    oxyCODONE-acetaminophen (PERCOCET) 5-325 MG Tab Take 1 Tablet by mouth every 6 hours as needed.  FOR PAIN      albuterol 108 (90 Base) MCG/ACT Aero Soln inhalation aerosol Inhale 2 Puffs every 4 hours. 8.5 g 1    ondansetron (ZOFRAN ODT) 4 MG TABLET DISPERSIBLE Dissolve 1 Tablet by mouth every four hours as needed for Nausea/Vomiting (give PO if no IV route available). 10 Tablet 0    sulfamethoxazole-trimethoprim (BACTRIM) 400-80 MG Tab Take 1 Tablet by mouth 2 times a day. (Patient not taking: Reported on 9/4/2024)       No current facility-administered medications on file prior to encounter.         Review of Systems   Constitutional:  Positive for malaise/fatigue. Negative for chills, diaphoresis and fever.   HENT:  Negative for ear pain and sore throat.    Respiratory:  Negative for shortness of breath (only when ambulating up stairs).    Cardiovascular:  Negative for chest pain and palpitations.   Gastrointestinal:  Negative for constipation, diarrhea, nausea and vomiting.   Genitourinary:  Negative for dysuria.   Musculoskeletal:  Negative for myalgias.   Neurological:  Positive for tingling. Negative for dizziness and headaches.              Objective     /64 (BP Location: Right arm, Patient Position: Sitting)   Pulse 80   Temp 37.2 °C (99 °F) (Temporal)   Resp (!) 10   Ht 1.676 m (5' 6\")   Wt " 92.4 kg (203 lb 13 oz)   LMP 02/10/2012 (Approximate) Comment: Age of first period:10, No HRT  SpO2 97%   BMI 32.90 kg/m²      Physical Exam  Vitals reviewed.   Constitutional:       General: She is not in acute distress.     Appearance: Normal appearance. She is not diaphoretic.   HENT:      Head: Normocephalic and atraumatic.   Cardiovascular:      Rate and Rhythm: Normal rate and regular rhythm.      Heart sounds: Normal heart sounds. No murmur heard.     No friction rub. No gallop.   Pulmonary:      Effort: Pulmonary effort is normal.      Breath sounds: Normal breath sounds.   Chest:      Comments: 7/26/2024: Left breast reconstruction with tissue expander shows swelling mild redness and tenderness.  Drain is in place.  Previously status post bilateral mastectomies with tissue expander reconstructions  Abdominal:      General: Bowel sounds are normal. There is no distension.      Palpations: Abdomen is soft.      Tenderness: There is no abdominal tenderness.   Musculoskeletal:         General: No swelling or tenderness. Normal range of motion.   Skin:     General: Skin is warm and dry.   Neurological:      Mental Status: She is alert and oriented to person, place, and time.   Psychiatric:         Mood and Affect: Mood normal.         Behavior: Behavior normal.               MR-BREAST-BILATERAL-WITH & W/O    Result Date: 5/9/2024 5/8/2024 6:10 PM HISTORY/REASON FOR EXAM:  Follow up after neoadjuvant chemo for right breast cancer - prior to surgery; TECHNIQUE/EXAM DESCRIPTION: MRI of the breast, bilateral without and with dynamic IV gadolinium enhancement. MR imaging of the bilateral breasts was performed on a Fortino 3.0 Zena scanner using a dedicated breast coil with the patient in the prone position, with axial T1, axial fat-suppressed T2, and bolus dynamic intravenous gadolinium enhanced fat-suppressed 3D GRE sequences at precontrast, 30 second, 2 minute, 3 minute and 5 minute delays. Post processing  subtraction images were obtained. Delayed postcontrast sagittal images also obtained. 20 mL ProHance contrast was administered intravenously. COMPARISON:  January 12, 2024, January 10, 2024, January 4, 2024, December 21, 2023, December 7, 2023, December 5, 2023 FINDINGS: Background parenchymal enhancement is minimal and symmetric. Right breast: There is no residual mass identified surrounding FANNY clip in the upper outer quadrant of the right breast consistent with treatment response. There is no evidence of enhancing mass surrounding biopsy clip in the inferior lateral right breast corresponding with MR guided biopsy proven cancer. The biopsy clip corresponding with the benign area of enhancement in the 12:00 position is located in the superficial lateral superior breast. There is no evidence of enhancing mass or suspicious area of enhancement.  There is a small intramammary lymph node in the superficial lateral right breast on image 117 measuring 3 mm in size and lymph node in the right axillary tail measuring 4 mm in size. No axillary lymphadenopathy identified. Securemark clip identified in previously biopsied right axillary lymph node on image 159. Left breast: There is no evidence of enhancing mass or suspicious area of enhancement. No axillary lymphadenopathy identified. A Port-A-Cath is identified in the superior medial breast. Upper abdomen: Unremarkable. Bony structures: Unremarkable.     1.  No residual enhancing masses associated with biopsy clips denoting biopsy-proven cancers in the upper outer quadrant of the right breast and slightly more inferior lateral aspect of the right breast consistent with treatment response. 2.  No MRI evidence of malignancy in the left breast. R6 - CATEGORY 6: KNOWN BIOPSY-PROVEN MALIGNANCY - APPROPRIATE ACTION SHOULD BE TAKEN           Assessment & Plan       No diagnosis found.          Impression:  1.  Invasive ductal carcinoma of the right breast, grade 3, clinically  stage IIa (cT2, cN0) ER negative SC negative, HER2 3+ positive, Ki-67 20 to 30%.  Status post TCHP with poor tolerance of cycle 1 necessitating dose reductions, changed to Cytoxan and ultimately discontinuation of Taxotere.  Clinical complete response to therapy documented after cycle 4. MRI showed no residual masses.  Bilateral mastectomy showed a pathologic complete response in the right (ypT0, ypN0).  Left breast normal.  2.  Chronic nausea after gastric sleeve and cholecystectomy severe after chemotherapy now resolved  3.  History of DVT/PE off anticoagulation for several years  4.  Severe myalgias likely from Udenyca -resolved 5.  Chemo nausea and vomiting -she will use Aloxi and olanzapine - patient to take Olanzepine x10 days  6.  Diarrhea d/t Perjeta -resolved  7.  Thrush a resolved  8. Hematuria - presented for first 3-4 days after both cycles 1 and 2.  Cystoscopy completed by urology and was negative.  Questioning whether this may be related to carboplatin, and will plan to change treatment with discontinuation of carboplatin and add Cytoxan. Resolved with cycle 3.   9.  Hot flashes -improved  10. Peripheral neuropathy noted in her toes. residual trace neuropathy  11.  Infected left tissue expander.  Admitted to the hospital and received IV antibiotics now on oral antibiotics with drain still in place.  Expander now removed and chest wall healed  12.  History of MRSA infection receiving antibiotics orally.  ID consult pending.  Resolved    Plan:  We are going to restart Herceptin and Perjeta with a loading dose this week.  We will switch her to Phesgo after this dose.  Please note that this dictation was created using voice recognition software. I have made every reasonable attempt to correct obvious errors, but I expect that there are errors of grammar and possibly content that I did not discover before finalizing the note.

## 2024-09-05 ENCOUNTER — APPOINTMENT (OUTPATIENT)
Dept: HEMATOLOGY ONCOLOGY | Facility: MEDICAL CENTER | Age: 39
End: 2024-09-05
Payer: MEDICAID

## 2024-09-05 DIAGNOSIS — T45.1X5A CHEMOTHERAPY-INDUCED PERIPHERAL NEUROPATHY (HCC): ICD-10-CM

## 2024-09-05 DIAGNOSIS — C50.411 MALIGNANT NEOPLASM OF UPPER-OUTER QUADRANT OF RIGHT BREAST IN FEMALE, ESTROGEN RECEPTOR NEGATIVE (HCC): ICD-10-CM

## 2024-09-05 DIAGNOSIS — Z17.1 MALIGNANT NEOPLASM OF UPPER-OUTER QUADRANT OF RIGHT BREAST IN FEMALE, ESTROGEN RECEPTOR NEGATIVE (HCC): ICD-10-CM

## 2024-09-05 DIAGNOSIS — G62.0 CHEMOTHERAPY-INDUCED PERIPHERAL NEUROPATHY (HCC): ICD-10-CM

## 2024-09-05 RX ORDER — PREGABALIN 50 MG/1
50 CAPSULE ORAL 2 TIMES DAILY
Qty: 42 CAPSULE | Refills: 0 | Status: SHIPPED | OUTPATIENT
Start: 2024-09-05 | End: 2024-09-26

## 2024-09-05 NOTE — PROGRESS NOTES
"Pharmacy Chemotherapy Verification Note:    Patient Name: Fela Tiwari      Dx: Breast CA, Stage IIA, ER-, OK-, HER2+       Protocol: TCHP->HP       *Dosing Reference*  Pertuzumab 840 mg IV over 60 minutes on Day 1 of Cycle 1 followed by  Pertuzumab 420 mg IV over 30 minutes on Day 1 of Cycles 2-6  Trastuzumab 8 mg/kg IV over 90 minutes on Day 1 of Cycle 1 followed by  Trastuzumab 6 mg/kg IV over 30 minutes on Day 1 of Cycles 2-6 followed by              2/16/24 - Dose reduced to 60 mg/m2 for tolerance starting with C2              4/19/24 - Removed from treatment plan starting with C5 due to worsening peripheral neuropathy              2/16/24 - Dose reduced to AUC 4.5 for tolerance starting with C2              Removed from treatment plan starting Cycle 3  Cyclophosphamide 600 mg/m2 IV over 30 minutes on Day 1              3/29/24 - Added starting C4 to replace carboplatin due to intolerance  21-day cycle for 6 cycles (completed 5/10/24 after 5 cycles for evelyn ance)  ~Followed by~  Pertuzumab 420 mg IV over 30 minutes on Day 1 beginning with Week 19     9/6/24 - Reload with 840 mg on C9 per Dr. Thomas   Trastuzumab 6 mg/kg IV over 30 minutes on Day 1 beginning with Week 19     9/6/24 - Reload with 8 mg/kg on C9 per Dr. Thomas   21-day cycle to complete 52 weeks total of trastuzumab and pertuzumab therapy    NCCN Guidelines for Breast Cancer V.2.2022.  Josef LEON, et al. Gay Oncol. 2013;24(9):2278-84.    Allergies:  Bee venom, Morphine sulfate, Tape, and Pineapple     /55   Pulse (!) 58   Temp 36.7 °C (98.1 °F) (Temporal)   Resp 16   Ht 1.68 m (5' 6.14\")   Wt 93.3 kg (205 lb 11 oz)   LMP 02/10/2012 (Approximate) Comment: Age of first period:10, No HRT  SpO2 96%   BMI 33.06 kg/m²  Body surface area is 2.09 meters squared.    No labs required  Per MD loera for ECHOs [id number]    ECHO 5/13/24: LVEF 69%     Drug Order   (Drug name, dose, route, IV Fluid & volume, frequency, number " of doses) Cycle: 9 (cycle 4 of HP) delayed due to hospitalization/infection  Previous treatment: C8 = 6/2124     Medication = trastuzumab-anns (Kanjinti)  Base Dose = 8 mg/kg  Calc Dose: Base Dose x 94.3 kg = 754.4 mg  Final Dose = 789 mg  Route = IV  Fluid & Volume =  mL  Admin Duration = Over 30-90 minutes          <10% difference, ok to treat with final written dose]   Medication = pertuzumab (Perjeta)  Base Dose = 840 mg fixed dose  Calc Dose: fixed dose = no calculations  Final Dose = 840 mg fixed dose  Route = IV  Fluid & Volume =  mL  Admin Duration = Over 30 minutes          <10% difference, ok to treat with final written dose     By my signature below, I confirm this process was performed independently with the BSA and all final chemotherapy dosing calculations congruent. I have reviewed the above chemotherapy order and that my calculation of the final dose and BSA (when applicable) corroborate those calculations of the  pharmacist.     Nel Gimenez, KoriD

## 2024-09-06 ENCOUNTER — OUTPATIENT INFUSION SERVICES (OUTPATIENT)
Dept: ONCOLOGY | Facility: MEDICAL CENTER | Age: 39
End: 2024-09-06
Attending: INTERNAL MEDICINE
Payer: MEDICAID

## 2024-09-06 VITALS
HEART RATE: 58 BPM | BODY MASS INDEX: 33.06 KG/M2 | RESPIRATION RATE: 16 BRPM | DIASTOLIC BLOOD PRESSURE: 55 MMHG | OXYGEN SATURATION: 96 % | TEMPERATURE: 98.1 F | HEIGHT: 66 IN | SYSTOLIC BLOOD PRESSURE: 109 MMHG | WEIGHT: 205.69 LBS

## 2024-09-06 DIAGNOSIS — Z17.1 MALIGNANT NEOPLASM OF UPPER-OUTER QUADRANT OF RIGHT BREAST IN FEMALE, ESTROGEN RECEPTOR NEGATIVE (HCC): ICD-10-CM

## 2024-09-06 DIAGNOSIS — C50.411 MALIGNANT NEOPLASM OF UPPER-OUTER QUADRANT OF RIGHT BREAST IN FEMALE, ESTROGEN RECEPTOR NEGATIVE (HCC): ICD-10-CM

## 2024-09-06 PROCEDURE — 700101 HCHG RX REV CODE 250: Mod: UD | Performed by: NURSE PRACTITIONER

## 2024-09-06 PROCEDURE — A4212 NON CORING NEEDLE OR STYLET: HCPCS

## 2024-09-06 PROCEDURE — 96417 CHEMO IV INFUS EACH ADDL SEQ: CPT

## 2024-09-06 PROCEDURE — 700111 HCHG RX REV CODE 636 W/ 250 OVERRIDE (IP): Mod: UD | Performed by: NURSE PRACTITIONER

## 2024-09-06 PROCEDURE — 96413 CHEMO IV INFUSION 1 HR: CPT

## 2024-09-06 PROCEDURE — 700111 HCHG RX REV CODE 636 W/ 250 OVERRIDE (IP): Mod: JZ,UD | Performed by: INTERNAL MEDICINE

## 2024-09-06 PROCEDURE — 700105 HCHG RX REV CODE 258: Mod: UD | Performed by: INTERNAL MEDICINE

## 2024-09-06 RX ADMIN — TRASTUZUMAB-ANNS 789 MG: 420 INJECTION, POWDER, LYOPHILIZED, FOR SOLUTION INTRAVENOUS at 12:40

## 2024-09-06 RX ADMIN — PERTUZUMAB 840 MG: 30 INJECTION, SOLUTION, CONCENTRATE INTRAVENOUS at 10:17

## 2024-09-06 RX ADMIN — LIDOCAINE HYDROCHLORIDE 0.5 ML: 10 INJECTION, SOLUTION EPIDURAL; INFILTRATION; INTRACAUDAL at 09:38

## 2024-09-06 RX ADMIN — HEPARIN 500 UNITS: 100 SYRINGE at 14:21

## 2024-09-06 ASSESSMENT — FIBROSIS 4 INDEX: FIB4 SCORE: 0.59

## 2024-09-06 NOTE — PROGRESS NOTES
Pt presented to Infusion for Day 1 loading dose of Perjeta and kanjinti. POC discussed and pt verbalized understanding. Port accessed per [facility] policy using lidocaine bleb prior to access, brisk blood return noted, line flushes with ease. Pt tolerated well. Perjeta administered per MAR over 1 hr with 1 hr of observation; Pt tolerated well. No s/s of adverse reactions or complications noted. Kanjinti administered; pt tolerated well with no s/s of adverse reactions or complications.Port flushed per [facility] policy, and de-accessed with needle intact; site covered with sterile gauze/paper tape and pt tolerated well. Educated pt on when to contact provider including fever, s/s of infection, worsening symptoms and/or defer judgment to ED for provider evaluation. Pt verbalized understanding. Pt confirmed next appt and discharged to self care. Pt in NAD at time of discharge.

## 2024-09-06 NOTE — PROGRESS NOTES
Chemotherapy Verification - PRIMARY RN      Height = 1.68 m  Weight = 93.3 kg  BSA = 2.09 m^2       Medication: pertuzumab (Perjeta)  Dose: 840 mg (Set dose)  Calculated Dose: 840 mg (Set dose)                             (In mg/m2, AUC, mg/kg)     Medication: trastuzumab-anns (Kanjinti)  Dose: 8 mg/kg  Calculated Dose: 746.4 mg                             (In mg/m2, AUC, mg/kg)      I confirm this process was performed independently with the BSA and all final chemotherapy dosing calculations congruent.  Any discrepancies of 10% or greater have been addressed with the chemotherapy pharmacist. The resolution of the discrepancy has been documented in the EPIC progress notes.

## 2024-09-06 NOTE — PROGRESS NOTES
Chemotherapy Verification - SECONDARY RN       Height = 168cm  Weight = 93.3kg  BSA = 2.09  Echo 5/13/24, okay for Q4 months       Medication: Pertuzumab  Dose: 840mg  Calculated Dose: 840mg loading dose                               (In mg/m2, AUC, mg/kg)     Medication: Kanjanti  Dose: 8mg/kg  Calculated Dose: 746.4mg                             (In mg/m2, AUC, mg/kg)        I confirm that this process was performed independently.

## 2024-09-06 NOTE — PROGRESS NOTES
Patient with chemotherapy induced peripheral neuropathy. She was seen yesterday on 9/4/24 by Dr. Thomas who discussed starting Lyrica 50 mg PO BID. She has previously been on Gabapentin with no relief. Will send in Rx per Dr. Thomas's request.     She has been given enough until next visit for re-evaluation.     1. Malignant neoplasm of upper-outer quadrant of right breast in female, estrogen receptor negative (HCC)  pregabalin (LYRICA) 50 MG capsule      2. Chemotherapy-induced peripheral neuropathy (HCC)  pregabalin (LYRICA) 50 MG capsule

## 2024-09-06 NOTE — PROGRESS NOTES
"Pharmacy Chemotherapy Verification    Dx: Breast cancer, ER-, [place]-, HER2+  Cycle 9  Previous treatment = C8 6/21/24    Protocol: TCHP  Pertuzumab 840 mg IV over 60 minutes on Day 1 of Cycle 1 followed by  Pertuzumab 420 mg IV over 30 minutes on Day 1 of Cycles 2-6  Trastuzumab 8 mg/kg IV over 90 minutes on Day 1 of Cycle 1 followed by  Trastuzumab 6 mg/kg IV over 30 minutes on Day 1 of Cycles 2-6 followed by   2/16/24 - Dose reduced to 60 mg/m2 for tolerance starting with C2   4/19/24 - Removed from treatment plan starting with C5 due to worsening peripheral neuropathy   2/16/24 - Dose reduced to AUC 4.5 for tolerance starting with C2   Removed from treatment plan starting Cycle 3  Cyclophosphamide 600 mg/m2 IV over 30 minutes on Day 1   3/29/24 - Added starting C4 to replace carboplatin due to intolerance  21-day cycle for 6 cycles (completed 5/10/24 after 5 cycles for tolerance)  ~Followed by~  Pertuzumab 420 mg IV over 30 minutes on Day 1 beginning with Week 19   9/6/24 - Reload with 840 mg on C9 per Dr. Thomas  Trastuzumab 6 mg/kg IV over 30 minutes on Day 1 beginning with Week 19   9/6/24 - Reload with 8 mg/kg on C9 per Dr. Thomas  21-day cycle to complete 52 weeks total of trastuzumab and pertuzumab therapy  NCCN Guidelines for Breast Cancer V.2.2022.  Josef LEON et al. Gay Oncol. 2013;24(9):2278-84.    Allergies:  Bee venom, Morphine sulfate, Tape, and Pineapple     /55   Pulse (!) 58   Temp 36.7 °C (98.1 °F) (Temporal)   Resp 16   Ht 1.68 m (5' 6.14\")   Wt 93.3 kg (205 lb 11 oz)   LMP 02/10/2012 (Approximate) Comment: Age of first period:10, No HRT  SpO2 96%   BMI 33.06 kg/m²  Body surface area is 2.09 meters squared.    ECHO  5/13/24 LVEF 69%  12/27/23 LVEF 65% (Okay to proceed with treatment today. Pt to schedule ECHO after 6 cycles of TCHP then every 4 months thereafter per Dr. Thomas)    Pertuzumab 840 mg fixed dose              Fixed dose, OK to treat with final dose = " 840 mg IV     Trastuzumab-anns 8 mg/kg x 93.3 kg = 746.4 mg              <10% difference, OK to treat with final dose = 789 mg IV    Casimiro Peres, PharmD

## 2024-09-10 ENCOUNTER — TELEPHONE (OUTPATIENT)
Dept: HEMATOLOGY ONCOLOGY | Facility: MEDICAL CENTER | Age: 39
End: 2024-09-10
Payer: MEDICAID

## 2024-09-10 DIAGNOSIS — U07.1 COVID: ICD-10-CM

## 2024-09-10 NOTE — TELEPHONE ENCOUNTER
Contacted patient in regard to switching from the IV pertuzumab and trastuzumab to Phesgo. Patient states she is covid positive and started feeling sick on Sunday, testing positive yesterday, Monday 9/9 via two different home tests. Patient experiencing body aches, fevers, sore throat, headaches and chills. Updated Dr. Thomas, orders for Paxlovid, patient is to start tonight. Discussed Phesgo with patient and patient is aware of medication. Dr. Thomas discussed with patient. Patient verbalizes understanding. Will update pharmacy clinical specialist, Fab, to switch to Phesgo. No other questions or concerns at this time.

## 2024-09-15 RX ORDER — ONDANSETRON 8 MG/1
8 TABLET, ORALLY DISINTEGRATING ORAL PRN
Status: CANCELLED | OUTPATIENT
Start: 2024-09-26

## 2024-09-15 RX ORDER — DIPHENHYDRAMINE HYDROCHLORIDE 50 MG/ML
50 INJECTION INTRAMUSCULAR; INTRAVENOUS PRN
Status: CANCELLED | OUTPATIENT
Start: 2024-09-26

## 2024-09-15 RX ORDER — 0.9 % SODIUM CHLORIDE 0.9 %
10 VIAL (ML) INJECTION PRN
Status: CANCELLED | OUTPATIENT
Start: 2024-09-26

## 2024-09-15 RX ORDER — PROCHLORPERAZINE MALEATE 10 MG
10 TABLET ORAL EVERY 6 HOURS PRN
Status: CANCELLED | OUTPATIENT
Start: 2024-09-26

## 2024-09-15 RX ORDER — 0.9 % SODIUM CHLORIDE 0.9 %
3 VIAL (ML) INJECTION PRN
Status: CANCELLED | OUTPATIENT
Start: 2024-09-26

## 2024-09-15 RX ORDER — METHYLPREDNISOLONE SODIUM SUCCINATE 125 MG/2ML
125 INJECTION, POWDER, LYOPHILIZED, FOR SOLUTION INTRAMUSCULAR; INTRAVENOUS PRN
Status: CANCELLED | OUTPATIENT
Start: 2024-09-26

## 2024-09-15 RX ORDER — EPINEPHRINE 1 MG/ML(1)
0.5 AMPUL (ML) INJECTION PRN
Status: CANCELLED | OUTPATIENT
Start: 2024-09-26

## 2024-09-15 RX ORDER — ONDANSETRON 2 MG/ML
4 INJECTION INTRAMUSCULAR; INTRAVENOUS PRN
Status: CANCELLED | OUTPATIENT
Start: 2024-09-26

## 2024-09-15 RX ORDER — 0.9 % SODIUM CHLORIDE 0.9 %
VIAL (ML) INJECTION PRN
Status: CANCELLED | OUTPATIENT
Start: 2024-09-26

## 2024-09-15 RX ORDER — SODIUM CHLORIDE 9 MG/ML
INJECTION, SOLUTION INTRAVENOUS CONTINUOUS
Status: CANCELLED | OUTPATIENT
Start: 2024-09-26

## 2024-09-26 ENCOUNTER — HOSPITAL ENCOUNTER (OUTPATIENT)
Dept: HEMATOLOGY ONCOLOGY | Facility: MEDICAL CENTER | Age: 39
End: 2024-09-26
Attending: INTERNAL MEDICINE
Payer: MEDICAID

## 2024-09-26 VITALS
RESPIRATION RATE: 17 BRPM | TEMPERATURE: 97.9 F | SYSTOLIC BLOOD PRESSURE: 108 MMHG | OXYGEN SATURATION: 97 % | HEIGHT: 66 IN | HEART RATE: 77 BPM | WEIGHT: 196.43 LBS | DIASTOLIC BLOOD PRESSURE: 68 MMHG | BODY MASS INDEX: 31.57 KG/M2

## 2024-09-26 DIAGNOSIS — C50.411 MALIGNANT NEOPLASM OF UPPER-OUTER QUADRANT OF RIGHT BREAST IN FEMALE, ESTROGEN RECEPTOR NEGATIVE (HCC): ICD-10-CM

## 2024-09-26 DIAGNOSIS — Z17.1 MALIGNANT NEOPLASM OF UPPER-OUTER QUADRANT OF RIGHT BREAST IN FEMALE, ESTROGEN RECEPTOR NEGATIVE (HCC): ICD-10-CM

## 2024-09-26 PROCEDURE — 99214 OFFICE O/P EST MOD 30 MIN: CPT | Performed by: INTERNAL MEDICINE

## 2024-09-26 PROCEDURE — 99212 OFFICE O/P EST SF 10 MIN: CPT | Performed by: INTERNAL MEDICINE

## 2024-09-26 ASSESSMENT — ENCOUNTER SYMPTOMS
DIAPHORESIS: 0
DIZZINESS: 0
PALPITATIONS: 0
TINGLING: 1
MYALGIAS: 0
SHORTNESS OF BREATH: 0
NAUSEA: 0
HEADACHES: 0
DIARRHEA: 0
VOMITING: 0
CHILLS: 0
FEVER: 0
CONSTIPATION: 0
SORE THROAT: 0

## 2024-09-26 ASSESSMENT — PAIN SCALES - GENERAL: PAINLEVEL: NO PAIN

## 2024-09-26 ASSESSMENT — FIBROSIS 4 INDEX: FIB4 SCORE: 0.59

## 2024-09-26 NOTE — PROGRESS NOTES
Pharmacy Chemotherapy Verification Note:    Patient Name: Fela Tiwari      Dx: Breast CA, Stage IIA, ER-, [place]-, HER2+       Protocol: TCHP->HP       *Dosing Reference*  Pertuzumab 840 mg IV over 60 minutes on Day 1 of Cycle 1 followed by  Pertuzumab 420 mg IV over 30 minutes on Day 1 of Cycles 2-6  Trastuzumab 8 mg/kg IV over 90 minutes on Day 1 of Cycle 1 followed by  Trastuzumab 6 mg/kg IV over 30 minutes on Day 1 of Cycles 2-6 followed by              2/16/24 - Dose reduced to 60 mg/m2 for tolerance starting with C2              4/19/24 - Removed from treatment plan starting with C5 due to worsening peripheral neuropathy              2/16/24 - Dose reduced to AUC 4.5 for tolerance starting with C2              Removed from treatment plan starting Cycle 3  Cyclophosphamide 600 mg/m2 IV over 30 minutes on Day 1              3/29/24 - Added starting C4 to replace carboplatin due to intolerance  21-day cycle for 6 cycles (completed 5/10/24 after 5 cycles for evelyn ance)  ~Followed by~   Pertuzumab 420 mg IV over 30 minutes on Day 1 beginning with Week 19   9/6/24 - Reload with 840 mg on C9 per Dr. Thomas    9/10/24 - substituted Phesgo starting C10 (C5 of HP)  Trastuzumab 6 mg/kg IV over 30 minutes on Day 1 beginning with Week 19   9/6/24 - Reload with 8 mg/kg on C9 per Dr. Thomas    9/10/24 - substituted with Phesgo starting C10 (C5 of HP)  Pertuzumab and trastuzumab and hyaluronidase-zzxf (Phesgo) subcutaneous on Day 1   Initial Dose: 1,200 mg pertuzumab, 600 mg trastuzumab, and 30,000 units hyaluronidase/15 mL - no initial dose as pt has been on traztuzumab and petruzumab   Subsequent Doses: 600 mg pertuzumab, 600 mg trastuzumab, and 20,000 units hyaluronidase/10 mL21-day cycle to complete 52 weeks total of trastuzumab and pertuzumab therapy    NCCN Guidelines for Breast Cancer V.2.2022.  Josef LEON et al. Gay Oncol. 2013;24(9):2278-84.  NCCNGuidelines® for Breast Cancer V.2.2024.   Tan  CAROLYN et al. Cancer Research. 2020;80(4_suppl):PD4-07.b    Allergies:  Bee venom, Morphine sulfate, Tape, and Pineapple     LMP 02/10/2012 (Approximate) Comment: Age of first period:10, No HRT There is no height or weight on file to calculate BSA.    No labs required    Per MD okay for ECHOs [id number]  ECHO 5/13/24: LVEF 69%     Drug Order   (Drug name, dose, route, IV Fluid & volume, frequency, number of doses) Cycle: 10 (cycle 5 of HP) delayed due to hospitalization/infection  Previous treatment: C9 9/6/4     Medication = Pertuzumab and trastuzumab and hyaluronidase-zzxf (Phesgo)   Base Dose = Pertuzumab 600 mg and Trastuzumab 600 mg and Hyaluronidase 21415 units   Fixed dose, no calculation needed  Final Dose = Pertuzumab 600 mg and Trastuzumab 600 mg and Hyaluronidase 88530 units fixed dose   Route = IV  Fluid & Volume = 10 mL SQ  Admin Duration = Over 5 mins          Fixed dose, no calculation needed       By my signature below, I confirm this process was performed independently with the BSA and all final chemotherapy dosing calculations congruent. I have reviewed the above chemotherapy order and that my calculation of the final dose and BSA (when applicable) corroborate those calculations of the  pharmacist.     Kori SimpsonD

## 2024-09-26 NOTE — PROGRESS NOTES
Subjective   Medical oncology follow-up visit: 2024  Fela Tiwari is a 39 y.o. female who presents with Breast Cancer          HPI    Referring Physician: Rochelle Gaviria MD   Primary Care:  Francisco Maier M.D.      Diagnosis: Invasive ductal carcinoma of the right breast, grade 3, clinically stage IIa (cT2, cN0) ER negative ID negative, HER2 3+ positive, Ki-67 20 to 30%      Chief Complaint: Patient seen today for evaluation of her ER negative HER2 positive breast cancer, for continued monitoring of symptoms and side effects of cancer treatments, employing Herceptin and Perjeta.     Oncology history of presenting illness:  Fela Tiwari is a 38 y.o. likely premenopausal white female who self detected a mass in her lateral right breast in 2023.  She had a mammogram on 2023 which showed the palpable mass is likely malignant.  Ultrasound showed it was 2.6 cm irregular hypoechoic spiculated mass.  Left breast was normal.  She underwent a ultrasound-guided biopsy of the right breast on 2023: This revealed an invasive ductal carcinoma, grade 3, ER negative, ID negative, HER2 3+ by IHC, Ki-67 20 to 30%.  An MRI was done yesterday and results are pending.  She had a hysterectomy in  but her ovaries are in place.  She is  3 para 2 and has not taken any hormone replacement therapy.  No family history of breast or ovarian cancer.  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  She was on anticoagulation for a while but has been off for several years.  She has also had gastric sleeve laparoscopic and cholecystectomy and does note chronic nausea.      Interval histories:  Interval history 24 (CAlsop, APRN):  Patient had a difficult time with her first cycle of chemotherapy.  Day after she received the Udenyca shot she presented to the emergency department with severe pain.  Pain was excruciating throughout her  entire body.  She was given IV Dilaudid in the hospital and discharged with Tylenol.  She has been trying to take 1000 g of Tylenol with no relief.  Patient tearful today due to the severity of pain.  She also had experienced blood when urinating.  She did have a positive occult blood on stool and urine during hospitalization.  She was asked to follow-up with urology in the outpatient setting.  She stated since being discharged from the hospital she has not had any blood whatsoever in the urine or will.  She has been experiencing diarrhea anywhere from 5-10 episodes per day likely related to Perjeta.  She has been taking Imodium, approximately 2-3 pills per day with minimal relief.  She does have nausea and vomiting but she does state that Zofran does help.  She does also note thrush which was appreciated on physical examination today.     Interval history 2/15/2024: She had a terrible time with her first cycle of TCHP as noted above.  Her diarrhea has slowed dramatically but she still is using Lomotil occasionally.  She also had severe eye tearing likely due to the Taxotere which persists although it is better.  She has no neuropathy but did have a rash which responded to corticosteroids.  She also had persistent nausea which has finally resolved.  Her thrush is resolved as well.  She has not had any further vaginal bleeding but this has not been evaluated adequately except for a bladder scan which as expected was negative.  She feels like her tumor shrunk some.      Interval history 03/07/24 (Basim, APRN):  Patient still had difficult time with cycle 2.  She did end up in the ER the day after receiving treatment with bleeding again.  She did have bleeding with her first cycle and did end up being seen by urology and underwent a cystoscopy which was negative.  She stated that the bleeding lasted approximately 3-4 days.  She did experience significant nausea and vomiting for the first 2 weeks.  She also had severe  diarrhea up to 10 times per day for the first 2 weeks.  She did take Lomotil with no improvement.  She has noticed some bleeding from the rectum which is from her hemorrhoids with the diarrhea.  She did state that she was given the olanzapine to take for the first 5 days after treatment which did help immensely with her nausea and vomiting as well as she believes to help with her diarrhea as well as sleep.  When she stopped that medication the symptoms returned.  She is noticing her vision feels a little differently.  Her eyes are very dry and watery.  We attempted to give patient days of Zarzio for neutropenia support as patient had significant pain with the Udenyca shot.  She stated that she did not notice any improvement or difference as she did still have the severe myalgias that lasted approximately 2 weeks with the Zarzio injection.  She did state this last week she has felt back to normal.  She is anxious to receive her next cycle of chemo.     Interval history 03/28/24 (CAlsop, APRN):  Patient tolerated her last cycle well as cycle 3 we held carboplatin and awaited approval for Cytoxan.  We also held Udenyca.  She does still have diarrhea noted but that is controlled with Lomotil.  She is noticing some bilateral flank pain but no other urinary symptoms.  With her last cycle she did not experience any blood in the urine/vaginal/rectal area.  She does have some fatigue and has noticed increase in hot flashes.  Hot flashes are quite bothersome to her at this time.  She also noticed peripheral neuropathy in her toes, worse at night.  She is unable to let the covers to touch her feet.     Interval history 04/18/24 (CAlsop, APRN):  Patient biggest complaint at this time as peripheral neuropathy in her feet.  She has the pain, burning and numbness.  She was taking gabapentin 300 mg at night but did take 2 tablets last night because her feet were severe which did help.  She has noticed some nausea and vomiting worse  with this past cycle likely with the addition of Cytoxan.  She does note she has consistently loose bowel movements, but no significant diarrhea.  She does have some hemorrhoids and does have pain with bowel movements at times.  She still has a hot flashes and did take the oxybutynin but she stated that she thought it made him worse.  She stopped taking the oxybutynin and noticed that her hot flashes are better.  She may be getting some relief from the gabapentin.  She also noticed some blisters in her mouth but the mouth rinses did help.  No mouth sores at this time.  She does complain of significant myalgias and arthralgias from the Udenyca shot that last about 3 days.  However she does continue to have the myalgias and arthralgias up till today still present.    Interval history 5/9/2024: Her peripheral neuropathy is better which she attributes in part to reduce chemo and provide to compression stockings that she is using. GI symptoms were much better with the last cycle. She has been in a clinical complete response and we are awaiting MRI results from yesterday to determine whether she needs more chemotherapy or simply HP for cycle 6. She is planning to do bilateral mastectomies with nipple sparing reconstructions.     Interval history 05/30/24 (CAlsop, APRN):  Is doing well.  She tolerated the last cycle well with Herceptin and Perjeta only.  She does still have diarrhea but states is not bothersome.  She was on gabapentin for hot flashes as well as peripheral neuropathy, but she felt like the gabapentin was making her hot flashes worse.  She stopped the gabapentin altogether and stated that her hot flashes have completely resolved.  She does still have some numbness in her toes but nothing like it has been before.  She wears compression socks at night with positive results.  She did get a sinus infection few weeks ago and took 10-day course of Augmentin.  She does still have some ear clogging but clinical  examination does not show any abnormal findings.    Interval history 6/20/2024: She underwent bilateral mastectomies on 6/12/2024.  The right breast showed no residual invasive or in situ carcinoma.  15 sentinel lymph nodes were negative in 4 clusters removed.  Postoperative course has been somewhat difficult for pain and she is emotionally overwhelmed today.  Expanders were placed and she will start fills in several weeks.  Her hair is growing back nicely.  Her peripheral neuropathy is almost resolved.  She does have numbness under the right arm secondary to the axillary surgery.    Interval history 7/26/2024: She has had a complicated course since we last saw her.  She developed left breast skin flap necrosis which was I&D.  Subsequently she had 170 cc of serous fluid drained from her left breast and she was admitted for IV antibiotics.  She was found to be allergic to vancomycin and ultimately switched to Bactrim and cephalexin.  Seroma cultures were negative .  Repeat seroma removal was negative.  She apparently also has an MRSA culture that was positive and will be seeing ID next week.  Drain was placed in the left reconstruction/seroma and is still draining 50 cc/day.    Interval history 9/4/2024: She finally completed antibiotics and had healing of her left chest wall after expander was removed.  She has a poor cosmetic result and would like to have this redone when possible.  She is ready to reinitiate HP therapy.    Interval history 9/26/2024: Her chest wall remains stable off antibiotics with no residual or recurrent infection.  Replacement of the left tissue expander is planned for December.  She is not yet getting fills on the right side until the other expander was placed back.  She tolerated H.P. without difficulty.  She is on Lyrica 50 mg twice daily for neuropathy which is controlling the numbness.  She does have a sensation of coldness of her feet on occasion.  Energy level is good and she went  swimming for the first time since starting chemotherapy which she enjoyed.     Treatment history:  01/23/24: C1D1 TCHP with Udenyca  02/16/24: C2D2 TCHP with 20-25% reduction in dosing of the chemotherapy and 5 days of Zarxio instead of Udenyca   03/08/24: C3D1 THP (20% dose reduction of Docetaxel) - will hold on Xarzio or Udenyca (d/c Carboplatin d/t tolerability)  03/29/24: C4D1 THP with addition of Cytoxan (continue with dose reduction of Docetaxel by 20%) with Udenyca support   04/19/24: C5D1 HP plus Cytoxan (d/c Docetaxel d/t worsening peripheral neuropathy) - no need for Udenyca  05/10/24: C6 Herceptin and Perjeta  05/31/24: C7 Herceptin and Perjeta  6/21/2024: C8 Herceptin and Perjeta    Allergies   Allergen Reactions    Bee Venom Anaphylaxis    Morphine Sulfate Anaphylaxis and Itching    Tape Rash     Paper tape OK, do not use tegaderm    Vancomycin Itching    Pineapple Hives     Current Outpatient Medications on File Prior to Encounter   Medication Sig Dispense Refill    Nirmatrelvir&Ritonavir 300/100 20 x 150 MG & 10 x 100MG Tablet Therapy Pack Take 300 mg nirmatrelvir (two 150 mg tablets) with 100 mg ritonavir (one 100 mg tablet) by mouth, with all three tablets taken together twice daily for 5 days. 30 Each 0    pregabalin (LYRICA) 50 MG capsule Take 1 Capsule by mouth 2 times a day for 21 days. 42 Capsule 0    ELDERBERRY PO Take  by mouth.      APPLE CIDER VINEGAR PO Take  by mouth.      ALPRAZolam (XANAX) 0.5 MG Tab Take 1 Tablet by mouth at bedtime as needed for Sleep for up to 90 days. 30 Tablet 2    oxyCODONE-acetaminophen (PERCOCET) 5-325 MG Tab Take 1 Tablet by mouth every 6 hours as needed.  FOR PAIN      albuterol 108 (90 Base) MCG/ACT Aero Soln inhalation aerosol Inhale 2 Puffs every 4 hours. 8.5 g 1    ondansetron (ZOFRAN ODT) 4 MG TABLET DISPERSIBLE Dissolve 1 Tablet by mouth every four hours as needed for Nausea/Vomiting (give PO if no IV route available). 10 Tablet 0     No current  facility-administered medications on file prior to encounter.         Review of Systems   Constitutional:  Positive for malaise/fatigue. Negative for chills, diaphoresis and fever.   HENT:  Negative for ear pain and sore throat.    Respiratory:  Negative for shortness of breath (only when ambulating up stairs).    Cardiovascular:  Negative for chest pain and palpitations.   Gastrointestinal:  Negative for constipation, diarrhea, nausea and vomiting.   Genitourinary:  Negative for dysuria.   Musculoskeletal:  Negative for myalgias.   Neurological:  Positive for tingling. Negative for dizziness and headaches.              Objective     LMP 02/10/2012 (Approximate) Comment: Age of first period:10, No HRT     Physical Exam  Vitals reviewed.   Constitutional:       General: She is not in acute distress.     Appearance: Normal appearance. She is not diaphoretic.   HENT:      Head: Normocephalic and atraumatic.   Cardiovascular:      Rate and Rhythm: Normal rate and regular rhythm.      Heart sounds: Normal heart sounds. No murmur heard.     No friction rub. No gallop.   Pulmonary:      Effort: Pulmonary effort is normal.      Breath sounds: Normal breath sounds.   Chest:      Comments: 9/26/2024: Right mastectomy and tissue expander without erythema or warmth.  Left chest wall is now well-healed with mild hyperpigmentation and no erythema or warmth.  7/26/2024: Left breast reconstruction with tissue expander shows swelling mild redness and tenderness.  Drain is in place.  Previously status post bilateral mastectomies with tissue expander reconstructions  Abdominal:      General: Bowel sounds are normal. There is no distension.      Palpations: Abdomen is soft.      Tenderness: There is no abdominal tenderness.   Musculoskeletal:         General: No swelling or tenderness. Normal range of motion.   Skin:     General: Skin is warm and dry.   Neurological:      Mental Status: She is alert and oriented to person, place, and  time.   Psychiatric:         Mood and Affect: Mood normal.         Behavior: Behavior normal.               MR-BREAST-BILATERAL-WITH & W/O    Result Date: 5/9/2024 5/8/2024 6:10 PM HISTORY/REASON FOR EXAM:  Follow up after neoadjuvant chemo for right breast cancer - prior to surgery; TECHNIQUE/EXAM DESCRIPTION: MRI of the breast, bilateral without and with dynamic IV gadolinium enhancement. MR imaging of the bilateral breasts was performed on a Fortino 3.0 Zena scanner using a dedicated breast coil with the patient in the prone position, with axial T1, axial fat-suppressed T2, and bolus dynamic intravenous gadolinium enhanced fat-suppressed 3D GRE sequences at precontrast, 30 second, 2 minute, 3 minute and 5 minute delays. Post processing subtraction images were obtained. Delayed postcontrast sagittal images also obtained. 20 mL ProHance contrast was administered intravenously. COMPARISON:  January 12, 2024, January 10, 2024, January 4, 2024, December 21, 2023, December 7, 2023, December 5, 2023 FINDINGS: Background parenchymal enhancement is minimal and symmetric. Right breast: There is no residual mass identified surrounding FANNY clip in the upper outer quadrant of the right breast consistent with treatment response. There is no evidence of enhancing mass surrounding biopsy clip in the inferior lateral right breast corresponding with MR guided biopsy proven cancer. The biopsy clip corresponding with the benign area of enhancement in the 12:00 position is located in the superficial lateral superior breast. There is no evidence of enhancing mass or suspicious area of enhancement.  There is a small intramammary lymph node in the superficial lateral right breast on image 117 measuring 3 mm in size and lymph node in the right axillary tail measuring 4 mm in size. No axillary lymphadenopathy identified. Securemark clip identified in previously biopsied right axillary lymph node on image 159. Left breast: There is no  evidence of enhancing mass or suspicious area of enhancement. No axillary lymphadenopathy identified. A Port-A-Cath is identified in the superior medial breast. Upper abdomen: Unremarkable. Bony structures: Unremarkable.     1.  No residual enhancing masses associated with biopsy clips denoting biopsy-proven cancers in the upper outer quadrant of the right breast and slightly more inferior lateral aspect of the right breast consistent with treatment response. 2.  No MRI evidence of malignancy in the left breast. R6 - CATEGORY 6: KNOWN BIOPSY-PROVEN MALIGNANCY - APPROPRIATE ACTION SHOULD BE TAKEN           Assessment & Plan       1. Malignant neoplasm of upper-outer quadrant of right breast in female, estrogen receptor negative (HCC)                  Impression:  1.  Invasive ductal carcinoma of the right breast, grade 3, clinically stage IIa (cT2, cN0) ER negative MT negative, HER2 3+ positive, Ki-67 20 to 30%.  Status post TCHP with poor tolerance of cycle 1 necessitating dose reductions, changed to Cytoxan and ultimately discontinuation of Taxotere.  Clinical complete response to therapy documented after cycle four. MRI showed no residual masses.  Bilateral mastectomy showed a pathologic complete response in the right (ypT0, ypN0).  Left breast normal  2.  History of DVT/PE off anticoagulation for several years  3.  Hot flashes -improved  4. Peripheral neuropathy noted in her toes. residual trace neuropathy on Lyrica  5.  Infected left tissue expander.  Admitted to the hospital and received IV antibiotics now on oral antibiotics with drain still in place.  Expander now removed and chest wall healed.      Plan:  We are switching her Herceptin and Perjeta to Phesgo which will start tomorrow.  If she tolerates this well through 2 cycles we will remove her port.  I will see her back in 6 weeks for routine follow-up.  We will switch her to Phesgo after this dose.  Please note that this dictation was created using voice  recognition software. I have made every reasonable attempt to correct obvious errors, but I expect that there are errors of grammar and possibly content that I did not discover before finalizing the note.

## 2024-09-27 ENCOUNTER — OUTPATIENT INFUSION SERVICES (OUTPATIENT)
Dept: ONCOLOGY | Facility: MEDICAL CENTER | Age: 39
End: 2024-09-27
Attending: INTERNAL MEDICINE
Payer: MEDICAID

## 2024-09-27 VITALS
SYSTOLIC BLOOD PRESSURE: 110 MMHG | HEIGHT: 67 IN | BODY MASS INDEX: 31.07 KG/M2 | DIASTOLIC BLOOD PRESSURE: 64 MMHG | TEMPERATURE: 96.8 F | WEIGHT: 197.97 LBS | HEART RATE: 74 BPM | RESPIRATION RATE: 18 BRPM | OXYGEN SATURATION: 96 %

## 2024-09-27 DIAGNOSIS — Z17.1 MALIGNANT NEOPLASM OF UPPER-OUTER QUADRANT OF RIGHT BREAST IN FEMALE, ESTROGEN RECEPTOR NEGATIVE (HCC): ICD-10-CM

## 2024-09-27 DIAGNOSIS — C50.411 MALIGNANT NEOPLASM OF UPPER-OUTER QUADRANT OF RIGHT BREAST IN FEMALE, ESTROGEN RECEPTOR NEGATIVE (HCC): ICD-10-CM

## 2024-09-27 DIAGNOSIS — Z79.899 HIGH RISK MEDICATION USE: ICD-10-CM

## 2024-09-27 LAB
ALBUMIN SERPL BCP-MCNC: 3.8 G/DL (ref 3.2–4.9)
ALBUMIN/GLOB SERPL: 1.5 G/DL
ALP SERPL-CCNC: 60 U/L (ref 30–99)
ALT SERPL-CCNC: 15 U/L (ref 2–50)
ANION GAP SERPL CALC-SCNC: 9 MMOL/L (ref 7–16)
AST SERPL-CCNC: 13 U/L (ref 12–45)
BASOPHILS # BLD AUTO: 0.5 % (ref 0–1.8)
BASOPHILS # BLD: 0.04 K/UL (ref 0–0.12)
BILIRUB SERPL-MCNC: 0.7 MG/DL (ref 0.1–1.5)
BUN SERPL-MCNC: 15 MG/DL (ref 8–22)
CALCIUM ALBUM COR SERPL-MCNC: 9 MG/DL (ref 8.5–10.5)
CALCIUM SERPL-MCNC: 8.8 MG/DL (ref 8.5–10.5)
CHLORIDE SERPL-SCNC: 108 MMOL/L (ref 96–112)
CO2 SERPL-SCNC: 23 MMOL/L (ref 20–33)
CREAT SERPL-MCNC: 0.89 MG/DL (ref 0.5–1.4)
EOSINOPHIL # BLD AUTO: 0.13 K/UL (ref 0–0.51)
EOSINOPHIL NFR BLD: 1.6 % (ref 0–6.9)
ERYTHROCYTE [DISTWIDTH] IN BLOOD BY AUTOMATED COUNT: 45.3 FL (ref 35.9–50)
GFR SERPLBLD CREATININE-BSD FMLA CKD-EPI: 83 ML/MIN/1.73 M 2
GLOBULIN SER CALC-MCNC: 2.5 G/DL (ref 1.9–3.5)
GLUCOSE SERPL-MCNC: 92 MG/DL (ref 65–99)
HCT VFR BLD AUTO: 43.2 % (ref 37–47)
HGB BLD-MCNC: 14.1 G/DL (ref 12–16)
IMM GRANULOCYTES # BLD AUTO: 0.03 K/UL (ref 0–0.11)
IMM GRANULOCYTES NFR BLD AUTO: 0.4 % (ref 0–0.9)
LYMPHOCYTES # BLD AUTO: 1.79 K/UL (ref 1–4.8)
LYMPHOCYTES NFR BLD: 21.4 % (ref 22–41)
MCH RBC QN AUTO: 31.3 PG (ref 27–33)
MCHC RBC AUTO-ENTMCNC: 32.6 G/DL (ref 32.2–35.5)
MCV RBC AUTO: 96 FL (ref 81.4–97.8)
MONOCYTES # BLD AUTO: 0.49 K/UL (ref 0–0.85)
MONOCYTES NFR BLD AUTO: 5.8 % (ref 0–13.4)
NEUTROPHILS # BLD AUTO: 5.9 K/UL (ref 1.82–7.42)
NEUTROPHILS NFR BLD: 70.3 % (ref 44–72)
NRBC # BLD AUTO: 0 K/UL
NRBC BLD-RTO: 0 /100 WBC (ref 0–0.2)
PLATELET # BLD AUTO: 194 K/UL (ref 164–446)
PMV BLD AUTO: 10.7 FL (ref 9–12.9)
POTASSIUM SERPL-SCNC: 4.2 MMOL/L (ref 3.6–5.5)
PROT SERPL-MCNC: 6.3 G/DL (ref 6–8.2)
RBC # BLD AUTO: 4.5 M/UL (ref 4.2–5.4)
SODIUM SERPL-SCNC: 140 MMOL/L (ref 135–145)
WBC # BLD AUTO: 8.4 K/UL (ref 4.8–10.8)

## 2024-09-27 PROCEDURE — 700111 HCHG RX REV CODE 636 W/ 250 OVERRIDE (IP): Mod: JZ,UD | Performed by: NURSE PRACTITIONER

## 2024-09-27 PROCEDURE — 80053 COMPREHEN METABOLIC PANEL: CPT

## 2024-09-27 PROCEDURE — 85025 COMPLETE CBC W/AUTO DIFF WBC: CPT

## 2024-09-27 PROCEDURE — 96401 CHEMO ANTI-NEOPL SQ/IM: CPT

## 2024-09-27 RX ADMIN — PERTUZUMAB, TRASTUZUMAB, AND HYALURONIDASE-ZZXF 1200 MG: 600; 600; 2000 INJECTION, SOLUTION SUBCUTANEOUS at 10:03

## 2024-09-27 ASSESSMENT — FIBROSIS 4 INDEX: FIB4 SCORE: 0.59

## 2024-09-27 NOTE — PROGRESS NOTES
"Pharmacy Chemotherapy Verification    Dx: Breast cancer, ER-, [place]-, HER2+  Cycle 10 (C1 in [place])  Previous treatment = C9 9/6/24/24    Protocol: [facility]  Pertuzumab 840 mg IV over 60 minutes on Day 1 of Cycle 1 followed by  Pertuzumab 420 mg IV over 30 minutes on Day 1 of Cycles 2-6  Trastuzumab 8 mg/kg IV over 90 minutes on Day 1 of Cycle 1 followed by  Trastuzumab 6 mg/kg IV over 30 minutes on Day 1 of Cycles 2-6 followed by   2/16/24 - Dose reduced to 60 mg/m2 for tolerance starting with C2   4/19/24 - Removed from treatment plan starting with C5 due to worsening peripheral neuropathy   2/16/24 - Dose reduced to AUC 4.5 for tolerance starting with C2   Removed from treatment plan starting Cycle 3  Cyclophosphamide 600 mg/m2 IV over 30 minutes on Day 1   3/29/24 - Added starting C4 to replace carboplatin due to intolerance  21-day cycle for 6 cycles (completed 5/10/24 after 5 cycles for tolerance)  ~Followed by~   IV over 30 minutes on Day 1 beginning with Week 19   9/6/24 - Reload with 840 mg on C9 per Dr. Thomas   IV over 30 minutes on Day 1 beginning with Week 19   9/6/24 - Reload with 8 mg/kg on C9 per Dr. Thomas  Switched to Pertuzumab 600 mg and trastuzumab 600 mg and hyaluronidase 91659 units subcutaneous on Day 1 starting with C10  21-day cycle to complete 52 weeks total of trastuzumab and pertuzumab therapy  NCCN Guidelines for Breast Cancer V.2.2024.  Josef LEON, et al. Gay Oncol. 2013;24(9):2278-84.  Bates A, et al. Cancer Research. 2020;80(4 suppl):PD4-07.    Allergies:  Bee venom, Morphine sulfate, Tape, and Pineapple     /64   Pulse 74   Temp 36 °C (96.8 °F) (Temporal)   Resp 18   Ht 1.69 m (5' 6.54\")   Wt 89.8 kg (197 lb 15.6 oz)   LMP 02/10/2012 (Approximate) Comment: Age of first period:10, No HRT  SpO2 96%   BMI 31.44 kg/m²  Body surface area is 2.05 meters squared.    ECHO  5/13/24 LVEF 69%  12/27/23 LVEF 65%    Pertuzumab 600 mg and Trastuzumab 600 mg and Hyaluronidase " 71298 units fixed dose   <10% difference, okay to treat with final dose = Pertuzumab 600 mg and Trastuzumab 600 mg and Hyaluronidase 99053 units subcutaneous    Casimiro Peres, PharmD

## 2024-09-27 NOTE — PROGRESS NOTES
Fela arrived ambulatory to the infusion center for Phesgo. She reports no major changes or complaints today. Plan of care discussed and agreed upon.     Last echo was noted to be 5/13/24 with an EF of 69%. She does not have an order for updated echo. This RN called Dr. Thomas office to obtain a new order. WILLIAM Fournier stated that she will place an order and have patient scheduled for a new echo.    Phesgo was administered in her right thigh over 6 minutes. She was monitored 15 minutes post injection and tolerated well with some discomfort after the injection but subsided.    She was then discharged home in stable condition aware of her next appointment.

## 2024-09-27 NOTE — PROGRESS NOTES
Chemotherapy Verification - PRIMARY RN      Height = 1.69 m  Weight = 89.8 kg  BSA = 2.05 m2       Medication: pertuzumab-trastuzumab-hyaluronidase  Dose: pertuzumab 600 mg/ trastuzumab 600 mg/ hyaluronidase 2000 units  set dose Calculated Dose: pertuzumab 600 mg/ trastuzumab 600 mg/ hyaluronidase 2000 units  set dose                             (In mg/m2, AUC, mg/kg)         I confirm this process was performed independently with the BSA and all final chemotherapy dosing calculations congruent.  Any discrepancies of 10% or greater have been addressed with the chemotherapy pharmacist. The resolution of the discrepancy has been documented in the EPIC progress notes.

## 2024-09-27 NOTE — PROGRESS NOTES
Chemotherapy Verification - SECONDARY RN       Height = 169 cm  Weight = 89.8 kg  BSA = 2.05 mg       Medication: Pertuzumab-Trastuzumab-Hyaluronidase  Dose: Pertuzumab 600 mg/Trastuzumab 600 mg/Hyaluronidase 59934 units (set dose) Calculated Dose: Pertuzumab 600 mg/Trastuzumab 600 mg/Hyaluronidase 54510 units                             (In mg/m2, AUC, mg/kg)       I confirm that this process was performed independently.

## 2024-10-09 RX ORDER — 0.9 % SODIUM CHLORIDE 0.9 %
10 VIAL (ML) INJECTION PRN
Status: CANCELLED | OUTPATIENT
Start: 2024-10-17

## 2024-10-09 RX ORDER — ONDANSETRON 8 MG/1
8 TABLET, ORALLY DISINTEGRATING ORAL PRN
Status: CANCELLED | OUTPATIENT
Start: 2024-10-17

## 2024-10-09 RX ORDER — 0.9 % SODIUM CHLORIDE 0.9 %
VIAL (ML) INJECTION PRN
Status: CANCELLED | OUTPATIENT
Start: 2024-10-17

## 2024-10-09 RX ORDER — 0.9 % SODIUM CHLORIDE 0.9 %
3 VIAL (ML) INJECTION PRN
Status: CANCELLED | OUTPATIENT
Start: 2024-10-17

## 2024-10-09 RX ORDER — SODIUM CHLORIDE 9 MG/ML
INJECTION, SOLUTION INTRAVENOUS CONTINUOUS
Status: CANCELLED | OUTPATIENT
Start: 2024-10-17

## 2024-10-09 RX ORDER — METHYLPREDNISOLONE SODIUM SUCCINATE 125 MG/2ML
125 INJECTION, POWDER, LYOPHILIZED, FOR SOLUTION INTRAMUSCULAR; INTRAVENOUS PRN
Status: CANCELLED | OUTPATIENT
Start: 2024-10-17

## 2024-10-09 RX ORDER — DIPHENHYDRAMINE HYDROCHLORIDE 50 MG/ML
50 INJECTION INTRAMUSCULAR; INTRAVENOUS PRN
Status: CANCELLED | OUTPATIENT
Start: 2024-10-17

## 2024-10-09 RX ORDER — EPINEPHRINE 1 MG/ML(1)
0.5 AMPUL (ML) INJECTION PRN
Status: CANCELLED | OUTPATIENT
Start: 2024-10-17

## 2024-10-09 RX ORDER — PROCHLORPERAZINE MALEATE 10 MG
10 TABLET ORAL EVERY 6 HOURS PRN
Status: CANCELLED | OUTPATIENT
Start: 2024-10-17

## 2024-10-09 RX ORDER — ONDANSETRON 2 MG/ML
4 INJECTION INTRAMUSCULAR; INTRAVENOUS PRN
Status: CANCELLED | OUTPATIENT
Start: 2024-10-17

## 2024-10-18 ENCOUNTER — APPOINTMENT (OUTPATIENT)
Dept: ONCOLOGY | Facility: MEDICAL CENTER | Age: 39
End: 2024-10-18
Attending: INTERNAL MEDICINE
Payer: MEDICAID

## 2024-10-19 ENCOUNTER — OUTPATIENT INFUSION SERVICES (OUTPATIENT)
Dept: ONCOLOGY | Facility: MEDICAL CENTER | Age: 39
End: 2024-10-19
Attending: INTERNAL MEDICINE
Payer: MEDICAID

## 2024-10-19 VITALS
HEIGHT: 67 IN | RESPIRATION RATE: 18 BRPM | HEART RATE: 61 BPM | OXYGEN SATURATION: 97 % | SYSTOLIC BLOOD PRESSURE: 115 MMHG | WEIGHT: 197.97 LBS | DIASTOLIC BLOOD PRESSURE: 64 MMHG | TEMPERATURE: 96.8 F | BODY MASS INDEX: 31.07 KG/M2

## 2024-10-19 DIAGNOSIS — Z17.1 MALIGNANT NEOPLASM OF UPPER-OUTER QUADRANT OF RIGHT BREAST IN FEMALE, ESTROGEN RECEPTOR NEGATIVE (HCC): ICD-10-CM

## 2024-10-19 DIAGNOSIS — C50.411 MALIGNANT NEOPLASM OF UPPER-OUTER QUADRANT OF RIGHT BREAST IN FEMALE, ESTROGEN RECEPTOR NEGATIVE (HCC): ICD-10-CM

## 2024-10-19 PROCEDURE — 700111 HCHG RX REV CODE 636 W/ 250 OVERRIDE (IP): Mod: JZ,UD | Performed by: NURSE PRACTITIONER

## 2024-10-19 PROCEDURE — 96401 CHEMO ANTI-NEOPL SQ/IM: CPT

## 2024-10-19 RX ADMIN — PERTUZUMAB, TRASTUZUMAB, AND HYALURONIDASE-ZZXF 1200 MG: 600; 600; 2000 INJECTION, SOLUTION SUBCUTANEOUS at 14:05

## 2024-10-19 ASSESSMENT — FIBROSIS 4 INDEX: FIB4 SCORE: 0.71

## 2024-10-21 ENCOUNTER — TELEPHONE (OUTPATIENT)
Dept: HEMATOLOGY ONCOLOGY | Facility: MEDICAL CENTER | Age: 39
End: 2024-10-21
Payer: MEDICAID

## 2024-11-01 ENCOUNTER — APPOINTMENT (OUTPATIENT)
Dept: CARDIOLOGY | Facility: MEDICAL CENTER | Age: 39
End: 2024-11-01
Attending: INTERNAL MEDICINE
Payer: MEDICAID

## 2024-11-01 DIAGNOSIS — C50.411 MALIGNANT NEOPLASM OF UPPER-OUTER QUADRANT OF RIGHT BREAST IN FEMALE, ESTROGEN RECEPTOR NEGATIVE (HCC): ICD-10-CM

## 2024-11-01 DIAGNOSIS — Z17.1 MALIGNANT NEOPLASM OF UPPER-OUTER QUADRANT OF RIGHT BREAST IN FEMALE, ESTROGEN RECEPTOR NEGATIVE (HCC): ICD-10-CM

## 2024-11-01 LAB
LV EJECT FRACT  99904: 65
LV EJECT FRACT MOD 2C 99903: 54.35
LV EJECT FRACT MOD 4C 99902: 55.09
LV EJECT FRACT MOD BP 99901: 53.15

## 2024-11-01 PROCEDURE — 93306 TTE W/DOPPLER COMPLETE: CPT | Mod: 26 | Performed by: INTERNAL MEDICINE

## 2024-11-01 PROCEDURE — 93306 TTE W/DOPPLER COMPLETE: CPT

## 2024-11-05 RX ORDER — ONDANSETRON 8 MG/1
8 TABLET, ORALLY DISINTEGRATING ORAL PRN
Status: CANCELLED | OUTPATIENT
Start: 2024-11-09

## 2024-11-05 RX ORDER — 0.9 % SODIUM CHLORIDE 0.9 %
VIAL (ML) INJECTION PRN
Status: CANCELLED | OUTPATIENT
Start: 2024-11-09

## 2024-11-05 RX ORDER — 0.9 % SODIUM CHLORIDE 0.9 %
10 VIAL (ML) INJECTION PRN
Status: CANCELLED | OUTPATIENT
Start: 2024-11-09

## 2024-11-05 RX ORDER — EPINEPHRINE 1 MG/ML(1)
0.5 AMPUL (ML) INJECTION PRN
Status: CANCELLED | OUTPATIENT
Start: 2024-11-09

## 2024-11-05 RX ORDER — DIPHENHYDRAMINE HYDROCHLORIDE 50 MG/ML
50 INJECTION INTRAMUSCULAR; INTRAVENOUS PRN
Status: CANCELLED | OUTPATIENT
Start: 2024-11-09

## 2024-11-05 RX ORDER — PROCHLORPERAZINE MALEATE 10 MG
10 TABLET ORAL EVERY 6 HOURS PRN
Status: CANCELLED | OUTPATIENT
Start: 2024-11-09

## 2024-11-05 RX ORDER — SODIUM CHLORIDE 9 MG/ML
INJECTION, SOLUTION INTRAVENOUS CONTINUOUS
Status: CANCELLED | OUTPATIENT
Start: 2024-11-09

## 2024-11-05 RX ORDER — METHYLPREDNISOLONE SODIUM SUCCINATE 125 MG/2ML
125 INJECTION, POWDER, LYOPHILIZED, FOR SOLUTION INTRAMUSCULAR; INTRAVENOUS PRN
Status: CANCELLED | OUTPATIENT
Start: 2024-11-09

## 2024-11-05 RX ORDER — 0.9 % SODIUM CHLORIDE 0.9 %
3 VIAL (ML) INJECTION PRN
Status: CANCELLED | OUTPATIENT
Start: 2024-11-09

## 2024-11-05 RX ORDER — ONDANSETRON 2 MG/ML
4 INJECTION INTRAMUSCULAR; INTRAVENOUS PRN
Status: CANCELLED | OUTPATIENT
Start: 2024-11-09

## 2024-11-07 ENCOUNTER — APPOINTMENT (OUTPATIENT)
Dept: HEMATOLOGY ONCOLOGY | Facility: MEDICAL CENTER | Age: 39
End: 2024-11-07
Payer: MEDICAID

## 2024-11-08 ENCOUNTER — APPOINTMENT (OUTPATIENT)
Dept: ONCOLOGY | Facility: MEDICAL CENTER | Age: 39
End: 2024-11-08
Payer: MEDICAID

## 2024-11-08 ENCOUNTER — HOSPITAL ENCOUNTER (OUTPATIENT)
Dept: HEMATOLOGY ONCOLOGY | Facility: MEDICAL CENTER | Age: 39
End: 2024-11-08
Payer: MEDICAID

## 2024-11-08 VITALS
BODY MASS INDEX: 30.5 KG/M2 | HEIGHT: 67 IN | SYSTOLIC BLOOD PRESSURE: 108 MMHG | TEMPERATURE: 97.5 F | HEART RATE: 70 BPM | WEIGHT: 194.34 LBS | OXYGEN SATURATION: 98 % | DIASTOLIC BLOOD PRESSURE: 68 MMHG

## 2024-11-08 DIAGNOSIS — Z17.1 MALIGNANT NEOPLASM OF UPPER-OUTER QUADRANT OF RIGHT BREAST IN FEMALE, ESTROGEN RECEPTOR NEGATIVE (HCC): ICD-10-CM

## 2024-11-08 DIAGNOSIS — C50.411 MALIGNANT NEOPLASM OF UPPER-OUTER QUADRANT OF RIGHT BREAST IN FEMALE, ESTROGEN RECEPTOR NEGATIVE (HCC): ICD-10-CM

## 2024-11-08 DIAGNOSIS — T45.1X5A CHEMOTHERAPY-INDUCED NEUROPATHY (HCC): ICD-10-CM

## 2024-11-08 DIAGNOSIS — G62.0 CHEMOTHERAPY-INDUCED NEUROPATHY (HCC): ICD-10-CM

## 2024-11-08 DIAGNOSIS — Z85.3 HISTORY OF BREAST CANCER: ICD-10-CM

## 2024-11-08 PROCEDURE — 99212 OFFICE O/P EST SF 10 MIN: CPT

## 2024-11-08 PROCEDURE — 99214 OFFICE O/P EST MOD 30 MIN: CPT

## 2024-11-08 RX ORDER — PREGABALIN 50 MG/1
50 CAPSULE ORAL 3 TIMES DAILY
Qty: 90 CAPSULE | Refills: 2 | Status: SHIPPED | OUTPATIENT
Start: 2024-11-08 | End: 2025-02-06

## 2024-11-08 ASSESSMENT — ENCOUNTER SYMPTOMS
CONSTIPATION: 0
MYALGIAS: 0
EYES NEGATIVE: 1
PSYCHIATRIC NEGATIVE: 1
DIAPHORESIS: 0
NAUSEA: 0
DIZZINESS: 0
CHILLS: 0
PALPITATIONS: 0
TINGLING: 1
VOMITING: 0
SHORTNESS OF BREATH: 0
SORE THROAT: 0
SENSORY CHANGE: 1
FEVER: 0
HEADACHES: 0
DIARRHEA: 0

## 2024-11-08 ASSESSMENT — FIBROSIS 4 INDEX: FIB4 SCORE: 0.71

## 2024-11-08 ASSESSMENT — PAIN SCALES - GENERAL: PAINLEVEL_OUTOF10: 8=MODERATE-SEVERE PAIN

## 2024-11-08 NOTE — PROGRESS NOTES
Subjective   Medical oncology follow-up visit: 2024  Fela Tiwari is a 39 y.o. female who presents for continued management of right breast cancer.        HPI    Referring Physician: Rochelle Gaviria MD   Primary Care:  Francisco Maier M.D.      Diagnosis: Invasive ductal carcinoma of the right breast, grade 3, clinically stage IIa (cT2, cN0) ER negative MN negative, HER2 3+ positive, Ki-67 20 to 30%      Chief Complaint: Patient seen today for evaluation of her ER negative HER2 positive breast cancer, for continued monitoring of symptoms and side effects of cancer treatments, employing Herceptin and Perjeta.     Oncology history of presenting illness:  Fela Tiwari is a 38 y.o. likely premenopausal white female who self detected a mass in her lateral right breast in 2023.  She had a mammogram on 2023 which showed the palpable mass is likely malignant.  Ultrasound showed it was 2.6 cm irregular hypoechoic spiculated mass.  Left breast was normal.  She underwent a ultrasound-guided biopsy of the right breast on 2023: This revealed an invasive ductal carcinoma, grade 3, ER negative, MN negative, HER2 3+ by IHC, Ki-67 20 to 30%.  An MRI was done yesterday and results are pending.  She had a hysterectomy in  but her ovaries are in place.  She is  3 para 2 and has not taken any hormone replacement therapy.  No family history of breast or ovarian cancer.  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  She was on anticoagulation for a while but has been off for several years.  She has also had gastric sleeve laparoscopic and cholecystectomy and does note chronic nausea.      Interval histories:  Interval history 24 (CAlsop, APRN):  Patient had a difficult time with her first cycle of chemotherapy.  Day after she received the Udenyca shot she presented to the emergency department with severe pain.  Pain was  excruciating throughout her entire body.  She was given IV Dilaudid in the hospital and discharged with Tylenol.  She has been trying to take 1000 g of Tylenol with no relief.  Patient tearful today due to the severity of pain.  She also had experienced blood when urinating.  She did have a positive occult blood on stool and urine during hospitalization.  She was asked to follow-up with urology in the outpatient setting.  She stated since being discharged from the hospital she has not had any blood whatsoever in the urine or will.  She has been experiencing diarrhea anywhere from 5-10 episodes per day likely related to Perjeta.  She has been taking Imodium, approximately 2-3 pills per day with minimal relief.  She does have nausea and vomiting but she does state that Zofran does help.  She does also note thrush which was appreciated on physical examination today.     Interval history 2/15/2024: She had a terrible time with her first cycle of TCHP as noted above.  Her diarrhea has slowed dramatically but she still is using Lomotil occasionally.  She also had severe eye tearing likely due to the Taxotere which persists although it is better.  She has no neuropathy but did have a rash which responded to corticosteroids.  She also had persistent nausea which has finally resolved.  Her thrush is resolved as well.  She has not had any further vaginal bleeding but this has not been evaluated adequately except for a bladder scan which as expected was negative.  She feels like her tumor shrunk some.      Interval history 03/07/24 (Basim, APRN):  Patient still had difficult time with cycle 2.  She did end up in the ER the day after receiving treatment with bleeding again.  She did have bleeding with her first cycle and did end up being seen by urology and underwent a cystoscopy which was negative.  She stated that the bleeding lasted approximately 3-4 days.  She did experience significant nausea and vomiting for the first 2  weeks.  She also had severe diarrhea up to 10 times per day for the first 2 weeks.  She did take Lomotil with no improvement.  She has noticed some bleeding from the rectum which is from her hemorrhoids with the diarrhea.  She did state that she was given the olanzapine to take for the first 5 days after treatment which did help immensely with her nausea and vomiting as well as she believes to help with her diarrhea as well as sleep.  When she stopped that medication the symptoms returned.  She is noticing her vision feels a little differently.  Her eyes are very dry and watery.  We attempted to give patient days of Zarzio for neutropenia support as patient had significant pain with the Udenyca shot.  She stated that she did not notice any improvement or difference as she did still have the severe myalgias that lasted approximately 2 weeks with the Zarzio injection.  She did state this last week she has felt back to normal.  She is anxious to receive her next cycle of chemo.     Interval history 03/28/24 (CAlsop, APRN):  Patient tolerated her last cycle well as cycle 3 we held carboplatin and awaited approval for Cytoxan.  We also held Udenyca.  She does still have diarrhea noted but that is controlled with Lomotil.  She is noticing some bilateral flank pain but no other urinary symptoms.  With her last cycle she did not experience any blood in the urine/vaginal/rectal area.  She does have some fatigue and has noticed increase in hot flashes.  Hot flashes are quite bothersome to her at this time.  She also noticed peripheral neuropathy in her toes, worse at night.  She is unable to let the covers to touch her feet.     Interval history 04/18/24 (CAlsop, APRN):  Patient biggest complaint at this time as peripheral neuropathy in her feet.  She has the pain, burning and numbness.  She was taking gabapentin 300 mg at night but did take 2 tablets last night because her feet were severe which did help.  She has noticed  some nausea and vomiting worse with this past cycle likely with the addition of Cytoxan.  She does note she has consistently loose bowel movements, but no significant diarrhea.  She does have some hemorrhoids and does have pain with bowel movements at times.  She still has a hot flashes and did take the oxybutynin but she stated that she thought it made him worse.  She stopped taking the oxybutynin and noticed that her hot flashes are better.  She may be getting some relief from the gabapentin.  She also noticed some blisters in her mouth but the mouth rinses did help.  No mouth sores at this time.  She does complain of significant myalgias and arthralgias from the Udenyca shot that last about 3 days.  However she does continue to have the myalgias and arthralgias up till today still present.    Interval history 5/9/2024: Her peripheral neuropathy is better which she attributes in part to reduce chemo and provide to compression stockings that she is using. GI symptoms were much better with the last cycle. She has been in a clinical complete response and we are awaiting MRI results from yesterday to determine whether she needs more chemotherapy or simply HP for cycle 6. She is planning to do bilateral mastectomies with nipple sparing reconstructions.     Interval history 05/30/24 (CAlsop, APRN):  Is doing well.  She tolerated the last cycle well with Herceptin and Perjeta only.  She does still have diarrhea but states is not bothersome.  She was on gabapentin for hot flashes as well as peripheral neuropathy, but she felt like the gabapentin was making her hot flashes worse.  She stopped the gabapentin altogether and stated that her hot flashes have completely resolved.  She does still have some numbness in her toes but nothing like it has been before.  She wears compression socks at night with positive results.  She did get a sinus infection few weeks ago and took 10-day course of Augmentin.  She does still have some  ear clogging but clinical examination does not show any abnormal findings.    Interval history 6/20/2024: She underwent bilateral mastectomies on 6/12/2024.  The right breast showed no residual invasive or in situ carcinoma.  15 sentinel lymph nodes were negative in 4 clusters removed.  Postoperative course has been somewhat difficult for pain and she is emotionally overwhelmed today.  Expanders were placed and she will start fills in several weeks.  Her hair is growing back nicely.  Her peripheral neuropathy is almost resolved.  She does have numbness under the right arm secondary to the axillary surgery.    Interval history 7/26/2024: She has had a complicated course since we last saw her.  She developed left breast skin flap necrosis which was I&D.  Subsequently she had 170 cc of serous fluid drained from her left breast and she was admitted for IV antibiotics.  She was found to be allergic to vancomycin and ultimately switched to Bactrim and cephalexin.  Seroma cultures were negative .  Repeat seroma removal was negative.  She apparently also has an MRSA culture that was positive and will be seeing ID next week.  Drain was placed in the left reconstruction/seroma and is still draining 50 cc/day.    Interval history 9/4/2024: She finally completed antibiotics and had healing of her left chest wall after expander was removed.  She has a poor cosmetic result and would like to have this redone when possible.  She is ready to reinitiate HP therapy.    Interval history 9/26/2024: Her chest wall remains stable off antibiotics with no residual or recurrent infection.  Replacement of the left tissue expander is planned for December.  She is not yet getting fills on the right side until the other expander was placed back.  She tolerated H.P. without difficulty.  She is on Lyrica 50 mg twice daily for neuropathy which is controlling the numbness.  She does have a sensation of coldness of her feet on occasion.  Energy level  is good and she went swimming for the first time since starting chemotherapy which she enjoyed.    Interval history 11/82024 (Celia Dumont, [facility]): Patient returns to clinic today for follow-up appointment, and pretreatment check prior to cycle 3 Phesgo.  Patient reports feeling well in general, except for ongoing and persistent neuropathy pain in her feet.  She states she does work at the [employer], where she is on her feet for more than 12 hours/day.  She is starting a new job on the 18th at the [employer], where she will be sitting down more frequently.  She is hopeful that being able to sit will help with her neuropathy pain.  She is requesting an increased dose of her Lyrica, as she states that she is not receiving much relief from the medication.  She is also requesting that her port to be taken out, as soon as possible, as she would like to have it out before she starts her new job.  She states her appetite and energy levels are otherwise good.  She denies any problems with any unusual fatigue, cough, shortness of breath, constipation, diarrhea, nausea, vomiting, fever or chills, or night sweats.  She is looking forward to the arrival of her new [facility].  No other complaints or concerns provided.     Treatment history:  01/23/24: C1D1 TCHP with Udenyca  02/16/24: C2D2 TCHP with 20-25% reduction in dosing of the chemotherapy and 5 days of Zarxio instead of Udenyca   03/08/24: C3D1 THP (20% dose reduction of Docetaxel) - will hold on Xarzio or Udenyca (d/c Carboplatin d/t tolerability)  03/29/24: C4D1 THP with addition of Cytoxan (continue with dose reduction of Docetaxel by 20%) with Udenyca support   04/19/24: C5D1 HP plus Cytoxan (d/c Docetaxel d/t worsening peripheral neuropathy) - no need for Udenyca  05/10/24: C6 Herceptin and Perjeta  05/31/24: C7 Herceptin and Perjeta  6/21/2024: C8 Herceptin and Perjeta  9/27/2024: Cycle 1 Phesgo   10/19/2024: Cycle 2 Phesgo   11/8/2024: Cycle 3 Phesgo  (planned)    Review of Systems   Constitutional:  Negative for chills, diaphoresis, fever and malaise/fatigue.   HENT:  Negative for ear pain and sore throat.    Eyes: Negative.    Respiratory:  Negative for shortness of breath (only when ambulating up stairs).    Cardiovascular:  Negative for chest pain and palpitations.   Gastrointestinal:  Negative for constipation, diarrhea, nausea and vomiting.   Genitourinary:  Negative for dysuria.   Musculoskeletal:  Negative for myalgias.   Neurological:  Positive for tingling (In hands and feet) and sensory change (In hands and feet). Negative for dizziness and headaches.   Psychiatric/Behavioral: Negative.           Past Medical History:   Diagnosis Date    Breath shortness       Only with pulmonary embolism    Bronchitis 2014    Cholesterol blood decreased      Heart burn      Personal history of venous thrombosis and embolism &     pulmonary    Unspecified hemorrhagic conditions       xarelto                 Past Surgical History:   Procedure Laterality Date    DIEGO BY LAPAROSCOPY   2015     Performed by Dre Malone M.D. at SURGERY [facility] ORS    GASTRIC SLEEVE LAPAROSCOPY   2014     Performed by Dre Malone M.D. at SURGERY [facility] ORS    LIVER BIOPSY LAPAROSCOPIC   2014     Performed by Dre Malone M.D. at SURGERY [facility] ORS    HYSTERECTOMY ROBOTIC   2013    LIPOSUCTION   3/4/2010     Performed by RAMON NUNN at SURGERY [facility] ORS    LIPOSUCTION   2009     Performed by RAMON NUNN at SURGERY [facility] ORS    TONSILLECTOMY   1997    OTHER          x2            Social History            Tobacco Use    Smoking status: Former       Current packs/day: 0.00       Average packs/day: 0.5 packs/day for 6.0 years (3.0 ttl pk-yrs)       Types: Cigarettes       Start date: 2011       Quit date: 2017       Years since quittin.5    Smokeless tobacco: Never   Vaping Use    Vaping Use:  "Every day   Substance Use Topics    Alcohol use: Yes       Alcohol/week: 0.6 - 1.2 oz       Types: 1 - 2 Standard drinks or equivalent per week       Comment: Daily 1-2 drinks    Drug use: Yes       Types: Inhaled, Marijuana       Comment: thc, daily                 Family History   Problem Relation Age of Onset    Diabetes Other      Hypertension Other      Cancer Other              Allergies   Allergen Reactions    Bee Venom Anaphylaxis    Morphine Sulfate Anaphylaxis and Itching    Tape Rash     Paper tape OK, do not use tegaderm    Vancomycin Itching    Pineapple Hives     Current Outpatient Medications on File Prior to Encounter   Medication Sig Dispense Refill    ELDERBERRY PO Take  by mouth.      APPLE CIDER VINEGAR PO Take  by mouth.      oxyCODONE-acetaminophen (PERCOCET) 5-325 MG Tab Take 1 Tablet by mouth every 6 hours as needed.  FOR PAIN      albuterol 108 (90 Base) MCG/ACT Aero Soln inhalation aerosol Inhale 2 Puffs every 4 hours. 8.5 g 1    ondansetron (ZOFRAN ODT) 4 MG TABLET DISPERSIBLE Dissolve 1 Tablet by mouth every four hours as needed for Nausea/Vomiting (give PO if no IV route available). 10 Tablet 0     No current facility-administered medications on file prior to encounter.           Objective     LMP 02/10/2012 (Approximate) Comment: Age of first period:10, No HRT   /68 (BP Location: Left arm, Patient Position: Sitting, BP Cuff Size: Large adult)   Pulse 70   Temp 36.4 °C (97.5 °F) (Temporal)   Ht 1.69 m (5' 6.54\")   Wt 88.2 kg (194 lb 5.4 oz)   LMP 02/10/2012 (Approximate) Comment: Age of first period:10, No HRT  SpO2 98%   BMI 30.86 kg/m²       Physical Exam  Vitals reviewed.   Constitutional:       General: She is not in acute distress.     Appearance: Normal appearance. She is not diaphoretic.   HENT:      Head: Normocephalic and atraumatic.      Nose: Nose normal.      Mouth/Throat:      Mouth: Mucous membranes are moist.      Pharynx: Oropharynx is clear.   Eyes:      " Extraocular Movements: Extraocular movements intact.      Conjunctiva/sclera: Conjunctivae normal.   Cardiovascular:      Rate and Rhythm: Normal rate and regular rhythm.      Heart sounds: Normal heart sounds. No murmur heard.     No friction rub. No gallop.   Pulmonary:      Effort: Pulmonary effort is normal.      Breath sounds: Normal breath sounds.   Chest:      Comments: 9/26/2024: Right mastectomy and tissue expander without erythema or warmth.  Left chest wall is now well-healed with mild hyperpigmentation and no erythema or warmth.  7/26/2024: Left breast reconstruction with tissue expander shows swelling mild redness and tenderness.  Drain is in place.  Previously status post bilateral mastectomies with tissue expander reconstructions  Abdominal:      General: Abdomen is flat. Bowel sounds are normal. There is no distension.      Palpations: Abdomen is soft.      Tenderness: There is no abdominal tenderness.   Musculoskeletal:         General: No swelling or tenderness. Normal range of motion.      Cervical back: Normal range of motion and neck supple.   Lymphadenopathy:      Cervical: No cervical adenopathy.      Upper Body:      Right upper body: No supraclavicular or axillary adenopathy.      Left upper body: No supraclavicular or axillary adenopathy.      Lower Body: No right inguinal adenopathy. No left inguinal adenopathy.   Skin:     General: Skin is warm and dry.   Neurological:      Mental Status: She is alert and oriented to person, place, and time.   Psychiatric:         Mood and Affect: Mood normal.         Behavior: Behavior normal.         LABS:      Latest Reference Range & Units 09/27/24 09:24   WBC 4.8 - 10.8 K/uL 8.4   RBC 4.20 - 5.40 M/uL 4.50   Hemoglobin 12.0 - 16.0 g/dL 14.1   Hematocrit 37.0 - 47.0 % 43.2   MCV 81.4 - 97.8 fL 96.0   MCH 27.0 - 33.0 pg 31.3   MCHC 32.2 - 35.5 g/dL 32.6   RDW 35.9 - 50.0 fL 45.3   Platelet Count 164 - 446 K/uL 194   MPV 9.0 - 12.9 fL 10.7    Neutrophils-Polys 44.00 - 72.00 % 70.30   Neutrophils (Absolute) 1.82 - 7.42 K/uL 5.90   Lymphocytes 22.00 - 41.00 % 21.40 (L)   Lymphs (Absolute) 1.00 - 4.80 K/uL 1.79   Monocytes 0.00 - 13.40 % 5.80   Monos (Absolute) 0.00 - 0.85 K/uL 0.49   Eosinophils 0.00 - 6.90 % 1.60   Eos (Absolute) 0.00 - 0.51 K/uL 0.13   Basophils 0.00 - 1.80 % 0.50   Baso (Absolute) 0.00 - 0.12 K/uL 0.04   Immature Granulocytes 0.00 - 0.90 % 0.40   Immature Granulocytes (abs) 0.00 - 0.11 K/uL 0.03   Nucleated RBC 0.00 - 0.20 /100 WBC 0.00   NRBC (Absolute) K/uL 0.00   Sodium 135 - 145 mmol/L 140   Potassium 3.6 - 5.5 mmol/L 4.2   Chloride 96 - 112 mmol/L 108   Co2 20 - 33 mmol/L 23   Anion Gap 7.0 - 16.0  9.0   Glucose 65 - 99 mg/dL 92   Bun 8 - 22 mg/dL 15   Creatinine 0.50 - 1.40 mg/dL 0.89   GFR (CKD-EPI) >60 mL/min/1.73 m 2 83   Calcium 8.5 - 10.5 mg/dL 8.8   Correct Calcium 8.5 - 10.5 mg/dL 9.0   AST(SGOT) 12 - 45 U/L 13   ALT(SGPT) 2 - 50 U/L 15   Alkaline Phosphatase 30 - 99 U/L 60   Total Bilirubin 0.1 - 1.5 mg/dL 0.7   Albumin 3.2 - 4.9 g/dL 3.8   Total Protein 6.0 - 8.2 g/dL 6.3   Globulin 1.9 - 3.5 g/dL 2.5   A-G Ratio g/dL 1.5   (L): Data is abnormally low      Imaging:  MR-BREAST-BILATERAL-WITH & W/O  5/9/2024  1.  No residual enhancing masses associated with biopsy clips denoting biopsy-proven cancers in the upper outer quadrant of the right breast and slightly more inferior lateral aspect of the right breast consistent with treatment response.   2.  No MRI evidence of malignancy in the left breast.   R6 - CATEGORY 6: KNOWN BIOPSY-PROVEN MALIGNANCY - APPROPRIATE ACTION SHOULD BE TAKEN       11/1/2024 Echo Report  Prior study on 05/13/2024, compared to the report of the prior study,   there has been no significant change.   Normal left ventricular systolic function.  The left ventricular ejection fraction is visually estimated to be 60-  65%.  No significant valvular abnormalities.        Assessment & Plan         Impression:  1.  Invasive ductal carcinoma of the right breast, grade 3, clinically stage IIa (cT2, cN0) ER negative [place] negative, HER2 3+ positive, Ki-67 20 to 30%.  Status post TCHP with poor tolerance of cycle 1 necessitating dose reductions, changed to Cytoxan and ultimately discontinuation of Taxotere.  Clinical complete response to therapy documented after cycle four. MRI showed no residual masses.  Bilateral mastectomy showed a pathologic complete response in the right (ypT0, ypN0).  Left breast normal  2.  History of DVT/PE off anticoagulation for several years  3.  Hot flashes -improved  4. Peripheral neuropathy noted in her toes. residual trace neuropathy on Lyrica.  Reports some mild relief, will increase Lyrica to 50 mg 3 times daily  5.  Infected left tissue expander.  Admitted to the hospital and received IV antibiotics now on oral antibiotics with drain still in place.  Expander now removed and chest wall healed.  Follow-up appointment with plastic surgery planned for 12/20/2024.  6.  Requesting port removal at this time order placed.      Plan:  Patient now on Phesgo and tolerating treatment well.  To return to clinic in 3 weeks for next follow-up appointment and pretreatment check prior to cycle 4 Phesgo.  (Patient may do video visit if needed due to new employment situation).    Reviewed with patient strategies to help reduce the risk of breast cancer recurrence, including achieving/maintaining a healthy BMI, limiting alcoholic intake, and complete abstinence from use of tobacco products.  Also reviewed with patient signs and symptoms for which to urgently contact the clinic, including the development of any new lumps or masses, or any skin puckering,dimpling or textural changes.     Plan of care reviewed with patient and all questions answered.  Patient verbalizes understanding, denies questions, and agrees with plan of care.  Patient instructed to call or message clinic with any questions or  concerns, contact information provided.    Thank you for allowing me the privilege of caring for this patient.  If if you have any questions, please do not hesitate to reach out.  I may be contacted at [phone].    Celia Dumont, MSN, AOCNP, FNP-C    Please note that this dictation was created using voice recognition software. I have made every reasonable attempt to correct obvious errors, but I expect that there are errors of grammar, and possibly content, that I did not discover before finalizing the note.

## 2024-11-09 ENCOUNTER — OUTPATIENT INFUSION SERVICES (OUTPATIENT)
Dept: ONCOLOGY | Facility: MEDICAL CENTER | Age: 39
End: 2024-11-09
Attending: INTERNAL MEDICINE
Payer: MEDICAID

## 2024-11-09 VITALS
RESPIRATION RATE: 18 BRPM | HEIGHT: 67 IN | OXYGEN SATURATION: 95 % | WEIGHT: 192.9 LBS | DIASTOLIC BLOOD PRESSURE: 75 MMHG | SYSTOLIC BLOOD PRESSURE: 108 MMHG | HEART RATE: 81 BPM | TEMPERATURE: 97.3 F | BODY MASS INDEX: 30.28 KG/M2

## 2024-11-09 DIAGNOSIS — Z17.1 MALIGNANT NEOPLASM OF UPPER-OUTER QUADRANT OF RIGHT BREAST IN FEMALE, ESTROGEN RECEPTOR NEGATIVE (HCC): ICD-10-CM

## 2024-11-09 DIAGNOSIS — C50.411 MALIGNANT NEOPLASM OF UPPER-OUTER QUADRANT OF RIGHT BREAST IN FEMALE, ESTROGEN RECEPTOR NEGATIVE (HCC): ICD-10-CM

## 2024-11-09 PROCEDURE — 700111 HCHG RX REV CODE 636 W/ 250 OVERRIDE (IP): Mod: JZ,UD | Performed by: NURSE PRACTITIONER

## 2024-11-09 PROCEDURE — 96401 CHEMO ANTI-NEOPL SQ/IM: CPT

## 2024-11-09 RX ADMIN — PERTUZUMAB, TRASTUZUMAB, AND HYALURONIDASE-ZZXF 1200 MG: 600; 600; 2000 INJECTION, SOLUTION SUBCUTANEOUS at 09:09

## 2024-11-09 ASSESSMENT — FIBROSIS 4 INDEX: FIB4 SCORE: 0.71

## 2024-11-09 NOTE — PROGRESS NOTES
Chemotherapy Verification - PRIMARY RN         Height = 169 cm  Weight = 87.5 kg  BSA = 2.02 mg         Medication: Pertuzumab-Trastuzumab-Hyaluronidase  Dose: Pertuzumab 600 mg/Trastuzumab 600 mg (set dose) Calculated Dose: Pertuzumab 600 mg/Trastuzumab 600 mg                             (In mg/m2, AUC, mg/kg)        I confirm this process was performed independently with the BSA and all final chemotherapy dosing calculations congruent.  Any discrepancies of 10% or greater have been addressed with the chemotherapy pharmacist. The resolution of the discrepancy has been documented in the EPIC progress notes.

## 2024-11-09 NOTE — PROGRESS NOTES
"Pharmacy Chemotherapy Verification    Dx: Breast cancer, ER-, RI-, HER2+  Cycle 12 (C3 in [place])  Previous treatment = C11 10/19/24    Protocol: TCHP  Pertuzumab 840 mg IV over 60 minutes on Day 1 of Cycle 1 followed by  Pertuzumab 420 mg IV over 30 minutes on Day 1 of Cycles 2-6  Trastuzumab 8 mg/kg IV over 90 minutes on Day 1 of Cycle 1 followed by  Trastuzumab 6 mg/kg IV over 30 minutes on Day 1 of Cycles 2-6 followed by   2/16/24 - Dose reduced to 60 mg/m2 for tolerance starting with C2   4/19/24 - Removed from treatment plan starting with C5 due to worsening peripheral neuropathy   2/16/24 - Dose reduced to AUC 4.5 for tolerance starting with C2   Removed from treatment plan starting Cycle 3  Cyclophosphamide 600 mg/m2 IV over 30 minutes on Day 1   3/29/24 - Added starting C4 to replace carboplatin due to intolerance  21-day cycle for 6 cycles (completed 5/10/24 after 5 cycles for tolerance)  ~Followed by~   IV over 30 minutes on Day 1 beginning with Week 19   9/6/24 - Reload with 840 mg on C9 per Dr. Thomas   IV over 30 minutes on Day 1 beginning with Week 19   9/6/24 - Reload with 8 mg/kg on C9 per Dr. Thomas  Switched to Pertuzumab 600 mg and trastuzumab 600 mg and hyaluronidase 83780 units subcutaneous on Day 1 starting with C10  21-day cycle to complete 52 weeks total of trastuzumab and pertuzumab therapy  NCCN Guidelines for Breast Cancer V.2.2024.  Josef A, et al. Gay Oncol. 2013;24(9):2278-84.  Bates A, et al. Cancer Research. 2020;80(4 suppl):PD4-07.    Allergies:  Bee venom, Morphine sulfate, Tape, and Pineapple     /75   Pulse 81   Temp 36.3 °C (97.3 °F) (Temporal)   Resp 18   Ht 1.69 m (5' 6.54\")   Wt 87.5 kg (192 lb 14.4 oz)   LMP 02/10/2012 (Approximate) Comment: Age of first period:10, No HRT  SpO2 95%   BMI 30.64 kg/m²  Body surface area is 2.03 meters squared.    ECHO   11/1/24 LVEF 65%  5/13/24 LVEF 69%  12/27/23 LVEF 65%    Pertuzumab 600 mg and Trastuzumab 600 " mg and Hyaluronidase 22378 units fixed dose   <10% difference, okay to treat with final dose = Pertuzumab 600 mg and Trastuzumab 600 mg and Hyaluronidase 91173 units subcutaneous    Casimiro Peres, PharmD

## 2024-11-09 NOTE — PROGRESS NOTES
Chemotherapy Verification - SECONDARY RN       Height = 1.69m  Weight = 87.5kg  BSA = 2.03m2       Medication: pertuzumab-trastuzumab-hyaluronidase  Dose: 600-600mg/10mL set dose  Calculated Dose: 1200mg set dose                             (In mg/m2, AUC, mg/kg)       I confirm that this process was performed independently.

## 2024-11-09 NOTE — PROGRESS NOTES
Pharmacy Chemotherapy Calculation:    Dx: Invasive ductal carcinoma of the right breast SD/ER (-) HER2 (+) stage IIa (cT2, cN0)        Protocol: TCHP (DOCEtaxel/CARBOplatin + Pertuzumab + Trastuzumab)    *Dosing Reference*   Pertuzumab 840 mg IV over 60 minutes on Day 1 of Cycle 1   followed by 420 mg IV over 30 minutes on Day 1 of Cycles 2 - 6   Trastuzumab 8 mg/kg IV over 90 minutes on Day 1 of Cycle 1   followed by 6 mg/kg IV over 30 minutes on Day 1 of Cycles 2 - 6 followed by   *dose reduced to 60 mg/m2 starting C2 per Dr. Thomas due to tolerance  *removed from plan starting C5 per NIYA Cordova due to worsening neutropenia     *dose reduced to AUC 4.5 starting C2 per Dr. Thomas due to tolerance  *removed from plan starting C4 per Dr. Thomas due to tolerance  *substituted with Cyclophosphamide 600 mg/m2 IV over 30 minutes on Day 1 per chemo regimen TC on NCCN Guidelines for Breast Cancer V.2.2024  21-day cycle for  *completed 5/10/24 after 5 cycles for tolerance  ~Followed by~   Pertuzumab 420 mg IV over 30 minutes on Day 1 beginning with Week 19   9/6/24 - Reload with 840 mg on C9 per Dr. Thomas    9/10/24 - substituted Phesgo starting C10 (C5 of HP)  Trastuzumab 6 mg/kg IV over 30 minutes on Day 1 beginning with Week 19   9/6/24 - Reload with 8 mg/kg on C9 per Dr. Thomas    9/10/24 - substituted with Phesgo starting C10 (C5 of HP)  Pertuzumab and trastuzumab and hyaluronidase-zzxf (Phesgo) subcutaneous on Day 1   Initial Dose: 1,200 mg pertuzumab, 600 mg trastuzumab, and 30,000 units hyaluronidase/15 mL - no initial dose as pt has been on traztuzumab and petruzumab   Subsequent Doses: 600 mg pertuzumab, 600 mg trastuzumab, and 20,000 units hyaluronidase/10 mL  21-day cycle to complete 52 weeks total of trastuzumab and pertuzumab therapy    NCCN Guidelines® for Breast Cancer V.2.2022.  Josef LEON et al. Gay Oncol. 2013;24(9):2278-84.  NCCN Guidelines® for Breast Cancer  "V.2.2024.   Paulo LEON et al. Cancer Research. 2020;80(4_suppl):PD4-07.    Allergies:  Bee venom, Morphine sulfate, Tape, and Pineapple     /75   Pulse 81   Temp 36.3 °C (97.3 °F) (Temporal)   Resp 18   Ht 1.69 m (5' 6.54\")   Wt 87.5 kg (192 lb 14.4 oz)   LMP 02/10/2012 (Approximate) Comment: Age of first period:10, No HRT  SpO2 95%   BMI 30.64 kg/m²  Body surface area is 2.03 meters squared.    Per MD loera for ECHOs [id number]  ECHO 5/13/24: LVEF 69%  ECHO 11/1/24: LVEF 65%    Drug Order   (Drug name, dose, route, IV Fluid & volume, frequency, number of doses) Cycle 12  Previous treatment: C11 10/19/24     Medication = Pertuzumab and trastuzumab and hyaluronidase-zzxf (Phesgo)   Base Dose = Pertuzumab 600 mg and Trastuzumab 600 mg and Hyaluronidase 40009 units   Fixed dose; no calc required  Final Dose = Pertuzumab 600 mg and Trastuzumab 600 mg and Hyaluronidase 89184 units fixed dose   Route = Subcutaneous  Fluid & Volume = 10 mL  Admin Duration = Over 5 minutes          Fixed dose, OK to treat with final dose     By my signature below, I confirm this process was performed independently with the BSA and all final chemotherapy dosing calculations congruent. I have reviewed the above chemotherapy order and that my calculation of the final dose and BSA (when applicable) corroborate those calculations of the  pharmacist.     Kori NunezD  "

## 2024-11-09 NOTE — PROGRESS NOTES
Pt arrived to [facility], ambulatory, for Phesgo. Pt c/o ongoing neuropathy/pain in her toes. Pt declines port flush at this time d/t pending port removal soon. Phesgo injected into the R-thigh over 7 minutes with no complications. 15 minute monitoring completed with no complications. Pt left [facility] with no s/sx of distress. Follow up appointment confirmed.

## 2024-11-11 NOTE — ADDENDUM NOTE
Encounter addended by: Luz Alvarenga, Med Ass't on: 11/11/2024 9:59 AM   Actions taken: Charge Capture section accepted

## 2024-11-24 RX ORDER — 0.9 % SODIUM CHLORIDE 0.9 %
3 VIAL (ML) INJECTION PRN
Status: CANCELLED | OUTPATIENT
Start: 2024-11-30

## 2024-11-24 RX ORDER — EPINEPHRINE 1 MG/ML(1)
0.5 AMPUL (ML) INJECTION PRN
Status: CANCELLED | OUTPATIENT
Start: 2024-11-30

## 2024-11-24 RX ORDER — ONDANSETRON 8 MG/1
8 TABLET, ORALLY DISINTEGRATING ORAL PRN
Status: CANCELLED | OUTPATIENT
Start: 2024-11-30

## 2024-11-24 RX ORDER — SODIUM CHLORIDE 9 MG/ML
INJECTION, SOLUTION INTRAVENOUS CONTINUOUS
Status: CANCELLED | OUTPATIENT
Start: 2024-11-30

## 2024-11-24 RX ORDER — ONDANSETRON 2 MG/ML
4 INJECTION INTRAMUSCULAR; INTRAVENOUS PRN
Status: CANCELLED | OUTPATIENT
Start: 2024-11-30

## 2024-11-24 RX ORDER — 0.9 % SODIUM CHLORIDE 0.9 %
VIAL (ML) INJECTION PRN
Status: CANCELLED | OUTPATIENT
Start: 2024-11-30

## 2024-11-24 RX ORDER — 0.9 % SODIUM CHLORIDE 0.9 %
10 VIAL (ML) INJECTION PRN
Status: CANCELLED | OUTPATIENT
Start: 2024-11-30

## 2024-11-24 RX ORDER — PROCHLORPERAZINE MALEATE 10 MG
10 TABLET ORAL EVERY 6 HOURS PRN
Status: CANCELLED | OUTPATIENT
Start: 2024-11-30

## 2024-11-24 RX ORDER — METHYLPREDNISOLONE SODIUM SUCCINATE 125 MG/2ML
125 INJECTION, POWDER, LYOPHILIZED, FOR SOLUTION INTRAMUSCULAR; INTRAVENOUS PRN
Status: CANCELLED | OUTPATIENT
Start: 2024-11-30

## 2024-11-24 RX ORDER — DIPHENHYDRAMINE HYDROCHLORIDE 50 MG/ML
50 INJECTION INTRAMUSCULAR; INTRAVENOUS PRN
Status: CANCELLED | OUTPATIENT
Start: 2024-11-30

## 2024-11-27 ENCOUNTER — HOSPITAL ENCOUNTER (OUTPATIENT)
Dept: HEMATOLOGY ONCOLOGY | Facility: MEDICAL CENTER | Age: 39
End: 2024-11-27
Attending: NURSE PRACTITIONER
Payer: MEDICAID

## 2024-11-27 DIAGNOSIS — G62.0 CHEMOTHERAPY-INDUCED NEUROPATHY (HCC): ICD-10-CM

## 2024-11-27 DIAGNOSIS — Z17.1 MALIGNANT NEOPLASM OF UPPER-OUTER QUADRANT OF RIGHT BREAST IN FEMALE, ESTROGEN RECEPTOR NEGATIVE (HCC): ICD-10-CM

## 2024-11-27 DIAGNOSIS — C50.411 MALIGNANT NEOPLASM OF UPPER-OUTER QUADRANT OF RIGHT BREAST IN FEMALE, ESTROGEN RECEPTOR NEGATIVE (HCC): ICD-10-CM

## 2024-11-27 DIAGNOSIS — T45.1X5A CHEMOTHERAPY-INDUCED NEUROPATHY (HCC): ICD-10-CM

## 2024-11-27 DIAGNOSIS — Z79.899 ENCOUNTER FOR LONG-TERM (CURRENT) USE OF HIGH-RISK MEDICATION: ICD-10-CM

## 2024-11-27 ASSESSMENT — ENCOUNTER SYMPTOMS
DIZZINESS: 0
NAUSEA: 0
DIARRHEA: 0
TINGLING: 1
CONSTIPATION: 0
PALPITATIONS: 0
CHILLS: 0
VOMITING: 0
WEIGHT LOSS: 0
SHORTNESS OF BREATH: 0
HEADACHES: 0
COUGH: 0
FEVER: 0

## 2024-11-27 ASSESSMENT — PAIN SCALES - GENERAL: PAINLEVEL_OUTOF10: NO PAIN

## 2024-11-28 NOTE — PROGRESS NOTES
Subjective     Fela Tiwari is a 39 y.o. female who presents with Breast Cancer ([place]/ pre chemo )          HPI    Referring Physician: Rochelle Gaviria MD   Primary Care:  Francisco Maier M.D.      Diagnosis: Invasive ductal carcinoma of the right breast, grade 3, clinically stage IIa (cT2, cN0) ER negative NM negative, HER2 3+ positive, Ki-67 20 to 30%      Chief Complaint: Patient seen today for evaluation of her ER negative HER2 positive breast cancer, for continued monitoring of symptoms and side effects of cancer treatments, employing Herceptin and Perjeta.     Oncology history of presenting illness:  Fela Tiwari is a 38 y.o. likely premenopausal white female who self detected a mass in her lateral right breast in 2023.  She had a mammogram on 2023 which showed the palpable mass is likely malignant.  Ultrasound showed it was 2.6 cm irregular hypoechoic spiculated mass.  Left breast was normal.  She underwent a ultrasound-guided biopsy of the right breast on 2023: This revealed an invasive ductal carcinoma, grade 3, ER negative, NM negative, HER2 3+ by IHC, Ki-67 20 to 30%.  An MRI was done yesterday and results are pending.  She had a hysterectomy in  but her ovaries are in place.  She is  3 para 2 and has not taken any hormone replacement therapy.  No family history of breast or ovarian cancer.  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  She was on anticoagulation for a while but has been off for several years.  She has also had gastric sleeve laparoscopic and cholecystectomy and does note chronic nausea.      Interval histories:  Interval history 24 (CAlsop, APRN):  Patient had a difficult time with her first cycle of chemotherapy.  Day after she received the Udenyca shot she presented to the emergency department with severe pain.  Pain was excruciating throughout her entire body.   She was given IV Dilaudid in the hospital and discharged with Tylenol.  She has been trying to take 1000 g of Tylenol with no relief.  Patient tearful today due to the severity of pain.  She also had experienced blood when urinating.  She did have a positive occult blood on stool and urine during hospitalization.  She was asked to follow-up with urology in the outpatient setting.  She stated since being discharged from the hospital she has not had any blood whatsoever in the urine or will.  She has been experiencing diarrhea anywhere from 5-10 episodes per day likely related to Perjeta.  She has been taking Imodium, approximately 2-3 pills per day with minimal relief.  She does have nausea and vomiting but she does state that Zofran does help.  She does also note thrush which was appreciated on physical examination today.     Interval history 2/15/2024: She had a terrible time with her first cycle of TCHP as noted above.  Her diarrhea has slowed dramatically but she still is using Lomotil occasionally.  She also had severe eye tearing likely due to the Taxotere which persists although it is better.  She has no neuropathy but did have a rash which responded to corticosteroids.  She also had persistent nausea which has finally resolved.  Her thrush is resolved as well.  She has not had any further vaginal bleeding but this has not been evaluated adequately except for a bladder scan which as expected was negative.  She feels like her tumor shrunk some.      Interval history 03/07/24 (CAlsop, APRN):  Patient still had difficult time with cycle 2.  She did end up in the ER the day after receiving treatment with bleeding again.  She did have bleeding with her first cycle and did end up being seen by urology and underwent a cystoscopy which was negative.  She stated that the bleeding lasted approximately 3-4 days.  She did experience significant nausea and vomiting for the first 2 weeks.  She also had severe diarrhea up to  10 times per day for the first 2 weeks.  She did take Lomotil with no improvement.  She has noticed some bleeding from the rectum which is from her hemorrhoids with the diarrhea.  She did state that she was given the olanzapine to take for the first 5 days after treatment which did help immensely with her nausea and vomiting as well as she believes to help with her diarrhea as well as sleep.  When she stopped that medication the symptoms returned.  She is noticing her vision feels a little differently.  Her eyes are very dry and watery.  We attempted to give patient days of Zarzio for neutropenia support as patient had significant pain with the Udenyca shot.  She stated that she did not notice any improvement or difference as she did still have the severe myalgias that lasted approximately 2 weeks with the Zarzio injection.  She did state this last week she has felt back to normal.  She is anxious to receive her next cycle of chemo.     Interval history 03/28/24 (CAlsop, APRN):  Patient tolerated her last cycle well as cycle 3 we held carboplatin and awaited approval for Cytoxan.  We also held Udenyca.  She does still have diarrhea noted but that is controlled with Lomotil.  She is noticing some bilateral flank pain but no other urinary symptoms.  With her last cycle she did not experience any blood in the urine/vaginal/rectal area.  She does have some fatigue and has noticed increase in hot flashes.  Hot flashes are quite bothersome to her at this time.  She also noticed peripheral neuropathy in her toes, worse at night.  She is unable to let the covers to touch her feet.     Interval history 04/18/24 (CAlsop, APRN):  Patient biggest complaint at this time as peripheral neuropathy in her feet.  She has the pain, burning and numbness.  She was taking gabapentin 300 mg at night but did take 2 tablets last night because her feet were severe which did help.  She has noticed some nausea and vomiting worse with this past  cycle likely with the addition of Cytoxan.  She does note she has consistently loose bowel movements, but no significant diarrhea.  She does have some hemorrhoids and does have pain with bowel movements at times.  She still has a hot flashes and did take the oxybutynin but she stated that she thought it made him worse.  She stopped taking the oxybutynin and noticed that her hot flashes are better.  She may be getting some relief from the gabapentin.  She also noticed some blisters in her mouth but the mouth rinses did help.  No mouth sores at this time.  She does complain of significant myalgias and arthralgias from the Udenyca shot that last about 3 days.  However she does continue to have the myalgias and arthralgias up till today still present.     Interval history 5/9/2024: Her peripheral neuropathy is better which she attributes in part to reduce chemo and provide to compression stockings that she is using. GI symptoms were much better with the last cycle. She has been in a clinical complete response and we are awaiting MRI results from yesterday to determine whether she needs more chemotherapy or simply HP for cycle 6. She is planning to do bilateral mastectomies with nipple sparing reconstructions.      Interval history 05/30/24 (CAlsop, APRN):  Is doing well.  She tolerated the last cycle well with Herceptin and Perjeta only.  She does still have diarrhea but states is not bothersome.  She was on gabapentin for hot flashes as well as peripheral neuropathy, but she felt like the gabapentin was making her hot flashes worse.  She stopped the gabapentin altogether and stated that her hot flashes have completely resolved.  She does still have some numbness in her toes but nothing like it has been before.  She wears compression socks at night with positive results.  She did get a sinus infection few weeks ago and took 10-day course of Augmentin.  She does still have some ear clogging but clinical examination does  not show any abnormal findings.     Interval history 6/20/2024: She underwent bilateral mastectomies on 6/12/2024.  The right breast showed no residual invasive or in situ carcinoma.  15 sentinel lymph nodes were negative in 4 clusters removed.  Postoperative course has been somewhat difficult for pain and she is emotionally overwhelmed today.  Expanders were placed and she will start fills in several weeks.  Her hair is growing back nicely.  Her peripheral neuropathy is almost resolved.  She does have numbness under the right arm secondary to the axillary surgery.     Interval history 7/26/2024: She has had a complicated course since we last saw her.  She developed left breast skin flap necrosis which was I&D.  Subsequently she had 170 cc of serous fluid drained from her left breast and she was admitted for IV antibiotics.  She was found to be allergic to vancomycin and ultimately switched to Bactrim and cephalexin.  Seroma cultures were negative .  Repeat seroma removal was negative.  She apparently also has an MRSA culture that was positive and will be seeing ID next week.  Drain was placed in the left reconstruction/seroma and is still draining 50 cc/day.     Interval history 9/4/2024: She finally completed antibiotics and had healing of her left chest wall after expander was removed.  She has a poor cosmetic result and would like to have this redone when possible.  She is ready to reinitiate HP therapy.     Interval history 9/26/2024: Her chest wall remains stable off antibiotics with no residual or recurrent infection.  Replacement of the left tissue expander is planned for December.  She is not yet getting fills on the right side until the other expander was placed back.  She tolerated H.P. without difficulty.  She is on Lyrica 50 mg twice daily for neuropathy which is controlling the numbness.  She does have a sensation of coldness of her feet on occasion.  Energy level is good and she went swimming for the  first time since starting chemotherapy which she enjoyed.     Interval history 11/8/2024 (Celia Dumont, AOCNP): Patient returns to clinic today for follow-up appointment, and pretreatment check prior to cycle 3 Phesgo.  Patient reports feeling well in general, except for ongoing and persistent neuropathy pain in her feet.  She states she does work at the [employer], where she is on her feet for more than 12 hours/day.  She is starting a new job on the 18th at the [employer], where she will be sitting down more frequently.  She is hopeful that being able to sit will help with her neuropathy pain.  She is requesting an increased dose of her Lyrica, as she states that she is not receiving much relief from the medication.  She is also requesting that her port to be taken out, as soon as possible, as she would like to have it out before she starts her new job.  She states her appetite and energy levels are otherwise good.  She denies any problems with any unusual fatigue, cough, shortness of breath, constipation, diarrhea, nausea, vomiting, fever or chills, or night sweats.  She is looking forward to the arrival of her new grandbaby.  No other complaints or concerns provided.    Interval history 11/27/24 (Basim, APRN):  Visit is conducted as an on demand video visit using Teams and patient is unaccompanied today.   Patient is doing very well overall.  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  She did state that she has lost weight since her initial cancer diagnosis and is feeling well.  She is down to 191 pounds.  She did state that her peripheral apathy is still present however has improved with the increased dose of her Lyrica.  She is scheduled to have an expander placed with Dr. Wooten on 12/9/2024 and requesting to be able to have her port removed at the same time.     Treatment history:  01/23/24: C1D1 TCHP with Udenyca  02/16/24: C2D2 TCHP with 20-25% reduction in dosing  of the chemotherapy and 5 days of Zarxio instead of Udenyca   03/08/24: C3D1 THP (20% dose reduction of Docetaxel) - will hold on Xarzio or Udenyca (d/c Carboplatin d/t tolerability)  03/29/24: C4D1 THP with addition of Cytoxan (continue with dose reduction of Docetaxel by 20%) with Udenyca support   04/19/24: C5D1 HP plus Cytoxan (d/c Docetaxel d/t worsening peripheral neuropathy) - no need for Udenyca  05/10/24: C6 Herceptin and Perjeta  05/31/24: C7 Herceptin and Perjeta  06/21/24: C8 Herceptin and Perjeta  09/27/24: C1 Phesgo   10/19/24: C2 Phesgo   11/08/24: C3 Phesgo   11/30/24: C4 Phesgo      Allergies   Allergen Reactions    Bee Venom Anaphylaxis    Morphine Sulfate Anaphylaxis and Itching    Tape Rash     Paper tape OK, do not use tegaderm    Vancomycin Itching    Pineapple Hives     Current Outpatient Medications on File Prior to Encounter   Medication Sig Dispense Refill    pregabalin (LYRICA) 50 MG capsule Take 1 Capsule by mouth 3 times a day for 90 days. (Patient taking differently: Take 50 mg by mouth 3 times a day. Change to 2 in the AM and 2 at NOC) 90 Capsule 2    ELDERBERRY PO Take  by mouth.      APPLE CIDER VINEGAR PO Take  by mouth.      oxyCODONE-acetaminophen (PERCOCET) 5-325 MG Tab Take 1 Tablet by mouth every 6 hours as needed.  FOR PAIN      albuterol 108 (90 Base) MCG/ACT Aero Soln inhalation aerosol Inhale 2 Puffs every 4 hours. 8.5 g 1    ondansetron (ZOFRAN ODT) 4 MG TABLET DISPERSIBLE Dissolve 1 Tablet by mouth every four hours as needed for Nausea/Vomiting (give PO if no IV route available). 10 Tablet 0     No current facility-administered medications on file prior to encounter.         Review of Systems   Constitutional:  Negative for chills, fever, malaise/fatigue and weight loss.   Respiratory:  Negative for cough and shortness of breath.    Cardiovascular:  Negative for chest pain and palpitations.   Gastrointestinal:  Negative for constipation, diarrhea, nausea and vomiting.    Genitourinary:  Negative for dysuria.   Neurological:  Positive for tingling. Negative for dizziness and headaches.              Objective     LMP 02/10/2012 (Approximate) Comment: Age of first period:10, No HRT     Physical Exam  Vitals reviewed.   Constitutional:       General: She is not in acute distress.     Appearance: Normal appearance. She is not diaphoretic.   Pulmonary:      Effort: Pulmonary effort is normal.   Neurological:      Mental Status: She is alert and oriented to person, place, and time.   Psychiatric:         Mood and Affect: Mood normal.         Behavior: Behavior normal.                       Assessment & Plan     1. Malignant neoplasm of upper-outer quadrant of right breast in female, estrogen receptor negative (HCC)        2. Chemotherapy-induced neuropathy (HCC)        3. Encounter for long-term (current) use of high-risk medication                Impression:  1.  Invasive ductal carcinoma of the right breast, grade 3, clinically stage IIa (cT2, cN0) ER negative UT negative, HER2 3+ positive, Ki-67 20 to 30%.  Status post TCHP with poor tolerance of cycle 1 necessitating dose reductions, changed to Cytoxan and ultimately discontinuation of Taxotere.  Clinical complete response to therapy documented after cycle four. MRI showed no residual masses.  Bilateral mastectomy showed a pathologic complete response in the right (ypT0, ypN0).  Left breast normal  2.  History of DVT/PE off anticoagulation for several years  3.  Hot flashes -improved  4. Peripheral neuropathy noted in her toes. residual trace neuropathy on Lyrica.  Reports some mild relief, will increase Lyrica to 50 mg 3 times daily with improvement.   5.  Infected left tissue expander.  Admitted to the hospital and received IV antibiotics now on oral antibiotics with drain still in place.  Expander now removed and chest wall healed.  Follow-up appointment with plastic surgery planned for 12/20/2024.  6.  Requesting port removal at  this time order placed. Discussed with patient and she will ask if she can have the plastic surgeon remove it during her tissue expander placement on 12/10/24.     Plan:  Clinically patient is doing very well.  She is okay to proceed with treatment this coming Saturday as planned.    Discussed with patient that she is okay to have her port removed during her tissue expander on 12/9/2024 and highly encouraged her to contact her surgeons office to make sure that they are okay to do that as well.    She will follow-up in the clinic in 3 weeks, or sooner if needed.      Please note that this dictation was created using voice recognition software. I have made every reasonable attempt to correct obvious errors, but I expect that there are errors of grammar and possibly content that I did not discover before finalizing the note.    This evaluation was conducted via Teams using secure and encrypted videoconferencing technology. The patient was in a private location outside of their home in the [facility].    The patient's identity was confirmed and verbal consent was obtained for this virtual visit.

## 2024-11-30 ENCOUNTER — OUTPATIENT INFUSION SERVICES (OUTPATIENT)
Dept: ONCOLOGY | Facility: MEDICAL CENTER | Age: 39
End: 2024-11-30
Attending: INTERNAL MEDICINE
Payer: MEDICAID

## 2024-11-30 ENCOUNTER — APPOINTMENT (OUTPATIENT)
Dept: ONCOLOGY | Facility: MEDICAL CENTER | Age: 39
End: 2024-11-30
Payer: MEDICAID

## 2024-11-30 VITALS
OXYGEN SATURATION: 96 % | HEART RATE: 96 BPM | DIASTOLIC BLOOD PRESSURE: 73 MMHG | TEMPERATURE: 97.4 F | BODY MASS INDEX: 31.6 KG/M2 | HEIGHT: 66 IN | WEIGHT: 196.65 LBS | RESPIRATION RATE: 18 BRPM | SYSTOLIC BLOOD PRESSURE: 127 MMHG

## 2024-11-30 DIAGNOSIS — Z17.1 MALIGNANT NEOPLASM OF UPPER-OUTER QUADRANT OF RIGHT BREAST IN FEMALE, ESTROGEN RECEPTOR NEGATIVE (HCC): ICD-10-CM

## 2024-11-30 DIAGNOSIS — C50.411 MALIGNANT NEOPLASM OF UPPER-OUTER QUADRANT OF RIGHT BREAST IN FEMALE, ESTROGEN RECEPTOR NEGATIVE (HCC): ICD-10-CM

## 2024-11-30 PROCEDURE — 700111 HCHG RX REV CODE 636 W/ 250 OVERRIDE (IP): Mod: JZ,UD | Performed by: NURSE PRACTITIONER

## 2024-11-30 PROCEDURE — 96401 CHEMO ANTI-NEOPL SQ/IM: CPT

## 2024-11-30 RX ADMIN — PERTUZUMAB, TRASTUZUMAB, AND HYALURONIDASE-ZZXF 1200 MG: 600; 600; 2000 INJECTION, SOLUTION SUBCUTANEOUS at 09:51

## 2024-11-30 ASSESSMENT — FIBROSIS 4 INDEX: FIB4 SCORE: 0.71

## 2024-11-30 NOTE — PROGRESS NOTES
Fela arrived ambulatory to [facility] for Trastuzumab/Pertuzumab injection. Patient is waiting to get port removed, patient declines port flush at this time. Plan of care reviewed, assessment completed. Patient denies any recent or current infections or illness. Echo completed 11/1/24.   Phesgo administered SQ to L thigh as ordered over 7 minutes.  Observation completed, patient tolerated injection well with no signs of adverse reaction. Next appointment confirmed with patient, patient discharged home in [facility].

## 2024-11-30 NOTE — PROGRESS NOTES
Pharmacy Chemotherapy Calculation:    Dx: Invasive ductal carcinoma of the right breast AR/ER (-) HER2 (+) stage IIa (cT2, cN0)        Protocol: TCHP (DOCEtaxel/CARBOplatin + Pertuzumab + Trastuzumab)    *Dosing Reference*   Pertuzumab 840 mg IV over 60 minutes on Day 1 of Cycle 1   followed by 420 mg IV over 30 minutes on Day 1 of Cycles 2 - 6   Trastuzumab 8 mg/kg IV over 90 minutes on Day 1 of Cycle 1   followed by 6 mg/kg IV over 30 minutes on Day 1 of Cycles 2 - 6 followed by   *dose reduced to 60 mg/m2 starting C2 per Dr. Thomas due to tolerance  *removed from plan starting C5 per NIYA Cordova due to worsening neutropenia     *dose reduced to AUC 4.5 starting C2 per Dr. Thomas due to tolerance  *removed from plan starting C4 per Dr. Thomas due to tolerance  *substituted with Cyclophosphamide 600 mg/m2 IV over 30 minutes on Day 1 per chemo regimen TC on NCCN Guidelines for Breast Cancer V.2.2024  21-day cycle for  *completed 5/10/24 after 5 cycles for tolerance  ~Followed by~   Pertuzumab 420 mg IV over 30 minutes on Day 1 beginning with Week 19   9/6/24 - Reload with 840 mg on C9 per Dr. Thomas    9/10/24 - substituted Phesgo starting C10 (C5 of HP)  Trastuzumab 6 mg/kg IV over 30 minutes on Day 1 beginning with Week 19   9/6/24 - Reload with 8 mg/kg on C9 per Dr. Thomas    9/10/24 - substituted with Phesgo starting C10 (C5 of HP)  Pertuzumab and trastuzumab and hyaluronidase-zzxf (Phesgo) subcutaneous on Day 1   Initial Dose: 1,200 mg pertuzumab, 600 mg trastuzumab, and 30,000 units hyaluronidase/15 mL - no initial dose as pt has been on traztuzumab and petruzumab   Subsequent Doses: 600 mg pertuzumab, 600 mg trastuzumab, and 20,000 units hyaluronidase/10 mL  21-day cycle to complete 52 weeks total of trastuzumab and pertuzumab therapy    NCCN Guidelines® for Breast Cancer V.2.2022.  Josef LEON et al. Gay Oncol. 2013;24(9):2278-84.  NCCN Guidelines® for Breast Cancer  "V.2.2024.   Paulo LEON et al. Cancer Research. 2020;80(4_suppl):PD4-07.    Allergies:  Bee venom, Morphine sulfate, Tape, and Pineapple     /73   Pulse 96   Temp 36.3 °C (97.4 °F) (Temporal)   Resp 18   Ht 1.68 m (5' 6.14\") Comment: No shoes   Wt 89.2 kg (196 lb 10.4 oz)   LMP 02/10/2012 (Approximate) Comment: Age of first period:10, No HRT  SpO2 96%   BMI 31.60 kg/m²  Body surface area is 2.04 meters squared.    Per MD loera for ECHOs [id number]  ECHO 5/13/24: LVEF 69%  ECHO 11/1/24: LVEF 65%    Drug Order   (Drug name, dose, route, IV Fluid & volume, frequency, number of doses) Cycle 13  Previous treatment: C12 11/9/24     Medication = Pertuzumab and trastuzumab and hyaluronidase-zzxf (Phesgo)   Base Dose = Pertuzumab 600 mg and Trastuzumab 600 mg and Hyaluronidase 06679 units   Fixed dose; no calc required  Final Dose = Pertuzumab 600 mg and Trastuzumab 600 mg and Hyaluronidase 82516 units fixed dose   Route = Subcutaneous  Fluid & Volume = 10 mL  Admin Duration = Over 5 minutes          Fixed dose, OK to treat with final dose     By my signature below, I confirm this process was performed independently with the BSA and all final chemotherapy dosing calculations congruent. I have reviewed the above chemotherapy order and that my calculation of the final dose and BSA (when applicable) corroborate those calculations of the  pharmacist.     Casimiro Peres, PharmD  "

## 2024-11-30 NOTE — PROGRESS NOTES
"Pharmacy Chemotherapy Verification    Dx: Breast cancer, ER-, MA-, HER2+    Cycle 13  Previous treatment = C12 on 11/9/24    Protocol: [facility]  *Dosing Reference*  Pertuzumab 840 mg IV over 60 minutes on Day 1 of Cycle 1 followed by  Pertuzumab 420 mg IV over 30 minutes on Day 1 of Cycles 2-6  Trastuzumab 8 mg/kg IV over 90 minutes on Day 1 of Cycle 1 followed by  Trastuzumab 6 mg/kg IV over 30 minutes on Day 1 of Cycles 2-6 followed by   2/16/24 - Dose reduced to 60 mg/m2 for tolerance starting with C2   4/19/24 - Removed from treatment plan starting with C5 due to worsening peripheral neuropathy   2/16/24 - Dose reduced to AUC 4.5 for tolerance starting with C2   Removed from treatment plan starting Cycle 3  Cyclophosphamide 600 mg/m2 IV over 30 minutes on Day 1   3/29/24 - Added starting C4 to replace carboplatin due to intolerance  21-day cycle for 6 cycles (completed 5/10/24 after 5 cycles for tolerance)  ~Followed by~   IV over 30 minutes on Day 1 beginning with Week 19   9/6/24 - Reload with 840 mg on C9 per Dr. Thomas   IV over 30 minutes on Day 1 beginning with Week 19   9/6/24 - Reload with 8 mg/kg on C9 per Dr. Thomas  Switched to Pertuzumab 600 mg and trastuzumab 600 mg and hyaluronidase 80785 units subcutaneous on Day 1 starting with C10  21-day cycle to complete 52 weeks total of trastuzumab and pertuzumab therapy  NCCN Guidelines for Breast Cancer V.2.2024.  Josef LEON, et al. Gay Oncol. 2013;24(9):2278-84.  Bates A, et al. Cancer Research. 2020;80(4 suppl):PD4-07.    Allergies:  Bee venom, Morphine sulfate, Tape, and Pineapple     /73   Pulse 96   Temp 36.3 °C (97.4 °F) (Temporal)   Resp 18   Ht 1.68 m (5' 6.14\") Comment: No shoes   Wt 89.2 kg (196 lb 10.4 oz)   LMP 02/10/2012 (Approximate) Comment: Age of first period:10, No HRT  SpO2 96%   BMI 31.60 kg/m²  Body surface area is 2.04 meters squared.    ECHO - every 4 months  11/1/24 LVEF 65%  5/13/24 LVEF 69%  12/27/23 LVEF " 65%    No hold parameters per treatment plan.      Pertuzumab 600 mg and Trastuzumab 600 mg and Hyaluronidase 20,000 units fixed dose   No calculation required, okay to treat with final dose = Pertuzumab 600 mg and Trastuzumab 600 mg and Hyaluronidase 20,000 units subcutaneous      Elsi Schaffer, PharmD

## 2024-11-30 NOTE — PROGRESS NOTES
Chemotherapy Verification - PRIMARY RN      Height = 168cm  Weight = 89.2kg  BSA = 2.04m2       Medication: pertuzumab-trastuzumabb-hyaluronidase  (Phesgo) Dose: 600mg pertuzumab, 600mg trastuzumab (set dose)  Calculated Dose: 1,200mg (set dose)                             (In mg/m2, AUC, mg/kg)       I confirm this process was performed independently with the BSA and all final chemotherapy dosing calculations congruent.  Any discrepancies of 10% or greater have been addressed with the chemotherapy pharmacist. The resolution of the discrepancy has been documented in the EPIC progress notes.

## 2024-11-30 NOTE — PROGRESS NOTES
Chemotherapy Verification - SECONDARY RN         Height = 168 cm  Weight = 89.2 kg  BSA = 2.04 mg         Medication: Pertuzumab-Trastuzumab-Hyaluronidase  Dose: Pertuzumab 600 mg/Trastuzumab 600 mg (set dose) Calculated Dose: Pertuzumab 600 mg/Trastuzumab 600 mg                             (In mg/m2, AUC, mg/kg)         I confirm this process was performed independently.

## 2024-12-17 ENCOUNTER — TELEPHONE (OUTPATIENT)
Dept: HEMATOLOGY ONCOLOGY | Facility: MEDICAL CENTER | Age: 39
End: 2024-12-17
Payer: MEDICAID

## 2024-12-17 ENCOUNTER — HOSPITAL ENCOUNTER (OUTPATIENT)
Dept: HEMATOLOGY ONCOLOGY | Facility: MEDICAL CENTER | Age: 39
End: 2024-12-17
Attending: INTERNAL MEDICINE
Payer: MEDICAID

## 2024-12-17 DIAGNOSIS — C50.411 MALIGNANT NEOPLASM OF UPPER-OUTER QUADRANT OF RIGHT BREAST IN FEMALE, ESTROGEN RECEPTOR NEGATIVE (HCC): ICD-10-CM

## 2024-12-17 DIAGNOSIS — Z17.1 MALIGNANT NEOPLASM OF UPPER-OUTER QUADRANT OF RIGHT BREAST IN FEMALE, ESTROGEN RECEPTOR NEGATIVE (HCC): ICD-10-CM

## 2024-12-17 PROCEDURE — 99212 OFFICE O/P EST SF 10 MIN: CPT | Performed by: INTERNAL MEDICINE

## 2024-12-17 ASSESSMENT — ENCOUNTER SYMPTOMS
VOMITING: 0
TINGLING: 1
SHORTNESS OF BREATH: 0
NAUSEA: 0
FEVER: 0
DIZZINESS: 0
DIARRHEA: 0
WEIGHT LOSS: 0
PALPITATIONS: 0
CHILLS: 0
COUGH: 0
HEADACHES: 0
CONSTIPATION: 0

## 2024-12-17 NOTE — PROGRESS NOTES
Subjective     Fela Tiwari is a 39 y.o. female who presents with No chief complaint on file.          HPI    Referring Physician: Rochelle Gaviria MD   Primary Care:  Francisco Maier M.D.      Diagnosis: Invasive ductal carcinoma of the right breast, grade 3, clinically stage IIa (cT2, cN0) ER negative OK negative, HER2 3+ positive, Ki-67 20 to 30%      Chief Complaint: Patient seen today for evaluation of her ER negative HER2 positive breast cancer, for continued monitoring of symptoms and side effects of cancer treatments, employing Herceptin and Perjeta.     Oncology history of presenting illness:  Fela Tiwari is a 38 y.o. likely premenopausal white female who self detected a mass in her lateral right breast in 2023.  She had a mammogram on 2023 which showed the palpable mass is likely malignant.  Ultrasound showed it was 2.6 cm irregular hypoechoic spiculated mass.  Left breast was normal.  She underwent a ultrasound-guided biopsy of the right breast on 2023: This revealed an invasive ductal carcinoma, grade 3, ER negative, OK negative, HER2 3+ by IHC, Ki-67 20 to 30%.  An MRI was done yesterday and results are pending.  She had a hysterectomy in  but her ovaries are in place.  She is  3 para 2 and has not taken any hormone replacement therapy.  No family history of breast or ovarian cancer.  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  She was on anticoagulation for a while but has been off for several years.  She has also had gastric sleeve laparoscopic and cholecystectomy and does note chronic nausea.      Interval histories:  Interval history 24 (CAlsop, APRN):  Patient had a difficult time with her first cycle of chemotherapy.  Day after she received the Udenyca shot she presented to the emergency department with severe pain.  Pain was excruciating throughout her entire body.  She was given  IV Dilaudid in the hospital and discharged with Tylenol.  She has been trying to take 1000 g of Tylenol with no relief.  Patient tearful today due to the severity of pain.  She also had experienced blood when urinating.  She did have a positive occult blood on stool and urine during hospitalization.  She was asked to follow-up with urology in the outpatient setting.  She stated since being discharged from the hospital she has not had any blood whatsoever in the urine or will.  She has been experiencing diarrhea anywhere from 5-10 episodes per day likely related to Perjeta.  She has been taking Imodium, approximately 2-3 pills per day with minimal relief.  She does have nausea and vomiting but she does state that Zofran does help.  She does also note thrush which was appreciated on physical examination today.     Interval history 2/15/2024: She had a terrible time with her first cycle of TCHP as noted above.  Her diarrhea has slowed dramatically but she still is using Lomotil occasionally.  She also had severe eye tearing likely due to the Taxotere which persists although it is better.  She has no neuropathy but did have a rash which responded to corticosteroids.  She also had persistent nausea which has finally resolved.  Her thrush is resolved as well.  She has not had any further vaginal bleeding but this has not been evaluated adequately except for a bladder scan which as expected was negative.  She feels like her tumor shrunk some.      Interval history 03/07/24 (CAlsop, APRN):  Patient still had difficult time with cycle 2.  She did end up in the ER the day after receiving treatment with bleeding again.  She did have bleeding with her first cycle and did end up being seen by urology and underwent a cystoscopy which was negative.  She stated that the bleeding lasted approximately 3-4 days.  She did experience significant nausea and vomiting for the first 2 weeks.  She also had severe diarrhea up to 10 times per  day for the first 2 weeks.  She did take Lomotil with no improvement.  She has noticed some bleeding from the rectum which is from her hemorrhoids with the diarrhea.  She did state that she was given the olanzapine to take for the first 5 days after treatment which did help immensely with her nausea and vomiting as well as she believes to help with her diarrhea as well as sleep.  When she stopped that medication the symptoms returned.  She is noticing her vision feels a little differently.  Her eyes are very dry and watery.  We attempted to give patient days of Zarzio for neutropenia support as patient had significant pain with the Udenyca shot.  She stated that she did not notice any improvement or difference as she did still have the severe myalgias that lasted approximately 2 weeks with the Zarzio injection.  She did state this last week she has felt back to normal.  She is anxious to receive her next cycle of chemo.     Interval history 03/28/24 (CAlsop, APRN):  Patient tolerated her last cycle well as cycle 3 we held carboplatin and awaited approval for Cytoxan.  We also held Udenyca.  She does still have diarrhea noted but that is controlled with Lomotil.  She is noticing some bilateral flank pain but no other urinary symptoms.  With her last cycle she did not experience any blood in the urine/vaginal/rectal area.  She does have some fatigue and has noticed increase in hot flashes.  Hot flashes are quite bothersome to her at this time.  She also noticed peripheral neuropathy in her toes, worse at night.  She is unable to let the covers to touch her feet.     Interval history 04/18/24 (CAlsop, APRN):  Patient biggest complaint at this time as peripheral neuropathy in her feet.  She has the pain, burning and numbness.  She was taking gabapentin 300 mg at night but did take 2 tablets last night because her feet were severe which did help.  She has noticed some nausea and vomiting worse with this past cycle likely  with the addition of Cytoxan.  She does note she has consistently loose bowel movements, but no significant diarrhea.  She does have some hemorrhoids and does have pain with bowel movements at times.  She still has a hot flashes and did take the oxybutynin but she stated that she thought it made him worse.  She stopped taking the oxybutynin and noticed that her hot flashes are better.  She may be getting some relief from the gabapentin.  She also noticed some blisters in her mouth but the mouth rinses did help.  No mouth sores at this time.  She does complain of significant myalgias and arthralgias from the Udenyca shot that last about 3 days.  However she does continue to have the myalgias and arthralgias up till today still present.     Interval history 5/9/2024: Her peripheral neuropathy is better which she attributes in part to reduce chemo and provide to compression stockings that she is using. GI symptoms were much better with the last cycle. She has been in a clinical complete response and we are awaiting MRI results from yesterday to determine whether she needs more chemotherapy or simply HP for cycle 6. She is planning to do bilateral mastectomies with nipple sparing reconstructions.      Interval history 05/30/24 (CAlsop, APRN):  Is doing well.  She tolerated the last cycle well with Herceptin and Perjeta only.  She does still have diarrhea but states is not bothersome.  She was on gabapentin for hot flashes as well as peripheral neuropathy, but she felt like the gabapentin was making her hot flashes worse.  She stopped the gabapentin altogether and stated that her hot flashes have completely resolved.  She does still have some numbness in her toes but nothing like it has been before.  She wears compression socks at night with positive results.  She did get a sinus infection few weeks ago and took 10-day course of Augmentin.  She does still have some ear clogging but clinical examination does not show any  abnormal findings.     Interval history 6/20/2024: She underwent bilateral mastectomies on 6/12/2024.  The right breast showed no residual invasive or in situ carcinoma.  15 sentinel lymph nodes were negative in 4 clusters removed.  Postoperative course has been somewhat difficult for pain and she is emotionally overwhelmed today.  Expanders were placed and she will start fills in several weeks.  Her hair is growing back nicely.  Her peripheral neuropathy is almost resolved.  She does have numbness under the right arm secondary to the axillary surgery.     Interval history 7/26/2024: She has had a complicated course since we last saw her.  She developed left breast skin flap necrosis which was I&D.  Subsequently she had 170 cc of serous fluid drained from her left breast and she was admitted for IV antibiotics.  She was found to be allergic to vancomycin and ultimately switched to Bactrim and cephalexin.  Seroma cultures were negative .  Repeat seroma removal was negative.  She apparently also has an MRSA culture that was positive and will be seeing ID next week.  Drain was placed in the left reconstruction/seroma and is still draining 50 cc/day.     Interval history 9/4/2024: She finally completed antibiotics and had healing of her left chest wall after expander was removed.  She has a poor cosmetic result and would like to have this redone when possible.  She is ready to reinitiate HP therapy.     Interval history 9/26/2024: Her chest wall remains stable off antibiotics with no residual or recurrent infection.  Replacement of the left tissue expander is planned for December.  She is not yet getting fills on the right side until the other expander was placed back.  She tolerated H.P. without difficulty.  She is on Lyrica 50 mg twice daily for neuropathy which is controlling the numbness.  She does have a sensation of coldness of her feet on occasion.  Energy level is good and she went swimming for the first time  since starting chemotherapy which she enjoyed.     Interval history 11/8/2024 (Celia Dumont, AOCNP): Patient returns to clinic today for follow-up appointment, and pretreatment check prior to cycle 3 Phesgo.  Patient reports feeling well in general, except for ongoing and persistent neuropathy pain in her feet.  She states she does work at the [employer], where she is on her feet for more than 12 hours/day.  She is starting a new job on the 18th at the [employer], where she will be sitting down more frequently.  She is hopeful that being able to sit will help with her neuropathy pain.  She is requesting an increased dose of her Lyrica, as she states that she is not receiving much relief from the medication.  She is also requesting that her port to be taken out, as soon as possible, as she would like to have it out before she starts her new job.  She states her appetite and energy levels are otherwise good.  She denies any problems with any unusual fatigue, cough, shortness of breath, constipation, diarrhea, nausea, vomiting, fever or chills, or night sweats.  She is looking forward to the arrival of her new grandbaby.  No other complaints or concerns provided.    Interval history 11/27/24 (Basim, APRN):  Visit is conducted as an on demand video visit using Teams and patient is unaccompanied today.   Patient is doing very well overall.  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  She did state that she has lost weight since her initial cancer diagnosis and is feeling well.  She is down to 191 pounds.  She did state that her peripheral apathy is still present however has improved with the increased dose of her Lyrica.  She is scheduled to have an expander placed with Dr. Wooten on 12/9/2024 and requesting to be able to have her port removed at the same time.    Interval history 12/17/2024: She is apparently tolerating Phesgo well.  She went to an infusion for her treatment  before being seen by us.  We will see her back in 3 weeks.     Treatment history:  01/23/24: C1D1 TCHP with Udenyca  02/16/24: C2D2 TCHP with 20-25% reduction in dosing of the chemotherapy and 5 days of Zarxio instead of Udenyca   03/08/24: C3D1 THP (20% dose reduction of Docetaxel) - will hold on Xarzio or Udenyca (d/c Carboplatin d/t tolerability)  03/29/24: C4D1 THP with addition of Cytoxan (continue with dose reduction of Docetaxel by 20%) with Udenyca support   04/19/24: C5D1 HP plus Cytoxan (d/c Docetaxel d/t worsening peripheral neuropathy) - no need for Udenyca  05/10/24: C6 Herceptin and Perjeta  05/31/24: C7 Herceptin and Perjeta  06/21/24: C8 Herceptin and Perjeta  09/27/24: C1 Phesgo   10/19/24: C2 Phesgo   11/08/24: C3 Phesgo   11/30/24: C4 Phesgo      Allergies   Allergen Reactions    Bee Venom Anaphylaxis    Morphine Sulfate Anaphylaxis and Itching    Tape Rash     Paper tape OK, do not use tegaderm    Vancomycin Itching    Pineapple Hives     Current Outpatient Medications on File Prior to Encounter   Medication Sig Dispense Refill    pregabalin (LYRICA) 50 MG capsule Take 1 Capsule by mouth 3 times a day for 90 days. (Patient taking differently: Take 50 mg by mouth 3 times a day. Change to 2 in the AM and 2 at NOC) 90 Capsule 2    ELDERBERRY PO Take  by mouth.      APPLE CIDER VINEGAR PO Take  by mouth.      oxyCODONE-acetaminophen (PERCOCET) 5-325 MG Tab Take 1 Tablet by mouth every 6 hours as needed.  FOR PAIN      albuterol 108 (90 Base) MCG/ACT Aero Soln inhalation aerosol Inhale 2 Puffs every 4 hours. 8.5 g 1    ondansetron (ZOFRAN ODT) 4 MG TABLET DISPERSIBLE Dissolve 1 Tablet by mouth every four hours as needed for Nausea/Vomiting (give PO if no IV route available). 10 Tablet 0     No current facility-administered medications on file prior to encounter.         Review of Systems   Constitutional:  Negative for chills, fever, malaise/fatigue and weight loss.   Respiratory:  Negative for cough  and shortness of breath.    Cardiovascular:  Negative for chest pain and palpitations.   Gastrointestinal:  Negative for constipation, diarrhea, nausea and vomiting.   Genitourinary:  Negative for dysuria.   Neurological:  Positive for tingling. Negative for dizziness and headaches.              Objective     LMP 02/10/2012 (Approximate) Comment: Age of first period:10, No HRT     Physical Exam  Vitals reviewed.   Constitutional:       General: She is not in acute distress.     Appearance: Normal appearance. She is not diaphoretic.   Pulmonary:      Effort: Pulmonary effort is normal.   Neurological:      Mental Status: She is alert and oriented to person, place, and time.   Psychiatric:         Mood and Affect: Mood normal.         Behavior: Behavior normal.                       Assessment & Plan     No diagnosis found.          Impression:  1.  Invasive ductal carcinoma of the right breast, grade 3, clinically stage IIa (cT2, cN0) ER negative UT negative, HER2 3+ positive, Ki-67 20 to 30%.  Status post TCHP with poor tolerance of cycle 1 necessitating dose reductions, changed to Cytoxan and ultimately discontinuation of Taxotere.  Clinical complete response to therapy documented after cycle four. MRI showed no residual masses.  Bilateral mastectomy showed a pathologic complete response in the right (ypT0, ypN0).  Left breast normal  2.  History of DVT/PE off anticoagulation for several years  3.  Hot flashes -improved  4. Peripheral neuropathy noted in her toes. residual trace neuropathy on Lyrica.  Reports some mild relief, will increase Lyrica to 50 mg 3 times daily with improvement.   5.  Infected left tissue expander.  Admitted to the hospital and received IV antibiotics now on oral antibiotics with drain still in place.  Expander now removed and chest wall healed.  Follow-up appointment with plastic surgery planned for 12/20/2024.  6.  Requesting port removal at this time order placed. Discussed with patient  and she will ask if she can have the plastic surgeon remove it during her tissue expander placement on 12/10/24.     Plan:  Continue Phesgo.  Will see her back in 3 weeks.    Please note that this dictation was created using voice recognition software. I have made every reasonable attempt to correct obvious errors, but I expect that there are errors of grammar and possibly content that I did not discover before finalizing the note.    This evaluation was conducted via Teams using secure and encrypted videoconferencing technology. The patient was in a private location outside of their home in the [facility].    The patient's identity was confirmed and verbal consent was obtained for this virtual visit.

## 2024-12-17 NOTE — TELEPHONE ENCOUNTER
Spoke to patient. Unfortunately Appointments were running late today and patient left before being seen. Per dr. Thomas, pt does not need to be seen for a pre chemo for the 12/21 treatment cycle. Let patient know that we will see her on 1/9 for her next cycle. Pt verbalized understanding

## 2024-12-20 RX ORDER — ONDANSETRON 2 MG/ML
4 INJECTION INTRAMUSCULAR; INTRAVENOUS PRN
Status: CANCELLED | OUTPATIENT
Start: 2024-12-21

## 2024-12-20 RX ORDER — ONDANSETRON 8 MG/1
8 TABLET, ORALLY DISINTEGRATING ORAL PRN
Status: CANCELLED | OUTPATIENT
Start: 2024-12-21

## 2024-12-20 RX ORDER — SODIUM CHLORIDE 9 MG/ML
INJECTION, SOLUTION INTRAVENOUS CONTINUOUS
Status: CANCELLED | OUTPATIENT
Start: 2024-12-21

## 2024-12-20 RX ORDER — EPINEPHRINE 1 MG/ML(1)
0.5 AMPUL (ML) INJECTION PRN
Status: CANCELLED | OUTPATIENT
Start: 2024-12-21

## 2024-12-20 RX ORDER — 0.9 % SODIUM CHLORIDE 0.9 %
3 VIAL (ML) INJECTION PRN
Status: CANCELLED | OUTPATIENT
Start: 2024-12-21

## 2024-12-20 RX ORDER — METHYLPREDNISOLONE SODIUM SUCCINATE 125 MG/2ML
125 INJECTION, POWDER, LYOPHILIZED, FOR SOLUTION INTRAMUSCULAR; INTRAVENOUS PRN
Status: CANCELLED | OUTPATIENT
Start: 2024-12-21

## 2024-12-20 RX ORDER — 0.9 % SODIUM CHLORIDE 0.9 %
10 VIAL (ML) INJECTION PRN
Status: CANCELLED | OUTPATIENT
Start: 2024-12-21

## 2024-12-20 RX ORDER — PROCHLORPERAZINE MALEATE 10 MG
10 TABLET ORAL EVERY 6 HOURS PRN
Status: CANCELLED | OUTPATIENT
Start: 2024-12-21

## 2024-12-20 RX ORDER — DIPHENHYDRAMINE HYDROCHLORIDE 50 MG/ML
50 INJECTION INTRAMUSCULAR; INTRAVENOUS PRN
Status: CANCELLED | OUTPATIENT
Start: 2024-12-21

## 2024-12-20 RX ORDER — 0.9 % SODIUM CHLORIDE 0.9 %
VIAL (ML) INJECTION PRN
Status: CANCELLED | OUTPATIENT
Start: 2024-12-21

## 2024-12-21 ENCOUNTER — OUTPATIENT INFUSION SERVICES (OUTPATIENT)
Dept: ONCOLOGY | Facility: MEDICAL CENTER | Age: 39
End: 2024-12-21
Attending: INTERNAL MEDICINE
Payer: MEDICAID

## 2024-12-21 ENCOUNTER — APPOINTMENT (OUTPATIENT)
Dept: ONCOLOGY | Facility: MEDICAL CENTER | Age: 39
End: 2024-12-21
Payer: MEDICAID

## 2024-12-21 VITALS
RESPIRATION RATE: 18 BRPM | HEART RATE: 58 BPM | SYSTOLIC BLOOD PRESSURE: 107 MMHG | TEMPERATURE: 96.9 F | WEIGHT: 202.6 LBS | OXYGEN SATURATION: 95 % | DIASTOLIC BLOOD PRESSURE: 62 MMHG | HEIGHT: 66 IN | BODY MASS INDEX: 32.56 KG/M2

## 2024-12-21 DIAGNOSIS — C50.411 MALIGNANT NEOPLASM OF UPPER-OUTER QUADRANT OF RIGHT BREAST IN FEMALE, ESTROGEN RECEPTOR NEGATIVE (HCC): ICD-10-CM

## 2024-12-21 DIAGNOSIS — Z17.1 MALIGNANT NEOPLASM OF UPPER-OUTER QUADRANT OF RIGHT BREAST IN FEMALE, ESTROGEN RECEPTOR NEGATIVE (HCC): ICD-10-CM

## 2024-12-21 PROCEDURE — 96401 CHEMO ANTI-NEOPL SQ/IM: CPT

## 2024-12-21 PROCEDURE — 700111 HCHG RX REV CODE 636 W/ 250 OVERRIDE (IP): Mod: JZ,UD | Performed by: NURSE PRACTITIONER

## 2024-12-21 RX ORDER — ONDANSETRON 8 MG/1
8 TABLET, ORALLY DISINTEGRATING ORAL PRN
Status: DISCONTINUED | OUTPATIENT
Start: 2024-12-21 | End: 2024-12-21 | Stop reason: HOSPADM

## 2024-12-21 RX ORDER — EPINEPHRINE 1 MG/ML(1)
0.5 AMPUL (ML) INJECTION PRN
Status: DISCONTINUED | OUTPATIENT
Start: 2024-12-21 | End: 2024-12-21 | Stop reason: HOSPADM

## 2024-12-21 RX ORDER — DIPHENHYDRAMINE HYDROCHLORIDE 50 MG/ML
50 INJECTION INTRAMUSCULAR; INTRAVENOUS PRN
Status: DISCONTINUED | OUTPATIENT
Start: 2024-12-21 | End: 2024-12-21 | Stop reason: HOSPADM

## 2024-12-21 RX ORDER — METHYLPREDNISOLONE SODIUM SUCCINATE 125 MG/2ML
125 INJECTION, POWDER, LYOPHILIZED, FOR SOLUTION INTRAMUSCULAR; INTRAVENOUS PRN
Status: DISCONTINUED | OUTPATIENT
Start: 2024-12-21 | End: 2024-12-21 | Stop reason: HOSPADM

## 2024-12-21 RX ADMIN — PERTUZUMAB, TRASTUZUMAB, AND HYALURONIDASE-ZZXF 1200 MG: 600; 600; 2000 INJECTION, SOLUTION SUBCUTANEOUS at 09:59

## 2024-12-21 ASSESSMENT — FIBROSIS 4 INDEX: FIB4 SCORE: 0.71

## 2024-12-21 NOTE — PROGRESS NOTES
Pharmacy Chemotherapy Verification    Dx: Invasive ductal carcinoma of the right breast IN/ER (-) HER2 (+) stage IIa (cT2, cN0)      Cycle 14  Previous treatment = C13 11/30/24    Protocol: TCHP (DOCEtaxel/CARBOplatin + Pertuzumab + Trastuzumab)    *Dosing Reference*   Pertuzumab 840 mg IV over 60 minutes on Day 1 of Cycle 1   followed by 420 mg IV over 30 minutes on Day 1 of Cycles 2 - 6   Trastuzumab 8 mg/kg IV over 90 minutes on Day 1 of Cycle 1   followed by 6 mg/kg IV over 30 minutes on Day 1 of Cycles 2 - 6 followed by   *dose reduced to 60 mg/m2 starting C2 per Dr. Thomas due to tolerance  *removed from plan starting C5 per NIYA Cordova due to worsening neutropenia     *dose reduced to AUC 4.5 starting C2 per Dr. Thomas due to tolerance  *removed from plan starting C4 per Dr. Thomas due to tolerance  *substituted with Cyclophosphamide 600 mg/m2 IV over 30 minutes on Day 1 per chemo regimen TC on NCCN Guidelines for Breast Cancer V.2.2024  21-day cycle for  *completed 5/10/24 after 5 cycles for tolerance  ~Followed by~   Pertuzumab 420 mg IV over 30 minutes on Day 1 beginning with Week 19   9/6/24 - Reload with 840 mg on C9 per Dr. Thomas    9/10/24 - substituted Phesgo starting C10 (C5 of HP)  Trastuzumab 6 mg/kg IV over 30 minutes on Day 1 beginning with Week 19   9/6/24 - Reload with 8 mg/kg on C9 per Dr. Thomas    9/10/24 - substituted with Phesgo starting C10 (C5 of HP)  Pertuzumab and trastuzumab and hyaluronidase-zzxf (Phesgo) subcutaneous on Day 1   Initial Dose: 1,200 mg pertuzumab, 600 mg trastuzumab, and 30,000 units hyaluronidase/15 mL - no initial dose as pt has been on traztuzumab and petruzumab   Subsequent Doses: 600 mg pertuzumab, 600 mg trastuzumab, and 20,000 units hyaluronidase/10 mL  21-day cycle to complete 52 weeks total of trastuzumab and pertuzumab therapy    NCCN Guidelines® for Breast Cancer V.2.2022.  Josef LEON, et al. Gay Oncol.  "2013;24(9):6515-40.  NCCN Guidelines® for Breast Cancer V.2.2024.   Bates A, et al. Cancer Research. 2020;80(4_suppl):PD4-07.    Allergies:  Bee venom, Morphine sulfate, Tape, and Pineapple     /56   Pulse 60   Temp 36.1 °C (96.9 °F) (Temporal)   Resp 18   Ht 1.68 m (5' 6.14\")   Wt 91.9 kg (202 lb 9.6 oz)   LMP 02/10/2012 (Approximate) Comment: Age of first period:10, No HRT  SpO2 95%   BMI 32.56 kg/m²  Body surface area is 2.07 meters squared.    ECHO - every 4 months  11/1/24 LVEF 65%  5/13/24 LVEF 69%  12/27/23 LVEF 65%    No hold parameters per treatment plan.    Pertuzumab 600 mg and Trastuzumab 600 mg and Hyaluronidase 20,000 units fixed dose   No calc required, okay to treat with final dose = Pertuzumab 600 mg and Trastuzumab 600 mg and Hyaluronidase 20,000 units subcutaneous    Brenda Hernandez PharmD  "

## 2024-12-21 NOTE — PROGRESS NOTES
Pharmacy Chemotherapy Calculation:    Dx: Invasive ductal carcinoma of the right breast NE/ER (-) HER2 (+) stage IIa (cT2, cN0)        Protocol: TCHP (DOCEtaxel/CARBOplatin + Pertuzumab + Trastuzumab)    *Dosing Reference*   Pertuzumab 840 mg IV over 60 minutes on Day 1 of Cycle 1   followed by 420 mg IV over 30 minutes on Day 1 of Cycles 2 - 6   Trastuzumab 8 mg/kg IV over 90 minutes on Day 1 of Cycle 1   followed by 6 mg/kg IV over 30 minutes on Day 1 of Cycles 2 - 6 followed by   *dose reduced to 60 mg/m2 starting C2 per Dr. Thomas due to tolerance  *removed from plan starting C5 per NIYA Cordova due to worsening neutropenia     *dose reduced to AUC 4.5 starting C2 per Dr. Thomas due to tolerance  *removed from plan starting C4 per Dr. Thomas due to tolerance  *substituted with Cyclophosphamide 600 mg/m2 IV over 30 minutes on Day 1 per chemo regimen TC on NCCN Guidelines for Breast Cancer V.2.2024  21-day cycle for  *completed 5/10/24 after 5 cycles for tolerance  ~Followed by~   Pertuzumab 420 mg IV over 30 minutes on Day 1 beginning with Week 19   9/6/24 - Reload with 840 mg on C9 per Dr. Thomas    9/10/24 - substituted Phesgo starting C10 (C5 of HP)  Trastuzumab 6 mg/kg IV over 30 minutes on Day 1 beginning with Week 19   9/6/24 - Reload with 8 mg/kg on C9 per Dr. Thomas    9/10/24 - substituted with Phesgo starting C10 (C5 of HP)  Pertuzumab and trastuzumab and hyaluronidase-zzxf (Phesgo) subcutaneous on Day 1   Initial Dose: 1,200 mg pertuzumab, 600 mg trastuzumab, and 30,000 units hyaluronidase/15 mL - no initial dose as pt has been on traztuzumab and petruzumab   Subsequent Doses: 600 mg pertuzumab, 600 mg trastuzumab, and 20,000 units hyaluronidase/10 mL  21-day cycle to complete 52 weeks total of trastuzumab and pertuzumab therapy    NCCN Guidelines® for Breast Cancer V.2.2022.  Josef LEON et al. Gay Oncol. 2013;24(9):2278-84.  NCCN Guidelines® for Breast Cancer  "V.2.2024.   Paulo LEON et al. Cancer Research. 2020;80(4_suppl):PD4-07.    Allergies:  Bee venom, Morphine sulfate, Tape, and Pineapple     /62   Pulse (!) 58   Temp 36.1 °C (96.9 °F) (Temporal)   Resp 18   Ht 1.68 m (5' 6.14\")   Wt 91.9 kg (202 lb 9.6 oz)   LMP 02/10/2012 (Approximate) Comment: Age of first period:10, No HRT  SpO2 95%   BMI 32.56 kg/m²  Body surface area is 2.07 meters squared.    Per MD loera for ECHOs z2beflet  ECHO 5/13/24: LVEF 69%  ECHO 11/1/24: LVEF 65%    No hold parameters per treatment plan.      Drug Order   (Drug name, dose, route, IV Fluid & volume, frequency, number of doses) Cycle 14  Previous treatment: C13 on11/30/24   Medication = Pertuzumab and trastuzumab and hyaluronidase-zzxf (Phesgo)   Base Dose = Pertuzumab 600 mg and Trastuzumab 600 mg and Hyaluronidase 20,000 units   Fixed dose; no calc required  Final Dose = Pertuzumab 600 mg and Trastuzumab 600 mg and Hyaluronidase 20,000 units fixed dose   Route = Subcutaneous  Fluid & Volume = 10 mL  Admin Duration = Over 5 minutes          Fixed dose, OK to treat with final dose     By my signature below, I confirm this process was performed independently with the BSA and all final chemotherapy dosing calculations congruent. I have reviewed the above chemotherapy order and that my calculation of the final dose and BSA (when applicable) corroborate those calculations of the  pharmacist.     Elsi Schaffer, PharmD  "

## 2024-12-21 NOTE — PROGRESS NOTES
Chemotherapy Verification - PRIMARY RN  21-day cycle    Height = 168 cm  Weight = 91.9 kg  BSA = 2.07 m^2       Medication: pertuzumab-trastuzumab-hyaluronidase (PHESGO)  Dose: 1200 mg set dose (600mg-600mg/10ml) Calculated Dose: 1200 mg set dose                            (In mg/m2, AUC, mg/kg)     I confirm this process was performed independently with the BSA and all final chemotherapy dosing calculations congruent.  Any discrepancies of 10% or greater have been addressed with the chemotherapy pharmacist. The resolution of the discrepancy has been documented in the EPIC progress notes.

## 2024-12-21 NOTE — PROGRESS NOTES
Fela arrives to [facility] for 21-day cycle Phesgo for breast cancer. Patient denies acute health concerns. Denies s/s active infections, open wounds, and mouth sores. Patient's port has been removed. LVEF ~65% from ECHO on 11/1/24. Phesgo administered SQ to R thigh over 10 mins without adverse s/s. 15 mins observation completed without complications. Patient has her next appt. Discharged home to self care in no apparent distress.

## 2024-12-21 NOTE — PROGRESS NOTES
Chemotherapy Verification - SECONDARY RN       Height = 1.68 m  Weight = 91.9 kg  BSA = 2.07 m2       Medication: pertuzumab-trastuzumab-hyaluronidase  Dose: 600 mg-600 mg set dose  Calculated Dose: 1,200 mg                             (In mg/m2, AUC, mg/kg)     I confirm that this process was performed independently.

## 2024-12-24 ENCOUNTER — OFFICE VISIT (OUTPATIENT)
Dept: SURGERY | Facility: MEDICAL CENTER | Age: 39
End: 2024-12-24
Payer: MEDICAID

## 2024-12-24 VITALS
WEIGHT: 202 LBS | HEART RATE: 61 BPM | SYSTOLIC BLOOD PRESSURE: 114 MMHG | HEIGHT: 66 IN | DIASTOLIC BLOOD PRESSURE: 73 MMHG | OXYGEN SATURATION: 96 % | TEMPERATURE: 96.9 F | BODY MASS INDEX: 32.47 KG/M2

## 2024-12-24 DIAGNOSIS — Z17.1 MALIGNANT NEOPLASM OF UPPER-OUTER QUADRANT OF RIGHT BREAST IN FEMALE, ESTROGEN RECEPTOR NEGATIVE (HCC): ICD-10-CM

## 2024-12-24 DIAGNOSIS — C50.411 MALIGNANT NEOPLASM OF UPPER-OUTER QUADRANT OF RIGHT BREAST IN FEMALE, ESTROGEN RECEPTOR NEGATIVE (HCC): ICD-10-CM

## 2024-12-24 PROCEDURE — 3074F SYST BP LT 130 MM HG: CPT

## 2024-12-24 PROCEDURE — 3078F DIAST BP <80 MM HG: CPT

## 2024-12-24 PROCEDURE — 99213 OFFICE O/P EST LOW 20 MIN: CPT

## 2024-12-24 ASSESSMENT — ENCOUNTER SYMPTOMS
RESPIRATORY NEGATIVE: 1
NEUROLOGICAL NEGATIVE: 1
CONSTITUTIONAL NEGATIVE: 1
MUSCULOSKELETAL NEGATIVE: 1
EYES NEGATIVE: 1
PSYCHIATRIC NEGATIVE: 1
GASTROINTESTINAL NEGATIVE: 1
CARDIOVASCULAR NEGATIVE: 1

## 2024-12-24 ASSESSMENT — FIBROSIS 4 INDEX: FIB4 SCORE: 0.71

## 2024-12-24 NOTE — PROGRESS NOTES
"         SUBJECTIVE:   Fela Tiwari is a delightful 39 y.o. female who presents for routine follow up visit.  She is status post Bilateral SSM, Right SLNBx on 06/12/2024 for IDC. Currently, she reports that she had her left expander placed on 12/09/2024 which was tough on her, but she does not have much pain today. She has one more injection of Herceptin/Perjeta on 01/09/2025. There are no changes in her functional status and she is overall doing well.      Interval Imaging:  None    Review of Systems   Constitutional: Negative.    HENT: Negative.     Eyes: Negative.    Respiratory: Negative.     Cardiovascular: Negative.    Gastrointestinal: Negative.    Genitourinary: Negative.    Musculoskeletal: Negative.    Skin: Negative.    Neurological: Negative.    Endo/Heme/Allergies: Negative.    Psychiatric/Behavioral: Negative.          OBJECTIVE:  /73 (BP Location: Left arm, Patient Position: Sitting, BP Cuff Size: Large adult long)   Pulse 61   Temp 36.1 °C (96.9 °F) (Temporal)   Ht 1.676 m (5' 6\")   Wt 91.6 kg (202 lb)   LMP 02/10/2012 (Approximate) Comment: Age of first period:10, No HRT  SpO2 96%   BMI 32.60 kg/m²    General: Alert, oriented, pleasant, no acute distress.  HEENT: Extraocular movements intact, no scleral icterus or conjunctival injection.  Lung: Respirations unlabored on room air. Lung sounds clear in all fields.  Cardio: Normal rate and rhythm. Heart sounds normal.  Abdomen: Soft, nondistended.  Extremities: Warm, well-perfused.  Breasts:  Bilateral breasts with expanders examined in the upright and supine positions without dominant masses or nodules.  No suspicious skin findings on either side (erythema, peau d'orange).  Bilateral nipples surgically absent No unexpected contour abnormalities for this stage.  No palpable cervical, supraclavicular, or axillary adenopathy.     FUNCTIONAL ASSESSMENT    Patient responses to questions:    Have you ever had shoulder surgery or been " treated for a shoulder injury that resulted in a loss of range of motion?  No     Are you unable to reach into an upper cabinet and retrieve a cup or plate?  No     Do you have any limitations in daily activities because of your shoulder?  No     Overhead raise test for shoulder flexion:  Normal Range:  150-180 degrees    [facility] [place] Status categories: 0= Fully active, able to carry on all pre-disease performance without restriction.    ASSESSMENT AND PLAN:  Delightful 39 y.o. female, here for follow-up.   Currently she is doing well, without evidence of malignancy on my exam. We have discussed the importance of self breast examination and to monitor for changes, such as breast pain, bloody nipple discharge, skin changes, masses and countour/nipple changes, as things can change between imaging intervals. If these are to occur, I advised her to notify our office immediately.     Referrals:None  Return to office: 06/2025 or sooner if needed  Next Clinical Breast Exam: 06/2025,12/2025, 06/2026  Next Imaging: No routine breast imaging s/p bilateral mastectomy     Problem   Malignant Neoplasm of Upper-Outer Quadrant of Right Breast in Female, Estrogen Receptor Negative (Hcc)    Right breast 10:00 4cmFN IDC, G3, ER-MS-HER2+, Ki67 30%, [id number] (suyapa/prog IIA) --> ypT0N0 (pCR)  - US-guided CNBx 12/07/2023  - Neoadjuvant TCHP x5 cycles ([place], poorly tolerated, last cytotoxic dose 04/19/2024)  - Radiographic complete response on MRI 05/08/2024  - Genetics negative for actionable mutation     - Deleterious NTHL1 mutation: [id number]* (single allele, autosomal recessive, assoc colon cancer)  - Bilateral skin-sparing mastectomy, right axillary sentinel node biopsy 06/12/2024 ([facility])     - Immediate TE reconstruction ([place])     - Left recurrent seroma, MRSA on culture     - Expander removed 08/13/2024, Left expander placed 12/09/2024             Cancer Staging   Malignant neoplasm of upper-outer quadrant of right  breast in female, estrogen receptor negative (HCC)  Staging form: Breast, AJCC 8th Edition  - Clinical stage from 12/14/2023: Stage IIA (cT2, cN0, cM0, G3, ER-, WI-, HER2+) - Signed by Rochelle Sullivan M.D. on 12/14/2023  - Pathologic stage from 6/17/2024: ypT0, pN0(sn), cM0, G3, ER-, WI-, HER2+ - Signed by Rochelle Sullivan M.D. on 6/17/2024       External imaging and reports were independently reviewed.  I spent 25 minutes today preparing to see the patient (including chart preparation and review), obtaining and reviewing additional medical history, performing a physical exam and evaluation, documenting clinical information in the electronic health record, independently interpreting results, communicating results to the patient, and coordinating care.     Kayla Varela PA-C

## 2025-01-07 RX ORDER — SODIUM CHLORIDE 9 MG/ML
INJECTION, SOLUTION INTRAVENOUS CONTINUOUS
Status: CANCELLED | OUTPATIENT
Start: 2025-01-11

## 2025-01-07 RX ORDER — 0.9 % SODIUM CHLORIDE 0.9 %
3 VIAL (ML) INJECTION PRN
Status: CANCELLED | OUTPATIENT
Start: 2025-01-11

## 2025-01-07 RX ORDER — PROCHLORPERAZINE MALEATE 10 MG
10 TABLET ORAL EVERY 6 HOURS PRN
Status: CANCELLED | OUTPATIENT
Start: 2025-01-11

## 2025-01-07 RX ORDER — 0.9 % SODIUM CHLORIDE 0.9 %
VIAL (ML) INJECTION PRN
Status: CANCELLED | OUTPATIENT
Start: 2025-01-11

## 2025-01-07 RX ORDER — DIPHENHYDRAMINE HYDROCHLORIDE 50 MG/ML
50 INJECTION INTRAMUSCULAR; INTRAVENOUS PRN
Status: CANCELLED | OUTPATIENT
Start: 2025-01-11

## 2025-01-07 RX ORDER — ONDANSETRON 2 MG/ML
4 INJECTION INTRAMUSCULAR; INTRAVENOUS PRN
Status: CANCELLED | OUTPATIENT
Start: 2025-01-11

## 2025-01-07 RX ORDER — 0.9 % SODIUM CHLORIDE 0.9 %
10 VIAL (ML) INJECTION PRN
Status: CANCELLED | OUTPATIENT
Start: 2025-01-11

## 2025-01-07 RX ORDER — ONDANSETRON 8 MG/1
8 TABLET, ORALLY DISINTEGRATING ORAL PRN
Status: CANCELLED | OUTPATIENT
Start: 2025-01-11

## 2025-01-07 RX ORDER — EPINEPHRINE 1 MG/ML(1)
0.5 AMPUL (ML) INJECTION PRN
Status: CANCELLED | OUTPATIENT
Start: 2025-01-11

## 2025-01-07 RX ORDER — METHYLPREDNISOLONE SODIUM SUCCINATE 125 MG/2ML
125 INJECTION, POWDER, LYOPHILIZED, FOR SOLUTION INTRAMUSCULAR; INTRAVENOUS PRN
Status: CANCELLED | OUTPATIENT
Start: 2025-01-11

## 2025-01-09 ENCOUNTER — HOSPITAL ENCOUNTER (OUTPATIENT)
Dept: HEMATOLOGY ONCOLOGY | Facility: MEDICAL CENTER | Age: 40
End: 2025-01-09
Attending: INTERNAL MEDICINE
Payer: MEDICAID

## 2025-01-09 VITALS
WEIGHT: 199.41 LBS | SYSTOLIC BLOOD PRESSURE: 102 MMHG | DIASTOLIC BLOOD PRESSURE: 62 MMHG | OXYGEN SATURATION: 98 % | BODY MASS INDEX: 32.05 KG/M2 | HEART RATE: 73 BPM | HEIGHT: 66 IN | TEMPERATURE: 97.5 F

## 2025-01-09 DIAGNOSIS — C50.411 MALIGNANT NEOPLASM OF UPPER-OUTER QUADRANT OF RIGHT BREAST IN FEMALE, ESTROGEN RECEPTOR NEGATIVE (HCC): ICD-10-CM

## 2025-01-09 DIAGNOSIS — Z17.1 MALIGNANT NEOPLASM OF UPPER-OUTER QUADRANT OF RIGHT BREAST IN FEMALE, ESTROGEN RECEPTOR NEGATIVE (HCC): ICD-10-CM

## 2025-01-09 PROCEDURE — 99212 OFFICE O/P EST SF 10 MIN: CPT | Performed by: INTERNAL MEDICINE

## 2025-01-09 PROCEDURE — 99214 OFFICE O/P EST MOD 30 MIN: CPT | Performed by: INTERNAL MEDICINE

## 2025-01-09 RX ORDER — DIPHENHYDRAMINE HCL 25 MG
25 TABLET ORAL EVERY 6 HOURS PRN
COMMUNITY

## 2025-01-09 ASSESSMENT — ENCOUNTER SYMPTOMS
DIARRHEA: 0
SHORTNESS OF BREATH: 0
HEADACHES: 0
NAUSEA: 0
WEIGHT LOSS: 0
CONSTIPATION: 0
PALPITATIONS: 0
VOMITING: 0
DIZZINESS: 0
COUGH: 0
FEVER: 0
TINGLING: 1
CHILLS: 0

## 2025-01-09 ASSESSMENT — PAIN SCALES - GENERAL: PAINLEVEL_OUTOF10: NO PAIN

## 2025-01-09 ASSESSMENT — FIBROSIS 4 INDEX: FIB4 SCORE: 0.71

## 2025-01-09 NOTE — PROGRESS NOTES
Subjective     Fela Tiwari is a 39 y.o. female who presents with Breast Cancer          HPI    Referring Physician: Rochelle Gaviria MD   Primary Care:  Francisco Maier M.D.      Diagnosis: Invasive ductal carcinoma of the right breast, grade 3, clinically stage IIa (cT2, cN0) ER negative PA negative, HER2 3+ positive, Ki-67 20 to 30%      Chief Complaint: Patient seen today for evaluation of her ER negative HER2 positive breast cancer, for continued monitoring of symptoms and side effects of cancer treatments, employing Herceptin and Perjeta.     Oncology history of presenting illness:  Fela Tiwari is a 38 y.o. likely premenopausal white female who self detected a mass in her lateral right breast in 2023.  She had a mammogram on 2023 which showed the palpable mass is likely malignant.  Ultrasound showed it was 2.6 cm irregular hypoechoic spiculated mass.  Left breast was normal.  She underwent a ultrasound-guided biopsy of the right breast on 2023: This revealed an invasive ductal carcinoma, grade 3, ER negative, PA negative, HER2 3+ by IHC, Ki-67 20 to 30%.  An MRI was done yesterday and results are pending.  She had a hysterectomy in  but her ovaries are in place.  She is  3 para 2 and has not taken any hormone replacement therapy.  No family history of breast or ovarian cancer.  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  She was on anticoagulation for a while but has been off for several years.  She has also had gastric sleeve laparoscopic and cholecystectomy and does note chronic nausea.      Interval histories:  Interval history 24 (CAlsop, APRN):  Patient had a difficult time with her first cycle of chemotherapy.  Day after she received the Udenyca shot she presented to the emergency department with severe pain.  Pain was excruciating throughout her entire body.  She was given IV Dilaudid in  the hospital and discharged with Tylenol.  She has been trying to take 1000 g of Tylenol with no relief.  Patient tearful today due to the severity of pain.  She also had experienced blood when urinating.  She did have a positive occult blood on stool and urine during hospitalization.  She was asked to follow-up with urology in the outpatient setting.  She stated since being discharged from the hospital she has not had any blood whatsoever in the urine or will.  She has been experiencing diarrhea anywhere from 5-10 episodes per day likely related to Perjeta.  She has been taking Imodium, approximately 2-3 pills per day with minimal relief.  She does have nausea and vomiting but she does state that Zofran does help.  She does also note thrush which was appreciated on physical examination today.     Interval history 2/15/2024: She had a terrible time with her first cycle of TCHP as noted above.  Her diarrhea has slowed dramatically but she still is using Lomotil occasionally.  She also had severe eye tearing likely due to the Taxotere which persists although it is better.  She has no neuropathy but did have a rash which responded to corticosteroids.  She also had persistent nausea which has finally resolved.  Her thrush is resolved as well.  She has not had any further vaginal bleeding but this has not been evaluated adequately except for a bladder scan which as expected was negative.  She feels like her tumor shrunk some.      Interval history 03/07/24 (Basim, APRN):  Patient still had difficult time with cycle 2.  She did end up in the ER the day after receiving treatment with bleeding again.  She did have bleeding with her first cycle and did end up being seen by urology and underwent a cystoscopy which was negative.  She stated that the bleeding lasted approximately 3-4 days.  She did experience significant nausea and vomiting for the first 2 weeks.  She also had severe diarrhea up to 10 times per day for the  first 2 weeks.  She did take Lomotil with no improvement.  She has noticed some bleeding from the rectum which is from her hemorrhoids with the diarrhea.  She did state that she was given the olanzapine to take for the first 5 days after treatment which did help immensely with her nausea and vomiting as well as she believes to help with her diarrhea as well as sleep.  When she stopped that medication the symptoms returned.  She is noticing her vision feels a little differently.  Her eyes are very dry and watery.  We attempted to give patient days of Zarzio for neutropenia support as patient had significant pain with the Udenyca shot.  She stated that she did not notice any improvement or difference as she did still have the severe myalgias that lasted approximately 2 weeks with the Zarzio injection.  She did state this last week she has felt back to normal.  She is anxious to receive her next cycle of chemo.     Interval history 03/28/24 (CAlsop, APRN):  Patient tolerated her last cycle well as cycle 3 we held carboplatin and awaited approval for Cytoxan.  We also held Udenyca.  She does still have diarrhea noted but that is controlled with Lomotil.  She is noticing some bilateral flank pain but no other urinary symptoms.  With her last cycle she did not experience any blood in the urine/vaginal/rectal area.  She does have some fatigue and has noticed increase in hot flashes.  Hot flashes are quite bothersome to her at this time.  She also noticed peripheral neuropathy in her toes, worse at night.  She is unable to let the covers to touch her feet.     Interval history 04/18/24 (CAlsop, APRN):  Patient biggest complaint at this time as peripheral neuropathy in her feet.  She has the pain, burning and numbness.  She was taking gabapentin 300 mg at night but did take 2 tablets last night because her feet were severe which did help.  She has noticed some nausea and vomiting worse with this past cycle likely with the  addition of Cytoxan.  She does note she has consistently loose bowel movements, but no significant diarrhea.  She does have some hemorrhoids and does have pain with bowel movements at times.  She still has a hot flashes and did take the oxybutynin but she stated that she thought it made him worse.  She stopped taking the oxybutynin and noticed that her hot flashes are better.  She may be getting some relief from the gabapentin.  She also noticed some blisters in her mouth but the mouth rinses did help.  No mouth sores at this time.  She does complain of significant myalgias and arthralgias from the Udenyca shot that last about 3 days.  However she does continue to have the myalgias and arthralgias up till today still present.     Interval history 5/9/2024: Her peripheral neuropathy is better which she attributes in part to reduce chemo and provide to compression stockings that she is using. GI symptoms were much better with the last cycle. She has been in a clinical complete response and we are awaiting MRI results from yesterday to determine whether she needs more chemotherapy or simply HP for cycle 6. She is planning to do bilateral mastectomies with nipple sparing reconstructions.      Interval history 05/30/24 (CAlsop, APRN):  Is doing well.  She tolerated the last cycle well with Herceptin and Perjeta only.  She does still have diarrhea but states is not bothersome.  She was on gabapentin for hot flashes as well as peripheral neuropathy, but she felt like the gabapentin was making her hot flashes worse.  She stopped the gabapentin altogether and stated that her hot flashes have completely resolved.  She does still have some numbness in her toes but nothing like it has been before.  She wears compression socks at night with positive results.  She did get a sinus infection few weeks ago and took 10-day course of Augmentin.  She does still have some ear clogging but clinical examination does not show any abnormal  findings.     Interval history 6/20/2024: She underwent bilateral mastectomies on 6/12/2024.  The right breast showed no residual invasive or in situ carcinoma.  15 sentinel lymph nodes were negative in 4 clusters removed.  Postoperative course has been somewhat difficult for pain and she is emotionally overwhelmed today.  Expanders were placed and she will start fills in several weeks.  Her hair is growing back nicely.  Her peripheral neuropathy is almost resolved.  She does have numbness under the right arm secondary to the axillary surgery.     Interval history 7/26/2024: She has had a complicated course since we last saw her.  She developed left breast skin flap necrosis which was I&D.  Subsequently she had 170 cc of serous fluid drained from her left breast and she was admitted for IV antibiotics.  She was found to be allergic to vancomycin and ultimately switched to Bactrim and cephalexin.  Seroma cultures were negative .  Repeat seroma removal was negative.  She apparently also has an MRSA culture that was positive and will be seeing ID next week.  Drain was placed in the left reconstruction/seroma and is still draining 50 cc/day.     Interval history 9/4/2024: She finally completed antibiotics and had healing of her left chest wall after expander was removed.  She has a poor cosmetic result and would like to have this redone when possible.  She is ready to reinitiate HP therapy.     Interval history 9/26/2024: Her chest wall remains stable off antibiotics with no residual or recurrent infection.  Replacement of the left tissue expander is planned for December.  She is not yet getting fills on the right side until the other expander was placed back.  She tolerated H.P. without difficulty.  She is on Lyrica 50 mg twice daily for neuropathy which is controlling the numbness.  She does have a sensation of coldness of her feet on occasion.  Energy level is good and she went swimming for the first time since  starting chemotherapy which she enjoyed.     Interval history 11/8/2024 (Celia Dumont, AOCNP): Patient returns to clinic today for follow-up appointment, and pretreatment check prior to cycle 3 Phesgo.  Patient reports feeling well in general, except for ongoing and persistent neuropathy pain in her feet.  She states she does work at the [employer], where she is on her feet for more than 12 hours/day.  She is starting a new job on the 18th at the [employer], where she will be sitting down more frequently.  She is hopeful that being able to sit will help with her neuropathy pain.  She is requesting an increased dose of her Lyrica, as she states that she is not receiving much relief from the medication.  She is also requesting that her port to be taken out, as soon as possible, as she would like to have it out before she starts her new job.  She states her appetite and energy levels are otherwise good.  She denies any problems with any unusual fatigue, cough, shortness of breath, constipation, diarrhea, nausea, vomiting, fever or chills, or night sweats.  She is looking forward to the arrival of her new grandbaby.  No other complaints or concerns provided.    Interval history 11/27/24 (Basim, APRN):  Visit is conducted as an on demand video visit using Teams and patient is unaccompanied today.   Patient is doing very well overall.  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  She did state that she has lost weight since her initial cancer diagnosis and is feeling well.  She is down to 191 pounds.  She did state that her peripheral apathy is still present however has improved with the increased dose of her Lyrica.  She is scheduled to have an expander placed with Dr. Wooten on 12/9/2024 and requesting to be able to have her port removed at the same time.    Interval history 1/9/2025: She is doing well overall.  She jumped over a rock wall with her daughter on New Year's Janine and  sustained a hairline fracture in her right ankle and is wearing a boot now.  She did have a new tissue expander placed by Dr. Wooten and will be starting fills.  Permanents will be scheduled for March.  Her energy level is good.  She has minimal neuropathy on low-dose Lyrica at this time.     Treatment history:  01/23/24: C1D1 TCHP with Udenyca  02/16/24: C2D2 TCHP with 20-25% reduction in dosing of the chemotherapy and 5 days of Zarxio instead of Udenyca   03/08/24: C3D1 THP (20% dose reduction of Docetaxel) - will hold on Xarzio or Udenyca (d/c Carboplatin d/t tolerability)  03/29/24: C4D1 THP with addition of Cytoxan (continue with dose reduction of Docetaxel by 20%) with Udenyca support   04/19/24: C5D1 HP plus Cytoxan (d/c Docetaxel d/t worsening peripheral neuropathy) - no need for Udenyca  05/10/24: C6 Herceptin and Perjeta  05/31/24: C7 Herceptin and Perjeta  06/21/24: C8 Herceptin and Perjeta  09/27/24: C1 Phesgo   10/19/24: C2 Phesgo   11/08/24: C3 Phesgo   11/30/24: C4 Phesgo  12/21/2024: C5 Phesgo  1/12/2025: C6 Phesgo (treatment completed)      Allergies   Allergen Reactions    Bee Venom Anaphylaxis    Morphine Sulfate Anaphylaxis and Itching    Tape Rash     Paper tape OK, do not use tegaderm    Vancomycin Itching    Pineapple Hives     Current Outpatient Medications on File Prior to Encounter   Medication Sig Dispense Refill    diphenhydrAMINE (BENADRYL) 25 MG Tab Take 25 mg by mouth every 6 hours as needed for Sleep.      pregabalin (LYRICA) 50 MG capsule Take 1 Capsule by mouth 3 times a day for 90 days. (Patient taking differently: Take 50 mg by mouth 3 times a day. Change to 2 in the AM and 2 at NOC) 90 Capsule 2    ELDERBERRY PO Take  by mouth.      APPLE CIDER VINEGAR PO Take  by mouth.      albuterol 108 (90 Base) MCG/ACT Aero Soln inhalation aerosol Inhale 2 Puffs every 4 hours. 8.5 g 1    oxyCODONE-acetaminophen (PERCOCET) 5-325 MG Tab Take 1 Tablet by mouth every 6 hours as needed.  FOR PAIN    "    No current facility-administered medications on file prior to encounter.         Review of Systems   Constitutional:  Negative for chills, fever, malaise/fatigue and weight loss.   Respiratory:  Negative for cough and shortness of breath.    Cardiovascular:  Negative for chest pain and palpitations.   Gastrointestinal:  Negative for constipation, diarrhea, nausea and vomiting.   Genitourinary:  Negative for dysuria.   Neurological:  Positive for tingling. Negative for dizziness and headaches.              Objective     /62 (BP Location: Left arm, Patient Position: Sitting)   Pulse 73   Temp 36.4 °C (97.5 °F) (Temporal)   Ht 1.676 m (5' 6\")   Wt 90.5 kg (199 lb 6.5 oz)   LMP 02/10/2012 (Approximate) Comment: Age of first period:10, No HRT  SpO2 98%   BMI 32.19 kg/m²      Physical Exam  Vitals reviewed.   Constitutional:       General: She is not in acute distress.     Appearance: Normal appearance. She is not diaphoretic.   Pulmonary:      Effort: Pulmonary effort is normal.   Neurological:      Mental Status: She is alert and oriented to person, place, and time.   Psychiatric:         Mood and Affect: Mood normal.         Behavior: Behavior normal.                       Assessment & Plan     1. Malignant neoplasm of upper-outer quadrant of right breast in female, estrogen receptor negative (HCC)                Impression:  1.  Invasive ductal carcinoma of the right breast, grade 3, clinically stage IIa (cT2, cN0) ER negative MI negative, HER2 3+ positive, Ki-67 20 to 30%.  Status post TCHP with poor tolerance of cycle 1 necessitating dose reductions, changed to Cytoxan and ultimately discontinuation of Taxotere.  Clinical complete response to therapy documented after cycle four. MRI showed no residual masses.  Bilateral mastectomy showed a pathologic complete response in the right (ypT0, ypN0).  Left breast normal.  Completing a year of HP.  2.  History of DVT/PE off anticoagulation for several " years  3.  Hot flashes -almost resolved  4. Peripheral neuropathy noted in her toes. residual trace neuropathy on Lyrica.  Reports some mild relief, will increase Lyrica to 50 mg 3 times daily with improvement.  Improved  5.  Infected left tissue expander.  Admitted to the hospital and received IV antibiotics now on oral antibiotics with drain still in place.  Expander now removed and chest wall healed.  Follow-up appointment with plastic surgery planned for 12/20/2024.  New expander placed with fill starting    Plan:  Complete last dose of Phesgo this weekend.  She will continue Lyrica for 3 more months.  We will see her back at that time and possibly discontinue it.      Please note that this dictation was created using voice recognition software. I have made every reasonable attempt to correct obvious errors, but I expect that there are errors of grammar and possibly content that I did not discover before finalizing the note.    This evaluation was conducted via Teams using secure and encrypted videoconferencing technology. The patient was in a private location outside of their home in the [facility].    The patient's identity was confirmed and verbal consent was obtained for this virtual visit.

## 2025-01-09 NOTE — ADDENDUM NOTE
Encounter addended by: Franky Mitchell, Med Ass't on: 1/9/2025 1:47 PM   Actions taken: Charge Capture section accepted

## 2025-01-11 ENCOUNTER — OUTPATIENT INFUSION SERVICES (OUTPATIENT)
Dept: ONCOLOGY | Facility: MEDICAL CENTER | Age: 40
End: 2025-01-11
Attending: INTERNAL MEDICINE
Payer: MEDICAID

## 2025-01-11 VITALS
WEIGHT: 195.33 LBS | BODY MASS INDEX: 30.66 KG/M2 | HEIGHT: 67 IN | DIASTOLIC BLOOD PRESSURE: 65 MMHG | HEART RATE: 70 BPM | SYSTOLIC BLOOD PRESSURE: 107 MMHG | TEMPERATURE: 96.5 F | OXYGEN SATURATION: 95 % | RESPIRATION RATE: 18 BRPM

## 2025-01-11 DIAGNOSIS — C50.411 MALIGNANT NEOPLASM OF UPPER-OUTER QUADRANT OF RIGHT BREAST IN FEMALE, ESTROGEN RECEPTOR NEGATIVE (HCC): ICD-10-CM

## 2025-01-11 DIAGNOSIS — Z17.1 MALIGNANT NEOPLASM OF UPPER-OUTER QUADRANT OF RIGHT BREAST IN FEMALE, ESTROGEN RECEPTOR NEGATIVE (HCC): ICD-10-CM

## 2025-01-11 PROCEDURE — 700111 HCHG RX REV CODE 636 W/ 250 OVERRIDE (IP): Mod: JZ,UD | Performed by: NURSE PRACTITIONER

## 2025-01-11 PROCEDURE — 96401 CHEMO ANTI-NEOPL SQ/IM: CPT

## 2025-01-11 RX ADMIN — PERTUZUMAB, TRASTUZUMAB, AND HYALURONIDASE-ZZXF 1200 MG: 600; 600; 2000 INJECTION, SOLUTION SUBCUTANEOUS at 09:47

## 2025-01-11 ASSESSMENT — FIBROSIS 4 INDEX: FIB4 SCORE: 0.71

## 2025-01-11 NOTE — PROGRESS NOTES
Chemotherapy Verification - SECONDARY RN       Height = 1.69 m  Weight = 88.6 kg  BSA = 2.04 m2       Medication: pertuzumab-trastuzumab-hyaluronidase  Dose: 600-600 mg/10 mL  Calculated Dose: 1,200 mg                             (In mg/m2, AUC, mg/kg)     I confirm that this process was performed independently.

## 2025-01-11 NOTE — PROGRESS NOTES
Pharmacy Chemotherapy Calculation:    Dx: Invasive ductal carcinoma of the right breast TN/ER (-) HER2 (+) stage IIa (cT2, cN0)        Protocol: TCHP (DOCEtaxel/CARBOplatin + Pertuzumab + Trastuzumab)    *Dosing Reference*   Pertuzumab 840 mg IV over 60 minutes on Day 1 of Cycle 1   followed by 420 mg IV over 30 minutes on Day 1 of Cycles 2 - 6   Trastuzumab 8 mg/kg IV over 90 minutes on Day 1 of Cycle 1   followed by 6 mg/kg IV over 30 minutes on Day 1 of Cycles 2 - 6 followed by   *dose reduced to 60 mg/m2 starting C2 per Dr. Thomas due to tolerance  *removed from plan starting C5 per NIYA Cordova due to worsening neutropenia     *dose reduced to AUC 4.5 starting C2 per Dr. Thomas due to tolerance  *removed from plan starting C4 per Dr. Thomas due to tolerance  *substituted with Cyclophosphamide 600 mg/m2 IV over 30 minutes on Day 1 per chemo regimen TC on NCCN Guidelines for Breast Cancer V.2.2024  21-day cycle for  *completed 5/10/24 after 5 cycles for tolerance  ~Followed by~   Pertuzumab 420 mg IV over 30 minutes on Day 1 beginning with Week 19   9/6/24 - Reload with 840 mg on C9 per Dr. Thomas    9/10/24 - substituted Phesgo starting C10 (C5 of HP)  Trastuzumab 6 mg/kg IV over 30 minutes on Day 1 beginning with Week 19   9/6/24 - Reload with 8 mg/kg on C9 per Dr. Thomas    9/10/24 - substituted with Phesgo starting C10 (C5 of HP)  Pertuzumab and trastuzumab and hyaluronidase-zzxf (Phesgo) subcutaneous on Day 1   Initial Dose: 1,200 mg pertuzumab, 600 mg trastuzumab, and 30,000 units hyaluronidase/15 mL - no initial dose as pt has been on traztuzumab and petruzumab   Subsequent Doses: 600 mg pertuzumab, 600 mg trastuzumab, and 20,000 units hyaluronidase/10 mL  21-day cycle to complete 52 weeks total of trastuzumab and pertuzumab therapy    NCCN Guidelines® for Breast Cancer V.2.2022.  Josef LEON et al. Gay Oncol. 2013;24(9):2278-84.  NCCN Guidelines® for Breast Cancer  "V.2.2024.   Paulo LEON et al. Cancer Research. 2020;80(4_suppl):PD4-07.    Allergies:  Bee venom, Morphine sulfate, Tape, and Pineapple     /65   Pulse 70   Temp 35.8 °C (96.5 °F) (Temporal)   Resp 18   Ht 1.69 m (5' 6.54\")   Wt 88.6 kg (195 lb 5.2 oz)   LMP 02/10/2012 (Approximate) Comment: Age of first period:10, No HRT  SpO2 95%   BMI 31.02 kg/m²  Body surface area is 2.04 meters squared.    Per MD loera for ECHOs [id number]  ECHO 5/13/24: LVEF 69%  ECHO 11/1/24: LVEF 65%    No hold parameters per treatment plan.    Drug Order   (Drug name, dose, route, IV Fluid & volume, frequency, number of doses) Cycle 15  Previous treatment: C14 12/21/24   Medication = Pertuzumab and trastuzumab and hyaluronidase-zzxf (Phesgo)   Base Dose = Pertuzumab 600 mg and Trastuzumab 600 mg and Hyaluronidase 20,000 units   Fixed dose; no calc required  Final Dose = Pertuzumab 600 mg and Trastuzumab 600 mg and Hyaluronidase 20,000 units fixed dose   Route = Subcutaneous  Fluid & Volume = 10 mL  Admin Duration = Over 5 minutes          Fixed dose, OK to treat with final dose       By my signature below, I confirm this process was performed independently with the BSA and all final chemotherapy dosing calculations congruent. I have reviewed the above chemotherapy order and that my calculation of the final dose and BSA (when applicable) corroborate those calculations of the  pharmacist.     Kori NunezD  "

## 2025-01-11 NOTE — PROGRESS NOTES
Chemotherapy Verification - PRIMARY RN         Height = 169 cm  Weight = 88.6 kg  BSA = 2.04 mg         Medication: Pertuzumab-Trastuzumab-Hyaluronidase  Dose: Pertuzumab 600 mg/Trastuzumab 600 mg (set dose) Calculated Dose: Pertuzumab 600 mg/Trastuzumab 600 mg                             (In mg/m2, AUC, mg/kg)        I confirm this process was performed independently with the BSA and all final chemotherapy dosing calculations congruent.  Any discrepancies of 10% or greater have been addressed with the chemotherapy pharmacist. The resolution of the discrepancy has been documented in the EPIC progress notes.

## 2025-01-11 NOTE — PROGRESS NOTES
Pharmacy Chemotherapy Verification    Dx: Invasive ductal carcinoma of the right breast WA/ER (-) HER2 (+) stage IIa (cT2, cN0)      Cycle 15  Previous treatment = C14 12/21/24    Protocol: TCHP (DOCEtaxel/CARBOplatin + Pertuzumab + Trastuzumab)    *Dosing Reference*   Pertuzumab 840 mg IV over 60 minutes on Day 1 of Cycle 1   followed by 420 mg IV over 30 minutes on Day 1 of Cycles 2 - 6   Trastuzumab 8 mg/kg IV over 90 minutes on Day 1 of Cycle 1   followed by 6 mg/kg IV over 30 minutes on Day 1 of Cycles 2 - 6 followed by   *dose reduced to 60 mg/m2 starting C2 per Dr. Thomas due to tolerance  *removed from plan starting C5 per NIYA Cordova due to worsening neutropenia     *dose reduced to AUC 4.5 starting C2 per Dr. Thomas due to tolerance  *removed from plan starting C4 per Dr. Thomas due to tolerance  *substituted with Cyclophosphamide 600 mg/m2 IV over 30 minutes on Day 1 per chemo regimen TC on NCCN Guidelines for Breast Cancer V.2.2024  21-day cycle for  *completed 5/10/24 after 5 cycles for tolerance  ~Followed by~   Pertuzumab 420 mg IV over 30 minutes on Day 1 beginning with Week 19   9/6/24 - Reload with 840 mg on C9 per Dr. Thomas    9/10/24 - substituted Phesgo starting C10 (C5 of HP)  Trastuzumab 6 mg/kg IV over 30 minutes on Day 1 beginning with Week 19   9/6/24 - Reload with 8 mg/kg on C9 per Dr. Thomas    9/10/24 - substituted with Phesgo starting C10 (C5 of HP)  Pertuzumab and trastuzumab and hyaluronidase-zzxf (Phesgo) subcutaneous on Day 1   Initial Dose: 1,200 mg pertuzumab, 600 mg trastuzumab, and 30,000 units hyaluronidase/15 mL - no initial dose as pt has been on traztuzumab and petruzumab   Subsequent Doses: 600 mg pertuzumab, 600 mg trastuzumab, and 20,000 units hyaluronidase/10 mL  21-day cycle to complete 52 weeks total of trastuzumab and pertuzumab therapy    NCCN Guidelines® for Breast Cancer V.2.2022.  Josef LEON, et al. Gay Oncol.  "2013;24(9):0982-77.  NCCN Guidelines® for Breast Cancer V.2.2024.   Bates A, et al. Cancer Research. 2020;80(4_suppl):PD4-07.    Allergies:  Bee venom, Morphine sulfate, Tape, and Pineapple     /65   Pulse 70   Temp 35.8 °C (96.5 °F) (Temporal)   Resp 18   Ht 1.69 m (5' 6.54\")   Wt 88.6 kg (195 lb 5.2 oz)   LMP 02/10/2012 (Approximate) Comment: Age of first period:10, No HRT  SpO2 95%   BMI 31.02 kg/m²  Body surface area is 2.04 meters squared.    ECHO - every 4 months  11/1/24 LVEF 65%  5/13/24 LVEF 69%  12/27/23 LVEF 65%    Pertuzumab 600 mg and Trastuzumab 600 mg and Hyaluronidase 20,000 units fixed dose   No calc required, okay to treat with final dose = Pertuzumab 600 mg and Trastuzumab 600 mg and Hyaluronidase 20,000 units subcutaneous    Casimiro Peres, PharmD  "

## 2025-01-11 NOTE — PROGRESS NOTES
Pt arrived to [facility], ambulatory, for Phesgo. Pt voices no new complaints. Pt recently had port removed. Phesgo injected into the L-thigh over 7 minutes with no complications. 15 minute monitoring completed with no complications. Pt left IS with no s/sx of distress. Pt reports this is her final dose and does not need any future appointments at this time.

## 2025-04-10 ENCOUNTER — TELEPHONE (OUTPATIENT)
Dept: HEMATOLOGY ONCOLOGY | Facility: MEDICAL CENTER | Age: 40
End: 2025-04-10
Payer: MEDICAID

## 2025-04-10 ENCOUNTER — APPOINTMENT (OUTPATIENT)
Dept: HEMATOLOGY ONCOLOGY | Facility: MEDICAL CENTER | Age: 40
End: 2025-04-10
Attending: INTERNAL MEDICINE
Payer: MEDICAID

## 2025-04-10 NOTE — TELEPHONE ENCOUNTER
Called patient to check on her. Pt missed her 3 month FV today 4/10 with Dr. Thomas. Waiting to hear from pt and reschedule.

## 2025-04-10 NOTE — TELEPHONE ENCOUNTER
Pt returned call to inform us that she had another appointment to attend and that is why she missed today's appointment. Will RS patient .

## 2025-04-30 ENCOUNTER — HOSPITAL ENCOUNTER (OUTPATIENT)
Dept: HEMATOLOGY ONCOLOGY | Facility: MEDICAL CENTER | Age: 40
End: 2025-04-30
Attending: INTERNAL MEDICINE
Payer: MEDICAID

## 2025-04-30 VITALS
SYSTOLIC BLOOD PRESSURE: 121 MMHG | TEMPERATURE: 98.6 F | HEART RATE: 70 BPM | RESPIRATION RATE: 18 BRPM | WEIGHT: 208.89 LBS | OXYGEN SATURATION: 97 % | HEIGHT: 67 IN | BODY MASS INDEX: 32.79 KG/M2 | DIASTOLIC BLOOD PRESSURE: 60 MMHG

## 2025-04-30 DIAGNOSIS — C50.411 MALIGNANT NEOPLASM OF UPPER-OUTER QUADRANT OF RIGHT BREAST IN FEMALE, ESTROGEN RECEPTOR NEGATIVE (HCC): ICD-10-CM

## 2025-04-30 DIAGNOSIS — Z17.1 MALIGNANT NEOPLASM OF UPPER-OUTER QUADRANT OF RIGHT BREAST IN FEMALE, ESTROGEN RECEPTOR NEGATIVE (HCC): ICD-10-CM

## 2025-04-30 PROCEDURE — 99212 OFFICE O/P EST SF 10 MIN: CPT | Performed by: INTERNAL MEDICINE

## 2025-04-30 ASSESSMENT — ENCOUNTER SYMPTOMS
FEVER: 0
NAUSEA: 0
VOMITING: 0
DIZZINESS: 0
DIARRHEA: 0
PALPITATIONS: 0
SHORTNESS OF BREATH: 0
TINGLING: 1
CONSTIPATION: 0
HEADACHES: 0
COUGH: 0
WEIGHT LOSS: 0
CHILLS: 0

## 2025-04-30 ASSESSMENT — FIBROSIS 4 INDEX: FIB4 SCORE: 0.73

## 2025-04-30 NOTE — PROGRESS NOTES
Subjective   Medical oncology follow-up visit: 2025  Fela Tiwari is a 39 y.o. female who presents with Breast Cancer (3 month fv)          HPI    Referring Physician: Rochelle Gaviria MD   Primary Care:  rFancisco Maier M.D.      Diagnosis: Invasive ductal carcinoma of the right breast, grade 3, clinically stage IIa (cT2, cN0) ER negative AL negative, HER2 3+ positive, Ki-67 20 to 30%      Chief Complaint: Patient seen today for evaluation of her ER negative HER2 positive breast cancer, for continued monitoring of symptoms and side effects of cancer treatments, employing Herceptin and Perjeta.     Oncology history of presenting illness:  Fela Tiwari is a 38 y.o. likely premenopausal white female who self detected a mass in her lateral right breast in 2023.  She had a mammogram on 2023 which showed the palpable mass is likely malignant.  Ultrasound showed it was 2.6 cm irregular hypoechoic spiculated mass.  Left breast was normal.  She underwent a ultrasound-guided biopsy of the right breast on 2023: This revealed an invasive ductal carcinoma, grade 3, ER negative, AL negative, HER2 3+ by IHC, Ki-67 20 to 30%.  An MRI was done yesterday and results are pending.  She had a hysterectomy in  but her ovaries are in place.  She is  3 para 2 and has not taken any hormone replacement therapy.  No family history of breast or ovarian cancer.  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  She was on anticoagulation for a while but has been off for several years.  She has also had gastric sleeve laparoscopic and cholecystectomy and does note chronic nausea.      Interval histories:  Interval history 24 (CAlsop, APRN):  Patient had a difficult time with her first cycle of chemotherapy.  Day after she received the Udenyca shot she presented to the emergency department with severe pain.  Pain was excruciating  throughout her entire body.  She was given IV Dilaudid in the hospital and discharged with Tylenol.  She has been trying to take 1000 g of Tylenol with no relief.  Patient tearful today due to the severity of pain.  She also had experienced blood when urinating.  She did have a positive occult blood on stool and urine during hospitalization.  She was asked to follow-up with urology in the outpatient setting.  She stated since being discharged from the hospital she has not had any blood whatsoever in the urine or will.  She has been experiencing diarrhea anywhere from 5-10 episodes per day likely related to Perjeta.  She has been taking Imodium, approximately 2-3 pills per day with minimal relief.  She does have nausea and vomiting but she does state that Zofran does help.  She does also note thrush which was appreciated on physical examination today.     Interval history 2/15/2024: She had a terrible time with her first cycle of TCHP as noted above.  Her diarrhea has slowed dramatically but she still is using Lomotil occasionally.  She also had severe eye tearing likely due to the Taxotere which persists although it is better.  She has no neuropathy but did have a rash which responded to corticosteroids.  She also had persistent nausea which has finally resolved.  Her thrush is resolved as well.  She has not had any further vaginal bleeding but this has not been evaluated adequately except for a bladder scan which as expected was negative.  She feels like her tumor shrunk some.      Interval history 03/07/24 (Basim, APRN):  Patient still had difficult time with cycle 2.  She did end up in the ER the day after receiving treatment with bleeding again.  She did have bleeding with her first cycle and did end up being seen by urology and underwent a cystoscopy which was negative.  She stated that the bleeding lasted approximately 3-4 days.  She did experience significant nausea and vomiting for the first 2 weeks.  She  also had severe diarrhea up to 10 times per day for the first 2 weeks.  She did take Lomotil with no improvement.  She has noticed some bleeding from the rectum which is from her hemorrhoids with the diarrhea.  She did state that she was given the olanzapine to take for the first 5 days after treatment which did help immensely with her nausea and vomiting as well as she believes to help with her diarrhea as well as sleep.  When she stopped that medication the symptoms returned.  She is noticing her vision feels a little differently.  Her eyes are very dry and watery.  We attempted to give patient days of Zarzio for neutropenia support as patient had significant pain with the Udenyca shot.  She stated that she did not notice any improvement or difference as she did still have the severe myalgias that lasted approximately 2 weeks with the Zarzio injection.  She did state this last week she has felt back to normal.  She is anxious to receive her next cycle of chemo.     Interval history 03/28/24 (CAlsop, APRN):  Patient tolerated her last cycle well as cycle 3 we held carboplatin and awaited approval for Cytoxan.  We also held Udenyca.  She does still have diarrhea noted but that is controlled with Lomotil.  She is noticing some bilateral flank pain but no other urinary symptoms.  With her last cycle she did not experience any blood in the urine/vaginal/rectal area.  She does have some fatigue and has noticed increase in hot flashes.  Hot flashes are quite bothersome to her at this time.  She also noticed peripheral neuropathy in her toes, worse at night.  She is unable to let the covers to touch her feet.     Interval history 04/18/24 (CAlsop, APRN):  Patient biggest complaint at this time as peripheral neuropathy in her feet.  She has the pain, burning and numbness.  She was taking gabapentin 300 mg at night but did take 2 tablets last night because her feet were severe which did help.  She has noticed some nausea  and vomiting worse with this past cycle likely with the addition of Cytoxan.  She does note she has consistently loose bowel movements, but no significant diarrhea.  She does have some hemorrhoids and does have pain with bowel movements at times.  She still has a hot flashes and did take the oxybutynin but she stated that she thought it made him worse.  She stopped taking the oxybutynin and noticed that her hot flashes are better.  She may be getting some relief from the gabapentin.  She also noticed some blisters in her mouth but the mouth rinses did help.  No mouth sores at this time.  She does complain of significant myalgias and arthralgias from the Udenyca shot that last about 3 days.  However she does continue to have the myalgias and arthralgias up till today still present.     Interval history 5/9/2024: Her peripheral neuropathy is better which she attributes in part to reduce chemo and provide to compression stockings that she is using. GI symptoms were much better with the last cycle. She has been in a clinical complete response and we are awaiting MRI results from yesterday to determine whether she needs more chemotherapy or simply HP for cycle 6. She is planning to do bilateral mastectomies with nipple sparing reconstructions.      Interval history 05/30/24 (CAlsop, APRN):  Is doing well.  She tolerated the last cycle well with Herceptin and Perjeta only.  She does still have diarrhea but states is not bothersome.  She was on gabapentin for hot flashes as well as peripheral neuropathy, but she felt like the gabapentin was making her hot flashes worse.  She stopped the gabapentin altogether and stated that her hot flashes have completely resolved.  She does still have some numbness in her toes but nothing like it has been before.  She wears compression socks at night with positive results.  She did get a sinus infection few weeks ago and took 10-day course of Augmentin.  She does still have some ear  clogging but clinical examination does not show any abnormal findings.     Interval history 6/20/2024: She underwent bilateral mastectomies on 6/12/2024.  The right breast showed no residual invasive or in situ carcinoma.  15 sentinel lymph nodes were negative in 4 clusters removed.  Postoperative course has been somewhat difficult for pain and she is emotionally overwhelmed today.  Expanders were placed and she will start fills in several weeks.  Her hair is growing back nicely.  Her peripheral neuropathy is almost resolved.  She does have numbness under the right arm secondary to the axillary surgery.     Interval history 7/26/2024: She has had a complicated course since we last saw her.  She developed left breast skin flap necrosis which was I&D.  Subsequently she had 170 cc of serous fluid drained from her left breast and she was admitted for IV antibiotics.  She was found to be allergic to vancomycin and ultimately switched to Bactrim and cephalexin.  Seroma cultures were negative .  Repeat seroma removal was negative.  She apparently also has an MRSA culture that was positive and will be seeing ID next week.  Drain was placed in the left reconstruction/seroma and is still draining 50 cc/day.     Interval history 9/4/2024: She finally completed antibiotics and had healing of her left chest wall after expander was removed.  She has a poor cosmetic result and would like to have this redone when possible.  She is ready to reinitiate HP therapy.     Interval history 9/26/2024: Her chest wall remains stable off antibiotics with no residual or recurrent infection.  Replacement of the left tissue expander is planned for December.  She is not yet getting fills on the right side until the other expander was placed back.  She tolerated H.P. without difficulty.  She is on Lyrica 50 mg twice daily for neuropathy which is controlling the numbness.  She does have a sensation of coldness of her feet on occasion.  Energy level  is good and she went swimming for the first time since starting chemotherapy which she enjoyed.     Interval history 11/8/2024 (Celia Dumont, AOCNP): Patient returns to clinic today for follow-up appointment, and pretreatment check prior to cycle 3 Phesgo.  Patient reports feeling well in general, except for ongoing and persistent neuropathy pain in her feet.  She states she does work at the [employer], where she is on her feet for more than 12 hours/day.  She is starting a new job on the 18th at the [employer], where she will be sitting down more frequently.  She is hopeful that being able to sit will help with her neuropathy pain.  She is requesting an increased dose of her Lyrica, as she states that she is not receiving much relief from the medication.  She is also requesting that her port to be taken out, as soon as possible, as she would like to have it out before she starts her new job.  She states her appetite and energy levels are otherwise good.  She denies any problems with any unusual fatigue, cough, shortness of breath, constipation, diarrhea, nausea, vomiting, fever or chills, or night sweats.  She is looking forward to the arrival of her new [facility].  No other complaints or concerns provided.    Interval history 11/27/24 (NIYA Stallworth):  Visit is conducted as an on demand video visit using Teams and patient is unaccompanied today.   Patient is doing very well overall.  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  She did state that she has lost weight since her initial cancer diagnosis and is feeling well.  She is down to 191 pounds.  She did state that her peripheral apathy is still present however has improved with the increased dose of her Lyrica.  She is scheduled to have an expander placed with Dr. Wooten on 12/9/2024 and requesting to be able to have her port removed at the same time.    Interval history 1/9/2025: She is doing well overall.  She jumped  over a rock wall with her daughter on New Year's Janine and sustained a hairline fracture in her right ankle and is wearing a boot now.  She did have a new tissue expander placed by Dr. Wooten and will be starting fills.  Permanents will be scheduled for March.  Her energy level is good.  She has minimal neuropathy on low-dose Lyrica at this time.    Interval history 4/30/2025: She has finished a fills on her left tissue expander and is anxious to get a permanent implant because it is somewhat uncomfortable.  She stopped her Lyrica and overall feels fine.     Treatment history:  01/23/24: C1D1 TCHP with Udenyca  02/16/24: C2D2 TCHP with 20-25% reduction in dosing of the chemotherapy and 5 days of Zarxio instead of Udenyca   03/08/24: C3D1 THP (20% dose reduction of Docetaxel) - will hold on Xarzio or Udenyca (d/c Carboplatin d/t tolerability)  03/29/24: C4D1 THP with addition of Cytoxan (continue with dose reduction of Docetaxel by 20%) with Udenyca support   04/19/24: C5D1 HP plus Cytoxan (d/c Docetaxel d/t worsening peripheral neuropathy) - no need for Udenyca  05/10/24: C6 Herceptin and Perjeta  05/31/24: C7 Herceptin and Perjeta  06/21/24: C8 Herceptin and Perjeta  09/27/24: C1 Phesgo   10/19/24: C2 Phesgo   11/08/24: C3 Phesgo   11/30/24: C4 Phesgo  12/21/2024: C5 Phesgo  1/12/2025: C6 Phesgo (treatment completed)      Allergies   Allergen Reactions    Bee Venom Anaphylaxis    Morphine Sulfate Anaphylaxis and Itching    Tape Rash     Paper tape OK, do not use tegaderm    Vancomycin Itching    Pineapple Hives     Current Outpatient Medications on File Prior to Encounter   Medication Sig Dispense Refill    diphenhydrAMINE (BENADRYL) 25 MG Tab Take 25 mg by mouth every 6 hours as needed for Sleep.      ELDERBERRY PO Take  by mouth.      APPLE CIDER VINEGAR PO Take  by mouth.      albuterol 108 (90 Base) MCG/ACT Aero Soln inhalation aerosol Inhale 2 Puffs every 4 hours. 8.5 g 1     No current facility-administered  "medications on file prior to encounter.         Review of Systems   Constitutional:  Negative for chills, fever, malaise/fatigue and weight loss.   Respiratory:  Negative for cough and shortness of breath.    Cardiovascular:  Negative for chest pain and palpitations.   Gastrointestinal:  Negative for constipation, diarrhea, nausea and vomiting.   Genitourinary:  Negative for dysuria.   Neurological:  Positive for tingling. Negative for dizziness and headaches.              Objective     Ht 1.69 m (5' 6.54\")   LMP 02/10/2012 (Approximate) Comment: Age of first period:10, No HRT  BMI 31.02 kg/m²      Physical Exam  Vitals reviewed.   Constitutional:       General: She is not in acute distress.     Appearance: Normal appearance. She is not diaphoretic.   HENT:      Head: Normocephalic.      Mouth/Throat:      Mouth: Mucous membranes are moist.      Pharynx: Oropharynx is clear.   Eyes:      Extraocular Movements: Extraocular movements intact.      Conjunctiva/sclera: Conjunctivae normal.      Pupils: Pupils are equal, round, and reactive to light.   Cardiovascular:      Rate and Rhythm: Normal rate and regular rhythm.      Pulses: Normal pulses.   Pulmonary:      Effort: Pulmonary effort is normal.      Breath sounds: Normal breath sounds.   Abdominal:      General: Abdomen is flat. Bowel sounds are normal.      Palpations: Abdomen is soft. There is no mass.   Musculoskeletal:         General: Normal range of motion.   Skin:     General: Skin is warm.   Neurological:      General: No focal deficit present.      Mental Status: She is alert and oriented to person, place, and time.   Psychiatric:         Mood and Affect: Mood normal.         Behavior: Behavior normal.                       Assessment & Plan      Impression:  1.  Invasive ductal carcinoma of the right breast, grade 3, clinically stage IIa (cT2, cN0) ER negative [place] negative, HER2 3+ positive, Ki-67 20 to 30%.  Status post TCHP with poor tolerance of cycle " 1 necessitating dose reductions, changed to Cytoxan and ultimately discontinuation of Taxotere.  Clinical complete response to therapy documented after cycle four. MRI showed no residual masses.  Bilateral mastectomy showed a pathologic complete response in the right (ypT0, ypN0).  Left breast normal.  Completed a year of HP.  2.  History of DVT/PE off anticoagulation for several years  3.  Hot flashes -almost resolved  4. Peripheral neuropathy noted in her toes. residual trace neuropathy on Lyrica.  Reports some mild relief, will increase Lyrica to 50 mg 3 times daily with improvement.  Improved  5.  Infected left tissue expander.  Admitted to the hospital and received IV antibiotics now on oral antibiotics with drain still in place.  Expander now removed and chest wall healed.  Follow-up appointment with plastic surgery planned for 12/20/2024.  New expander placed with fill starting    Plan:  Continue observation only we will see her back in 6 months.  Imaging is already ordered.    Please note that this dictation was created using voice recognition software. I have made every reasonable attempt to correct obvious errors, but I expect that there are errors of grammar and possibly content that I did not discover before finalizing the note.    Lizandro Thomas MD

## 2025-04-30 NOTE — ADDENDUM NOTE
Encounter addended by: Judith Woods, Med Ass't on: 4/30/2025 4:06 PM   Actions taken: Charge Capture section accepted

## 2025-05-02 ENCOUNTER — APPOINTMENT (OUTPATIENT)
Dept: ADMISSIONS | Facility: MEDICAL CENTER | Age: 40
End: 2025-05-02
Attending: PLASTIC SURGERY
Payer: MEDICAID

## 2025-05-07 DIAGNOSIS — Z17.1 MALIGNANT NEOPLASM OF UPPER-OUTER QUADRANT OF RIGHT BREAST IN FEMALE, ESTROGEN RECEPTOR NEGATIVE (HCC): ICD-10-CM

## 2025-05-07 DIAGNOSIS — C50.411 MALIGNANT NEOPLASM OF UPPER-OUTER QUADRANT OF RIGHT BREAST IN FEMALE, ESTROGEN RECEPTOR NEGATIVE (HCC): ICD-10-CM

## 2025-05-07 RX ORDER — ALPRAZOLAM 0.5 MG
0.5 TABLET ORAL NIGHTLY PRN
Qty: 30 TABLET | Refills: 2 | Status: SHIPPED | OUTPATIENT
Start: 2025-05-07 | End: 2025-08-05

## 2025-05-12 ENCOUNTER — PRE-ADMISSION TESTING (OUTPATIENT)
Dept: ADMISSIONS | Facility: MEDICAL CENTER | Age: 40
End: 2025-05-12
Attending: PLASTIC SURGERY
Payer: MEDICAID

## 2025-05-12 NOTE — OR NURSING
Pre-Admit RN appointment completed for 5/15/25. Copy of Medication Reconciliation with instructions provided to pt via [facility].

## 2025-05-12 NOTE — PREADMIT AVS NOTE
Current Medications   Medication Instructions    ALPRAZolam (XANAX) 0.5 MG Tab Continue taking as prescribed.    diphenhydrAMINE (BENADRYL) 25 MG Tab Continue taking as prescribed.    ELDERBERRY PO Stop 7 days before surgery    APPLE CIDER VINEGAR PO Stop 7 days before surgery    albuterol 108 (90 Base) MCG/ACT Aero Soln inhalation aerosol Continue taking as prescribed.

## 2025-05-14 ENCOUNTER — PRE-ADMISSION TESTING (OUTPATIENT)
Dept: ADMISSIONS | Facility: MEDICAL CENTER | Age: 40
End: 2025-05-14
Attending: PLASTIC SURGERY
Payer: MEDICAID

## 2025-05-14 ENCOUNTER — ANESTHESIA EVENT (OUTPATIENT)
Dept: SURGERY | Facility: MEDICAL CENTER | Age: 40
End: 2025-05-14
Payer: MEDICAID

## 2025-05-14 DIAGNOSIS — Z01.812 PRE-OPERATIVE LABORATORY EXAMINATION: ICD-10-CM

## 2025-05-14 LAB
ANION GAP SERPL CALC-SCNC: 8 MMOL/L (ref 7–16)
BUN SERPL-MCNC: 13 MG/DL (ref 8–22)
CALCIUM SERPL-MCNC: 9 MG/DL (ref 8.5–10.5)
CHLORIDE SERPL-SCNC: 111 MMOL/L (ref 96–112)
CO2 SERPL-SCNC: 26 MMOL/L (ref 20–33)
CREAT SERPL-MCNC: 0.9 MG/DL (ref 0.5–1.4)
ERYTHROCYTE [DISTWIDTH] IN BLOOD BY AUTOMATED COUNT: 47.2 FL (ref 35.9–50)
GFR SERPLBLD CREATININE-BSD FMLA CKD-EPI: 82 ML/MIN/1.73 M 2
GLUCOSE SERPL-MCNC: 113 MG/DL (ref 65–99)
HCT VFR BLD AUTO: 42.4 % (ref 37–47)
HGB BLD-MCNC: 14.4 G/DL (ref 12–16)
MCH RBC QN AUTO: 33.1 PG (ref 27–33)
MCHC RBC AUTO-ENTMCNC: 34 G/DL (ref 32.2–35.5)
MCV RBC AUTO: 97.5 FL (ref 81.4–97.8)
PLATELET # BLD AUTO: 169 K/UL (ref 164–446)
PMV BLD AUTO: 11.1 FL (ref 9–12.9)
POTASSIUM SERPL-SCNC: 4.4 MMOL/L (ref 3.6–5.5)
RBC # BLD AUTO: 4.35 M/UL (ref 4.2–5.4)
SODIUM SERPL-SCNC: 145 MMOL/L (ref 135–145)
WBC # BLD AUTO: 8.7 K/UL (ref 4.8–10.8)

## 2025-05-14 PROCEDURE — 80048 BASIC METABOLIC PNL TOTAL CA: CPT

## 2025-05-14 PROCEDURE — 36415 COLL VENOUS BLD VENIPUNCTURE: CPT

## 2025-05-14 PROCEDURE — 85027 COMPLETE CBC AUTOMATED: CPT

## 2025-05-15 ENCOUNTER — ANESTHESIA (OUTPATIENT)
Dept: SURGERY | Facility: MEDICAL CENTER | Age: 40
End: 2025-05-15
Payer: MEDICAID

## 2025-05-15 ENCOUNTER — HOSPITAL ENCOUNTER (OUTPATIENT)
Facility: MEDICAL CENTER | Age: 40
End: 2025-05-15
Attending: PLASTIC SURGERY | Admitting: PLASTIC SURGERY
Payer: MEDICAID

## 2025-05-15 VITALS
WEIGHT: 212.08 LBS | OXYGEN SATURATION: 94 % | HEART RATE: 82 BPM | TEMPERATURE: 97 F | DIASTOLIC BLOOD PRESSURE: 53 MMHG | HEIGHT: 66 IN | BODY MASS INDEX: 34.08 KG/M2 | SYSTOLIC BLOOD PRESSURE: 106 MMHG | RESPIRATION RATE: 16 BRPM

## 2025-05-15 PROCEDURE — 700101 HCHG RX REV CODE 250: Mod: UD | Performed by: STUDENT IN AN ORGANIZED HEALTH CARE EDUCATION/TRAINING PROGRAM

## 2025-05-15 PROCEDURE — 160048 HCHG OR STATISTICAL LEVEL 1-5: Performed by: PLASTIC SURGERY

## 2025-05-15 PROCEDURE — 700111 HCHG RX REV CODE 636 W/ 250 OVERRIDE (IP): Mod: UD | Performed by: STUDENT IN AN ORGANIZED HEALTH CARE EDUCATION/TRAINING PROGRAM

## 2025-05-15 PROCEDURE — 160002 HCHG RECOVERY MINUTES (STAT): Performed by: PLASTIC SURGERY

## 2025-05-15 PROCEDURE — 160039 HCHG SURGERY MINUTES - EA ADDL 1 MIN LEVEL 3: Performed by: PLASTIC SURGERY

## 2025-05-15 PROCEDURE — 700102 HCHG RX REV CODE 250 W/ 637 OVERRIDE(OP): Mod: UD | Performed by: PLASTIC SURGERY

## 2025-05-15 PROCEDURE — A9270 NON-COVERED ITEM OR SERVICE: HCPCS | Mod: UD | Performed by: STUDENT IN AN ORGANIZED HEALTH CARE EDUCATION/TRAINING PROGRAM

## 2025-05-15 PROCEDURE — 700101 HCHG RX REV CODE 250: Mod: UD | Performed by: PLASTIC SURGERY

## 2025-05-15 PROCEDURE — 700111 HCHG RX REV CODE 636 W/ 250 OVERRIDE (IP): Mod: JZ,UD | Performed by: STUDENT IN AN ORGANIZED HEALTH CARE EDUCATION/TRAINING PROGRAM

## 2025-05-15 PROCEDURE — 502000 HCHG MISC OR IMPLANTS RC 0278: Performed by: PLASTIC SURGERY

## 2025-05-15 PROCEDURE — 700102 HCHG RX REV CODE 250 W/ 637 OVERRIDE(OP): Mod: UD | Performed by: STUDENT IN AN ORGANIZED HEALTH CARE EDUCATION/TRAINING PROGRAM

## 2025-05-15 PROCEDURE — A9270 NON-COVERED ITEM OR SERVICE: HCPCS | Mod: UD | Performed by: PLASTIC SURGERY

## 2025-05-15 PROCEDURE — 160036 HCHG PACU - EA ADDL 30 MINS PHASE I: Performed by: PLASTIC SURGERY

## 2025-05-15 PROCEDURE — 160028 HCHG SURGERY MINUTES - 1ST 30 MINS LEVEL 3: Performed by: PLASTIC SURGERY

## 2025-05-15 PROCEDURE — 160025 RECOVERY II MINUTES (STATS): Performed by: PLASTIC SURGERY

## 2025-05-15 PROCEDURE — 160035 HCHG PACU - 1ST 60 MINS PHASE I: Performed by: PLASTIC SURGERY

## 2025-05-15 PROCEDURE — C1789 PROSTHESIS, BREAST, IMP: HCPCS | Performed by: PLASTIC SURGERY

## 2025-05-15 PROCEDURE — 160046 HCHG PACU - 1ST 60 MINS PHASE II: Performed by: PLASTIC SURGERY

## 2025-05-15 PROCEDURE — 160015 HCHG STAT PREOP MINUTES: Performed by: PLASTIC SURGERY

## 2025-05-15 PROCEDURE — 700105 HCHG RX REV CODE 258: Mod: UD | Performed by: PLASTIC SURGERY

## 2025-05-15 PROCEDURE — 160009 HCHG ANES TIME/MIN: Performed by: PLASTIC SURGERY

## 2025-05-15 DEVICE — IMPLANTABLE DEVICE: Type: IMPLANTABLE DEVICE | Site: BREAST | Status: FUNCTIONAL

## 2025-05-15 RX ORDER — ACETAMINOPHEN 500 MG
1000 TABLET ORAL ONCE
Status: COMPLETED | OUTPATIENT
Start: 2025-05-15 | End: 2025-05-15

## 2025-05-15 RX ORDER — HYDROMORPHONE HYDROCHLORIDE 1 MG/ML
0.1 INJECTION, SOLUTION INTRAMUSCULAR; INTRAVENOUS; SUBCUTANEOUS
Status: DISCONTINUED | OUTPATIENT
Start: 2025-05-15 | End: 2025-05-15 | Stop reason: HOSPADM

## 2025-05-15 RX ORDER — DIPHENHYDRAMINE HYDROCHLORIDE 50 MG/ML
12.5 INJECTION, SOLUTION INTRAMUSCULAR; INTRAVENOUS
Status: DISCONTINUED | OUTPATIENT
Start: 2025-05-15 | End: 2025-05-15 | Stop reason: HOSPADM

## 2025-05-15 RX ORDER — SCOPOLAMINE 1 MG/3D
1 PATCH, EXTENDED RELEASE TRANSDERMAL
Status: DISCONTINUED | OUTPATIENT
Start: 2025-05-15 | End: 2025-05-15 | Stop reason: HOSPADM

## 2025-05-15 RX ORDER — PHENYLEPHRINE HCL IN 0.9% NACL 1 MG/10 ML
SYRINGE (ML) INTRAVENOUS PRN
Status: DISCONTINUED | OUTPATIENT
Start: 2025-05-15 | End: 2025-05-15 | Stop reason: SURG

## 2025-05-15 RX ORDER — HALOPERIDOL 5 MG/ML
1 INJECTION INTRAMUSCULAR
Status: DISCONTINUED | OUTPATIENT
Start: 2025-05-15 | End: 2025-05-15 | Stop reason: HOSPADM

## 2025-05-15 RX ORDER — KETOROLAC TROMETHAMINE 15 MG/ML
INJECTION, SOLUTION INTRAMUSCULAR; INTRAVENOUS PRN
Status: DISCONTINUED | OUTPATIENT
Start: 2025-05-15 | End: 2025-05-15 | Stop reason: SURG

## 2025-05-15 RX ORDER — MIDAZOLAM HYDROCHLORIDE 1 MG/ML
INJECTION INTRAMUSCULAR; INTRAVENOUS PRN
Status: DISCONTINUED | OUTPATIENT
Start: 2025-05-15 | End: 2025-05-15 | Stop reason: SURG

## 2025-05-15 RX ORDER — DEXAMETHASONE SODIUM PHOSPHATE 4 MG/ML
INJECTION, SOLUTION INTRA-ARTICULAR; INTRALESIONAL; INTRAMUSCULAR; INTRAVENOUS; SOFT TISSUE PRN
Status: DISCONTINUED | OUTPATIENT
Start: 2025-05-15 | End: 2025-05-15 | Stop reason: SURG

## 2025-05-15 RX ORDER — ONDANSETRON 2 MG/ML
INJECTION INTRAMUSCULAR; INTRAVENOUS PRN
Status: DISCONTINUED | OUTPATIENT
Start: 2025-05-15 | End: 2025-05-15 | Stop reason: SURG

## 2025-05-15 RX ORDER — BUPIVACAINE HYDROCHLORIDE AND EPINEPHRINE 5; 5 MG/ML; UG/ML
INJECTION, SOLUTION EPIDURAL; INTRACAUDAL; PERINEURAL
Status: DISCONTINUED
Start: 2025-05-15 | End: 2025-05-15 | Stop reason: HOSPADM

## 2025-05-15 RX ORDER — OXYCODONE HCL 5 MG/5 ML
5 SOLUTION, ORAL ORAL
Status: COMPLETED | OUTPATIENT
Start: 2025-05-15 | End: 2025-05-15

## 2025-05-15 RX ORDER — BUPIVACAINE HYDROCHLORIDE AND EPINEPHRINE 5; 5 MG/ML; UG/ML
INJECTION, SOLUTION PERINEURAL
Status: DISCONTINUED | OUTPATIENT
Start: 2025-05-15 | End: 2025-05-15 | Stop reason: HOSPADM

## 2025-05-15 RX ORDER — OXYCODONE HCL 5 MG/5 ML
10 SOLUTION, ORAL ORAL
Status: COMPLETED | OUTPATIENT
Start: 2025-05-15 | End: 2025-05-15

## 2025-05-15 RX ORDER — HYDROMORPHONE HYDROCHLORIDE 1 MG/ML
0.2 INJECTION, SOLUTION INTRAMUSCULAR; INTRAVENOUS; SUBCUTANEOUS
Status: DISCONTINUED | OUTPATIENT
Start: 2025-05-15 | End: 2025-05-15 | Stop reason: HOSPADM

## 2025-05-15 RX ORDER — SODIUM CHLORIDE, SODIUM LACTATE, POTASSIUM CHLORIDE, CALCIUM CHLORIDE 600; 310; 30; 20 MG/100ML; MG/100ML; MG/100ML; MG/100ML
INJECTION, SOLUTION INTRAVENOUS CONTINUOUS
Status: DISCONTINUED | OUTPATIENT
Start: 2025-05-15 | End: 2025-05-15 | Stop reason: HOSPADM

## 2025-05-15 RX ORDER — DEXMEDETOMIDINE HYDROCHLORIDE 100 UG/ML
INJECTION, SOLUTION INTRAVENOUS PRN
Status: DISCONTINUED | OUTPATIENT
Start: 2025-05-15 | End: 2025-05-15 | Stop reason: SURG

## 2025-05-15 RX ORDER — HYDROMORPHONE HYDROCHLORIDE 1 MG/ML
0.4 INJECTION, SOLUTION INTRAMUSCULAR; INTRAVENOUS; SUBCUTANEOUS
Status: DISCONTINUED | OUTPATIENT
Start: 2025-05-15 | End: 2025-05-15 | Stop reason: HOSPADM

## 2025-05-15 RX ORDER — LIDOCAINE HYDROCHLORIDE 20 MG/ML
INJECTION, SOLUTION EPIDURAL; INFILTRATION; INTRACAUDAL; PERINEURAL PRN
Status: DISCONTINUED | OUTPATIENT
Start: 2025-05-15 | End: 2025-05-15 | Stop reason: SURG

## 2025-05-15 RX ORDER — EPHEDRINE SULFATE 50 MG/ML
5 INJECTION, SOLUTION INTRAVENOUS
Status: DISCONTINUED | OUTPATIENT
Start: 2025-05-15 | End: 2025-05-15 | Stop reason: HOSPADM

## 2025-05-15 RX ORDER — CEFAZOLIN SODIUM 1 G/3ML
INJECTION, POWDER, FOR SOLUTION INTRAMUSCULAR; INTRAVENOUS PRN
Status: DISCONTINUED | OUTPATIENT
Start: 2025-05-15 | End: 2025-05-15 | Stop reason: SURG

## 2025-05-15 RX ADMIN — DEXMEDETOMIDINE 15 MCG: 100 INJECTION, SOLUTION INTRAVENOUS at 13:05

## 2025-05-15 RX ADMIN — KETOROLAC TROMETHAMINE 15 MG: 15 INJECTION, SOLUTION INTRAMUSCULAR; INTRAVENOUS at 14:08

## 2025-05-15 RX ADMIN — DEXMEDETOMIDINE 5 MCG: 100 INJECTION, SOLUTION INTRAVENOUS at 14:11

## 2025-05-15 RX ADMIN — FENTANYL CITRATE 50 MCG: 50 INJECTION, SOLUTION INTRAMUSCULAR; INTRAVENOUS at 12:59

## 2025-05-15 RX ADMIN — DEXAMETHASONE SODIUM PHOSPHATE 8 MG: 4 INJECTION INTRA-ARTICULAR; INTRALESIONAL; INTRAMUSCULAR; INTRAVENOUS; SOFT TISSUE at 13:04

## 2025-05-15 RX ADMIN — PROPOFOL 150 MG: 10 INJECTION, EMULSION INTRAVENOUS at 12:59

## 2025-05-15 RX ADMIN — CEFAZOLIN 2 G: 1 INJECTION, POWDER, FOR SOLUTION INTRAMUSCULAR; INTRAVENOUS at 12:59

## 2025-05-15 RX ADMIN — FENTANYL CITRATE 50 MCG: 50 INJECTION, SOLUTION INTRAMUSCULAR; INTRAVENOUS at 14:11

## 2025-05-15 RX ADMIN — ACETAMINOPHEN 1000 MG: 500 TABLET ORAL at 12:20

## 2025-05-15 RX ADMIN — FENTANYL CITRATE 50 MCG: 50 INJECTION, SOLUTION INTRAMUSCULAR; INTRAVENOUS at 14:32

## 2025-05-15 RX ADMIN — DIPHENHYDRAMINE HYDROCHLORIDE 12.5 MG: 50 INJECTION, SOLUTION INTRAMUSCULAR; INTRAVENOUS at 14:39

## 2025-05-15 RX ADMIN — Medication 200 MCG: at 13:25

## 2025-05-15 RX ADMIN — LIDOCAINE HYDROCHLORIDE 50 MG: 20 INJECTION, SOLUTION EPIDURAL; INFILTRATION; INTRACAUDAL; PERINEURAL at 12:59

## 2025-05-15 RX ADMIN — SCOPOLAMINE 1 PATCH: 1.5 PATCH, EXTENDED RELEASE TRANSDERMAL at 12:19

## 2025-05-15 RX ADMIN — MIDAZOLAM HYDROCHLORIDE 2 MG: 1 INJECTION, SOLUTION INTRAMUSCULAR; INTRAVENOUS at 12:54

## 2025-05-15 RX ADMIN — ONDANSETRON 8 MG: 2 INJECTION INTRAMUSCULAR; INTRAVENOUS at 14:08

## 2025-05-15 RX ADMIN — OXYCODONE HYDROCHLORIDE 10 MG: 5 SOLUTION ORAL at 14:32

## 2025-05-15 RX ADMIN — FENTANYL CITRATE 50 MCG: 50 INJECTION, SOLUTION INTRAMUSCULAR; INTRAVENOUS at 13:10

## 2025-05-15 RX ADMIN — FENTANYL CITRATE 50 MCG: 50 INJECTION, SOLUTION INTRAMUSCULAR; INTRAVENOUS at 13:43

## 2025-05-15 RX ADMIN — SODIUM CHLORIDE, POTASSIUM CHLORIDE, SODIUM LACTATE AND CALCIUM CHLORIDE: 600; 310; 30; 20 INJECTION, SOLUTION INTRAVENOUS at 12:33

## 2025-05-15 RX ADMIN — Medication 200 MCG: at 13:15

## 2025-05-15 ASSESSMENT — PAIN DESCRIPTION - PAIN TYPE
TYPE: SURGICAL PAIN

## 2025-05-15 ASSESSMENT — PAIN SCALES - GENERAL: PAIN_LEVEL: 2

## 2025-05-15 ASSESSMENT — FIBROSIS 4 INDEX: FIB4 SCORE: 0.83

## 2025-05-15 NOTE — ANESTHESIA PROCEDURE NOTES
Airway    Date/Time: 5/15/2025 1:01 PM    Performed by: Sabi Garner M.D.  Authorized by: Sabi Garner M.D.    Location:  OR  Urgency:  Elective  Indications for Airway Management:  Anesthesia      Spontaneous Ventilation: absent    Sedation Level:  Deep  Preoxygenated: Yes    Mask Difficulty Assessment:  1 - vent by mask  Final Airway Type:  Supraglottic airway  Final Supraglottic Airway:  Standard LMA    SGA Size:  4  Number of Attempts at Approach:  1

## 2025-05-15 NOTE — ANESTHESIA PREPROCEDURE EVALUATION
Case: [id number] Date/Time: 05/15/25 1305    Procedure: REMOVE BILATERAL SOFT TISSUE EXPANDERS AND PLACE BILATERAL IMPLANTS BREAST    Pre-op diagnosis: HISTORY OF BREAST CANCER, ACQUIRED ABSENCE OF BREAST    Location: [facility] ROOM 27 / SURGERY SAME DAY [facility]    Surgeons: Darrel Tee M.D.            Relevant Problems   CARDIAC   (positive) Hot flashes         (positive) Prerenal acute renal failure (HCC)       Physical Exam    Airway   Mallampati: I  TM distance: >3 FB  Neck ROM: full       Cardiovascular - normal exam   Dental - normal exam           Pulmonary - normal exam   Abdominal    Neurological                Anesthesia Plan    ASA 2       Plan - general       Airway plan will be LMA    (41yo female presenting for bilateral breast implant placement. Appropriately NPO. Allergies reviewed -- notable for adhesive reaction. PMH: BMI 34, GERD, SOB, asthma.)      Induction: intravenous      Pertinent diagnostic labs and testing reviewed    Informed Consent:    Anesthetic plan and risks discussed with patient.    Use of blood products discussed with: patient whom consented to blood products.

## 2025-05-15 NOTE — OR NURSING
1415 patient arrived from OR, report received, attached to monitoring. VSS, patient oxygenating well on 4 L  simple mask, surgical site to bilateral breast with telfa and IV 3000, also to left thigh with telfa and IV 3000, clean/dry/intact on arrival, patient asleep     1432 patient co pain, medicated per EMAR     1445 patient tolerates small sips of water     1502 telephone updates to brother, Saravanan    1600 patient with tolerable pain, continues asleep, wakes up to drink water, denies nausea, returns to sleep     1615 patient's brother brought to bedside     1620 dc instructions discussed with patient and patient's brother, questions answered     1623 patient getting dressed     1630 patient dc home in stable condition at this time, vss, surgical sites remain clean/dry/intact, tolerable pain, denies nausea, piv dc tip intact, all belongings with patient and accounted for, escorted out via wc

## 2025-05-15 NOTE — ANESTHESIA POSTPROCEDURE EVALUATION
Patient: Fela Tristaniott    Procedure Summary       Date: 05/15/25 Room / Location: [facility] ROOM 27 / SURGERY SAME DAY [facility]    Anesthesia Start: 1252 Anesthesia Stop: 1424    Procedure: REMOVE BILATERAL SOFT TISSUE EXPANDERS AND PLACE BILATERAL IMPLANTS BREAST, EXCISION LEFT THIGH SKIN TAG (Bilateral: Breast) Diagnosis: (HISTORY OF BREAST CANCER, ACQUIRED ABSENCE OF BREAST)    Surgeons: Darrel Tee M.D. Responsible Provider: Sabi Garner M.D.    Anesthesia Type: general ASA Status: 2            Final Anesthesia Type: general  Last vitals  BP   Blood Pressure: 121/59    Temp   36.1 °C (96.9 °F)    Pulse   (!) 57   Resp   16    SpO2   96 %      Anesthesia Post Evaluation    Patient location during evaluation: PACU  Patient participation: complete - patient participated  Level of consciousness: awake and alert  Pain score: 2    Airway patency: patent  Anesthetic complications: no  Cardiovascular status: hemodynamically stable  Respiratory status: acceptable  Hydration status: stable    PONV: none          No notable events documented.     Nurse Pain Score: 0 (NPRS)

## 2025-05-15 NOTE — OR SURGEON
Immediate Post OP Note    PreOp Diagnosis: acquired absence of bilateral breast and nipple      PostOp Diagnosis: same      Procedure(s):  REMOVE BILATERAL SOFT TISSUE EXPANDERS AND PLACE BILATERAL IMPLANTS BREAST, EXCISION LEFT THIGH SKIN TAG - Wound Class: Clean    Surgeon(s):  Darrel Tee M.D.    Anesthesiologist/Type of Anesthesia:  Anesthesiologist: Sabi Garner M.D./General    Surgical Staff:  Circulator: Sallie Cowan R.N.  Scrub Person: Rogerio Pierson    Specimens removed if any:  * No specimens in log *    Estimated Blood Loss: minimal    Findings: malposition of left expander from scar tissue    Complications: none        5/15/2025 2:15 PM Darrel Tee M.D.

## 2025-05-15 NOTE — OR NURSING
*** Patient arrived to PACU from OR. Report received from RN and anesthesia. Patient attached to monitoring. VSS. Patient oxygenating well on *** L ***. Site assessed on ****, CDI. No signs of bleeding.

## 2025-05-15 NOTE — DISCHARGE INSTRUCTIONS
What to Expect Post Anesthesia    Rest and take it easy for the first 24 hours.  A responsible adult is recommended to remain with you during that time.  It is normal to feel sleepy.  We encourage you to not do anything that requires balance, judgment or coordination.    FOR 24 HOURS DO NOT:  Drive, operate machinery or run household appliances.  Drink beer or alcoholic beverages.  Make important decisions or sign legal documents.    Special Instructions:  Keep ACE wrap in place for 48 hours then DC.  Wear sports bra once ACE wrap is removed  OK to shower at 48 hours  Call with any worries or concerns  Call if one breast is greatly enlarged compared with the other.        To avoid nausea, slowly advance diet as tolerated, avoiding spicy or greasy foods for the first day.  Add more substantial food to your diet according to your provider's instructions. INCREASE FLUIDS AND FIBER TO AVOID CONSTIPATION.    MILD FLU-LIKE SYMPTOMS ARE NORMAL.  YOU MAY EXPERIENCE GENERALIZED MUSCLE ACHES, THROAT IRRITATION, HEADACHE AND/OR SOME NAUSEA.    If any questions arise, call your provider.  Dr. Tee @ [phone]     If your provider is not available, please feel free to call the Surgical Center at [phone].    MEDICATIONS: Resume taking daily medication.  Take prescribed pain medication with food.  If no medication is prescribed, you may take non-aspirin pain medication if needed.  PAIN MEDICATION CAN BE VERY CONSTIPATING.  Take a stool softener or laxative such as senokot, pericolace, or milk of magnesia if needed.    Last pain medications given:     Tylenol given at 12:20 PM. You may take another dose of tylenol at 6:20 PM if needed.     Oxycodone given @ 2:35 PM    Keep scopolamine patch in place for 72 hours to help prevent nausea. Wash hands thouroughly after removing. OK to remove patch earlier if you experience disorientation, dizziness, eye pain, heart palpitations, confusion, etc.

## 2025-05-15 NOTE — H&P
Surgery Plastic History & Physical Note    Date  5/15/2025    Primary Care Physician  Pcp Pt States None    CC  Acquired absence of bilateral breast and nipple    HPI  This is a 40 y.o. female who presented with a diagnosis of breast cancer. She received immediate reconstruction with soft tissue expanders.  She is expanded and presents for placement of her permanent implants    Past Medical History[1]    Past Surgical History[2]    Current Medications[3]    Social History     Socioeconomic History    Marital status: Single     Spouse name: Not on file    Number of children: Not on file    Years of education: Not on file    Highest education level: Associate degree: academic program   Occupational History    Not on file   Tobacco Use    Smoking status: Former     Current packs/day: 0.00     Average packs/day: 0.5 packs/day for 6.0 years (3.0 ttl pk-yrs)     Types: Cigarettes, Electronic Cigarettes     Start date: 2011     Quit date: 2017     Years since quittin.9    Smokeless tobacco: Never   Vaping Use    Vaping status: Some Days    Substances: Nicotine, THC   Substance and Sexual Activity    Alcohol use: Not Currently     Alcohol/week: 1.2 oz     Types: 2 Shots of liquor per week     Comment: 2 shots weekly, sometimes    Drug use: Yes     Types: Inhaled     Comment: thc, daily    Sexual activity: Yes     Partners: Male     Birth control/protection: Female Sterilization     Comment: Hysterectomy   Other Topics Concern    Not on file   Social History Narrative    Not on file     Social Drivers of Health     Financial Resource Strain: Low Risk  (2023)    Overall Financial Resource Strain (CARDIA)     Difficulty of Paying Living Expenses: Not very hard   Food Insecurity: No Food Insecurity (2024)    Hunger Vital Sign     Worried About Running Out of Food in the Last Year: Never true     Ran Out of Food in the Last Year: Never true   Transportation Needs: No Transportation Needs (2024)     PRAPARE - Transportation     Lack of Transportation (Medical): No     Lack of Transportation (Non-Medical): No   Physical Activity: Insufficiently Active (5/31/2023)    Exercise Vital Sign     Days of Exercise per Week: 3 days     Minutes of Exercise per Session: 30 min   Stress: Stress Concern Present (5/31/2023)    [employer] [place] [employer] - Occupational Stress Questionnaire     Feeling of Stress : To some extent   Social Connections: Socially Isolated (5/31/2023)    Social Connection and Isolation Panel [NHANES]     Frequency of Communication with Friends and Family: More than three times a week     Frequency of Social Gatherings with Friends and Family: More than three times a week     Attends Caodaism Services: Never     Active Member of Clubs or Organizations: No     Attends Club or Organization Meetings: Never     Marital Status:    Intimate Partner Violence: Not At Risk (7/9/2024)    Humiliation, Afraid, Rape, and Kick questionnaire     Fear of Current or Ex-Partner: No     Emotionally Abused: No     Physically Abused: No     Sexually Abused: No   Housing Stability: Low Risk  (7/9/2024)    Housing Stability Vital Sign     Unable to Pay for Housing in the Last Year: No     Number of Places Lived in the Last Year: 1     Unstable Housing in the Last Year: No       Family History   Problem Relation Age of Onset    Diabetes Other     Hypertension Other     Cancer Other        Allergies  Bee venom, Morphine sulfate, Tape, Vancomycin, and Pineapple    Review of Systems  Negative    Physical Exam    Vital Signs  Blood Pressure: 126/71   Temperature: 36.1 °C (96.9 °F)   Pulse: 77   Respiration: 16   Pulse Oximetry: 98 %     Mild asymmetry of breasts, expanders intact  PERRL  Neck is supple  Lungs are clear  Heart is rr  Abdomen is soft  Extremities are unremarkable    Labs:  Recent Labs     05/14/25  1355   WBC 8.7   RBC 4.35   HEMOGLOBIN 14.4   HEMATOCRIT 42.4   MCV 97.5   MCH 33.1*   MCHC  34.0   RDW 47.2   PLATELETCT 169   MPV 11.1     Recent Labs     05/14/25  1355   SODIUM 145   POTASSIUM 4.4   CHLORIDE 111   CO2 26   GLUCOSE 113*   BUN 13   CREATININE 0.90   CALCIUM 9.0               Radiology:  No orders to display         Assessment/Plan:  Breast cancer for removal of bilateral soft tissue expanders and placement of bilateral breast implants  Procedure(s):  REMOVE BILATERAL SOFT TISSUE EXPANDERS AND PLACE BILATERAL IMPLANTS BREAST  Excise left thigh skin tag         [1]   Past Medical History:  Diagnosis Date    Anesthesia 12/28/2023    wakes up disoriented and panics    Asthma 12/28/2023    exercise or illness induced, inhalers prn    Breath shortness     Only with pulmonary embolism    Bronchitis 06/01/2014    Cancer (HCC) 12/28/2023    right outer breast    Cholesterol blood decreased     Heart burn 05/24/2024    history of    Indigestion Due to sleeve    Pain 12/28/2023    right breast very tender    Personal history of venous thrombosis and embolism 05/24/2024    PE bilaterally    Psychiatric problem 05/24/2024    anxiety, panic attacks with procedures   [2]   Past Surgical History:  Procedure Laterality Date    WOUND IRRIGATION & DEBRIDEMENT Left 7/11/2024    Procedure: IRRIGATION AND DEBRIDEMENT, WOUND - LEFT BREAST SEROMA;  Surgeon: Darrel Tee M.D.;  Location: SURGERY SAME DAY [facility];  Service: Plastics    BREAST BIOPSY Left 6/27/2024    Procedure: EXCISE 10CM LEFT BREAST WOUND;  Surgeon: Darrel Tee M.D.;  Location: SURGERY SAME DAY [facility];  Service: Plastics    MASTECTOMY Bilateral 6/12/2024    Procedure: BILATERAL SKIN-SPARING MASTECTOMY, RIGHT SENTINEL LYMPH NODE INJECTION AND BIOPSY;  Surgeon: Rochelle Sullivan M.D.;  Location: SURGERY SAME DAY [facility];  Service: General    NODE BIOPSY SENTINEL Right 6/12/2024    Procedure: BIOPSY, LYMPH NODE, SENTINEL;  Surgeon: Rochelle Sullivan M.D.;  Location: SURGERY SAME DAY [facility];  Service: General    BREAST  RECONSTRUCTION Bilateral 2024    Procedure: BILATERAL BREAST RECONSTRUCTION WITH SOFT TISSUE EXPANDERS;  Surgeon: Darrel Tee M.D.;  Location: SURGERY SAME DAY [facility];  Service: Plastics    DIEGO BY LAPAROSCOPY  2015    Performed by Dre Malone M.D. at SURGERY [facility]    GASTRIC SLEEVE LAPAROSCOPY  2014    Performed by Dre Malone M.D. at SURGERY [facility]    LIVER BIOPSY LAPAROSCOPIC  2014    Performed by Dre Malone M.D. at SURGERY [facility]    HYSTERECTOMY ROBOTIC  2013    LIPOSUCTION  2010    Performed by RAMON NUNN at SURGERY [facility]    LIPOSUCTION  2009    Performed by RAMON NUNN at [facility]    TONSILLECTOMY  1997    OTHER       x2   [3]   Current Facility-Administered Medications   Medication Dose Route Frequency Provider Last Rate Last Admin    scopolamine (Transderm-Scop) patch 1 Patch  1 Patch Transdermal Q72HRS Darrel Tee M.D.        acetaminophen (Tylenol) tablet 1,000 mg  1,000 mg Oral Once Sabi Garner M.D.        lidocaine (Xylocaine) 1 % injection 0.5 mL  0.5 mL Intradermal Once PRN Darrel Tee M.D.        lactated ringers infusion   Intravenous Continuous Darrel Tee M.D.

## 2025-05-15 NOTE — ANESTHESIA TIME REPORT
Anesthesia Start and Stop Event Times       Date Time Event    5/15/2025 1211 Ready for Procedure     1252 Anesthesia Start     1424 Anesthesia Stop          Responsible Staff  05/15/25      Name Role Begin End    Sabi Garner M.D. Anesth 1252 1424          Overtime Reason:  no overtime (within assigned shift)    Comments:

## 2025-05-16 NOTE — OP REPORT
DATE OF SERVICE:  05/15/2025     PREOPERATIVE DIAGNOSIS:  Acquired absence of bilateral breast nipple and skin   tag of inner thigh.     POSTOPERATIVE DIAGNOSIS:  Acquired absence of bilateral breast nipple and skin   tag of inner thigh.     PROCEDURE PERFORMED:  1.  Removal of bilateral soft tissue expanders and placement of bilateral 730   moderate plus style gel implants.  2.  Excision of skin tag of left inner thigh.     SURGEON:  Darrel Tee MD     ASSISTANT:  None.     ANESTHESIOLOGIST:  Sabi Garner MD     OPERATIVE INDICATIONS:  The patient is 40.  She had bilateral mastectomy with   immediate reconstruction utilizing soft tissue expanders.  The patient's right   breast expanded nicely, but the left breast did not expand as well secondary   to scar tissue.  There was clearly some asymmetry and malposition of both   implants with the right implant having lost lateral support being positioned   more laterally and the left implant not expanded fully and in an unsightly   shape secondary to scar tissue.  We discussed the risks, benefits, and   alternatives to proceeding to the operating room for removal of the expanders,   placement of permanent implants, removal of the skin tag.  She knew that we   would do our very best to make her even, but she was likely going to have some   degree of asymmetry.  She was marked in the preoperative holding area.  The   patient consented to proceed with surgery.     OPERATIVE DESCRIPTION:  After induction of general endotracheal anesthesia,   the patient's chest was prepped and draped sterilely.  Local anesthesia was   infiltrated to the operative site.  Bilateral inframammary fold incisions were   made and the dissection took place to the level of the capsule using a   scalpel.  On the right side, cutting cautery was used to enter the pocket by   performing capsulotomy.  The expander was easily identifiable and without   abnormality.  It was punctured and evacuated of its  saline using suction and   was removed.  There were no abnormalities within the pocket other than the   implant having migrated a little bit laterally.  This pocket was irrigated   with antibiotic saline solution and an antibiotic saline solution lap was left   in this site temporarily.  On the left hand side, a similar procedure was   performed.  The capsulotomy was done with cutting cautery.  The expander was   identified and found to be twisted to some degree.  It was also punctured and   evacuated of saline and removed.  This pocket definitely had evidence of   capsular contracture causing, I think the distortion of the expander.    Capsulotomies were done under direct vision using a lighted retractor on the   anterior surface of the capsule superiorly, superomedially, and medially.    There were still some scar tissue bands that I was reluctant to cut with the   scalpel as the tissue was very thin and I elected instead to bluntly dissect   and put pressure on this area into the scar tissue bands dissipated.  Again,   this was done under direct vision using a lighted retractor.  The operative   site was made completely hemostatic.  The pocket was irrigated with antibiotic   saline solution.  At this point, two 700 mL sizers were opened sterilely.    They were bathed in antibiotic saline solution and placed.  Symmetry was good.    The left breast was a little smaller, but when we put a larger sizer in the   left side, it was still asymmetric.  So I felt that we would be good with the   plan of placing the 730 mL implants.  Before the permanent implants were   opened, capsulorrhaphy sutures were placed on the right hand side to try to   match the lateral extent of the pocket to the best of my ability.  These were   0 Ethibond sutures placed in a figure-of-eight manner in interrupted fashion.    Approximately 5 of these were placed.  Further antibiotic irrigation was   performed.  The sizers had been replaced and  symmetry was acceptable.  The 730   mL moderate plus profile gel implants were selected.  They were bathed in   antibiotic saline solution.  The pocket was irrigated with antibiotic saline   solution.  The implants were placed using a no-touch technique.  The patient   was sat upright.  Asymmetry was acceptable and no further pocket work was   necessary.  The patient was placed back in the supine position.  Wounds were   closed in layers and sterile dressings and a compressive wrap were applied.    Next, the patient was frog-legged.  Local anesthesia was administered to the   skin tag and the operative site was prepped and draped sterilely.  The skin   tag was simply grasped, put on tension, and cut.  The base of the skin tag was   approximated using a running 4-0 Monocryl suture.  Sterile dressing was   applied here.  The patient was extubated and taken to the recovery room in   stable condition.        ______________________________  MD ADALI MORALES/HAIDER    DD:  05/15/2025 14:23  DT:  05/15/2025 17:00    Job#:  [id number]

## 2025-05-22 ENCOUNTER — TELEPHONE (OUTPATIENT)
Dept: SURGERY | Facility: MEDICAL CENTER | Age: 40
End: 2025-05-22
Payer: MEDICAID

## 2025-05-22 NOTE — TELEPHONE ENCOUNTER
I called patient to let her know we need a new referral for her to be seen by GRABIEL Arenas on 5/30. Patient will continue to see Dr. Thomas for breast exams as she doesn't have a PCP. Patient cancelled her appointment.

## 2025-05-30 ENCOUNTER — APPOINTMENT (OUTPATIENT)
Dept: SURGERY | Facility: MEDICAL CENTER | Age: 40
End: 2025-05-30
Payer: MEDICAID

## (undated) DEVICE — DRESSING TRANSPARENT FILM TEGADERM 2.375 X 2.75"  (100EA/BX)"

## (undated) DEVICE — TUBE CONNECTING SUCTION - CLEAR PLASTIC STERILE 72 IN (50EA/CA)

## (undated) DEVICE — SUTURE 4-0 MONOCRYL PLUS PS-2 - 27 INCH (36/BX)

## (undated) DEVICE — GLOVE BIOGEL PI INDICATOR SZ 7.0 SURGICAL PF LF - (50/BX 4BX/CA)

## (undated) DEVICE — DRESSING NON ADHERENT 3 X 4 - STERILE (100/BX 12BX/CA)

## (undated) DEVICE — SLEEVE VASO CALF MED - (10PR/CA)

## (undated) DEVICE — SOLUTION PREP PVP IODINE 3/4 OZ POUCH PACKET CONTAINER STERILE LATEX FREE

## (undated) DEVICE — ELECTRODE DUAL RETURN W/ CORD - (50/PK)

## (undated) DEVICE — SHEAR HS FOCUS 9CM CVD - (6/BX)

## (undated) DEVICE — GLOVE BIOGEL SZ 7.5 SURGICAL PF LTX - (50PR/BX 4BX/CA)

## (undated) DEVICE — SUTURE 2-0 VICRYL PLUS CT-2 - 27 INCH (36/BX)

## (undated) DEVICE — MASK AIRWAY SIZE 3 UNIQUE SILICON (10/BX)

## (undated) DEVICE — TUBING CLEARLINK DUO-VENT - C-FLO (48EA/CA)

## (undated) DEVICE — WATER IRRIGATION STERILE 1000ML (12EA/CA)

## (undated) DEVICE — SUCTION INSTRUMENT YANKAUER BULBOUS TIP W/O VENT (50EA/CA)

## (undated) DEVICE — MARKER CORRECT CLIPS (1EA) - MIN ORDER OF 24 EA MUST BE INCREMENTS OF 24

## (undated) DEVICE — SYRINGE 10 ML CONTROL LL (25EA/BX 4BX/CA)

## (undated) DEVICE — SUTURE 3-0 MONOCRYL PLUS PS-2 - (12/BX)

## (undated) DEVICE — MASK AIRWAY FLEXIBLE SINGLE-USE SIZE 4 ADULTS (10EA/BX)

## (undated) DEVICE — CHLORAPREP 26 ML APPLICATOR - ORANGE TINT(25/CA)

## (undated) DEVICE — COVER LIGHT HANDLE FLEXIBLE - SOFT (2EA/PK 80PK/CA)

## (undated) DEVICE — SUTURE 3.5-0 ETHIBOND GREEN CT-2 TAPER (36PK/BX)

## (undated) DEVICE — GOWN WARMING STANDARD FLEX - (30/CA)

## (undated) DEVICE — SODIUM CHL IRRIGATION 0.9% 1000ML (12EA/CA)

## (undated) DEVICE — LACTATED RINGERS INJ 1000 ML - (14EA/CA 60CA/PF)

## (undated) DEVICE — CANNULA O2 COMFORT SOFT EAR ADULT 7 FT TUBING (50/CA)

## (undated) DEVICE — SUTURE GENERAL

## (undated) DEVICE — SENSOR OXIMETER ADULT SPO2 RD SET (20EA/BX)

## (undated) DEVICE — SUTURE 3-0 ETHILON FS-1 - (36/BX) 30 INCH

## (undated) DEVICE — GOWN SURGICAL X-LARGE ULTRA - FILM-REINFORCED (20/CA)

## (undated) DEVICE — RESERVOIR SUCTION 100 CC - SILICONE (20EA/CA)

## (undated) DEVICE — SET LEADWIRE 5 LEAD BEDSIDE DISPOSABLE ECG (1SET OF 5/EA)

## (undated) DEVICE — BANDAGE ELASTIC STERILE MATRIX 6 X 10 (20EA/CA)

## (undated) DEVICE — TUBE CONNECT SUCTION CLEAR 120 X 1/4" (50EA/CA)"

## (undated) DEVICE — SUTURE 3-0 VICRYL PLUS CT-1 - 36 INCH (36/BX)

## (undated) DEVICE — TOWEL STOP TIMEOUT SAFETY FLAG (40EA/CA)

## (undated) DEVICE — DRESSING ANTIMICROBIAL BIOPATCH 4.0M (40EA/CA)

## (undated) DEVICE — MASK OXYGEN VNYL ADLT MED CONC WITH 7 FOOT TUBING  - (50EA/CA)

## (undated) DEVICE — GOWN SURGEONS X-LARGE - DISP. (30/CA)

## (undated) DEVICE — DEVICE MONOPOLAR RF PEAK PLASMABLADE 3.0S

## (undated) DEVICE — SLEEVE VASO DVT COMPRESSION CALF MED - (10PR/CA)

## (undated) DEVICE — SUTURE 3-0 ETHILON PS-1 (36PK/BX)

## (undated) DEVICE — CANISTER SUCTION RIGID RED 1500CC (40EA/CA)

## (undated) DEVICE — BOVIE BLADE COATED &INSULATED (50EA/PK)

## (undated) DEVICE — RETRACTOR LIGHTED RADIALUX

## (undated) DEVICE — CANISTER SUCTION 3000ML MECHANICAL FILTER AUTO SHUTOFF MEDI-VAC NONSTERILE LF DISP  (40EA/CA)

## (undated) DEVICE — DRESSING TRANSPARENT FILM TEGADERM 4 X 4.75" (50EA/BX)"

## (undated) DEVICE — DRAPE SURGICAL U 77X120 - (10/CA)

## (undated) DEVICE — SUTURE 4-0 VICRYL PLUS SH - UNDYED 27 INCH (36PK/BX)

## (undated) DEVICE — Device

## (undated) DEVICE — GLOVE BIOGEL PI INDICATOR SZ 7.5 SURGICAL PF LF -(50/BX 4BX/CA)

## (undated) DEVICE — KIT  I.V. START (100EA/CA)

## (undated) DEVICE — GLOVE SZ 7 BIOGEL PI MICRO - PF LF (50PR/BX 4BX/CA)

## (undated) DEVICE — CANISTER SUCTION 3000ML MECHANICAL FILTER AUTO SHUTOFF MEDI-VAC NONSTERILE LF DISP (40EA/CA)

## (undated) DEVICE — SUTURE 3-0 MONOCRYL PLUS PS-1 - 27 INCH (36/BX)

## (undated) DEVICE — TUBE E-T HI-LO CUFF 7.0MM (10EA/PK)

## (undated) DEVICE — SUTURE 2-0 VICRYL PLUS CT-1 36 (36PK/BX)"

## (undated) DEVICE — DRESSING NON-ADHERING 8 X 3 - (50/BX)

## (undated) DEVICE — PACK BREAST RECONSTRUCTION (2EA/CA)

## (undated) DEVICE — DRAIN J-VAC 10MM FLAT - (10/CA)

## (undated) DEVICE — DRAPE LAPAROTOMY T SHEET - (12EA/CA)

## (undated) DEVICE — MASK OXYGEN VNYL ADLT MED CONC WITH 7 FOOT TUBING - (50EA/CA)

## (undated) DEVICE — GLOVE SZ 6.5 BIOGEL PI MICRO - PF LF (50PR/BX)

## (undated) DEVICE — GLOVE SZ 7.5 BIOGEL PI MICRO - PF LF (50PR/BX)

## (undated) DEVICE — PAD LAP STERILE 18 X 18 - (5/PK 40PK/CA)

## (undated) DEVICE — COVER CIV-FLEX TRANSDUCER - (24/BX)

## (undated) DEVICE — GUIDE PHOTON WIDE FLAT

## (undated) DEVICE — SUTURE 2-0 VICRYL PLUS SH - 27 INCH (36/BX)

## (undated) DEVICE — MAT PATIENT POSITIONING PREVALON (10EA/CA)